# Patient Record
Sex: FEMALE | Race: WHITE | NOT HISPANIC OR LATINO | Employment: PART TIME | URBAN - METROPOLITAN AREA
[De-identification: names, ages, dates, MRNs, and addresses within clinical notes are randomized per-mention and may not be internally consistent; named-entity substitution may affect disease eponyms.]

---

## 2022-12-04 ENCOUNTER — APPOINTMENT (EMERGENCY)
Dept: CT IMAGING | Facility: HOSPITAL | Age: 62
End: 2022-12-04

## 2022-12-04 ENCOUNTER — HOSPITAL ENCOUNTER (INPATIENT)
Facility: HOSPITAL | Age: 62
LOS: 31 days | End: 2023-01-04
Attending: EMERGENCY MEDICINE | Admitting: SURGERY

## 2022-12-04 DIAGNOSIS — A41.9 SEPTIC SHOCK (HCC): ICD-10-CM

## 2022-12-04 DIAGNOSIS — R65.21 SEPTIC SHOCK (HCC): ICD-10-CM

## 2022-12-04 DIAGNOSIS — K57.32 SIGMOID DIVERTICULITIS: ICD-10-CM

## 2022-12-04 DIAGNOSIS — T81.43XA POSTOPERATIVE INTRA-ABDOMINAL ABSCESS: ICD-10-CM

## 2022-12-04 DIAGNOSIS — K57.92 DIVERTICULITIS: Primary | ICD-10-CM

## 2022-12-04 DIAGNOSIS — J90 PLEURAL EFFUSION: ICD-10-CM

## 2022-12-04 DIAGNOSIS — J96.01 ACUTE RESPIRATORY FAILURE WITH HYPOXIA (HCC): ICD-10-CM

## 2022-12-04 LAB
ALBUMIN SERPL BCP-MCNC: 4.4 G/DL (ref 3.5–5)
ALP SERPL-CCNC: 68 U/L (ref 34–104)
ALT SERPL W P-5'-P-CCNC: 17 U/L (ref 7–52)
ANION GAP SERPL CALCULATED.3IONS-SCNC: 9 MMOL/L (ref 4–13)
APTT PPP: 26 SECONDS (ref 23–37)
AST SERPL W P-5'-P-CCNC: 17 U/L (ref 13–39)
BASOPHILS # BLD AUTO: 0.04 THOUSANDS/ÂΜL (ref 0–0.1)
BASOPHILS NFR BLD AUTO: 1 % (ref 0–1)
BILIRUB SERPL-MCNC: 0.47 MG/DL (ref 0.2–1)
BILIRUB UR QL STRIP: NEGATIVE
BUN SERPL-MCNC: 18 MG/DL (ref 5–25)
CALCIUM SERPL-MCNC: 10 MG/DL (ref 8.4–10.2)
CHLORIDE SERPL-SCNC: 104 MMOL/L (ref 96–108)
CLARITY UR: CLEAR
CO2 SERPL-SCNC: 25 MMOL/L (ref 21–32)
COLOR UR: ABNORMAL
CREAT SERPL-MCNC: 0.9 MG/DL (ref 0.6–1.3)
EOSINOPHIL # BLD AUTO: 0.12 THOUSAND/ÂΜL (ref 0–0.61)
EOSINOPHIL NFR BLD AUTO: 3 % (ref 0–6)
ERYTHROCYTE [DISTWIDTH] IN BLOOD BY AUTOMATED COUNT: 12.5 % (ref 11.6–15.1)
GFR SERPL CREATININE-BSD FRML MDRD: 68 ML/MIN/1.73SQ M
GLUCOSE SERPL-MCNC: 104 MG/DL (ref 65–140)
GLUCOSE UR STRIP-MCNC: NEGATIVE MG/DL
HCT VFR BLD AUTO: 42.1 % (ref 34.8–46.1)
HGB BLD-MCNC: 14.1 G/DL (ref 11.5–15.4)
HGB UR QL STRIP.AUTO: NEGATIVE
IMM GRANULOCYTES # BLD AUTO: 0.01 THOUSAND/UL (ref 0–0.2)
IMM GRANULOCYTES NFR BLD AUTO: 0 % (ref 0–2)
INR PPP: 0.93 (ref 0.84–1.19)
KETONES UR STRIP-MCNC: NEGATIVE MG/DL
LEUKOCYTE ESTERASE UR QL STRIP: NEGATIVE
LIPASE SERPL-CCNC: 15 U/L (ref 11–82)
LYMPHOCYTES # BLD AUTO: 2.04 THOUSANDS/ÂΜL (ref 0.6–4.47)
LYMPHOCYTES NFR BLD AUTO: 43 % (ref 14–44)
MCH RBC QN AUTO: 31.7 PG (ref 26.8–34.3)
MCHC RBC AUTO-ENTMCNC: 33.5 G/DL (ref 31.4–37.4)
MCV RBC AUTO: 95 FL (ref 82–98)
MONOCYTES # BLD AUTO: 0.46 THOUSAND/ÂΜL (ref 0.17–1.22)
MONOCYTES NFR BLD AUTO: 10 % (ref 4–12)
NEUTROPHILS # BLD AUTO: 2.07 THOUSANDS/ÂΜL (ref 1.85–7.62)
NEUTS SEG NFR BLD AUTO: 43 % (ref 43–75)
NITRITE UR QL STRIP: NEGATIVE
NRBC BLD AUTO-RTO: 0 /100 WBCS
PH UR STRIP.AUTO: 8 [PH]
PLATELET # BLD AUTO: 248 THOUSANDS/UL (ref 149–390)
PMV BLD AUTO: 9.9 FL (ref 8.9–12.7)
POTASSIUM SERPL-SCNC: 3.8 MMOL/L (ref 3.5–5.3)
PROT SERPL-MCNC: 6.7 G/DL (ref 6.4–8.4)
PROT UR STRIP-MCNC: NEGATIVE MG/DL
PROTHROMBIN TIME: 12.5 SECONDS (ref 11.6–14.5)
RBC # BLD AUTO: 4.45 MILLION/UL (ref 3.81–5.12)
SODIUM SERPL-SCNC: 138 MMOL/L (ref 135–147)
SP GR UR STRIP.AUTO: 1.02
UROBILINOGEN UR QL STRIP.AUTO: 0.2 E.U./DL
WBC # BLD AUTO: 4.74 THOUSAND/UL (ref 4.31–10.16)

## 2022-12-04 RX ORDER — FENTANYL CITRATE 50 UG/ML
25 INJECTION, SOLUTION INTRAMUSCULAR; INTRAVENOUS EVERY 2 HOUR PRN
Status: DISCONTINUED | OUTPATIENT
Start: 2022-12-04 | End: 2022-12-05

## 2022-12-04 RX ORDER — SODIUM CHLORIDE, SODIUM LACTATE, POTASSIUM CHLORIDE, CALCIUM CHLORIDE 600; 310; 30; 20 MG/100ML; MG/100ML; MG/100ML; MG/100ML
75 INJECTION, SOLUTION INTRAVENOUS CONTINUOUS
Status: DISCONTINUED | OUTPATIENT
Start: 2022-12-04 | End: 2022-12-07

## 2022-12-04 RX ORDER — KETOROLAC TROMETHAMINE 30 MG/ML
15 INJECTION, SOLUTION INTRAMUSCULAR; INTRAVENOUS ONCE
Status: COMPLETED | OUTPATIENT
Start: 2022-12-04 | End: 2022-12-04

## 2022-12-04 RX ORDER — ACETAMINOPHEN 325 MG/1
975 TABLET ORAL EVERY 8 HOURS SCHEDULED
Status: DISCONTINUED | OUTPATIENT
Start: 2022-12-04 | End: 2022-12-05

## 2022-12-04 RX ORDER — ACETAMINOPHEN 325 MG/1
975 TABLET ORAL ONCE
Status: COMPLETED | OUTPATIENT
Start: 2022-12-04 | End: 2022-12-04

## 2022-12-04 RX ORDER — OMEPRAZOLE 40 MG/1
40 CAPSULE, DELAYED RELEASE ORAL 2 TIMES DAILY
COMMUNITY

## 2022-12-04 RX ORDER — ONDANSETRON 2 MG/ML
4 INJECTION INTRAMUSCULAR; INTRAVENOUS ONCE
Status: DISCONTINUED | OUTPATIENT
Start: 2022-12-04 | End: 2022-12-04

## 2022-12-04 RX ORDER — MAGNESIUM HYDROXIDE/ALUMINUM HYDROXICE/SIMETHICONE 120; 1200; 1200 MG/30ML; MG/30ML; MG/30ML
30 SUSPENSION ORAL ONCE
Status: COMPLETED | OUTPATIENT
Start: 2022-12-04 | End: 2022-12-04

## 2022-12-04 RX ORDER — FENTANYL CITRATE 50 UG/ML
25 INJECTION, SOLUTION INTRAMUSCULAR; INTRAVENOUS ONCE
Status: COMPLETED | OUTPATIENT
Start: 2022-12-04 | End: 2022-12-04

## 2022-12-04 RX ORDER — PANTOPRAZOLE SODIUM 40 MG/10ML
40 INJECTION, POWDER, LYOPHILIZED, FOR SOLUTION INTRAVENOUS EVERY 12 HOURS SCHEDULED
Status: DISCONTINUED | OUTPATIENT
Start: 2022-12-04 | End: 2022-12-23

## 2022-12-04 RX ORDER — PROMETHAZINE HYDROCHLORIDE 25 MG/ML
12.5 INJECTION, SOLUTION INTRAMUSCULAR; INTRAVENOUS EVERY 4 HOURS PRN
Status: DISCONTINUED | OUTPATIENT
Start: 2022-12-04 | End: 2022-12-05

## 2022-12-04 RX ORDER — DICYCLOMINE HCL 20 MG
20 TABLET ORAL ONCE
Status: COMPLETED | OUTPATIENT
Start: 2022-12-04 | End: 2022-12-04

## 2022-12-04 RX ORDER — LISINOPRIL 40 MG/1
40 TABLET ORAL DAILY
COMMUNITY

## 2022-12-04 RX ORDER — KETOROLAC TROMETHAMINE 30 MG/ML
15 INJECTION, SOLUTION INTRAMUSCULAR; INTRAVENOUS EVERY 6 HOURS SCHEDULED
Status: DISCONTINUED | OUTPATIENT
Start: 2022-12-04 | End: 2022-12-04

## 2022-12-04 RX ORDER — LIDOCAINE HYDROCHLORIDE 20 MG/ML
15 SOLUTION OROPHARYNGEAL ONCE
Status: COMPLETED | OUTPATIENT
Start: 2022-12-04 | End: 2022-12-04

## 2022-12-04 RX ORDER — AMLODIPINE BESYLATE 10 MG/1
10 TABLET ORAL DAILY
COMMUNITY

## 2022-12-04 RX ORDER — HEPARIN SODIUM 5000 [USP'U]/ML
5000 INJECTION, SOLUTION INTRAVENOUS; SUBCUTANEOUS EVERY 8 HOURS SCHEDULED
Status: DISCONTINUED | OUTPATIENT
Start: 2022-12-04 | End: 2022-12-05

## 2022-12-04 RX ORDER — ACETAMINOPHEN 650 MG/1
650 SUPPOSITORY RECTAL EVERY 4 HOURS PRN
Status: DISCONTINUED | OUTPATIENT
Start: 2022-12-04 | End: 2022-12-08

## 2022-12-04 RX ORDER — NEBIVOLOL 5 MG/1
5 TABLET ORAL DAILY
COMMUNITY

## 2022-12-04 RX ORDER — ONDANSETRON 2 MG/ML
4 INJECTION INTRAMUSCULAR; INTRAVENOUS EVERY 6 HOURS PRN
Status: DISCONTINUED | OUTPATIENT
Start: 2022-12-04 | End: 2022-12-22

## 2022-12-04 RX ORDER — FENTANYL CITRATE 50 UG/ML
50 INJECTION, SOLUTION INTRAMUSCULAR; INTRAVENOUS EVERY 2 HOUR PRN
Status: DISCONTINUED | OUTPATIENT
Start: 2022-12-04 | End: 2022-12-05

## 2022-12-04 RX ORDER — FENTANYL CITRATE 50 UG/ML
50 INJECTION, SOLUTION INTRAMUSCULAR; INTRAVENOUS ONCE
Status: DISCONTINUED | OUTPATIENT
Start: 2022-12-04 | End: 2022-12-05

## 2022-12-04 RX ORDER — KETOROLAC TROMETHAMINE 30 MG/ML
30 INJECTION, SOLUTION INTRAMUSCULAR; INTRAVENOUS EVERY 6 HOURS SCHEDULED
Status: DISCONTINUED | OUTPATIENT
Start: 2022-12-04 | End: 2022-12-05

## 2022-12-04 RX ORDER — ONDANSETRON 2 MG/ML
4 INJECTION INTRAMUSCULAR; INTRAVENOUS ONCE
Status: COMPLETED | OUTPATIENT
Start: 2022-12-04 | End: 2022-12-04

## 2022-12-04 RX ADMIN — ALUMINUM HYDROXIDE, MAGNESIUM HYDROXIDE, AND SIMETHICONE 30 ML: 200; 200; 20 SUSPENSION ORAL at 07:35

## 2022-12-04 RX ADMIN — ACETAMINOPHEN 975 MG: 325 TABLET ORAL at 07:19

## 2022-12-04 RX ADMIN — PROMETHAZINE HYDROCHLORIDE 12.5 MG: 25 INJECTION INTRAMUSCULAR; INTRAVENOUS at 15:52

## 2022-12-04 RX ADMIN — SODIUM CHLORIDE, SODIUM LACTATE, POTASSIUM CHLORIDE, AND CALCIUM CHLORIDE 1000 ML: .6; .31; .03; .02 INJECTION, SOLUTION INTRAVENOUS at 15:31

## 2022-12-04 RX ADMIN — SODIUM CHLORIDE 1000 ML: 0.9 INJECTION, SOLUTION INTRAVENOUS at 09:46

## 2022-12-04 RX ADMIN — ACETAMINOPHEN 975 MG: 325 TABLET ORAL at 21:13

## 2022-12-04 RX ADMIN — LIDOCAINE HYDROCHLORIDE 15 ML: 20 SOLUTION OROPHARYNGEAL at 07:35

## 2022-12-04 RX ADMIN — PIPERACILLIN AND TAZOBACTAM 3.38 G: 3; .375 INJECTION, POWDER, FOR SOLUTION INTRAVENOUS at 09:28

## 2022-12-04 RX ADMIN — KETOROLAC TROMETHAMINE 30 MG: 30 INJECTION, SOLUTION INTRAMUSCULAR at 23:54

## 2022-12-04 RX ADMIN — ONDANSETRON 4 MG: 2 INJECTION INTRAMUSCULAR; INTRAVENOUS at 09:28

## 2022-12-04 RX ADMIN — FENTANYL CITRATE 25 MCG: 50 INJECTION INTRAMUSCULAR; INTRAVENOUS at 09:43

## 2022-12-04 RX ADMIN — SODIUM CHLORIDE, POTASSIUM CHLORIDE, SODIUM LACTATE AND CALCIUM CHLORIDE 125 ML/HR: 600; 310; 30; 20 INJECTION, SOLUTION INTRAVENOUS at 10:48

## 2022-12-04 RX ADMIN — SODIUM CHLORIDE 1000 ML: 0.9 INJECTION, SOLUTION INTRAVENOUS at 07:18

## 2022-12-04 RX ADMIN — HEPARIN SODIUM 5000 UNITS: 5000 INJECTION INTRAVENOUS; SUBCUTANEOUS at 21:14

## 2022-12-04 RX ADMIN — FENTANYL CITRATE 50 MCG: 50 INJECTION INTRAMUSCULAR; INTRAVENOUS at 20:04

## 2022-12-04 RX ADMIN — SODIUM CHLORIDE, POTASSIUM CHLORIDE, SODIUM LACTATE AND CALCIUM CHLORIDE 125 ML/HR: 600; 310; 30; 20 INJECTION, SOLUTION INTRAVENOUS at 22:37

## 2022-12-04 RX ADMIN — FENTANYL CITRATE 50 MCG: 50 INJECTION INTRAMUSCULAR; INTRAVENOUS at 11:50

## 2022-12-04 RX ADMIN — ONDANSETRON 4 MG: 2 INJECTION INTRAMUSCULAR; INTRAVENOUS at 14:17

## 2022-12-04 RX ADMIN — KETOROLAC TROMETHAMINE 15 MG: 30 INJECTION, SOLUTION INTRAMUSCULAR at 07:19

## 2022-12-04 RX ADMIN — PROMETHAZINE HYDROCHLORIDE 12.5 MG: 25 INJECTION INTRAMUSCULAR; INTRAVENOUS at 20:04

## 2022-12-04 RX ADMIN — PANTOPRAZOLE SODIUM 40 MG: 40 INJECTION, POWDER, LYOPHILIZED, FOR SOLUTION INTRAVENOUS at 12:56

## 2022-12-04 RX ADMIN — FENTANYL CITRATE 50 MCG: 50 INJECTION INTRAMUSCULAR; INTRAVENOUS at 15:52

## 2022-12-04 RX ADMIN — IOHEXOL 100 ML: 350 INJECTION, SOLUTION INTRAVENOUS at 08:27

## 2022-12-04 RX ADMIN — PROMETHAZINE HYDROCHLORIDE 12.5 MG: 25 INJECTION INTRAMUSCULAR; INTRAVENOUS at 11:50

## 2022-12-04 RX ADMIN — PIPERACILLIN AND TAZOBACTAM 4.5 G: 4; .5 INJECTION, POWDER, FOR SOLUTION INTRAVENOUS at 21:14

## 2022-12-04 RX ADMIN — HEPARIN SODIUM 5000 UNITS: 5000 INJECTION INTRAVENOUS; SUBCUTANEOUS at 15:31

## 2022-12-04 RX ADMIN — PIPERACILLIN AND TAZOBACTAM 4.5 G: 4; .5 INJECTION, POWDER, FOR SOLUTION INTRAVENOUS at 17:16

## 2022-12-04 RX ADMIN — DICYCLOMINE HYDROCHLORIDE 20 MG: 20 TABLET ORAL at 07:35

## 2022-12-04 RX ADMIN — KETOROLAC TROMETHAMINE 30 MG: 30 INJECTION, SOLUTION INTRAMUSCULAR at 11:49

## 2022-12-04 RX ADMIN — PANTOPRAZOLE SODIUM 40 MG: 40 INJECTION, POWDER, LYOPHILIZED, FOR SOLUTION INTRAVENOUS at 21:12

## 2022-12-04 RX ADMIN — KETOROLAC TROMETHAMINE 30 MG: 30 INJECTION, SOLUTION INTRAMUSCULAR at 18:05

## 2022-12-04 NOTE — ED NOTES
Patient and  made aware that patient will be hold in ED until bed becomes available       Garrick Dennis RN  12/04/22 102

## 2022-12-04 NOTE — ED PROVIDER NOTES
History  Chief Complaint   Patient presents with   • Abdominal Pain     Patient reports lower abdominal pain that started at 0300  Patient reports cramping and nausea  Patient is a 59-year-old female the history of GERD, hiatal hernia, who presents for evaluation of lower abdominal pain  Patient says the symptoms started around 3:00 a m  this morning  She says it has been progressing  She describes it as sharp in nature  Does not radiate into her back or anywhere else  She denies any nausea, vomiting  Patient says that she had a small bowel movement yesterday  She denies any chest pain, shortness of breath  Patient has a history of multiple abdominal surgeries  She took some Pepto-Bismol and an aphthous at home which did not help  Prior to Admission Medications   Prescriptions Last Dose Informant Patient Reported? Taking? amLODIPine (NORVASC) 10 mg tablet 12/3/2022  Yes Yes   Sig: Take 10 mg by mouth daily   lisinopril (ZESTRIL) 40 mg tablet 12/3/2022  Yes Yes   Sig: Take 40 mg by mouth daily   nebivolol (BYSTOLIC) 5 mg tablet 88/7/5005  Yes Yes   Sig: Take 5 mg by mouth daily   omeprazole (PriLOSEC) 40 MG capsule 12/3/2022  Yes Yes   Sig: Take 40 mg by mouth 2 (two) times a day      Facility-Administered Medications: None       Past Medical History:   Diagnosis Date   • Asthma    • Bronchiectasis ([facility] [address] )    • Bronchomalacia    • GERD (gastroesophageal reflux disease)    • Hiatal hernia        Past Surgical History:   Procedure Laterality Date   • APPENDECTOMY     • CHOLECYSTECTOMY     • HYSTERECTOMY     • KNEE SURGERY Left        History reviewed  No pertinent family history  I have reviewed and agree with the history as documented      E-Cigarette/Vaping     E-Cigarette/Vaping Substances     Social History     Tobacco Use   • Smoking status: Never   • Smokeless tobacco: Never   Substance Use Topics   • Alcohol use: Not Currently   • Drug use: Not Currently       Review of Systems Constitutional: Negative for fever and unexpected weight change  HENT: Negative for congestion, ear pain, sore throat and trouble swallowing  Eyes: Negative for pain and redness  Respiratory: Negative for cough, chest tightness and shortness of breath  Cardiovascular: Negative for chest pain and leg swelling  Gastrointestinal: Positive for abdominal pain  Negative for abdominal distention, diarrhea and vomiting  Endocrine: Negative for polyuria  Genitourinary: Negative for dysuria, hematuria, pelvic pain and vaginal bleeding  Musculoskeletal: Negative for back pain and myalgias  Skin: Negative for rash  Neurological: Negative for dizziness, syncope, weakness, light-headedness and headaches  Physical Exam  Physical Exam  Vitals and nursing note reviewed  Constitutional:       General: She is in acute distress  Appearance: She is well-developed  HENT:      Head: Normocephalic and atraumatic  Right Ear: External ear normal       Left Ear: External ear normal       Nose: Nose normal       Mouth/Throat:      Mouth: Mucous membranes are moist       Pharynx: No oropharyngeal exudate  Eyes:      Conjunctiva/sclera: Conjunctivae normal       Pupils: Pupils are equal, round, and reactive to light  Cardiovascular:      Rate and Rhythm: Normal rate and regular rhythm  Heart sounds: Normal heart sounds  No murmur heard  No friction rub  No gallop  Pulmonary:      Effort: Pulmonary effort is normal  No respiratory distress  Breath sounds: Normal breath sounds  No wheezing or rales  Abdominal:      General: There is no distension  Palpations: Abdomen is soft  Tenderness: There is abdominal tenderness (diffuse)  There is no guarding  Musculoskeletal:         General: No swelling, tenderness or deformity  Normal range of motion  Cervical back: Normal range of motion and neck supple  Lymphadenopathy:      Cervical: No cervical adenopathy     Skin: General: Skin is warm and dry  Neurological:      General: No focal deficit present  Mental Status: She is alert and oriented to person, place, and time  Mental status is at baseline  Cranial Nerves: No cranial nerve deficit  Sensory: No sensory deficit  Motor: No weakness or abnormal muscle tone        Coordination: Coordination normal          Vital Signs  ED Triage Vitals [12/04/22 0710]   Temperature Pulse Respirations Blood Pressure SpO2   98 8 °F (37 1 °C) 81 20 150/73 96 %      Temp Source Heart Rate Source Patient Position - Orthostatic VS BP Location FiO2 (%)   Temporal Monitor Lying Left arm --      Pain Score       10 - Worst Possible Pain           Vitals:    12/04/22 1231 12/04/22 1300 12/04/22 1400 12/04/22 1500   BP: 108/55 109/58 99/55 111/59   Pulse: 73 71 79 78   Patient Position - Orthostatic VS: Lying            Visual Acuity      ED Medications  Medications   fentanyl citrate (PF) 100 MCG/2ML 50 mcg (0 mcg Intravenous Hold 12/4/22 0943)   lactated ringers infusion (125 mL/hr Intravenous New Bag 12/4/22 1048)   heparin (porcine) subcutaneous injection 5,000 Units (5,000 Units Subcutaneous Given 12/4/22 1531)   ondansetron (ZOFRAN) injection 4 mg (4 mg Intravenous Given 12/4/22 1417)   piperacillin-tazobactam (ZOSYN) IVPB 4 5 g (has no administration in time range)   acetaminophen (TYLENOL) tablet 975 mg (975 mg Oral Not Given 12/4/22 1149)   promethazine (PHENERGAN) injection 12 5 mg (12 5 mg Intravenous Given 12/4/22 1552)   pantoprazole (PROTONIX) injection 40 mg (40 mg Intravenous Given 12/4/22 1256)   fentanyl citrate (PF) 100 MCG/2ML 25 mcg (has no administration in time range)   fentanyl citrate (PF) 100 MCG/2ML 50 mcg (50 mcg Intravenous Given 12/4/22 1552)   ketorolac (TORADOL) injection 30 mg (30 mg Intravenous Given 12/4/22 1149)   lactated ringers bolus 1,000 mL (1,000 mL Intravenous New Bag 12/4/22 1531)   sodium chloride 0 9 % bolus 1,000 mL (0 mL Intravenous Stopped 12/4/22 0909)   ketorolac (TORADOL) injection 15 mg (15 mg Intravenous Given 12/4/22 0719)   acetaminophen (TYLENOL) tablet 975 mg (975 mg Oral Given 12/4/22 0719)   dicyclomine (BENTYL) tablet 20 mg (20 mg Oral Given 12/4/22 0735)   Lidocaine Viscous HCl (XYLOCAINE) 2 % mucosal solution 15 mL (15 mL Swish & Swallow Given 12/4/22 0735)   aluminum-magnesium hydroxide-simethicone (MYLANTA) oral suspension 30 mL (30 mL Oral Given 12/4/22 0735)   iohexol (OMNIPAQUE) 350 MG/ML injection (SINGLE-DOSE) 100 mL (100 mL Intravenous Given 12/4/22 0827)   piperacillin-tazobactam (ZOSYN) IVPB 3 375 g (0 g Intravenous Stopped 12/4/22 1016)   ondansetron (ZOFRAN) injection 4 mg (4 mg Intravenous Given 12/4/22 0928)   fentanyl citrate (PF) 100 MCG/2ML 25 mcg (25 mcg Intravenous Given 12/4/22 0943)   sodium chloride 0 9 % bolus 1,000 mL (0 mL Intravenous Stopped 12/4/22 1048)       Diagnostic Studies  Results Reviewed     Procedure Component Value Units Date/Time    UA (URINE) with reflex to Scope [[id number]]  (Abnormal) Collected: 12/04/22 0833    Lab Status: Final result Specimen: Urine, Other Updated: 12/04/22 0908     Color, UA Straw     Clarity, UA Clear     Specific [place], UA 1 020     pH, UA 8 0     Leukocytes, UA Negative     Nitrite, UA Negative     Protein, UA Negative mg/dl      Glucose, UA Negative mg/dl      Ketones, UA Negative mg/dl      Urobilinogen, UA 0 2 E U /dl      Bilirubin, UA Negative     Occult Blood, UA Negative    Comprehensive metabolic panel [[id number]] Collected: 12/04/22 0718    Lab Status: Final result Specimen: Blood from Arm, Right Updated: 12/04/22 0751     Sodium 138 mmol/L      Potassium 3 8 mmol/L      Chloride 104 mmol/L      CO2 25 mmol/L      ANION GAP 9 mmol/L      BUN 18 mg/dL      Creatinine 0 90 mg/dL      Glucose 104 mg/dL      Calcium 10 0 mg/dL      AST 17 U/L      ALT 17 U/L      Alkaline Phosphatase 68 U/L      Total Protein 6 7 g/dL      Albumin 4 4 g/dL      Total Bilirubin 0 47 mg/dL      eGFR 68 ml/min/1 73sq m     Narrative:      [place] guidelines for Chronic Kidney Disease (CKD):   •  Stage 1 with normal or high GFR (GFR > 90 mL/min/1 73 square meters)  •  Stage 2 Mild CKD (GFR = 60-89 mL/min/1 73 square meters)  •  Stage 3A Moderate CKD (GFR = 45-59 mL/min/1 73 square meters)  •  Stage 3B Moderate CKD (GFR = 30-44 mL/min/1 73 square meters)  •  Stage 4 Severe CKD (GFR = 15-29 mL/min/1 73 square meters)  •  Stage 5 End Stage CKD (GFR <15 mL/min/1 73 square meters)  Note: GFR calculation is accurate only with a steady state creatinine    Lipase [[id number]]  (Normal) Collected: 12/04/22 0718    Lab Status: Final result Specimen: Blood from Arm, Right Updated: 12/04/22 0751     Lipase 15 u/L     Protime-INR [[id number]]  (Normal) Collected: 12/04/22 0718    Lab Status: Final result Specimen: Blood from Arm, Right Updated: 12/04/22 0743     Protime 12 5 seconds      INR 0 93    APTT [[id number]]  (Normal) Collected: 12/04/22 0718    Lab Status: Final result Specimen: Blood from Arm, Right Updated: 12/04/22 0743     PTT 26 seconds     CBC and differential [[id number]] Collected: 12/04/22 0718    Lab Status: Final result Specimen: Blood from Arm, Right Updated: 12/04/22 0730     WBC 4 74 Thousand/uL      RBC 4 45 Million/uL      Hemoglobin 14 1 g/dL      Hematocrit 42 1 %      MCV 95 fL      MCH 31 7 pg      MCHC 33 5 g/dL      RDW 12 5 %      MPV 9 9 fL      Platelets 305 Thousands/uL      nRBC 0 /100 WBCs      Neutrophils Relative 43 %      Immat GRANS % 0 %      Lymphocytes Relative 43 %      Monocytes Relative 10 %      Eosinophils Relative 3 %      Basophils Relative 1 %      Neutrophils Absolute 2 07 Thousands/µL      Immature Grans Absolute 0 01 Thousand/uL      Lymphocytes Absolute 2 04 Thousands/µL      Monocytes Absolute 0 46 Thousand/µL      Eosinophils Absolute 0 12 Thousand/µL      Basophils Absolute 0 04 Thousands/µL                  CT abdomen pelvis with contrast   Final Result by Yumiko Browne DO (12/04 9272)      Acute sigmoid diverticulitis involving a large sigmoid diverticulum  There is a small amount of pneumoperitoneum suggesting small perforation  No abscess identified  The study was marked in [facility] for immediate notification  Workstation performed: [id number]                    Procedures  Procedures         ED Course  ED Course as of 12/04/22 1603   Sun Dec 04, 2022   7714 Patients pain improved s/p medications    0922 Spoke with Dr Davis Damian of general surgery  She says her team will see the patient  Said to start on Zosyn  4296 Updated patient and  on lab work, imaging and plan of care going forward   0935 Dr Davis Damian evaluated the patient in the emergency department  She will accept the patient to her service                               SBIRT 22yo+    Flowsheet Row Most Recent Value   SBIRT (25 yo +)    In order to provide better care to our patients, we are screening all of our patients for alcohol and drug use  Would it be okay to ask you these screening questions? Yes Filed at: 12/04/2022 1708   Initial Alcohol Screen: US AUDIT-C     1  How often do you have a drink containing alcohol? 0 Filed at: 12/04/2022 0714   2  How many drinks containing alcohol do you have on a typical day you are drinking? 0 Filed at: 12/04/2022 0714   3a  Male UNDER 65: How often do you have five or more drinks on one occasion? 0 Filed at: 12/04/2022 0714   3b  FEMALE Any Age, or MALE 65+: How often do you have 4 or more drinks on one occassion? 0 Filed at: 12/04/2022 0714   Audit-C Score 0 Filed at: 12/04/2022 5404   CLAUDIA: How many times in the past year have you    Used an illegal drug or used a prescription medication for non-medical reasons?  Never Filed at: 12/04/2022 7332                    MDM  Number of Diagnoses or Management Options  Diagnosis management comments: 63-year-old female presenting for lower abdominal pain   Started since 3:00 a m  progressing  Nonradiating  Diffuse abdominal tenderness on exam   No nausea, vomiting  No chest pain or shortness of breath  No urinary symptoms  Will obtain belly labs, CT abdomen pelvis  Will give Toradol, IV fluids, Zofran  Disposition  Final diagnoses:   Diverticulitis     Time reflects when diagnosis was documented in both MDM as applicable and the Disposition within this note     Time User Action Codes Description Comment    12/4/2022  9:20 AM Tama Gilford Asuncion [K57 [id number]] Diverticulitis       ED Disposition     ED Disposition   Admit    Condition   Stable    Date/Time   Sun Dec 4, 2022  9:35 AM    Comment   Case was discussed with General Surgeyr and the patient's admission status was agreed to be Admission Status: inpatient status to the service of Dr Moreau Last   Follow-up Information    None         Current Discharge Medication List      CONTINUE these medications which have NOT CHANGED    Details   amLODIPine (NORVASC) 10 mg tablet Take 10 mg by mouth daily      lisinopril (ZESTRIL) 40 mg tablet Take 40 mg by mouth daily      nebivolol (BYSTOLIC) 5 mg tablet Take 5 mg by mouth daily      omeprazole (PriLOSEC) 40 MG capsule Take 40 mg by mouth 2 (two) times a day             No discharge procedures on file      PDMP Review     None          ED Provider  Electronically Signed by           Lea Bolaños DO  12/04/22 8466

## 2022-12-04 NOTE — H&P
H&P Exam - General Surgery   Flora Garcia 58 y o  female MRN: [id number]  Unit/Bed#: ED 25 Encounter: [id number]    Assessment/Plan     62F with bronchiectasis and bronchomalacia, now with first episode complicated sigmoid diverticulitis with a small amount of pneumoperitoneum  Vitals and labs are normal  Chart reviewed, images reviewed directly by me  Patient with quite a bit of discomfort and tenderness but is overall soft  Will admit to the surgical service for close monitoring in the stepdown unit for bowel rest, IVF rehydration, IV antibiotics, scheduled antiinflammatories, pain and nausea control  Will hold home BP medications for now and give PPI via IV route  Will reassess frequently, patient understands that she may require repeat CT imaging, IR percutaneous drainage, operative management with colon resection, possible stoma  She is willing to proceed as we feel is appropriate  History of Present Illness     HPI:  Flora Garcia is a 58 y o  female who presents with first episode sigmoid diverticulitis  Symptoms started at 3 am with sharp abdominal pain on the left side radiating throughout  Had associated nausea, started to have episodes of emesis in the ED  Pain worse with movement  Labs and vitals normal  CT scan shows a large diverticulum with inflammatory changes in the sigmoid colon with some pericolonic air and a few specks of free air on the left side of the abdomen, no abscess  Patient has allergy to dilaudid, gets very nauseated with any narcotics  Fentanyl was tolerated in the past but still with some nausea  Hesitant to take pain medications but is very uncomfortable  Tylenol and toradol given and ordered scheduled  Zosyn given  No prior episodes of diverticulitis  Prior colonoscopy over 10 years ago  Maternal uncle had colon cancer at a young age, no other cancer in the family  Patient's  with a lot of experience with recurrent diverticulitis       Patient has bronchiectasis and bronchomalacia diagnosed in 2016, follows with pulmonary specialist at [facility]  Has had 2 laser treatments on her airways  Has not been down to see them since 2018/2019  Says things have been under control from that perspective  Had prior open cholecystectomy, appendectomy, hysterectomy  Not on blood thinners  No significant prior anesthetic complications  Review of Systems   Constitutional: Positive for activity change, appetite change and chills  Negative for diaphoresis, fatigue, fever and unexpected weight change  Gastrointestinal: Positive for abdominal pain, nausea and vomiting  Negative for constipation and diarrhea  All other systems reviewed and are negative        Historical Information   Past Medical History:   Diagnosis Date   • Asthma    • Bronchiectasis ([address] )    • Bronchomalacia    • GERD (gastroesophageal reflux disease)    • Hiatal hernia      Past Surgical History:   Procedure Laterality Date   • APPENDECTOMY     • CHOLECYSTECTOMY     • HYSTERECTOMY     • KNEE SURGERY Left      Social History   Social History     Substance and Sexual Activity   Alcohol Use Not Currently     Social History     Substance and Sexual Activity   Drug Use Not Currently     Social History     Tobacco Use   Smoking Status Never   Smokeless Tobacco Never     Family History: non-contributory    Meds/Allergies   all medications and allergies reviewed  Allergies   Allergen Reactions   • Dilaudid [Hydromorphone] Vomiting   • Doxycycline Vomiting   • Hydrochlorothiazide Vomiting   • Sulfa Antibiotics Vomiting       Objective   First Vitals:   Blood Pressure: 150/73 (12/04/22 0710)  Pulse: 81 (12/04/22 0710)  Temperature: 98 8 °F (37 1 °C) (12/04/22 0710)  Temp Source: Temporal (12/04/22 0710)  Respirations: 20 (12/04/22 0710)  Height: 5' 5" (165 1 cm) (12/04/22 0710)  Weight - Scale: 77 1 kg (170 lb) (12/04/22 0710)  SpO2: 96 % (12/04/22 0710)    Current Vitals:   Blood Pressure: 117/59 (12/04/22 0900)  Pulse: 61 (12/04/22 0900)  Temperature: 98 8 °F (37 1 °C) (12/04/22 0710)  Temp Source: Temporal (12/04/22 0710)  Respirations: 18 (12/04/22 0900)  Height: 5' 5" (165 1 cm) (12/04/22 0710)  Weight - Scale: 77 1 kg (170 lb) (12/04/22 0710)  SpO2: 96 % (12/04/22 0900)      Intake/Output Summary (Last 24 hours) at 12/4/2022 1152  Last data filed at 12/4/2022 1048  Gross per 24 hour   Intake 2100 ml   Output --   Net 2100 ml       Invasive Devices     Peripheral Intravenous Line  Duration           Peripheral IV 12/04/22 Left;Ventral (anterior) Forearm <1 day                Physical Exam  Vitals reviewed  Constitutional:       Appearance: She is obese  She is ill-appearing  HENT:      Mouth/Throat:      Mouth: Mucous membranes are dry  Eyes:      Pupils: Pupils are equal, round, and reactive to light  Cardiovascular:      Rate and Rhythm: Normal rate  Pulmonary:      Effort: Pulmonary effort is normal  No respiratory distress  Abdominal:      Comments: Soft, minimal distention, tender most notably in the left mid and lower abdomen with some rebound tenderness, some tenderness on the right mid abdomen as well but distractible   Skin:     General: Skin is warm and dry  Capillary Refill: Capillary refill takes less than 2 seconds  Neurological:      General: No focal deficit present  Mental Status: She is alert  Mental status is at baseline  Psychiatric:         Mood and Affect: Mood normal          Lab Results:   I have personally reviewed pertinent lab results    , CBC:   Lab Results   Component Value Date    WBC 4 74 12/04/2022    HGB 14 1 12/04/2022    HCT 42 1 12/04/2022    MCV 95 12/04/2022     12/04/2022    MCH 31 7 12/04/2022    MCHC 33 5 12/04/2022    RDW 12 5 12/04/2022    MPV 9 9 12/04/2022    NRBC 0 12/04/2022   , CMP:   Lab Results   Component Value Date    SODIUM 138 12/04/2022    K 3 8 12/04/2022     12/04/2022    CO2 25 12/04/2022    BUN 18 12/04/2022    CREATININE 0 90 12/04/2022 CALCIUM 10 0 12/04/2022    AST 17 12/04/2022    ALT 17 12/04/2022    ALKPHOS 68 12/04/2022    EGFR 68 12/04/2022     Imaging: I have personally reviewed pertinent reports  and I have personally reviewed pertinent films in PACS          Signature:   Jordi Resendiz MD  Date: 12/4/2022 Time: 11:52 AM

## 2022-12-04 NOTE — ED NOTES
Patient transported to [facility], [address] [place] [address]  12/04/22 8130

## 2022-12-04 NOTE — ED NOTES
Patient stating 10/10  Continues to refuse narcotics  Provider notified       Nathan Clovin, [address] [facility]  12/04/22 0906

## 2022-12-04 NOTE — PLAN OF CARE
Problem: Potential for Falls  Goal: Patient will remain free of falls  Description: INTERVENTIONS:  - Educate patient/family on patient safety including physical limitations  - Instruct patient to call for assistance with activity   - Consult OT/PT to assist with strengthening/mobility   - Keep Call bell within reach  - Keep bed low and locked with side rails adjusted as appropriate  - Keep care items and personal belongings within reach  - Initiate and maintain comfort rounds  - Make Fall Risk Sign visible to staff  - Offer Toileting every 2 Hours, in advance of need  - Initiate/Maintain bed alarm  - Obtain necessary fall risk management equipment:   Problem: PAIN - ADULT  Goal: Verbalizes/displays adequate comfort level or baseline comfort level  Description: Interventions:  - Encourage patient to monitor pain and request assistance  - Assess pain using appropriate pain scale  - Administer analgesics based on type and severity of pain and evaluate response  - Implement non-pharmacological measures as appropriate and evaluate response  - Consider cultural and social influences on pain and pain management  - Notify physician/advanced practitioner if interventions unsuccessful or patient reports new pain  Outcome: Progressing     Problem: INFECTION - ADULT  Goal: Absence or prevention of progression during hospitalization  Description: INTERVENTIONS:  - Assess and monitor for signs and symptoms of infection  - Monitor lab/diagnostic results  - Monitor all insertion sites, i e  indwelling lines, tubes, and drains  - Monitor endotracheal if appropriate and nasal secretions for changes in amount and color  - [place] appropriate cooling/warming therapies per order  - Administer medications as ordered  - Instruct and encourage patient and family to use good hand hygiene technique  - Identify and instruct in appropriate isolation precautions for identified infection/condition  Outcome: Progressing  Goal: Absence of fever/infection during neutropenic period  Description: INTERVENTIONS:  - Monitor WBC    Outcome: Progressing     Problem: Knowledge Deficit  Goal: Patient/family/caregiver demonstrates understanding of disease process, treatment plan, medications, and discharge instructions  Description: Complete learning assessment and assess knowledge base    Interventions:  - Provide teaching at level of understanding  - Provide teaching via preferred learning methods  Outcome: Progressing     - Apply yellow socks and bracelet for high fall risk patients  - Consider moving patient to room near nurses station  Outcome: Progressing

## 2022-12-04 NOTE — ED NOTES
[facility]     [facility]  12/04/22 9953

## 2022-12-04 NOTE — ED NOTES
Patient agreeable to "low dose" fentanyl at this time       [facility] [employer], [address] [facility]  12/04/22 3895

## 2022-12-05 ENCOUNTER — APPOINTMENT (INPATIENT)
Dept: RADIOLOGY | Facility: HOSPITAL | Age: 62
End: 2022-12-05

## 2022-12-05 ENCOUNTER — ANESTHESIA EVENT (INPATIENT)
Dept: PERIOP | Facility: HOSPITAL | Age: 62
End: 2022-12-05

## 2022-12-05 ENCOUNTER — ANESTHESIA (INPATIENT)
Dept: PERIOP | Facility: HOSPITAL | Age: 62
End: 2022-12-05

## 2022-12-05 PROBLEM — J98.09 BRONCHOMALACIA: Status: ACTIVE | Noted: 2022-12-05

## 2022-12-05 PROBLEM — I10 HYPERTENSION: Status: ACTIVE | Noted: 2022-12-05

## 2022-12-05 PROBLEM — A41.9 SEVERE SEPSIS (HCC): Status: ACTIVE | Noted: 2022-12-05

## 2022-12-05 PROBLEM — J96.01 ACUTE RESPIRATORY FAILURE WITH HYPOXIA (HCC): Status: ACTIVE | Noted: 2022-12-05

## 2022-12-05 PROBLEM — R65.20 SEVERE SEPSIS (HCC): Status: ACTIVE | Noted: 2022-12-05

## 2022-12-05 PROBLEM — R65.21 SEPTIC SHOCK (HCC): Status: ACTIVE | Noted: 2022-12-05

## 2022-12-05 PROBLEM — J45.909 ASTHMA: Status: ACTIVE | Noted: 2022-12-05

## 2022-12-05 PROBLEM — K57.32 SIGMOID DIVERTICULITIS: Status: ACTIVE | Noted: 2022-12-04

## 2022-12-05 PROBLEM — K21.9 GERD (GASTROESOPHAGEAL REFLUX DISEASE): Status: ACTIVE | Noted: 2022-12-05

## 2022-12-05 LAB
ABO GROUP BLD: NORMAL
ABO GROUP BLD: NORMAL
ALBUMIN SERPL BCP-MCNC: 2.9 G/DL (ref 3.5–5)
ALP SERPL-CCNC: 34 U/L (ref 34–104)
ALT SERPL W P-5'-P-CCNC: 13 U/L (ref 7–52)
ANION GAP SERPL CALCULATED.3IONS-SCNC: 8 MMOL/L (ref 4–13)
ANION GAP SERPL CALCULATED.3IONS-SCNC: 8 MMOL/L (ref 4–13)
APTT PPP: 35 SECONDS (ref 23–37)
AST SERPL W P-5'-P-CCNC: 18 U/L (ref 13–39)
BASE EXCESS BLDA CALC-SCNC: -4.3 MMOL/L
BASE EXCESS BLDA CALC-SCNC: -6 MMOL/L (ref -2–3)
BASOPHILS # BLD AUTO: 0.01 THOUSANDS/ÂΜL (ref 0–0.1)
BASOPHILS # BLD MANUAL: 0 THOUSAND/UL (ref 0–0.1)
BASOPHILS NFR BLD AUTO: 1 % (ref 0–1)
BASOPHILS NFR MAR MANUAL: 0 % (ref 0–1)
BILIRUB SERPL-MCNC: 0.61 MG/DL (ref 0.2–1)
BLD GP AB SCN SERPL QL: NEGATIVE
BUN SERPL-MCNC: 16 MG/DL (ref 5–25)
BUN SERPL-MCNC: 16 MG/DL (ref 5–25)
CA-I BLD-SCNC: 1.12 MMOL/L (ref 1.12–1.32)
CA-I BLD-SCNC: 1.22 MMOL/L (ref 1.12–1.32)
CALCIUM ALBUM COR SERPL-MCNC: 9 MG/DL (ref 8.3–10.1)
CALCIUM SERPL-MCNC: 8.1 MG/DL (ref 8.4–10.2)
CALCIUM SERPL-MCNC: 8.4 MG/DL (ref 8.4–10.2)
CHLORIDE SERPL-SCNC: 106 MMOL/L (ref 96–108)
CHLORIDE SERPL-SCNC: 110 MMOL/L (ref 96–108)
CO2 SERPL-SCNC: 21 MMOL/L (ref 21–32)
CO2 SERPL-SCNC: 26 MMOL/L (ref 21–32)
CREAT SERPL-MCNC: 0.78 MG/DL (ref 0.6–1.3)
CREAT SERPL-MCNC: 0.93 MG/DL (ref 0.6–1.3)
EOSINOPHIL # BLD AUTO: 0 THOUSAND/ÂΜL (ref 0–0.61)
EOSINOPHIL # BLD MANUAL: 0 THOUSAND/UL (ref 0–0.4)
EOSINOPHIL NFR BLD AUTO: 0 % (ref 0–6)
EOSINOPHIL NFR BLD MANUAL: 0 % (ref 0–6)
ERYTHROCYTE [DISTWIDTH] IN BLOOD BY AUTOMATED COUNT: 12.8 % (ref 11.6–15.1)
ERYTHROCYTE [DISTWIDTH] IN BLOOD BY AUTOMATED COUNT: 13 % (ref 11.6–15.1)
FLUAV RNA RESP QL NAA+PROBE: NEGATIVE
FLUBV RNA RESP QL NAA+PROBE: NEGATIVE
GFR SERPL CREATININE-BSD FRML MDRD: 66 ML/MIN/1.73SQ M
GFR SERPL CREATININE-BSD FRML MDRD: 81 ML/MIN/1.73SQ M
GLUCOSE SERPL-MCNC: 84 MG/DL (ref 65–140)
GLUCOSE SERPL-MCNC: 95 MG/DL (ref 65–140)
GLUCOSE SERPL-MCNC: 98 MG/DL (ref 65–140)
HCO3 BLDA-SCNC: 21 MMOL/L (ref 22–28)
HCO3 BLDA-SCNC: 21.4 MMOL/L (ref 22–28)
HCT VFR BLD AUTO: 34.3 % (ref 34.8–46.1)
HCT VFR BLD AUTO: 39.1 % (ref 34.8–46.1)
HCT VFR BLD CALC: 30 % (ref 34.8–46.1)
HGB BLD-MCNC: 11.3 G/DL (ref 11.5–15.4)
HGB BLD-MCNC: 13 G/DL (ref 11.5–15.4)
HGB BLDA-MCNC: 10.2 G/DL (ref 11.5–15.4)
HIV 1+2 AB+HIV1 P24 AG SERPL QL IA: NORMAL
HIV1 P24 AG SER QL: NORMAL
HOROWITZ INDEX BLDA+IHG-RTO: 70 MM[HG]
IMM GRANULOCYTES # BLD AUTO: 0.01 THOUSAND/UL (ref 0–0.2)
IMM GRANULOCYTES NFR BLD AUTO: 1 % (ref 0–2)
INR PPP: 2.19 (ref 0.84–1.19)
LACTATE SERPL-SCNC: 1.9 MMOL/L (ref 0.5–2)
LYMPHOCYTES # BLD AUTO: 0.13 THOUSANDS/ÂΜL (ref 0.6–4.47)
LYMPHOCYTES # BLD AUTO: 0.76 THOUSAND/UL (ref 0.6–4.47)
LYMPHOCYTES # BLD AUTO: 32 % (ref 14–44)
LYMPHOCYTES NFR BLD AUTO: 7 % (ref 14–44)
MAGNESIUM SERPL-MCNC: 1.6 MG/DL (ref 1.9–2.7)
MAGNESIUM SERPL-MCNC: 2 MG/DL (ref 1.9–2.7)
MCH RBC QN AUTO: 31.7 PG (ref 26.8–34.3)
MCH RBC QN AUTO: 31.9 PG (ref 26.8–34.3)
MCHC RBC AUTO-ENTMCNC: 32.9 G/DL (ref 31.4–37.4)
MCHC RBC AUTO-ENTMCNC: 33.2 G/DL (ref 31.4–37.4)
MCV RBC AUTO: 96 FL (ref 82–98)
MCV RBC AUTO: 96 FL (ref 82–98)
METAMYELOCYTES NFR BLD MANUAL: 8 % (ref 0–1)
MONOCYTES # BLD AUTO: 0.07 THOUSAND/UL (ref 0–1.22)
MONOCYTES # BLD AUTO: 0.09 THOUSAND/ÂΜL (ref 0.17–1.22)
MONOCYTES NFR BLD AUTO: 5 % (ref 4–12)
MONOCYTES NFR BLD: 3 % (ref 4–12)
MYELOCYTES NFR BLD MANUAL: 2 % (ref 0–1)
NEUTROPHILS # BLD AUTO: 1.59 THOUSANDS/ÂΜL (ref 1.85–7.62)
NEUTROPHILS # BLD MANUAL: 1.31 THOUSAND/UL (ref 1.85–7.62)
NEUTS BAND NFR BLD MANUAL: 21 % (ref 0–8)
NEUTS SEG NFR BLD AUTO: 34 % (ref 43–75)
NEUTS SEG NFR BLD AUTO: 86 % (ref 43–75)
NRBC BLD AUTO-RTO: 0 /100 WBCS
O2 CT BLDA-SCNC: 16.1 ML/DL (ref 16–23)
OXYHGB MFR BLDA: 93.6 % (ref 94–97)
PCO2 BLD: 22 MMOL/L (ref 21–32)
PCO2 BLD: 46.6 MM HG (ref 36–44)
PCO2 BLDA: 41.3 MM HG (ref 36–44)
PEEP RESPIRATORY: 6 CM[H2O]
PH BLD: 7.26 [PH] (ref 7.35–7.45)
PH BLDA: 7.33 [PH] (ref 7.35–7.45)
PHOSPHATE SERPL-MCNC: 3.3 MG/DL (ref 2.3–4.1)
PHOSPHATE SERPL-MCNC: 4.3 MG/DL (ref 2.3–4.1)
PLATELET # BLD AUTO: 166 THOUSANDS/UL (ref 149–390)
PLATELET # BLD AUTO: 181 THOUSANDS/UL (ref 149–390)
PLATELET BLD QL SMEAR: ADEQUATE
PMV BLD AUTO: 10.2 FL (ref 8.9–12.7)
PMV BLD AUTO: 9.9 FL (ref 8.9–12.7)
PO2 BLD: 75 MM HG (ref 75–129)
PO2 BLDA: 72.1 MM HG (ref 75–129)
POTASSIUM BLD-SCNC: 3.8 MMOL/L (ref 3.5–5.3)
POTASSIUM SERPL-SCNC: 4 MMOL/L (ref 3.5–5.3)
POTASSIUM SERPL-SCNC: 4.1 MMOL/L (ref 3.5–5.3)
PROCALCITONIN SERPL-MCNC: 17.61 NG/ML
PROT SERPL-MCNC: 4.6 G/DL (ref 6.4–8.4)
PROTHROMBIN TIME: 24.3 SECONDS (ref 11.6–14.5)
RBC # BLD AUTO: 3.57 MILLION/UL (ref 3.81–5.12)
RBC # BLD AUTO: 4.08 MILLION/UL (ref 3.81–5.12)
RBC MORPH BLD: NORMAL
RH BLD: POSITIVE
RH BLD: POSITIVE
RSV RNA RESP QL NAA+PROBE: NEGATIVE
SAO2 % BLD FROM PO2: 92 % (ref 60–85)
SARS-COV-2 RNA RESP QL NAA+PROBE: NEGATIVE
SODIUM BLD-SCNC: 140 MMOL/L (ref 136–145)
SODIUM SERPL-SCNC: 139 MMOL/L (ref 135–147)
SODIUM SERPL-SCNC: 140 MMOL/L (ref 135–147)
SPECIMEN EXPIRATION DATE: NORMAL
SPECIMEN SOURCE: ABNORMAL
SPECIMEN SOURCE: ABNORMAL
VENT AC: 14
VT SETTING VENT: 480 ML
WBC # BLD AUTO: 1.83 THOUSAND/UL (ref 4.31–10.16)
WBC # BLD AUTO: 2.38 THOUSAND/UL (ref 4.31–10.16)

## 2022-12-05 PROCEDURE — 0DTN0ZZ RESECTION OF SIGMOID COLON, OPEN APPROACH: ICD-10-PCS | Performed by: SURGERY

## 2022-12-05 PROCEDURE — 0DCW0ZZ EXTIRPATION OF MATTER FROM PERITONEUM, OPEN APPROACH: ICD-10-PCS | Performed by: SURGERY

## 2022-12-05 PROCEDURE — 0D1L0Z4 BYPASS TRANSVERSE COLON TO CUTANEOUS, OPEN APPROACH: ICD-10-PCS | Performed by: SURGERY

## 2022-12-05 PROCEDURE — 0DJD8ZZ INSPECTION OF LOWER INTESTINAL TRACT, VIA NATURAL OR ARTIFICIAL OPENING ENDOSCOPIC: ICD-10-PCS | Performed by: SURGERY

## 2022-12-05 RX ORDER — DEXAMETHASONE SODIUM PHOSPHATE 10 MG/ML
INJECTION, SOLUTION INTRAMUSCULAR; INTRAVENOUS AS NEEDED
Status: DISCONTINUED | OUTPATIENT
Start: 2022-12-05 | End: 2022-12-05

## 2022-12-05 RX ORDER — BUDESONIDE 0.5 MG/2ML
0.5 INHALANT ORAL
Status: DISCONTINUED | OUTPATIENT
Start: 2022-12-05 | End: 2022-12-16

## 2022-12-05 RX ORDER — CEFAZOLIN SODIUM 1 G/3ML
INJECTION, POWDER, FOR SOLUTION INTRAMUSCULAR; INTRAVENOUS AS NEEDED
Status: DISCONTINUED | OUTPATIENT
Start: 2022-12-05 | End: 2022-12-05

## 2022-12-05 RX ORDER — DEXMEDETOMIDINE HYDROCHLORIDE 100 UG/ML
INJECTION, SOLUTION INTRAVENOUS AS NEEDED
Status: DISCONTINUED | OUTPATIENT
Start: 2022-12-05 | End: 2022-12-05

## 2022-12-05 RX ORDER — HEPARIN SODIUM 5000 [USP'U]/ML
5000 INJECTION, SOLUTION INTRAVENOUS; SUBCUTANEOUS EVERY 8 HOURS SCHEDULED
Status: DISCONTINUED | OUTPATIENT
Start: 2022-12-05 | End: 2022-12-05

## 2022-12-05 RX ORDER — PROPOFOL 10 MG/ML
5-50 INJECTION, EMULSION INTRAVENOUS
Status: DISCONTINUED | OUTPATIENT
Start: 2022-12-05 | End: 2022-12-07

## 2022-12-05 RX ORDER — ROCURONIUM BROMIDE 10 MG/ML
INJECTION, SOLUTION INTRAVENOUS AS NEEDED
Status: DISCONTINUED | OUTPATIENT
Start: 2022-12-05 | End: 2022-12-05

## 2022-12-05 RX ORDER — MAGNESIUM HYDROXIDE 1200 MG/15ML
LIQUID ORAL AS NEEDED
Status: DISCONTINUED | OUTPATIENT
Start: 2022-12-05 | End: 2022-12-05 | Stop reason: HOSPADM

## 2022-12-05 RX ORDER — ALBUTEROL SULFATE 90 UG/1
AEROSOL, METERED RESPIRATORY (INHALATION) AS NEEDED
Status: DISCONTINUED | OUTPATIENT
Start: 2022-12-05 | End: 2022-12-05

## 2022-12-05 RX ORDER — CHLORHEXIDINE GLUCONATE 0.12 MG/ML
15 RINSE ORAL EVERY 12 HOURS SCHEDULED
Status: DISCONTINUED | OUTPATIENT
Start: 2022-12-05 | End: 2022-12-07

## 2022-12-05 RX ORDER — LEVALBUTEROL 1.25 MG/.5ML
1.25 SOLUTION, CONCENTRATE RESPIRATORY (INHALATION)
Status: DISCONTINUED | OUTPATIENT
Start: 2022-12-06 | End: 2022-12-17

## 2022-12-05 RX ORDER — SODIUM CHLORIDE FOR INHALATION 0.9 %
3 VIAL, NEBULIZER (ML) INHALATION
Status: DISCONTINUED | OUTPATIENT
Start: 2022-12-06 | End: 2022-12-17

## 2022-12-05 RX ORDER — SUCCINYLCHOLINE/SOD CL,ISO/PF 100 MG/5ML
SYRINGE (ML) INTRAVENOUS AS NEEDED
Status: DISCONTINUED | OUTPATIENT
Start: 2022-12-05 | End: 2022-12-05

## 2022-12-05 RX ORDER — FENTANYL CITRATE 50 UG/ML
INJECTION, SOLUTION INTRAMUSCULAR; INTRAVENOUS AS NEEDED
Status: DISCONTINUED | OUTPATIENT
Start: 2022-12-05 | End: 2022-12-05

## 2022-12-05 RX ORDER — LIDOCAINE HYDROCHLORIDE 10 MG/ML
INJECTION, SOLUTION EPIDURAL; INFILTRATION; INTRACAUDAL; PERINEURAL
Status: COMPLETED | OUTPATIENT
Start: 2022-12-05 | End: 2022-12-05

## 2022-12-05 RX ORDER — SODIUM CHLORIDE FOR INHALATION 0.9 %
3 VIAL, NEBULIZER (ML) INHALATION
Status: DISCONTINUED | OUTPATIENT
Start: 2022-12-05 | End: 2022-12-05

## 2022-12-05 RX ORDER — METRONIDAZOLE 500 MG/100ML
INJECTION, SOLUTION INTRAVENOUS CONTINUOUS PRN
Status: DISCONTINUED | OUTPATIENT
Start: 2022-12-05 | End: 2022-12-05

## 2022-12-05 RX ORDER — LEVALBUTEROL 1.25 MG/.5ML
1.25 SOLUTION, CONCENTRATE RESPIRATORY (INHALATION)
Status: DISCONTINUED | OUTPATIENT
Start: 2022-12-05 | End: 2022-12-05

## 2022-12-05 RX ORDER — MAGNESIUM SULFATE HEPTAHYDRATE 40 MG/ML
4 INJECTION, SOLUTION INTRAVENOUS ONCE
Status: COMPLETED | OUTPATIENT
Start: 2022-12-05 | End: 2022-12-05

## 2022-12-05 RX ORDER — SODIUM CHLORIDE, SODIUM LACTATE, POTASSIUM CHLORIDE, CALCIUM CHLORIDE 600; 310; 30; 20 MG/100ML; MG/100ML; MG/100ML; MG/100ML
INJECTION, SOLUTION INTRAVENOUS CONTINUOUS PRN
Status: DISCONTINUED | OUTPATIENT
Start: 2022-12-05 | End: 2022-12-05

## 2022-12-05 RX ORDER — ALBUMIN, HUMAN INJ 5% 5 %
SOLUTION INTRAVENOUS CONTINUOUS PRN
Status: DISCONTINUED | OUTPATIENT
Start: 2022-12-05 | End: 2022-12-05

## 2022-12-05 RX ORDER — PROPOFOL 10 MG/ML
INJECTION, EMULSION INTRAVENOUS AS NEEDED
Status: DISCONTINUED | OUTPATIENT
Start: 2022-12-05 | End: 2022-12-05

## 2022-12-05 RX ORDER — FENTANYL CITRATE 50 UG/ML
50 INJECTION, SOLUTION INTRAMUSCULAR; INTRAVENOUS
Status: DISCONTINUED | OUTPATIENT
Start: 2022-12-05 | End: 2022-12-07

## 2022-12-05 RX ORDER — FENTANYL CITRATE-0.9 % NACL/PF 10 MCG/ML
75 PLASTIC BAG, INJECTION (ML) INTRAVENOUS CONTINUOUS
Status: DISCONTINUED | OUTPATIENT
Start: 2022-12-05 | End: 2022-12-07

## 2022-12-05 RX ORDER — ONDANSETRON 2 MG/ML
INJECTION INTRAMUSCULAR; INTRAVENOUS AS NEEDED
Status: DISCONTINUED | OUTPATIENT
Start: 2022-12-05 | End: 2022-12-05

## 2022-12-05 RX ADMIN — PROPOFOL 150 MG: 10 INJECTION, EMULSION INTRAVENOUS at 14:35

## 2022-12-05 RX ADMIN — ALBUTEROL SULFATE 2 PUFF: 90 INHALANT RESPIRATORY (INHALATION) at 14:25

## 2022-12-05 RX ADMIN — FENTANYL CITRATE 50 MCG: 50 INJECTION, SOLUTION INTRAMUSCULAR; INTRAVENOUS at 15:44

## 2022-12-05 RX ADMIN — ONDANSETRON 4 MG: 2 INJECTION INTRAMUSCULAR; INTRAVENOUS at 18:29

## 2022-12-05 RX ADMIN — MAGNESIUM SULFATE HEPTAHYDRATE 4 G: 40 INJECTION, SOLUTION INTRAVENOUS at 06:12

## 2022-12-05 RX ADMIN — PROPOFOL 50 MCG/KG/MIN: 10 INJECTION, EMULSION INTRAVENOUS at 20:51

## 2022-12-05 RX ADMIN — SODIUM CHLORIDE, POTASSIUM CHLORIDE, SODIUM LACTATE AND CALCIUM CHLORIDE 125 ML/HR: 600; 310; 30; 20 INJECTION, SOLUTION INTRAVENOUS at 06:12

## 2022-12-05 RX ADMIN — FENTANYL CITRATE 100 MCG: 50 INJECTION, SOLUTION INTRAMUSCULAR; INTRAVENOUS at 17:45

## 2022-12-05 RX ADMIN — CHLORHEXIDINE GLUCONATE 0.12% ORAL RINSE 15 ML: 1.2 LIQUID ORAL at 20:51

## 2022-12-05 RX ADMIN — PIPERACILLIN AND TAZOBACTAM 4.5 G: 4; .5 INJECTION, POWDER, FOR SOLUTION INTRAVENOUS at 04:15

## 2022-12-05 RX ADMIN — PANTOPRAZOLE SODIUM 40 MG: 40 INJECTION, POWDER, LYOPHILIZED, FOR SOLUTION INTRAVENOUS at 08:58

## 2022-12-05 RX ADMIN — DEXMEDETOMIDINE HCL 8 MCG: 100 INJECTION INTRAVENOUS at 17:45

## 2022-12-05 RX ADMIN — FENTANYL CITRATE 50 MCG: 50 INJECTION INTRAMUSCULAR; INTRAVENOUS at 05:16

## 2022-12-05 RX ADMIN — KETOROLAC TROMETHAMINE 30 MG: 30 INJECTION, SOLUTION INTRAMUSCULAR at 11:55

## 2022-12-05 RX ADMIN — FENTANYL CITRATE 50 MCG: 50 INJECTION, SOLUTION INTRAMUSCULAR; INTRAVENOUS at 15:30

## 2022-12-05 RX ADMIN — PIPERACILLIN AND TAZOBACTAM 4.5 G: 4; .5 INJECTION, POWDER, FOR SOLUTION INTRAVENOUS at 22:30

## 2022-12-05 RX ADMIN — SODIUM CHLORIDE, SODIUM LACTATE, POTASSIUM CHLORIDE, AND CALCIUM CHLORIDE: .6; .31; .03; .02 INJECTION, SOLUTION INTRAVENOUS at 15:59

## 2022-12-05 RX ADMIN — KETOROLAC TROMETHAMINE 30 MG: 30 INJECTION, SOLUTION INTRAMUSCULAR at 05:36

## 2022-12-05 RX ADMIN — SODIUM CHLORIDE, SODIUM LACTATE, POTASSIUM CHLORIDE, AND CALCIUM CHLORIDE 1000 ML: .6; .31; .03; .02 INJECTION, SOLUTION INTRAVENOUS at 23:45

## 2022-12-05 RX ADMIN — ROCURONIUM BROMIDE 20 MG: 10 INJECTION INTRAVENOUS at 18:06

## 2022-12-05 RX ADMIN — CEFAZOLIN 2000 MG: 1 INJECTION, POWDER, FOR SOLUTION INTRAMUSCULAR; INTRAVENOUS at 19:18

## 2022-12-05 RX ADMIN — ONDANSETRON 4 MG: 2 INJECTION INTRAMUSCULAR; INTRAVENOUS at 12:52

## 2022-12-05 RX ADMIN — PIPERACILLIN AND TAZOBACTAM 4.5 G: 4; .5 INJECTION, POWDER, FOR SOLUTION INTRAVENOUS at 08:54

## 2022-12-05 RX ADMIN — FENTANYL CITRATE 50 MCG: 50 INJECTION INTRAMUSCULAR; INTRAVENOUS at 10:01

## 2022-12-05 RX ADMIN — LIDOCAINE HYDROCHLORIDE 80 MG: 20 INJECTION INTRAVENOUS at 14:35

## 2022-12-05 RX ADMIN — FENTANYL CITRATE 50 MCG: 50 INJECTION INTRAMUSCULAR; INTRAVENOUS at 20:32

## 2022-12-05 RX ADMIN — ROCURONIUM BROMIDE 50 MG: 10 INJECTION INTRAVENOUS at 14:45

## 2022-12-05 RX ADMIN — ALBUMIN (HUMAN): 12.5 INJECTION, SOLUTION INTRAVENOUS at 14:48

## 2022-12-05 RX ADMIN — PROMETHAZINE HYDROCHLORIDE 12.5 MG: 25 INJECTION INTRAMUSCULAR; INTRAVENOUS at 09:58

## 2022-12-05 RX ADMIN — SODIUM CHLORIDE, SODIUM LACTATE, POTASSIUM CHLORIDE, AND CALCIUM CHLORIDE: .6; .31; .03; .02 INJECTION, SOLUTION INTRAVENOUS at 17:44

## 2022-12-05 RX ADMIN — PHENYLEPHRINE HYDROCHLORIDE 20 MCG/MIN: 10 INJECTION INTRAVENOUS at 15:06

## 2022-12-05 RX ADMIN — DEXMEDETOMIDINE HCL 8 MCG: 100 INJECTION INTRAVENOUS at 16:39

## 2022-12-05 RX ADMIN — CEFAZOLIN 2000 MG: 1 INJECTION, POWDER, FOR SOLUTION INTRAMUSCULAR; INTRAVENOUS at 15:18

## 2022-12-05 RX ADMIN — FENTANYL CITRATE 50 MCG: 50 INJECTION INTRAMUSCULAR; INTRAVENOUS at 01:59

## 2022-12-05 RX ADMIN — Medication 100 MG: at 14:35

## 2022-12-05 RX ADMIN — ACETAMINOPHEN 975 MG: 325 TABLET ORAL at 05:37

## 2022-12-05 RX ADMIN — METRONIDAZOLE: 500 INJECTION, SOLUTION INTRAVENOUS at 15:15

## 2022-12-05 RX ADMIN — DEXMEDETOMIDINE HCL 12 MCG: 100 INJECTION INTRAVENOUS at 14:35

## 2022-12-05 RX ADMIN — PANTOPRAZOLE SODIUM 40 MG: 40 INJECTION, POWDER, LYOPHILIZED, FOR SOLUTION INTRAVENOUS at 20:51

## 2022-12-05 RX ADMIN — HEPARIN SODIUM 5000 UNITS: 5000 INJECTION INTRAVENOUS; SUBCUTANEOUS at 05:37

## 2022-12-05 RX ADMIN — PROPOFOL 50 MCG/KG/MIN: 10 INJECTION, EMULSION INTRAVENOUS at 22:44

## 2022-12-05 RX ADMIN — ROCURONIUM BROMIDE 10 MG: 10 INJECTION INTRAVENOUS at 16:18

## 2022-12-05 RX ADMIN — ROCURONIUM BROMIDE 20 MG: 10 INJECTION INTRAVENOUS at 15:02

## 2022-12-05 RX ADMIN — ALBUMIN (HUMAN): 12.5 INJECTION, SOLUTION INTRAVENOUS at 14:30

## 2022-12-05 RX ADMIN — SODIUM CHLORIDE, SODIUM LACTATE, POTASSIUM CHLORIDE, AND CALCIUM CHLORIDE: .6; .31; .03; .02 INJECTION, SOLUTION INTRAVENOUS at 14:23

## 2022-12-05 RX ADMIN — ONDANSETRON 4 MG: 2 INJECTION INTRAMUSCULAR; INTRAVENOUS at 05:16

## 2022-12-05 RX ADMIN — ROCURONIUM BROMIDE 10 MG: 10 INJECTION INTRAVENOUS at 17:47

## 2022-12-05 RX ADMIN — SODIUM CHLORIDE, SODIUM LACTATE, POTASSIUM CHLORIDE, AND CALCIUM CHLORIDE 500 ML: .6; .31; .03; .02 INJECTION, SOLUTION INTRAVENOUS at 08:46

## 2022-12-05 RX ADMIN — DEXAMETHASONE SODIUM PHOSPHATE 8 MG: 10 INJECTION, SOLUTION INTRAMUSCULAR; INTRAVENOUS at 14:41

## 2022-12-05 RX ADMIN — Medication 50 MCG/HR: at 20:52

## 2022-12-05 RX ADMIN — PROMETHAZINE HYDROCHLORIDE 12.5 MG: 25 INJECTION INTRAMUSCULAR; INTRAVENOUS at 02:00

## 2022-12-05 RX ADMIN — SODIUM CHLORIDE, SODIUM LACTATE, POTASSIUM CHLORIDE, AND CALCIUM CHLORIDE 1000 ML: 600; 310; 30; 20 INJECTION, SOLUTION INTRAVENOUS at 00:00

## 2022-12-05 RX ADMIN — LIDOCAINE HYDROCHLORIDE 2 ML: 10 INJECTION, SOLUTION EPIDURAL; INFILTRATION; INTRACAUDAL; PERINEURAL at 14:25

## 2022-12-05 RX ADMIN — FENTANYL CITRATE 50 MCG: 50 INJECTION INTRAMUSCULAR; INTRAVENOUS at 12:51

## 2022-12-05 NOTE — ANESTHESIA PROCEDURE NOTES
Central Line Insertion  Performed by: Davis Lindsay MD  Authorized by: Davis Lindsay MD     Date/Time: 12/5/2022 3:03 PM  Catheter Type:  triple lumen  Consent: Written consent obtained  Risks and benefits: risks, benefits and alternatives were discussed  Consent given by: patient  Indications: vascular access  Catheter size: 7 Fr  Patient position: Trendelenburg    Sedation:  Patient sedated: Under GA      Complications: local hematoma  Assessment: blood return through all ports  Preparation: skin prepped with 2% chlorhexidine  Skin prep agent dried: skin prep agent completely dried prior to procedure  Sterile barriers: all five maximum sterile barriers used - cap, mask, sterile gown, sterile gloves, and large sterile sheet  Hand hygiene: hand hygiene performed prior to central venous catheter insertion  sterile gel and probe cover used in ultrasound-guided central venous catheter insertionPre-procedure: landmarks identified  Number of attempts: 1  Successful placement: yes  Post-procedure: line sutured and dressing applied  Patient tolerance: Patient tolerated the procedure well with no immediate complications

## 2022-12-05 NOTE — UTILIZATION REVIEW
Inpatient Admission Authorization Request   NOTIFICATION OF INPATIENT ADMISSION/INPATIENT AUTHORIZATION REQUEST   SERVICING FACILITY:   [facility] [address]  [address] [facility] Kishore Javier, [address]  Tax ID: [id number]  NPI: [id number]  Place of Service: Inpatient [facility] [facility]  [address]  Place of Service Code: 24     ATTENDING PROVIDER:  Attending Name and NPI#: Brianda [place], [place] [[id number]]  Address: [address] [facility] [place] [place]avier, [address]  Phone: [phone]     UTILIZATION REVIEW CONTACT:  Abelardo Carrizales Utilization   Network Utilization Review Department  Phone: [phone]  Fax [phone]  Email: [email or web address]     PHYSICIAN ADVISORY SERVICES:  FOR PILA-YL-LKSE REVIEW - MEDICAL NECESSITY DENIAL  Phone: [phone]  Fax: [phone]  Email: [email or web address]     TYPE OF REQUEST:  Inpatient Status     ADMISSION INFORMATION:  ADMISSION DATE/TIME: 12/4/22  9:35 AM  PATIENT DIAGNOSIS CODE/DESCRIPTION:  Diverticulitis [K57 92]  Abdominal pain [R10 9]  DISCHARGE DATE/TIME: No discharge date for patient encounter  IMPORTANT INFORMATION:  Please contact the Abelardo Carrizales directly with any questions or concerns regarding this request  Department voicemails are confidential     Send requests for admission clinical reviews, concurrent reviews, approvals, and administrative denials due to lack of clinical to fax [phone]

## 2022-12-05 NOTE — PROGRESS NOTES
Progress Note - General Surgery   Stalin Hernandez 58 y o  female MRN: [id number]  Unit/Bed#: ICU 11-01 Encounter: [id number]    Assessment:  58year old female with past medical history significant for bronchiectasis, bronchomalacia, and hiatal hernia presenting with acute complicated sigmoid diverticulitis    -Febrile overnight with Tmax 101 8 now with temperature of 99 7, hypotension with SBP 80s to 90s, on 4L supplemental O2  -UO 1 3L over last 24 hours, 200cc recorded since 0400  -one episode of emesis recorded at 1400 12/4, no additional episodes recorded   -WBC 2 38   -Hgb 13 0  -Cr 0 93   -hypomagnesia, 1 6   -hyperphosphatemia, 4 3   -patient with unchanged generalized abdominal pain, notes minimal relief from pain medications    Plan:  500 cc LR bolus, s/p 1L bolus last evening   NPO  IVF   IV Zosyn   Replete elytes   Scheduled tylenol and toradol   Antiemetics and analgesics prn   Serial abd exams   Monitor vitals and UO  To be evaluated by attending to determine if operative management necessary at this time     Subjective/Objective   Subjective: Patient febrile overnight with tmax 101 8 and hypotensive with SBP 80s to 90s  Reports unchanged abdominal pain and notes minimal relief with pain medications  Denies passing flatus or bowel movement since admission  Multiple episodes of vomiting reported yesterday but no episodes overnight  Objective:   Blood pressure 90/59, pulse 75, temperature 99 7 °F (37 6 °C), temperature source Bladder, resp  rate 16, height 5' 5" (1 651 m), weight 80 7 kg (177 lb 14 6 oz), SpO2 90 %  ,Body mass index is 29 61 kg/m²        Intake/Output Summary (Last 24 hours) at 12/5/2022 0814  Last data filed at 12/5/2022 0800  Gross per 24 hour   Intake 5850 ml   Output 1365 ml   Net 4485 ml       Invasive Devices     Peripheral Intravenous Line  Duration           Peripheral IV 12/04/22 Left;Ventral (anterior) Forearm <1 day          Drain  Duration           Urethral Catheter Temperature probe 16 Fr  <1 day              Physical Exam  Vitals and nursing note reviewed  Constitutional:       General: She is not in acute distress  Appearance: She is obese  She is ill-appearing  She is not toxic-appearing  Interventions: Nasal cannula in place  HENT:      Head: Normocephalic and atraumatic  Cardiovascular:      Rate and Rhythm: Normal rate and regular rhythm  Pulses: Normal pulses  Heart sounds: Normal heart sounds  No murmur heard  No friction rub  No gallop  Pulmonary:      Effort: Pulmonary effort is normal  No respiratory distress  Breath sounds: Normal breath sounds  No wheezing, rhonchi or rales  Abdominal:      General: Bowel sounds are decreased  There is distension  Palpations: Abdomen is soft  Tenderness: There is generalized abdominal tenderness (light percussion and palpation)  There is guarding  Musculoskeletal:         General: Normal range of motion  Right lower leg: No edema  Left lower leg: No edema  Skin:     General: Skin is warm and dry  Neurological:      General: No focal deficit present  Mental Status: She is alert and oriented to person, place, and time  Psychiatric:         Mood and Affect: Mood normal          Behavior: Behavior normal          Thought Content: Thought content normal        Lab, Imaging and other studies:  I have personally reviewed pertinent lab results      CBC:   Lab Results   Component Value Date    WBC 2 38 (L) 12/05/2022    HGB 13 0 12/05/2022    HCT 39 1 12/05/2022    MCV 96 12/05/2022     12/05/2022    MCH 31 9 12/05/2022    MCHC 33 2 12/05/2022    RDW 12 8 12/05/2022    MPV 9 9 12/05/2022     CMP:   Lab Results   Component Value Date    SODIUM 140 12/05/2022    K 4 0 12/05/2022     12/05/2022    CO2 26 12/05/2022    BUN 16 12/05/2022    CREATININE 0 93 12/05/2022    CALCIUM 8 4 12/05/2022    EGFR 66 12/05/2022     VTE Pharmacologic Prophylaxis: Heparin  VTE Mechanical Prophylaxis: sequential compression device    Los Garcia PA-C

## 2022-12-05 NOTE — UTILIZATION REVIEW
Initial Clinical Review    Admission: Date/Time/Statement:   Admission Orders (From admission, onward)     Ordered        12/04/22 1200  INPATIENT ADMISSION  Once                      Orders Placed This Encounter   Procedures   • INPATIENT ADMISSION     Standing Status:   Standing     Number of Occurrences:   1     Order Specific Question:   Level of Care     Answer:   Level 2 Stepdown / HOT [14]     Order Specific Question:   Estimated length of stay     Answer:   More than 2 Midnights     Order Specific Question:   Certification     Answer:   I certify that inpatient services are medically necessary for this patient for a duration of greater than two midnights  See H&P and MD Progress Notes for additional information about the patient's course of treatment  ED Arrival Information     Expected   12/4/2022     Arrival   12/4/2022 07:07    Acuity   Urgent            Means of arrival   Ambulance    Escorted by   [facility]    Service   Surgery-General    Admission type   Emergency            Arrival complaint   Abdominal Pain           Chief Complaint   Patient presents with   • Abdominal Pain     Patient reports lower abdominal pain that started at 0300  Patient reports cramping and nausea  Initial Presentation: 58 y o  female to the ED from home via EMS with complaints of abdominal pain, nausea, cramping which started about 4 hours prior to arrival  Admitted to CRITICAL CARE step down for diverticulitis  H/O   GERD, hiatal hernia  Arrives tachypneic  Given IV toradol, iv fentanyl and IV fluids, zofran in the ED  CT a/p shows: Diverticulitis with pneumoperitoneum  Keep NPO  Notably tender in left mid and lower abdomen with rebound tenderness  Date: 12/5   Day 2:   Febrile overnight  Given IV fluid bolus  SBP in 80s/90s  Continue with IV abx  Had an episode of vomiting overnight  COntinue with serial abdominal exams  KEep NPO   Generalized abdominal tenderness, abdominal distension, with guarding       ED Triage Vitals [12/04/22 0710]   Temperature Pulse Respirations Blood Pressure SpO2   98 8 °F (37 1 °C) 81 20 150/73 96 %      Temp Source Heart Rate Source Patient Position - Orthostatic VS BP Location FiO2 (%)   Temporal Monitor Lying Left arm --      Pain Score       10 - Worst Possible Pain          Wt Readings from Last 1 Encounters:   12/05/22 80 7 kg (177 lb 14 6 oz)     Additional Vital Signs:   Date/Time Temp Pulse Resp BP MAP (mmHg) SpO2 Calculated FIO2 (%) - Nasal Cannula Nasal Cannula O2 Flow Rate (L/min) O2 Device Patient Position - Orthostatic VS   12/05/22 1200 99 1 °F (37 3 °C) 79 19 98/56 71 88 % Abnormal  40 5 L/min  -- Lying   Nasal Cannula O2 Flow Rate (L/min): increase 6 at 12/05/22 1200   12/05/22 1100 98 8 °F (37 1 °C) 76 15 95/55 70 87 % Abnormal  36 4 L/min  -- Lying   Nasal Cannula O2 Flow Rate (L/min): increase to 5 at 12/05/22 1100   12/05/22 1000 99 3 °F (37 4 °C) 78 25 Abnormal  97/56 73 88 % Abnormal  -- -- -- Lying   12/05/22 0600 100 4 °F (38 °C) 75 16 81/53 Abnormal  63 Abnormal  91 % -- -- -- --   12/05/22 0546 100 4 °F (38 °C) 76 18 -- -- 91 % 36 4 L/min Nasal cannula --   12/05/22 0500 100 °F (37 8 °C) 79 17 127/75 95 90 % -- -- -- --   12/05/22 0400 99 5 °F (37 5 °C) 77 22 118/71 89 91 % -- -- -- --   12/05/22 0300 98 8 °F (37 1 °C) 71 15 113/61 81 90 % -- -- -- --   12/04/22 2300 101 5 °F (38 6 °C) Abnormal  75 21 98/56 73 94 % -- -- -- --   12/04/22 2200 101 8 °F (38 8 °C) Abnormal  76 21 91/54 68 93 % -- -- -- --   12/04/22 2113 101 2 °F (38 4 °C) Abnormal  79 23 Abnormal  96/57 70 93 % -- -- -- --   12/04/22 2100 -- 77 20 -- -- 93 % -- -- -- --   12/04/22 2004 -- 79 20 106/63 80 93 % -- -- -- --   12/04/22 2000 -- 79 21 106/63 80 93 % -- -- -- --   12/04/22 1900 100 6 °F (38 1 °C) Abnormal  84 17 103/60 76 92 % 32 3 L/min Nasal cannula Lying   12/04/22 1847 -- 88 23 Abnormal  -- -- 92 % -- -- -- --   12/04/22 1808 -- -- -- 108/66 82 -- -- -- -- --   12/04/22 1600 99 6 °F (37 6 °C) 90 21 93/51 67 80 % Abnormal  -- -- -- --   12/04/22 1500 -- 78 28 Abnormal  111/59 79 90 % -- -- -- --   12/04/22 1400 -- 79 26 Abnormal  99/55 72 90 % -- -- -- --   12/04/22 1300 -- 71 20 109/58 76 92 % -- -- -- --   12/04/22 1231 99 4 °F (37 4 °C) 73 23 Abnormal  108/55 78 94 % -- -- None (Room air) Lying   12/04/22 1200 -- 71 18 118/60 -- 93 % -- -- None (Room air) Sitting   12/04/22 0900 -- 61 18 117/59 82 96 % -- -- None (Room air) Sitting   12/04/22 0845 -- 60 18 145/78 -- 96 % -- -- None (Room air) Lying   12/04/22 0715 -- 77 -- 150/73 101 96 % -- -- -- --   12/04/22 0710 98 8 °F (37 1 °C) 81 20 150/73 -- 96 % -- -- None (Room air) Lying       Pertinent Labs/Diagnostic Test Results:   CT abdomen pelvis with contrast   Final Result by Kwame Marie DO (12/04 6473)      Acute sigmoid diverticulitis involving a large sigmoid diverticulum  There is a small amount of pneumoperitoneum suggesting small perforation  No abscess identified  The study was marked in [facility] for immediate notification        Workstation performed: [id number]               Results from last 7 days   Lab Units 12/05/22  0507 12/04/22  0718   WBC Thousand/uL 2 38* 4 74   HEMOGLOBIN g/dL 13 0 14 1   HEMATOCRIT % 39 1 42 1   PLATELETS Thousands/uL 181 248   NEUTROS ABS Thousands/µL  --  2 07   BANDS PCT % 21*  --          Results from last 7 days   Lab Units 12/05/22  0507 12/04/22  0718   SODIUM mmol/L 140 138   POTASSIUM mmol/L 4 0 3 8   CHLORIDE mmol/L 106 104   CO2 mmol/L 26 25   ANION GAP mmol/L 8 9   BUN mg/dL 16 18   CREATININE mg/dL 0 93 0 90   EGFR ml/min/1 73sq m 66 68   CALCIUM mg/dL 8 4 10 0   MAGNESIUM mg/dL 1 6*  --    PHOSPHORUS mg/dL 4 3*  --      Results from last 7 days   Lab Units 12/04/22  0718   AST U/L 17   ALT U/L 17   ALK PHOS U/L 68   TOTAL PROTEIN g/dL 6 7   ALBUMIN g/dL 4 4   TOTAL BILIRUBIN mg/dL 0 47         Results from last 7 days   Lab Units 12/05/22  0507 12/04/22  0718   GLUCOSE RANDOM mg/dL 95 104           Results from last 7 days   Lab Units 12/04/22  0718   PROTIME seconds 12 5   INR  0 93   PTT seconds 26       Results from last 7 days   Lab Units 12/04/22  0718   LIPASE u/L 15       Results from last 7 days   Lab Units 12/04/22  0833   CLARITY UA  Clear   COLOR UA  Straw   SPEC GRAV UA  1 020   PH UA  8 0*   GLUCOSE UA mg/dl Negative   KETONES UA mg/dl Negative   BLOOD UA  Negative   PROTEIN UA mg/dl Negative   NITRITE UA  Negative   BILIRUBIN UA  Negative   UROBILINOGEN UA E U /dl 0 2   LEUKOCYTES UA  Negative       ED Treatment:   Medication Administration from 12/04/2022 0707 to 12/04/2022 1219       Date/Time Order Dose Route Action Action by Comments     12/04/2022 0718 EST sodium chloride 0 9 % bolus 1,000 mL 1,000 mL Intravenous New Bag Lynsey Chili, RN --     12/04/2022 0719 EST ketorolac (TORADOL) injection 15 mg 15 mg Intravenous Given Lynsey Maico, RN --     12/04/2022 0719 EST acetaminophen (TYLENOL) tablet 975 mg 975 mg Oral Given Lynsey Chili, RN --     12/04/2022 0735 EST dicyclomine (BENTYL) tablet 20 mg 20 mg Oral Given Lynsey Chili, RN --     12/04/2022 0735 EST Lidocaine Viscous HCl (XYLOCAINE) 2 % mucosal solution 15 mL 15 mL Swish & Swallow Given Lynsey Maico, RN --     12/04/2022 0735 EST aluminum-magnesium hydroxide-simethicone (MYLANTA) oral suspension 30 mL 30 mL Oral Given Lynsey Maico, RN --     12/04/2022 0928 EST piperacillin-tazobactam (ZOSYN) IVPB 3 375 g 3 375 g Intravenous New Bag Lynsey Maico, RN --     12/04/2022 0928 EST ondansetron (ZOFRAN) injection 4 mg 4 mg Intravenous Given Lynsey Maico, RN --     12/04/2022 5242 EST fentanyl citrate (PF) 100 MCG/2ML 25 mcg 25 mcg Intravenous Given Lynsey Chili, RN --     12/04/2022 0946 EST sodium chloride 0 9 % bolus 1,000 mL 1,000 mL Intravenous New Bag Lynsey Chili, RN --     12/04/2022 1048 EST lactated ringers infusion 125 mL/hr Intravenous New Bag Amanda Acuña RN --     12/04/2022 1150 EST promethazine (PHENERGAN) injection 12 5 mg 12 5 mg Intravenous Given Amanda Acuña RN --     12/04/2022 1150 EST fentanyl citrate (PF) 100 MCG/2ML 50 mcg 50 mcg Intravenous Given Amanda Acuña RN --     12/04/2022 1149 EST ketorolac (TORADOL) injection 30 mg 30 mg Intravenous Given Amanda Acuña RN --        Past Medical History:   Diagnosis Date   • Asthma    • Bronchiectasis ([address] )    • Bronchomalacia    • GERD (gastroesophageal reflux disease)    • Hiatal hernia        Admitting Diagnosis: Diverticulitis [K57 92]  Abdominal pain [R10 9]  Age/Sex: 58 y o  female  Admission Orders:  NPO  Scheduled Medications:  acetaminophen, 975 mg, Oral, Q8H EAN  fentanyl citrate (PF), 50 mcg, Intravenous, Once  heparin (porcine), 5,000 Units, Subcutaneous, Q8H EAN  ketorolac, 30 mg, Intravenous, Q6H EAN  pantoprazole, 40 mg, Intravenous, Q12H EAN  piperacillin-tazobactam, 4 5 g, Intravenous, Q6H      Continuous IV Infusions:  lactated ringers, 125 mL/hr, Intravenous, Continuous      PRN Meds:  acetaminophen, 650 mg, Rectal, Q4H PRN  fentanyl citrate (PF), 25 mcg, Intravenous, Q2H PRN  fentanyl citrate (PF), 50 mcg, Intravenous, Q2H PRN x 3 on 12/4, 12/5   ondansetron, 4 mg, Intravenous, Q6H PRN  promethazine, 12 5 mg, Intravenous, Q4H PRN        IP CONSULT TO ACUTE CARE SURGERY  IP CONSULT TO MEDICAL CRITICAL CARE    Network Utilization Review Department  ATTENTION: Please call with any questions or concerns to [phone] and carefully listen to the prompts so that you are directed to the right person  All voicemails are confidential   Pamela Marshall all requests for admission clinical reviews, approved or denied determinations and any other requests to dedicated fax number below belonging to the campus where the patient is receiving treatment   List of dedicated fax numbers for the Facilities:  FACILITY NAME UR FAX NUMBER   ADMISSION DENIALS (Administrative/Medical Necessity) [phone]   [address] (Maternity/NICU/Pediatrics) Nancy Savage [address] [place]la Crockett  [phone]   [facility] [address] [place] [address] [facility] [address] [facility] [place] [address] [facility] [phone]

## 2022-12-05 NOTE — PLAN OF CARE
Problem: PAIN - ADULT  Goal: Verbalizes/displays adequate comfort level or baseline comfort level  Description: Interventions:  - Encourage patient to monitor pain and request assistance  - Assess pain using appropriate pain scale  - Administer analgesics based on type and severity of pain and evaluate response  - Implement non-pharmacological measures as appropriate and evaluate response  - Consider cultural and social influences on pain and pain management  - Notify physician/advanced practitioner if interventions unsuccessful or patient reports new pain  Outcome: Not Progressing     Problem: INFECTION - ADULT  Goal: Absence of fever/infection during neutropenic period  Description: INTERVENTIONS:  - Monitor WBC    Outcome: Not Progressing     Problem: SAFETY ADULT  Goal: Maintain or return to baseline ADL function  Description: INTERVENTIONS:  -  Assess patient's ability to carry out ADLs; assess patient's baseline for ADL function and identify physical deficits which impact ability to perform ADLs (bathing, care of mouth/teeth, toileting, grooming, dressing, etc )  - Assess/evaluate cause of self-care deficits   - Assess range of motion  - Assess patient's mobility; develop plan if impaired  - Assess patient's need for assistive devices and provide as appropriate  - Encourage maximum independence but intervene and supervise when necessary  - Involve family in performance of ADLs  - Assess for home care needs following discharge   - Consider OT consult to assist with ADL evaluation and planning for discharge  - Provide patient education as appropriate  Outcome: Not Progressing

## 2022-12-05 NOTE — PROGRESS NOTES
[id number]: Received report from day shift RN  [id number]: Pt lying in bed with  at bedside  Pt c/o 10/10 pain with goal of 5  Pt assisted to use bedpan  Pt has increased pain with movement  Pt also c/o pain with urination  Pt could not distinguish if pain was with trying to push the urine out or if the pain was with urination itself  Discussed placing a arcos in for comfort & more accurate output  Pt initially declined, but then changed her mind after having to move around to use bedpan  Temp 100 6 oral  Notified Dr Shannan Friedman of above information  Pt medicated with PRN Fentanyl & Phenergan for 10/10 pain  Pt receives antiemetic with opioid pain medication due to side effects of pain med  2130: 16 FR temp sensing arcos inserted under sterile technique  Pt tolerated procedure well  230 ml clear yellow urine drained within 10 minutes of insertion  Pt given PO tylenol  0000: Updated Dr Shannan Friedman of pt condition  HR 70-80's, bp 90's while asleep, temp was as high as 101 8, now down to 101 1  UO dropped to 45 ml since 2200; yellow/hazy  Order received for 1L bolus over 2 hours; started promptly  Pt denies nausea, but c/o 10/10 abdominal pain  Pt sleepy, arouses easily & falls back to sleep quickly  Pt given scheduled Toradol  Sleeping comfortably at this time, call bell within reach  0200: PRN Phenergan & fentanyl given  0515: PRN zofran & fentanyl given prior to being given a chlorhexidine bath  Fever had broken during the night  Pt was all sweaty & possibility for OR today  Pt was then medicated with scheduled tylenol for low grade fever and toradol for continued 10/10 pain  Pt rates as 10/10 every time   States that "it takes the edge off, but doesn't really take the pain away "

## 2022-12-05 NOTE — ANESTHESIA PROCEDURE NOTES
Arterial Line Insertion  Performed by: Susy Chen MD  Authorized by: Susy Chen MD   Consent: Written consent obtained  Risks and benefits: risks, benefits and alternatives were discussed  Preparation: Patient was prepped and draped in the usual sterile fashion  Indications: multiple ABGs, respiratory failure and hemodynamic monitoring  Orientation:  Left  Location: radial arterylidocaine (PF) (XYLOCAINE-MPF) 1 % - Infiltration   2 mL - 12/5/2022 2:25:00 PM  Sedation:  Patient sedated: yes  Sedation type: anxiolysis    Procedure Details:  Needle gauge: 20  Seldinger technique: Seldinger technique used  Number of attempts: 1    Post-procedure:  Post-procedure: dressing applied  Waveform: good waveform  Post-procedure CNS: normal  Patient tolerance: Patient tolerated the procedure well with no immediate complications  Comments: Ultrasound guided

## 2022-12-05 NOTE — PLAN OF CARE
Problem: Potential for Falls  Goal: Patient will remain free of falls  Description: INTERVENTIONS:  - Educate patient/family on patient safety including physical limitations  - Instruct patient to call for assistance with activity   - Consult OT/PT to assist with strengthening/mobility   - Keep Call bell within reach  - Keep bed low and locked with side rails adjusted as appropriate  - Keep care items and personal belongings within reach  - Initiate and maintain comfort rounds  - Make Fall Risk Sign visible to staff  - Offer Toileting every 2 Hours, in advance of need  - Initiate/Maintain bed alarm as needed  Problem: PAIN - ADULT  Goal: Verbalizes/displays adequate comfort level or baseline comfort level  Description: Interventions:  - Encourage patient to monitor pain and request assistance  - Assess pain using appropriate pain scale  - Administer analgesics based on type and severity of pain and evaluate response  - Implement non-pharmacological measures as appropriate and evaluate response  - Consider cultural and social influences on pain and pain management  - Notify physician/advanced practitioner if interventions unsuccessful or patient reports new pain  Outcome: Progressing     Problem: INFECTION - ADULT  Goal: Absence or prevention of progression during hospitalization  Description: INTERVENTIONS:  - Assess and monitor for signs and symptoms of infection  - Monitor lab/diagnostic results  - Monitor all insertion sites, i e  indwelling lines, tubes, and drains  - Monitor endotracheal if appropriate and nasal secretions for changes in amount and color  - [place] appropriate cooling/warming therapies per order  - Administer medications as ordered  - Instruct and encourage patient and family to use good hand hygiene technique  - Identify and instruct in appropriate isolation precautions for identified infection/condition  Outcome: Progressing     Problem: INFECTION - ADULT  Goal: Absence of fever/infection during neutropenic period  Description: INTERVENTIONS:  - Monitor WBC    Outcome: Progressing     Problem: SAFETY ADULT  Goal: Patient will remain free of falls  Description: INTERVENTIONS:  - Educate patient/family on patient safety including physical limitations  - Instruct patient to call for assistance with activity   - Consult OT/PT to assist with strengthening/mobility   - Keep Call bell within reach  - Keep bed low and locked with side rails adjusted as appropriate  - Keep care items and personal belongings within reach  - Initiate and maintain comfort rounds  - Make Fall Risk Sign visible to staff  - Offer Toileting every 2 Hours, in advance of need  Problem: DISCHARGE PLANNING  Goal: Discharge to home or other facility with appropriate resources  Description: INTERVENTIONS:  - Identify barriers to discharge w/patient and caregiver  - Arrange for needed discharge resources and transportation as appropriate  - Identify discharge learning needs (meds, wound care, etc )  - Arrange for interpretive services to assist at discharge as needed  - Refer to Case Management Department for coordinating discharge planning if the patient needs post-hospital services based on physician/advanced practitioner order or complex needs related to functional status, cognitive ability, or social support system  Outcome: Progressing     Problem: Knowledge Deficit  Goal: Patient/family/caregiver demonstrates understanding of disease process, treatment plan, medications, and discharge instructions  Description: Complete learning assessment and assess knowledge base    Interventions:  - Provide teaching at level of understanding  - Provide teaching via preferred learning methods  Outcome: Progressing     Problem: MOBILITY - ADULT  Goal: Maintains/Returns to pre admission functional level  Description: INTERVENTIONS:  - Perform BMAT or MOVE assessment daily    - Set and communicate daily mobility goal to care team and patient/family/caregiver     - Collaborate with rehabilitation services on mobility goals if consulted  - Reposition patient every 2 hr  Problem: Prexisting or High Potential for Compromised Skin Integrity  Goal: Skin integrity is maintained or improved  Description: INTERVENTIONS:  - Identify patients at risk for skin breakdown  - Assess and monitor skin integrity  - Assess and monitor nutrition and hydration status  - Monitor labs   - Assess for incontinence   - Turn and reposition patient  - Assist with mobility/ambulation  - Relieve pressure over bony prominences  - Avoid friction and shearing  - Provide appropriate hygiene as needed including keeping skin clean and dry  - Evaluate need for skin moisturizer/barrier cream  - Collaborate with interdisciplinary team   - Patient/family teaching  - Consider wound care consult   Outcome: Progressing     - Out of bed for toileting  - Record patient progress and toleration of activity level   Outcome: Progressing     - Apply yellow socks and bracelet for high fall risk patients  - Consider moving patient to room near nurses station  Outcome: Progressing     - Apply yellow socks and bracelet for high fall risk patients  - Consider moving patient to room near nurses station  Outcome: Progressing

## 2022-12-05 NOTE — ANESTHESIA PREPROCEDURE EVALUATION
Procedure:  RESECTION COLON SIGMOID LAPAROSCOPIC HAND-ASSISTED, POSSIBLE OPEN, POSSIBLE STOMA (Abdomen)    Relevant Problems   Other   (+) Diverticulitis      PMHx of GERD, hiatal hernia  Acute respiratory failure 2/2 sepsis     Meds: amlodipine, lisinopril, nebivolol, omeprazole    Hx of bronchiectasis and bronchomalacia with complicated sigmoid diverticulitis and small amount of pneumoperitoneum  Patient spiking fevers overnight with associated hypotension and desaturations, requiring fluid boluses and supplemental O2  Plan on surgical intervention  From Surgery H&P: "Patient has bronchiectasis and bronchomalacia diagnosed in 2016, follows with pulmonary specialist at [facility]  Has had 2 laser treatments on her airways  Has not been down to see them since 2018/2019  Says things have been under control from that perspective "     Upon further evaluation, patient states she has had "complete blockage" of one side of her airways which was treated via laser (no stent placed)  The other was at 70% blockage and was treated down to 50% and she has been stable from a respiratory standpoint for the past 4 years  Was able to reach [place] [address] who states her bronchomalacia is mild and has had no issues with intubation/extubation for previous procedures  She should be treated as any other patient receiving GA per his recommendations  Currently SpO2 in low 90s on 10 L simple mask  Does not appear to have increased work of breathing  CXR ordered  PSHx: + open cholecystectomy, appendectomy, hysterectomy  No issues with general anesthesia    Plan to obtain preop EKG and CXR; pre-induction A-line with possible CVC for pressors  Explained to patient possible prolonged intubation in ICU with sedation  Patient consented for TAP blocks         Physical Exam    Airway    Mallampati score: II  TM Distance: >3 FB  Neck ROM: full     Dental   No notable dental hx     Cardiovascular      Pulmonary      Other Findings        Anesthesia Plan  ASA Score- 4     Anesthesia Type- general with ASA Monitors  Additional Monitors: arterial line and central venous line  Airway Plan: ETT  Plan Factors-Exercise tolerance (METS): <4 METS  Chart reviewed  Patient summary reviewed  Patient is not a current smoker  Obstructive sleep apnea risk education given perioperatively  Induction- intravenous and rapid sequence induction  Postoperative Plan- Plan for postoperative opioid use  Planned trial extubation    Informed Consent- Anesthetic plan and risks discussed with patient  I personally reviewed this patient with the CRNA  Discussed and agreed on the Anesthesia Plan with the CRNA  Hernan Bobby

## 2022-12-05 NOTE — QUICK NOTE
Placed call to Dr Hao Cosby at [facility] who has been caring for patient and her pulmonary diagnoses  Spoke with Dr Hao Cosby at 2:25PM, he provided the following history:    -The patient has mild asthma and mild to moderate tracheobronchomalacia  -No documented history of bronchiectasis  -She had two bronchoscopic laser therapies to stiffen the membranous airways focusing on the trachea and the proximal portions of the right and left mainstem bronchi  -She tolerated LMA and ETT and extubation without difficulty, no history of hypercarbia or complex reintubation needs  -Has not been seen there since 2018/2019  -No special management recommendations in the perioperative period unique to her pulmonary history    Discussed with anesthesia

## 2022-12-06 ENCOUNTER — APPOINTMENT (INPATIENT)
Dept: RADIOLOGY | Facility: HOSPITAL | Age: 62
End: 2022-12-06

## 2022-12-06 LAB
ALBUMIN SERPL BCP-MCNC: 2.6 G/DL (ref 3.5–5)
ALP SERPL-CCNC: 28 U/L (ref 34–104)
ALT SERPL W P-5'-P-CCNC: 10 U/L (ref 7–52)
ANION GAP SERPL CALCULATED.3IONS-SCNC: 6 MMOL/L (ref 4–13)
ANION GAP SERPL CALCULATED.3IONS-SCNC: 8 MMOL/L (ref 4–13)
ARTERIAL PATENCY WRIST A: YES
AST SERPL W P-5'-P-CCNC: 20 U/L (ref 13–39)
ATRIAL RATE: 75 BPM
ATRIAL RATE: 83 BPM
BASE EXCESS BLDA CALC-SCNC: -3 MMOL/L
BASE EXCESS BLDA CALC-SCNC: -5 MMOL/L (ref -2–3)
BASE EXCESS BLDA CALC-SCNC: 0.5 MMOL/L
BASOPHILS # BLD MANUAL: 0 THOUSAND/UL (ref 0–0.1)
BASOPHILS NFR MAR MANUAL: 0 % (ref 0–1)
BILIRUB SERPL-MCNC: 0.49 MG/DL (ref 0.2–1)
BUN SERPL-MCNC: 15 MG/DL (ref 5–25)
BUN SERPL-MCNC: 16 MG/DL (ref 5–25)
CA-I BLD-SCNC: 1.11 MMOL/L (ref 1.12–1.32)
CA-I BLD-SCNC: 1.2 MMOL/L (ref 1.12–1.32)
CALCIUM ALBUM COR SERPL-MCNC: 8.9 MG/DL (ref 8.3–10.1)
CALCIUM SERPL-MCNC: 7.8 MG/DL (ref 8.4–10.2)
CALCIUM SERPL-MCNC: 8.4 MG/DL (ref 8.4–10.2)
CHLORIDE SERPL-SCNC: 108 MMOL/L (ref 96–108)
CHLORIDE SERPL-SCNC: 109 MMOL/L (ref 96–108)
CO2 SERPL-SCNC: 22 MMOL/L (ref 21–32)
CO2 SERPL-SCNC: 24 MMOL/L (ref 21–32)
CREAT SERPL-MCNC: 0.76 MG/DL (ref 0.6–1.3)
CREAT SERPL-MCNC: 0.8 MG/DL (ref 0.6–1.3)
EOSINOPHIL # BLD MANUAL: 0 THOUSAND/UL (ref 0–0.4)
EOSINOPHIL NFR BLD MANUAL: 0 % (ref 0–6)
ERYTHROCYTE [DISTWIDTH] IN BLOOD BY AUTOMATED COUNT: 13.2 % (ref 11.6–15.1)
ERYTHROCYTE [DISTWIDTH] IN BLOOD BY AUTOMATED COUNT: 13.4 % (ref 11.6–15.1)
GFR SERPL CREATININE-BSD FRML MDRD: 79 ML/MIN/1.73SQ M
GFR SERPL CREATININE-BSD FRML MDRD: 84 ML/MIN/1.73SQ M
GLUCOSE SERPL-MCNC: 101 MG/DL (ref 65–140)
GLUCOSE SERPL-MCNC: 115 MG/DL (ref 65–140)
GLUCOSE SERPL-MCNC: 115 MG/DL (ref 65–140)
GLUCOSE SERPL-MCNC: 117 MG/DL (ref 65–140)
GLUCOSE SERPL-MCNC: 122 MG/DL (ref 65–140)
GLUCOSE SERPL-MCNC: 127 MG/DL (ref 65–140)
GLUCOSE SERPL-MCNC: 135 MG/DL (ref 65–140)
GLUCOSE SERPL-MCNC: 89 MG/DL (ref 65–140)
HAV IGM SER QL: NORMAL
HBV CORE IGM SER QL: NORMAL
HBV SURFACE AG SER QL: NORMAL
HCO3 BLDA-SCNC: 21 MMOL/L (ref 22–28)
HCO3 BLDA-SCNC: 21.9 MMOL/L (ref 22–28)
HCO3 BLDA-SCNC: 25.4 MMOL/L (ref 22–28)
HCT VFR BLD AUTO: 29.8 % (ref 34.8–46.1)
HCT VFR BLD AUTO: 31.1 % (ref 34.8–46.1)
HCT VFR BLD CALC: 32 % (ref 34.8–46.1)
HCV AB SER QL: NORMAL
HGB BLD-MCNC: 10.4 G/DL (ref 11.5–15.4)
HGB BLD-MCNC: 9.8 G/DL (ref 11.5–15.4)
HGB BLDA-MCNC: 10.9 G/DL (ref 11.5–15.4)
HOROWITZ INDEX BLDA+IHG-RTO: 70 MM[HG]
HOROWITZ INDEX BLDA+IHG-RTO: 70 MM[HG]
LYMPHOCYTES # BLD AUTO: 0.35 THOUSAND/UL (ref 0.6–4.47)
LYMPHOCYTES # BLD AUTO: 6 % (ref 14–44)
MAGNESIUM SERPL-MCNC: 2.2 MG/DL (ref 1.9–2.7)
MAGNESIUM SERPL-MCNC: 2.2 MG/DL (ref 1.9–2.7)
MCH RBC QN AUTO: 31.7 PG (ref 26.8–34.3)
MCH RBC QN AUTO: 31.8 PG (ref 26.8–34.3)
MCHC RBC AUTO-ENTMCNC: 32.9 G/DL (ref 31.4–37.4)
MCHC RBC AUTO-ENTMCNC: 33.4 G/DL (ref 31.4–37.4)
MCV RBC AUTO: 95 FL (ref 82–98)
MCV RBC AUTO: 97 FL (ref 82–98)
MONOCYTES # BLD AUTO: 0.35 THOUSAND/UL (ref 0–1.22)
MONOCYTES NFR BLD: 6 % (ref 4–12)
NEUTROPHILS # BLD MANUAL: 5.18 THOUSAND/UL (ref 1.85–7.62)
NEUTS BAND NFR BLD MANUAL: 1 % (ref 0–8)
NEUTS SEG NFR BLD AUTO: 87 % (ref 43–75)
O2 CT BLDA-SCNC: 14.6 ML/DL (ref 16–23)
O2 CT BLDA-SCNC: 14.9 ML/DL (ref 16–23)
OXYHGB MFR BLDA: 93.6 % (ref 94–97)
OXYHGB MFR BLDA: 95.3 % (ref 94–97)
P AXIS: 31 DEGREES
P AXIS: 43 DEGREES
PCO2 BLD: 22 MMOL/L (ref 21–32)
PCO2 BLD: 41.6 MM HG (ref 36–44)
PCO2 BLDA: 38.8 MM HG (ref 36–44)
PCO2 BLDA: 42 MM HG (ref 36–44)
PEEP RESPIRATORY: 6 CM[H2O]
PEEP RESPIRATORY: 6 CM[H2O]
PH BLD: 7.31 [PH] (ref 7.35–7.45)
PH BLDA: 7.37 [PH] (ref 7.35–7.45)
PH BLDA: 7.4 [PH] (ref 7.35–7.45)
PHOSPHATE SERPL-MCNC: 2.1 MG/DL (ref 2.3–4.1)
PHOSPHATE SERPL-MCNC: 2.9 MG/DL (ref 2.3–4.1)
PLATELET # BLD AUTO: 155 THOUSANDS/UL (ref 149–390)
PLATELET # BLD AUTO: 166 THOUSANDS/UL (ref 149–390)
PLATELET BLD QL SMEAR: ADEQUATE
PMV BLD AUTO: 10.1 FL (ref 8.9–12.7)
PMV BLD AUTO: 10.1 FL (ref 8.9–12.7)
PO2 BLD: 74 MM HG (ref 75–129)
PO2 BLDA: 70.3 MM HG (ref 75–129)
PO2 BLDA: 84.4 MM HG (ref 75–129)
POTASSIUM BLD-SCNC: 4 MMOL/L (ref 3.5–5.3)
POTASSIUM SERPL-SCNC: 3.6 MMOL/L (ref 3.5–5.3)
POTASSIUM SERPL-SCNC: 3.9 MMOL/L (ref 3.5–5.3)
PR INTERVAL: 126 MS
PR INTERVAL: 166 MS
PROCALCITONIN SERPL-MCNC: 16.07 NG/ML
PROT SERPL-MCNC: 4.3 G/DL (ref 6.4–8.4)
QRS AXIS: 41 DEGREES
QRS AXIS: 72 DEGREES
QRSD INTERVAL: 78 MS
QRSD INTERVAL: 82 MS
QT INTERVAL: 364 MS
QT INTERVAL: 390 MS
QTC INTERVAL: 427 MS
QTC INTERVAL: 435 MS
RBC # BLD AUTO: 3.08 MILLION/UL (ref 3.81–5.12)
RBC # BLD AUTO: 3.28 MILLION/UL (ref 3.81–5.12)
RBC MORPH BLD: NORMAL
SAO2 % BLD FROM PO2: 93 % (ref 60–85)
SODIUM BLD-SCNC: 139 MMOL/L (ref 136–145)
SODIUM SERPL-SCNC: 137 MMOL/L (ref 135–147)
SODIUM SERPL-SCNC: 140 MMOL/L (ref 135–147)
SPECIMEN SOURCE: ABNORMAL
T WAVE AXIS: 38 DEGREES
T WAVE AXIS: 56 DEGREES
VENT AC: 14
VENT AC: 14
VENTRICULAR RATE: 75 BPM
VENTRICULAR RATE: 83 BPM
VT SETTING VENT: 480 ML
VT SETTING VENT: 480 ML
WBC # BLD AUTO: 2.7 THOUSAND/UL (ref 4.31–10.16)
WBC # BLD AUTO: 5.89 THOUSAND/UL (ref 4.31–10.16)

## 2022-12-06 RX ORDER — ALBUMIN, HUMAN INJ 5% 5 %
25 SOLUTION INTRAVENOUS ONCE
Status: COMPLETED | OUTPATIENT
Start: 2022-12-06 | End: 2022-12-06

## 2022-12-06 RX ORDER — FUROSEMIDE 10 MG/ML
20 INJECTION INTRAMUSCULAR; INTRAVENOUS ONCE
Status: COMPLETED | OUTPATIENT
Start: 2022-12-06 | End: 2022-12-06

## 2022-12-06 RX ORDER — FENTANYL CITRATE 50 UG/ML
100 INJECTION, SOLUTION INTRAMUSCULAR; INTRAVENOUS ONCE
Status: COMPLETED | OUTPATIENT
Start: 2022-12-06 | End: 2022-12-06

## 2022-12-06 RX ORDER — DEXMEDETOMIDINE HYDROCHLORIDE 4 UG/ML
.1-1 INJECTION, SOLUTION INTRAVENOUS
Status: DISCONTINUED | OUTPATIENT
Start: 2022-12-06 | End: 2022-12-08

## 2022-12-06 RX ORDER — ALBUMIN, HUMAN INJ 5% 5 %
25 SOLUTION INTRAVENOUS ONCE
Status: COMPLETED | OUTPATIENT
Start: 2022-12-06 | End: 2022-12-07

## 2022-12-06 RX ORDER — MIDAZOLAM HYDROCHLORIDE 2 MG/2ML
2 INJECTION, SOLUTION INTRAMUSCULAR; INTRAVENOUS ONCE
Status: COMPLETED | OUTPATIENT
Start: 2022-12-06 | End: 2022-12-06

## 2022-12-06 RX ADMIN — SODIUM CHLORIDE, POTASSIUM CHLORIDE, SODIUM LACTATE AND CALCIUM CHLORIDE 125 ML/HR: 600; 310; 30; 20 INJECTION, SOLUTION INTRAVENOUS at 15:36

## 2022-12-06 RX ADMIN — ISODIUM CHLORIDE 3 ML: 0.03 SOLUTION RESPIRATORY (INHALATION) at 08:24

## 2022-12-06 RX ADMIN — PIPERACILLIN AND TAZOBACTAM 4.5 G: 4; .5 INJECTION, POWDER, FOR SOLUTION INTRAVENOUS at 20:45

## 2022-12-06 RX ADMIN — PROPOFOL 50 MCG/KG/MIN: 10 INJECTION, EMULSION INTRAVENOUS at 19:50

## 2022-12-06 RX ADMIN — LEVALBUTEROL 1.25 MG: 1.25 SOLUTION, CONCENTRATE RESPIRATORY (INHALATION) at 08:24

## 2022-12-06 RX ADMIN — FENTANYL CITRATE 50 MCG: 50 INJECTION INTRAMUSCULAR; INTRAVENOUS at 10:16

## 2022-12-06 RX ADMIN — SODIUM CHLORIDE, POTASSIUM CHLORIDE, SODIUM LACTATE AND CALCIUM CHLORIDE 125 ML/HR: 600; 310; 30; 20 INJECTION, SOLUTION INTRAVENOUS at 07:39

## 2022-12-06 RX ADMIN — PROPOFOL 50 MCG/KG/MIN: 10 INJECTION, EMULSION INTRAVENOUS at 16:52

## 2022-12-06 RX ADMIN — PIPERACILLIN AND TAZOBACTAM 4.5 G: 4; .5 INJECTION, POWDER, FOR SOLUTION INTRAVENOUS at 09:08

## 2022-12-06 RX ADMIN — ALBUMIN (HUMAN) 25 G: 12.5 SOLUTION INTRAVENOUS at 23:01

## 2022-12-06 RX ADMIN — FENTANYL CITRATE 50 MCG: 50 INJECTION INTRAMUSCULAR; INTRAVENOUS at 06:41

## 2022-12-06 RX ADMIN — MIDAZOLAM 2 MG: 1 INJECTION INTRAMUSCULAR; INTRAVENOUS at 22:37

## 2022-12-06 RX ADMIN — FENTANYL CITRATE 50 MCG: 50 INJECTION INTRAMUSCULAR; INTRAVENOUS at 12:21

## 2022-12-06 RX ADMIN — DEXMEDETOMIDINE HYDROCHLORIDE 0.4 MCG/KG/HR: 400 INJECTION INTRAVENOUS at 22:37

## 2022-12-06 RX ADMIN — POTASSIUM PHOSPHATE, MONOBASIC AND POTASSIUM PHOSPHATE, DIBASIC 30 MMOL: 224; 236 INJECTION, SOLUTION, CONCENTRATE INTRAVENOUS at 23:04

## 2022-12-06 RX ADMIN — ISODIUM CHLORIDE 3 ML: 0.03 SOLUTION RESPIRATORY (INHALATION) at 13:11

## 2022-12-06 RX ADMIN — NOREPINEPHRINE BITARTRATE 1 MCG/MIN: 1 INJECTION, SOLUTION INTRAVENOUS at 17:43

## 2022-12-06 RX ADMIN — Medication 75 MCG/HR: at 14:06

## 2022-12-06 RX ADMIN — LEVALBUTEROL 1.25 MG: 1.25 SOLUTION, CONCENTRATE RESPIRATORY (INHALATION) at 13:11

## 2022-12-06 RX ADMIN — FENTANYL CITRATE 50 MCG: 50 INJECTION INTRAMUSCULAR; INTRAVENOUS at 09:01

## 2022-12-06 RX ADMIN — SODIUM CHLORIDE, POTASSIUM CHLORIDE, SODIUM LACTATE AND CALCIUM CHLORIDE 125 ML/HR: 600; 310; 30; 20 INJECTION, SOLUTION INTRAVENOUS at 00:15

## 2022-12-06 RX ADMIN — ALBUMIN (HUMAN) 25 G: 12.5 SOLUTION INTRAVENOUS at 03:54

## 2022-12-06 RX ADMIN — PROPOFOL 40 MCG/KG/MIN: 10 INJECTION, EMULSION INTRAVENOUS at 12:26

## 2022-12-06 RX ADMIN — PIPERACILLIN AND TAZOBACTAM 4.5 G: 4; .5 INJECTION, POWDER, FOR SOLUTION INTRAVENOUS at 15:36

## 2022-12-06 RX ADMIN — BUDESONIDE 0.5 MG: 0.5 INHALANT ORAL at 08:24

## 2022-12-06 RX ADMIN — Medication 75 MCG/HR: at 17:35

## 2022-12-06 RX ADMIN — PROPOFOL 50 MCG/KG/MIN: 10 INJECTION, EMULSION INTRAVENOUS at 23:49

## 2022-12-06 RX ADMIN — PROPOFOL 50 MCG/KG/MIN: 10 INJECTION, EMULSION INTRAVENOUS at 03:32

## 2022-12-06 RX ADMIN — ISODIUM CHLORIDE 3 ML: 0.03 SOLUTION RESPIRATORY (INHALATION) at 21:00

## 2022-12-06 RX ADMIN — FENTANYL CITRATE 50 MCG: 50 INJECTION INTRAMUSCULAR; INTRAVENOUS at 21:35

## 2022-12-06 RX ADMIN — PROPOFOL 40 MCG/KG/MIN: 10 INJECTION, EMULSION INTRAVENOUS at 07:37

## 2022-12-06 RX ADMIN — LEVALBUTEROL 1.25 MG: 1.25 SOLUTION, CONCENTRATE RESPIRATORY (INHALATION) at 21:00

## 2022-12-06 RX ADMIN — FENTANYL CITRATE 100 MCG: 50 INJECTION INTRAMUSCULAR; INTRAVENOUS at 22:37

## 2022-12-06 RX ADMIN — SODIUM CHLORIDE, POTASSIUM CHLORIDE, SODIUM LACTATE AND CALCIUM CHLORIDE 125 ML/HR: 600; 310; 30; 20 INJECTION, SOLUTION INTRAVENOUS at 23:04

## 2022-12-06 RX ADMIN — PIPERACILLIN AND TAZOBACTAM 4.5 G: 4; .5 INJECTION, POWDER, FOR SOLUTION INTRAVENOUS at 03:32

## 2022-12-06 RX ADMIN — CHLORHEXIDINE GLUCONATE 0.12% ORAL RINSE 15 ML: 1.2 LIQUID ORAL at 20:41

## 2022-12-06 RX ADMIN — BUDESONIDE 0.5 MG: 0.5 INHALANT ORAL at 21:00

## 2022-12-06 RX ADMIN — FUROSEMIDE 20 MG: 10 INJECTION, SOLUTION INTRAMUSCULAR; INTRAVENOUS at 11:36

## 2022-12-06 RX ADMIN — PANTOPRAZOLE SODIUM 40 MG: 40 INJECTION, POWDER, LYOPHILIZED, FOR SOLUTION INTRAVENOUS at 20:41

## 2022-12-06 RX ADMIN — CHLORHEXIDINE GLUCONATE 0.12% ORAL RINSE 15 ML: 1.2 LIQUID ORAL at 08:47

## 2022-12-06 RX ADMIN — PANTOPRAZOLE SODIUM 40 MG: 40 INJECTION, POWDER, LYOPHILIZED, FOR SOLUTION INTRAVENOUS at 08:47

## 2022-12-06 NOTE — INTERIM OP NOTE
RESECTION COLON SIGMOID LAPAROSCOPIC HAND-ASSISTED, diverting transverse loop colostomy  Postoperative Note  PATIENT NAME: Pattie Sahu  : 1960  MRN: [id number]  [facility] OR ROOM 01    Surgery Date: 2022    Preop Diagnosis:  Diverticulitis [K57 92]    Post-Op Diagnosis Codes:     * Diverticulitis [K57 92]    Procedure(s) (LRB):  RESECTION COLON SIGMOID LAPAROSCOPIC HAND-ASSISTED, diverting transverse loop colostomy (N/A)    Surgeon(s) and Role:     * Fátima Nagel MD - Primary     * Regi Peguero MD - Assisting     * Soumya Van PA-C - Assisting    Specimens:  ID Type Source Tests Collected by Time Destination   1 :  Tissue Large Intestine, Sigmoid Colon TISSUE EXAM Fátima Nagel MD 2022 1730    A :  Body Fluid Peritoneal Fluid BODY FLUID CULTURE AND GRAM STAIN Fátima Nagel MD 2022 1537      500cc albumin  2800cc crystalloid  180cc UOP  50cc NGT    Estimated Blood Loss:   30 mL    Anesthesia Type:   General     Findings:   -Perforated feculent diverticulitis with large perforation in the mid sigmoid colon  -Sigmoid resection performed  -29mm EEA end to end stapled anastomosis created  -Proximal donut with slight thinning and near defect  -No leak on rigid sigmoidoscopy  -Protective diverting transverse loop colostomy performed      Complications:   None      SIGNATURE: Fátima Nagel MD   DATE: 2022   TIME: 7:45 PM

## 2022-12-06 NOTE — ASSESSMENT & PLAN NOTE
· Worsening in PM of 12/04 - AEB hyperthermia, tachypnea, hypotension, leukopenia w/bandemia, elevated INR  · In setting of sigmoid diverticulitis with large perforation   · Imaging as noted above  · COVID/FLU/RSV negative  · Send blood cultures  · UA w/out evidence of infection  · Peritoneal fluid pending  · Send lactic acid now   · Send procalcitonin   · Has received aggressive IVF resuscitation  · Was requiring Phenylephrine in OR - now off  · Continue IV Zosyn D#3  · Monitor fever and WBC curve  · Trend procalcitonin and follow up cultures

## 2022-12-06 NOTE — ASSESSMENT & PLAN NOTE
· Diagnosed in 2016  · Follows with Pulmonary at [facility], however has not seen them since 2018/2019 as disease has been stable  · Is s/p 2 laser treatments to airways

## 2022-12-06 NOTE — RESPIRATORY THERAPY NOTE
12/05/22 2030   Respiratory Assessment   Assessment Type Assess only   General Appearance Sedated   Respiratory Pattern Assisted;Symmetrical   Chest Assessment Chest expansion symmetrical   Bilateral Breath Sounds Diminished;Clear   Resp Comments pt arrived from OR  for large perferation repaired, intubated placed on vent ACVC+ 14 480 70% +6 td well, pt did awake, bitting on Ett and then sedated, Ett 7 0 22@ teeth, Ett solitario placed and secured, skin and strap intact with 2 finger pass to back of head, pt had mod amt of green/brown bile from mouth, pt td vent, will cont to monitor   O2 Device vent   Vent Information   Vent ID [id number]   Vent type     Vent Mode AC/VC+   $ Vent Charge-INITIAL Yes   Ventilator Start Yes   $ Pulse Oximetry Spot Check Charge Completed   SpO2 92 %   AC/VC+ Settings   Resp Rate (BPM) 14 BPM   VT (mL) 480 mL   Insp Time (S) 1 06 S   FIO2 (%) 70 %   PEEP (cmH2O) 6 cmH2O   Rise Time (%) 50 %   Trigger Sensitivity Flow (LPM) 3 LPM   Humidification Heater   Heater Temp 98 6 °F (37 °C)   AC/VC+ Actuals   Resp Rate (BPM) 17 BPM   VT (mL) 564 mL   MV (Obs) 7 04   MAP (cmH2O) 10 cmH2O   Peak Pressure (cmH2O) 14 cmH2O   I:E Ratio (Obs) 1:2 1   Static Compliance (mL/cmH20) 51 mL/cmH2O   Plateau Pressure (cm H2O) 17 cm H2O   Heater Temperature (Obs) 98 4 °F (36 9 °C)   AC/VC+ ALARMS   High Peak Pressure (cmH2O) 40 cmH2O   Low Peak Pressure (cmH2O) 11 5 cm H2O   High Resp Rate (BPM) 45 BPM   High MV (L/min) 20 L/min   Low MV (L/min) 3 75 L/min   High Daniel VTE (mL) 880 mL   Low Daniel VTE (mL) 380 mL   High Spont VTE (mL) 880 mL   Low Spont VTE (mL) 380 mL   High Insp Daniel VT (mL) 1130 mL   AC/VC+ Apnea Settings   Resp Rate (BPM) 14 BPM   VT (mL) 480 mL   FIO2 (%) 100 %   Apnea Time (s) 20 S   Apnea Flow (L/min) 52 L/min   Maintenance   Alarm (pink) cable attached No   Resuscitation bag with peep valve at bedside Yes   Water bag changed No   Circuit changed No   Daily Screen   Patient safety screen outcome: Failed   Not Ready for Weaning due to: Underline problem not resolved   IHI Ventilator Associated Pneumonia Bundle   Daily Assessment of Readiness to Extubate Yes   Head of Bed Elevated HOB 30   ETT  Oral;Hi-Lo; Cuffed 7 mm   Placement Date/Time: 12/05/22 1436   Mask Ventilation: Ventilated by mask (1)  Preoxygenated: Yes  Technique: Stylet; Video laryngoscopy  Type: Oral;Hi-Lo; Cuffed  Tube Size: 7 mm  Laryngoscope: Lew  Blade Size: 3  Location: Oral  Grade View: Full v  Secured at (cm) 22   Measured from [address] [place] by [employer] tube solitario  (SKIN AND STRAP INTACT WITH 2 FINGER PASS TO BACK OF HEAD)   Site Condition Dry; Cool   Cuff Pressure (cm H2O) 25 cm H2O   HI-LO Suction  Continuous low suction   HI-LO Secretions Moderate;Thin;Blood tinged; Other (Comment)  (GREEN BILE BROWN)   HI-LO Intervention Patent   RT Ventilator Management Note  Authantonella Hernandez 58 y o  female MRN: [id number]  Unit/Bed#: ICU 11-01 Encounter: [id number]      Daily Screen         12/5/2022  2030             Patient safety screen outcome[de-identified] Failed    Not Ready for Weaning due to[de-identified] Underline problem not resolved              Physical Exam:   Assessment Type: Assess only  General Appearance: Sedated  Respiratory Pattern: Assisted, Symmetrical  Chest Assessment: Chest expansion symmetrical  Bilateral Breath Sounds: Diminished, Clear  O2 Device: vent      Resp Comments: pt arrived from OR  for large perferation repaired, intubated placed on vent ACVC+ 14 480 70% +6 td well, pt did awake, bitting on Ett and then sedated, Ett 7 0 22@ teeth, Ett solitario placed and secured, skin and strap intact with 2 finger pass to back of head, pt had mod amt of green/brown bile from mouth, pt td vent, will cont to monitor

## 2022-12-06 NOTE — CASE MANAGEMENT
Case Management Assessment & Discharge Planning Note    Patient name Denise Khan  Location ICU 11/ICU  MRN [id number]  : 1960 Date 2022       Current Admission Date: 2022  Current Admission Diagnosis:Sigmoid diverticulitis   Patient Active Problem List    Diagnosis Date Noted   • Acute respiratory failure with hypoxia ([facility] [address] ) 2022   • GERD (gastroesophageal reflux disease) 2022   • Hypertension 2022   • Bronchomalacia 2022   • Asthma 2022   • Septic shock ([facility] [address] ) 2022   • Sigmoid diverticulitis 2022      LOS (days): 2  Geometric Mean LOS (GMLOS) (days): 2 60  Days to GMLOS:0 7     OBJECTIVE:    Risk of Unplanned Readmission Score: 14 26         Current admission status: Inpatient  Referral Reason: Other (d/c planning)    Preferred Pharmacy: No Pharmacies Listed  Primary Care Provider: No primary care provider on file  Primary Insurance: [employer]  Secondary Insurance: MEDICARE    ASSESSMENT:  [id number] [facility], [address] [place] [address] Representative - Spouse   Primary Phone: [phone] (Mobile)               Advance Directives  Does patient have a [address]?: No  Was patient offered paperwork?: Yes (pt declined paperwork)  Does patient have Advance Directives?: No  Was patient offered paperwork?: Yes (pt declined paperwork)         Readmission Root Cause  30 Day Readmission: No    Patient Information  Admitted from[de-identified] Home  Mental Status: Alert  During Assessment patient was accompanied by: Spouse  Assessment information provided by[de-identified] Patient, Spouse  Primary Caregiver: Self  Support Systems: Spouse/significant other  [place] of Residence: Other (specify in comment box) [facility])  What city do you live in?: [place] entry access options   Select all that apply : Stairs  Number of steps to enter home :  (13-20 steps)  Do the steps have railings?: Yes  Type of Current Residence: 2 story home  Upon entering residence, is there a bedroom on the main floor (no further steps)?: No  A bedroom is located on the following floor levels of residence (select all that apply):: 2nd Floor  Upon entering residence, is there a bathroom on the main floor (no further steps)?: Yes  Number of steps to 2nd floor from main floor: One Flight  In the last 12 months, was there a time when you were not able to pay the mortgage or rent on time?: No  In the last 12 months, how many places have you lived?: 1  In the last 12 months, was there a time when you did not have a steady place to sleep or slept in a shelter (including now)?: No  Homeless/housing insecurity resource given?: N/A  Living Arrangements: Lives w/ Spouse/significant other  Is patient a ?: No    Activities of Daily Living Prior to Admission  Functional Status: Independent  Completes ADLs independently?: Yes  Ambulates independently?: Yes  Does patient use assisted devices?: No  Does patient currently own DME?: Yes  What DME does the patient currently own?: Crutches, Straight Cane, Nebulizer, Other (Comment) (bp cuff)  Does patient have a history of Outpatient Therapy (PT/OT)?: Yes  Does the patient have a history of Short-Term Rehab?: Yes ([facility])  Does patient have a history of HHC?: No  Does patient currently have [place] [address]?: No         Patient Information Continued  Income Source: Employed  Does patient have prescription coverage?: Yes ([employer])  Within the past 12 months, you worried that your food would run out before you got the money to buy more : Never true  Within the past 12 months, the food you bought just didn't last and you didn't have money to get more : Never true  Food insecurity resource given?: N/A  Does patient receive dialysis treatments?: No  Does patient have a history of substance abuse?: No  Does patient have a history of Mental Health Diagnosis?: No         Means of Transportation  Means of Transport to [facility][de-identified] Drives Self  In the past 12 months, has lack of transportation kept you from medical appointments or from getting medications?: No  In the past 12 months, has lack of transportation kept you from meetings, work, or from getting things needed for daily living?: No  Was application for public transport provided?: N/A        DISCHARGE DETAILS:    Discharge planning discussed with[de-identified] patient and spouse  met with them on 12/5/2022  Freedom of Choice: Yes  Comments - Freedom of Choice: cm will follow and assess for d/c needs  CM contacted family/caregiver?: Yes             Contacts  Patient Contacts: Al Jimenez  Relationship to Patient[de-identified] Family (spouse)  Contact Method:  In Person  Reason/Outcome: Discharge Planning              Other Referral/Resources/Interventions Provided:  Referral Comments: pt went for surgery on 12/5/2022  will continue to assess for d/c needs    Would you like to participate in our [facility] service program?  : No - Declined    Treatment Team Recommendation:  (d/c plan TBD)

## 2022-12-06 NOTE — ASSESSMENT & PLAN NOTE
· Unclear if she follows with Pulmonology as outpatient  · Prescribed Dulera and PRN Albuterol as outpatient - will order scheduled Xopenex/Pulmicort nebs  · Aggressive pulmonary hygiene

## 2022-12-06 NOTE — ASSESSMENT & PLAN NOTE
· Diagnosed in 2016  · Follows with Pulmonary at [facility], however has not seen them since 2018/2019 as disease has been stable  · Is s/p 2 laser treatments to airways

## 2022-12-06 NOTE — PROCEDURES
Intubation    Date/Time: 12/6/2022 4:57 PM  Performed by: Justin Benjamin by: BARBIE Cruz     Patient location:  Bedside  Other Assisting Provider: No    Consent:     Consent obtained:  Emergent situation  Pre-procedure details:     Patient status:  Unresponsive    Mallampati score:  2    Pretreatment medications:  Propofol  Indications:     Indications for intubation: airway protection and hypoxemia    Procedure details:     Preoxygenation:  Bag valve mask    CPR in progress: no      Intubation method:  Oral    Oral intubation technique:  Direct    Laryngoscope blade:  Mac 2    Tube size (mm):  7 0    Tube type:  Cuffed    Number of attempts:  1    Ventilation between attempts: no      Cricoid pressure: no      Tube visualized through cords: yes    Placement assessment:     ETT to lip:  23 cm    ETT to teeth:  22 cm    Tube secured with:  ETT solitario    Breath sounds:  Equal    Placement verification: chest rise, CXR verification and ETCO2 detector      Ventilator settings:  14/480/60/6  Post-procedure details:     Patient tolerance of procedure: Tolerated well, no immediate complications  Comments:      Existing ETT 7 0 exchanged over bougie with 7 0 ETT secondary to balloon tube being severed by patient's biting  Minor trauma

## 2022-12-06 NOTE — NUTRITION
12/06/22 1522   Current PO Intake   Current Diet Order NPO   Estimated calorie intake compared to estimated need Pt is NPO  Nutrition needs not met at this time  Recommendations/Interventions   Summary Presents 12/4 with lower abdominal pain, cramping, and nausea  Episodes of emesis in ED  Pt with first episode sigmoid diverticulitis with perforation  NPO for symptom management and OR  Underwent diverting transverse loop colostomy on 12/5  Pt remains intubated post-op for oxygen requirements  NGT in place for intermittent suctioning  RD to monitor for diet initiation      Interventions/Recommendations Initiate diet   Patient Nutrition Goals   Goal Nutrition via appropriate route

## 2022-12-06 NOTE — RESPIRATORY THERAPY NOTE
Resp care   12/06/22 1706   Respiratory Assessment   Resp Comments Pt  chewed through the  balloon on ET tube requiring tube exchange with the same size ET tube and resecured at the 22cm @ the teeth

## 2022-12-06 NOTE — NURSING NOTE
Patients vent alarming, RN attempted to troubleshoot, Patient sedated, SPO2 92%, vent alarming circuit disconnect  Patients endotracheal tube balloon bitten off and laying on patients chest  RN contacted Respiratory and NP  At bedside  Bougie put down over current Endotracheal tube and tube exchanged with a 7mm tube  Patient stable and currently ventilating

## 2022-12-06 NOTE — ANESTHESIA POSTPROCEDURE EVALUATION
Post-Op Assessment Note    CV Status:  Stable  Pain Score: 0    Pain management: adequate     Post-procedure mental status: sedated    Hydration Status:  Euvolemic   PONV Controlled:  Controlled  Airway: intubated      Post Op Vitals Reviewed: Yes      Staff: Anesthesiologist, CRNA         No notable events documented      BP   132/74   Temp 98 3   Pulse 73   Resp 20   SpO2 94

## 2022-12-06 NOTE — RESPIRATORY THERAPY NOTE
RT Ventilator Management Note  Candice Gist 58 y o  female MRN: [id number]  Unit/Bed#: ICU 11-01 Encounter: [id number]      Daily Screen         12/5/2022 2030 12/6/2022  0824          Patient safety screen outcome[de-identified] Failed Failed      Not Ready for Weaning due to[de-identified] Underline problem not resolved Underline problem not resolved                Physical Exam:   Assessment Type: Assess only  General Appearance: Sedated  Respiratory Pattern: Assisted, Symmetrical  Chest Assessment: Chest expansion symmetrical  Bilateral Breath Sounds: Diminished, Clear  Suction: (P) ET Tube  O2 Device: vent      Resp Comments: (P) FIO2 titrated at this time  No changes at this time

## 2022-12-06 NOTE — PROGRESS NOTES
[facility]  Progress Note - Manisha Faust 1960, 58 y o  female MRN: [id number]  Unit/Bed#: ICU 11-01 Encounter: [id number]  Primary Care Provider: No primary care provider on file  Date and time admitted to hospital: 12/4/2022  7:07 AM    * Sigmoid diverticulitis  Assessment & Plan  · Presented with abdominal pain, n/v on 12/04  · Imaging revealed acute sigmoid diverticulitis involving a large sigmoid diverticulum w/small amount of pneumoperitoneum suggesting small perforation  · OR today for laparoscopic hand-assisted sigmoid colon resection, diverting transverse loop colostomy   · Perforated feculent diverticulitis w/large perforation in mid sigmoid colon noted  · Maintain NGT to LIS   · Monitor colostomy stoma  · Strict I/O  · Trend endpoints - send labs now    Septic shock [facility])  Assessment & Plan  · Worsening in PM of 12/04 - AEB hyperthermia, tachypnea, hypotension, leukopenia w/bandemia, elevated INR  · In setting of sigmoid diverticulitis with large perforation   · Imaging as noted above  · COVID/FLU/RSV negative  · Blood cultures pending  · UA w/out evidence of infection  · Peritoneal fluid pending  · Lactic acid 1 9  · Procalcitonin 17 61  · Has received aggressive IVF resuscitation  · Was requiring Phenylephrine in OR - now off  · Continue IV Zosyn D#3  · Monitor fever and WBC curve  · Trend procalcitonin and follow up cultures    Acute respiratory failure with hypoxia ([address] )  Assessment & Plan  · Remained intubated post-op given O2 requirement and tenuous status  · CXR w/concern for ?  RLL infiltrate - formal read pending  · Current vent settings: ACVC 14/480/70/6  · Follow ABG and adjust vent settings as necessary  · Currently sedated with Propofol/Fentanyl gtt's for goal RASS -1 to -2  · Continue Xopenex/Pulmicort nebs given asthma history  · Aggressive pulmonary hygiene  · Daily SBT/SAT  · VAP precautions  · Titrate FiO2 for MAP > 90%    Asthma  Assessment & Plan  · Unclear if she follows with Pulmonology as outpatient  · Prescribed Dulera and PRN Albuterol as outpatient - continue scheduled Xopenex/Pulmicort nebs  · Aggressive pulmonary hygiene    Bronchomalacia  Assessment & Plan  · Diagnosed in 2016  · Follows with Pulmonary at [facility], however has not seen them since  as disease has been stable  · Is s/p 2 laser treatments to airways     Hypertension  Assessment & Plan  · Holding home antihypertensives 2/2 septic shock/hypotension    GERD (gastroesophageal reflux disease)  Assessment & Plan  · Continue home PPI via IV route while NPO post abdominal surgical procedure as noted above      ----------------------------------------------------------------------------------------  HPI/24hr events:     · Received additional IVF/albumin overnight for low UO/BP  · Unable to wean FiO2 given low SpO2/PaO2  · Periods of agitation requiring PRNs and increase in sedation    Patient appropriate for transfer out of the ICU today?: No  Disposition: Continue Critical Care   Code Status: Level 1 - Full Code  ---------------------------------------------------------------------------------------  SUBJECTIVE  ANGIE    Review of Systems  Review of systems was unable to be performed secondary to intubated/sedated  ---------------------------------------------------------------------------------------  OBJECTIVE    Vitals   Vitals:    22 0100 22 0200 22 0300 22 0400   BP:       BP Location:       Pulse: 67 68 73 67   Resp: 14 14 (!) 31 14   Temp: 97 9 °F (36 6 °C) 97 7 °F (36 5 °C) 97 7 °F (36 5 °C) 97 9 °F (36 6 °C)   TempSrc:  Bladder  Bladder   SpO2: 95% 93% (!) 89% 92%   Weight:       Height:         Temp (24hrs), Av 9 °F (37 2 °C), Min:97 7 °F (36 5 °C), Max:100 4 °F (38 °C)  Current: Temperature: 97 9 °F (36 6 °C)  Arterial Line BP: 85/49  Arterial Line MAP (mmHg): (!) 63 mmHg    Respiratory:  SpO2: SpO2: 92 %, SpO2 Activity: SpO2 Activity: At Rest, SpO2 Device: O2 Device: Ventilator  Nasal Cannula O2 Flow Rate (L/min): 10 L/min    Invasive/non-invasive ventilation settings   Respiratory    Lab Data (Last 4 hours)      12/06 0358            pH, Arterial       7 370             pCO2, Arterial       38 8             pO2, Arterial       70 3             HCO3, Arterial       21 9             Base Excess, Arterial       -3 0                  O2/Vent Data     None                Physical Exam  Vitals and nursing note reviewed  Constitutional:       Appearance: She is obese  She is ill-appearing  Interventions: She is sedated, intubated and restrained  HENT:      Head: Normocephalic and atraumatic  Mouth/Throat:      Mouth: Mucous membranes are dry  Eyes:      Pupils: Pupils are equal, round, and reactive to light  Cardiovascular:      Rate and Rhythm: Normal rate and regular rhythm  Pulses: Normal pulses  Heart sounds: Normal heart sounds  No murmur heard  Pulmonary:      Effort: She is intubated  Breath sounds: Decreased breath sounds and rhonchi present  No wheezing or rales  Abdominal:      General: Bowel sounds are decreased  There is distension  Comments: Midline incision; colostomy stoma pink   Genitourinary:     Comments: Avery cath draining urine  Musculoskeletal:      Cervical back: Neck supple  Right lower leg: No edema  Left lower leg: No edema  Skin:     General: Skin is warm and dry  Capillary Refill: Capillary refill takes less than 2 seconds  Coloration: Skin is pale  Neurological:      GCS: GCS eye subscore is 3  GCS verbal subscore is 1  GCS motor subscore is 5           Laboratory and Diagnostics:  Results from last 7 days   Lab Units 12/06/22  0358 12/05/22  2119 12/05/22  1600 12/05/22  0507 12/04/22  0718   WBC Thousand/uL 2 70* 1 83*  --  2 38* 4 74   HEMOGLOBIN g/dL 10 4* 11 3*  --  13 0 14 1   I STAT HEMOGLOBIN g/dl  --   --  10 2*  --   --    HEMATOCRIT % 31 1* 34 3*  --  39 1 42 1   HEMATOCRIT, ISTAT %  --   --  30*  --   --    PLATELETS Thousands/uL 155 166  --  181 248   NEUTROS PCT %  --  86*  --   --  43   BANDS PCT %  --   --   --  21*  --    MONOS PCT %  --  5  --   --  10   MONO PCT %  --   --   --  3*  --      Results from last 7 days   Lab Units 12/05/22 2119 12/05/22  1600 12/05/22  0507 12/04/22  0718   SODIUM mmol/L 139  --  140 138   POTASSIUM mmol/L 4 1  --  4 0 3 8   CHLORIDE mmol/L 110*  --  106 104   CO2 mmol/L 21  --  26 25   CO2, I-STAT mmol/L  --  22  --   --    ANION GAP mmol/L 8  --  8 9   BUN mg/dL 16  --  16 18   CREATININE mg/dL 0 78  --  0 93 0 90   CALCIUM mg/dL 8 1*  --  8 4 10 0   GLUCOSE RANDOM mg/dL 98  --  95 104   ALT U/L 13  --   --  17   AST U/L 18  --   --  17   ALK PHOS U/L 34  --   --  68   ALBUMIN g/dL 2 9*  --   --  4 4   TOTAL BILIRUBIN mg/dL 0 61  --   --  0 47     Results from last 7 days   Lab Units 12/05/22 2119 12/05/22  0507   MAGNESIUM mg/dL 2 0 1 6*   PHOSPHORUS mg/dL 3 3 4 3*      Results from last 7 days   Lab Units 12/05/22 2119 12/04/22  0718   INR  2 19* 0 93   PTT seconds 35 26          Results from last 7 days   Lab Units 12/05/22 2119   LACTIC ACID mmol/L 1 9     ABG:  Results from last 7 days   Lab Units 12/06/22  0358   PH ART  7 370   PCO2 ART mm Hg 38 8   PO2 ART mm Hg 70 3*   HCO3 ART mmol/L 21 9*   BASE EXC ART mmol/L -3 0   ABG SOURCE  Line, Arterial     VBG:  Results from last 7 days   Lab Units 12/06/22  0358   ABG SOURCE  Line, Arterial     Results from last 7 days   Lab Units 12/05/22 2119   PROCALCITONIN ng/ml 17 61*       Micro        EKG: sinus rhythm  Imaging: I have personally reviewed pertinent reports  and I have personally reviewed pertinent films in PACS    Intake and Output  I/O       12/04 0701 12/05 0700 12/05 0701 12/06 0700    P  O   0    I V  (mL/kg) 2150 (26 6) 4858 7 (60 2)    IV Piggyback 3200 2800    Total Intake(mL/kg) 5350 (66 3) 7658 7 (94 9)    Urine (mL/kg/hr) 1325 960 (0 5)    Emesis/NG output 0 50    Blood  30    Total Output 1325 1040    Net +4025 +6618 7          Unmeasured Urine Occurrence 1 x     Unmeasured Emesis Occurrence 1 x           Height and Weights   Height: 5' 5" (165 1 cm)     Body mass index is 29 61 kg/m²  Weight (last 2 days)     Date/Time Weight    12/05/22 0600 80 7 (177 91)    12/04/22 1231 81 (178 57)    12/04/22 0710 77 1 (170)            Nutrition       Diet Orders   (From admission, onward)             Start     Ordered    12/05/22 2009  Diet NPO  Diet effective now        References:    Nutrtion Support Algorithm Enteral vs  Parenteral   Question Answer Comment   Diet Type NPO    RD to adjust diet per protocol?  No        12/05/22 2008                  Active Medications  Scheduled Meds:  Current Facility-Administered Medications   Medication Dose Route Frequency Provider Last Rate   • acetaminophen  650 mg Rectal Q4H PRN Isela Wagner PA-C     • budesonide  0 5 mg Nebulization Q12H Yanira Emrey MD     • chlorhexidine  15 mL Mouth/Throat Q12H [address] Isela Wagner PA-C     • fentaNYL  50 mcg/hr Intravenous Continuous Isela Bernard, PA-C 50 mcg/hr (12/06/22 0000)   • fentanyl citrate (PF)  50 mcg Intravenous Q1H PRN Isela Wagner PA-C     • lactated ringers  125 mL/hr Intravenous Continuous Isela Bernard, PA-C 125 mL/hr (12/06/22 0015)   • levalbuterol  1 25 mg Nebulization TID Yanira Emery MD      And   • sodium chloride  3 mL Nebulization TID Yanira Emery MD     • ondansetron  4 mg Intravenous Q6H PRN Isela Wagner PA-C     • pantoprazole  40 mg Intravenous Q12H [address] Jolly Clinton PA-C     • piperacillin-tazobactam  4 5 g Intravenous Q6H Jolly Clinton PA-C 0 g (12/06/22 0000)   • propofol  5-50 mcg/kg/min Intravenous Titrated Isela Bernard, PA-C 35 mcg/kg/min (12/06/22 0405)     Continuous Infusions:  fentaNYL, 50 mcg/hr, Last Rate: 50 mcg/hr (12/06/22 0000)  lactated ringers, 125 mL/hr, Last Rate: 125 mL/hr (12/06/22 0015)  propofol, 5-50 mcg/kg/min, Last Rate: 35 mcg/kg/min (12/06/22 8875)      PRN Meds:   acetaminophen, 650 mg, Q4H PRN  fentanyl citrate (PF), 50 mcg, Q1H PRN  ondansetron, 4 mg, Q6H PRN        Invasive Devices Review  Invasive Devices     Central Venous Catheter Line  Duration           CVC Central Lines 12/05/22 Triple <1 day          Peripheral Intravenous Line  Duration           Peripheral IV 12/04/22 Left;Ventral (anterior) Forearm 1 day    Peripheral IV 12/05/22 Right Hand <1 day          Arterial Line  Duration           Arterial Line 12/05/22 Left Radial <1 day          Drain  Duration           Urethral Catheter Temperature probe 16 Fr  1 day    NG/OG/Enteral Tube Nasogastric 14 Fr Right nare <1 day          Airway  Duration           ETT  Oral;Hi-Lo; Cuffed 7 mm <1 day                Rationale for remaining devices: R IJ TLC - IV access, L Radial A-Line - BP monitoring, Avery cath - accurate I/O  ---------------------------------------------------------------------------------------  Advance Directive and Living Will:      Power of :    POLST:    ---------------------------------------------------------------------------------------  Care Time Delivered:   Upon my evaluation, this patient had a high probability of imminent or life-threatening deterioration due to septic shock, sigmoid diverticulitis, which required my direct attention, intervention, and personal management  I have personally provided 20 minutes ([phone] to [phone]) of critical care time, exclusive of procedures, teaching, family meetings, and any prior time recorded by providers other than myself  BARBIE Contreras      Portions of the record may have been created with voice recognition software  Occasional wrong word or "sound a like" substitutions may have occurred due to the inherent limitations of voice recognition software    Read the chart carefully and recognize, using context, where substitutions have occurred

## 2022-12-06 NOTE — ASSESSMENT & PLAN NOTE
· Worsening in PM of 12/04 - AEB hyperthermia, tachypnea, hypotension, leukopenia w/bandemia, elevated INR  · In setting of sigmoid diverticulitis with large perforation   · Imaging as noted above  · COVID/FLU/RSV negative  · Blood cultures pending  · UA w/out evidence of infection  · Peritoneal fluid pending  · Lactic acid 1 9  · Procalcitonin 17 61  · Has received aggressive IVF resuscitation  · Was requiring Phenylephrine in OR - now off  · Continue IV Zosyn D#3  · Monitor fever and WBC curve  · Trend procalcitonin and follow up cultures

## 2022-12-06 NOTE — ASSESSMENT & PLAN NOTE
· Remained intubated post-op given O2 requirement and tenuous status  · CXR w/concern for ?  RLL infiltrate - formal read pending  · Current vent settings: ACVC 14/480/70/6  · Follow ABG and adjust vent settings as necessary  · Currently sedated with Propofol/Fentanyl gtt's for goal RASS -1 to -2  · Continue Xopenex/Pulmicort nebs given asthma history  · Aggressive pulmonary hygiene  · Daily SBT/SAT  · VAP precautions  · Titrate FiO2 for MAP > 90%

## 2022-12-06 NOTE — ASSESSMENT & PLAN NOTE
· Unclear if she follows with Pulmonology as outpatient  · Prescribed Dulera and PRN Albuterol as outpatient - continue scheduled Xopenex/Pulmicort nebs  · Aggressive pulmonary hygiene

## 2022-12-06 NOTE — OCCUPATIONAL THERAPY NOTE
Occupational Therapy Cancel Note     12/06/22 0731   OT Last Visit   OT Visit Date 12/06/22   Note Type   Note type Cancelled Session   Cancel Reasons Intubated/sedated     Rosemary Lara MS, OTR/L

## 2022-12-06 NOTE — ASSESSMENT & PLAN NOTE
· Presented with abdominal pain, n/v on 12/04  · Imaging revealed acute sigmoid diverticulitis involving a large sigmoid diverticulum w/small amount of pneumoperitoneum suggesting small perforation  · OR today for laparoscopic hand-assisted sigmoid colon resection, diverting transverse loop colostomy   · Perforated feculent diverticulitis w/large perforation in mid sigmoid colon noted  · Maintain NGT to LIS   · Monitor colostomy stoma  · Strict I/O  · Trend endpoints - send labs now

## 2022-12-06 NOTE — PLAN OF CARE
Problem: Potential for Falls  Goal: Patient will remain free of falls  Description: INTERVENTIONS:  - Educate patient/family on patient safety including physical limitations  - Instruct patient to call for assistance with activity   - Consult OT/PT to assist with strengthening/mobility   - Keep Call bell within reach  - Keep bed low and locked with side rails adjusted as appropriate  - Keep care items and personal belongings within reach  - Initiate and maintain comfort rounds  - Make Fall Risk Sign visible to staff  - Offer Toileting every 2  Problem: PAIN - ADULT  Goal: Verbalizes/displays adequate comfort level or baseline comfort level  Description: Interventions:  - Encourage patient to monitor pain and request assistance  - Assess pain using appropriate pain scale  - Administer analgesics based on type and severity of pain and evaluate response  - Implement non-pharmacological measures as appropriate and evaluate response  - Consider cultural and social influences on pain and pain management  - Notify physician/advanced practitioner if interventions unsuccessful or patient reports new pain  Outcome: Progressing     Problem: INFECTION - ADULT  Goal: Absence or prevention of progression during hospitalization  Description: INTERVENTIONS:  - Assess and monitor for signs and symptoms of infection  - Monitor lab/diagnostic results  - Monitor all insertion sites, i e  indwelling lines, tubes, and drains  - Monitor endotracheal if appropriate and nasal secretions for changes in amount and color  - [place] appropriate cooling/warming therapies per order  - Administer medications as ordered  - Instruct and encourage patient and family to use good hand hygiene technique  - Identify and instruct in appropriate isolation precautions for identified infection/condition  Outcome: Progressing  Goal: Absence of fever/infection during neutropenic period  Description: INTERVENTIONS:  - Monitor WBC    Outcome: Progressing Problem: SAFETY ADULT  Goal: Patient will remain free of falls  Description: INTERVENTIONS:  - Educate patient/family on patient safety including physical limitations  - Instruct patient to call for assistance with activity   - Consult OT/PT to assist with strengthening/mobility   - Keep Call bell within reach  - Keep bed low and locked with side rails adjusted as appropriate  - Keep care items and personal belongings within reach  - Initiate and maintain comfort rounds  - Make Fall Risk Sign visible to staff  - Offer Toileting every 2  Problem: DISCHARGE PLANNING  Goal: Discharge to home or other facility with appropriate resources  Description: INTERVENTIONS:  - Identify barriers to discharge w/patient and caregiver  - Arrange for needed discharge resources and transportation as appropriate  - Identify discharge learning needs (meds, wound care, etc )  - Arrange for interpretive services to assist at discharge as needed  - Refer to Case Management Department for coordinating discharge planning if the patient needs post-hospital services based on physician/advanced practitioner order or complex needs related to functional status, cognitive ability, or social support system  Outcome: Progressing     Problem: Knowledge Deficit  Goal: Patient/family/caregiver demonstrates understanding of disease process, treatment plan, medications, and discharge instructions  Description: Complete learning assessment and assess knowledge base    Interventions:  - Provide teaching at level of understanding  - Provide teaching via preferred learning methods  Outcome: Progressing     Problem: MOBILITY - ADULT  Goal: Maintain or return to baseline ADL function  Description: INTERVENTIONS:  -  Assess patient's ability to carry out ADLs; assess patient's baseline for ADL function and identify physical deficits which impact ability to perform ADLs (bathing, care of mouth/teeth, toileting, grooming, dressing, etc )  - Assess/evaluate cause of self-care deficits   - Assess range of motion  - Assess patient's mobility; develop plan if impaired  - Assess patient's need for assistive devices and provide as appropriate  - Encourage maximum independence but intervene and supervise when necessary  - Involve family in performance of ADLs  - Assess for home care needs following discharge   - Consider OT consult to assist with ADL evaluation and planning for discharge  - Provide patient education as appropriate  Outcome: Progressing  Goal: Maintains/Returns to pre admission functional level  Description: INTERVENTIONS:  - Perform BMAT or MOVE assessment daily    - Set and communicate daily mobility goal to care team and patient/family/caregiver  - Collaborate with rehabilitation services on mobility goals if consulted  - Perform Range of Motion   Problem: Prexisting or High Potential for Compromised Skin Integrity  Goal: Skin integrity is maintained or improved  Description: INTERVENTIONS:  - Identify patients at risk for skin breakdown  - Assess and monitor skin integrity  - Assess and monitor nutrition and hydration status  - Monitor labs   - Assess for incontinence   - Turn and reposition patient  - Assist with mobility/ambulation  - Relieve pressure over bony prominences  - Avoid friction and shearing  - Provide appropriate hygiene as needed including keeping skin clean and dry  - Evaluate need for skin moisturizer/barrier cream  - Collaborate with interdisciplinary team   - Patient/family teaching  - Consider wound care consult   Outcome: Progressing    times a day  - Reposition patient every 2 hours    - Dangle patient  times a day  - Stand patient  times a day  - Ambulate patient  times a day  - Out of bed to chair  times a day   - Out of bed for meals  times a day  - Out of bed for toileting  - Record patient progress and toleration of activity level   Outcome: Progressing    Hours, in advance of need  - Initiate/Maintain alarm  - Obtain necessary fall risk management equipment:   - Apply yellow socks and bracelet for high fall risk patients  - Consider moving patient to room near nurses station  Outcome: Progressing  Goal: Maintain or return to baseline ADL function  Description: INTERVENTIONS:  -  Assess patient's ability to carry out ADLs; assess patient's baseline for ADL function and identify physical deficits which impact ability to perform ADLs (bathing, care of mouth/teeth, toileting, grooming, dressing, etc )  - Assess/evaluate cause of self-care deficits   - Assess range of motion  - Assess patient's mobility; develop plan if impaired  - Assess patient's need for assistive devices and provide as appropriate  - Encourage maximum independence but intervene and supervise when necessary  - Involve family in performance of ADLs  - Assess for home care needs following discharge   - Consider OT consult to assist with ADL evaluation and planning for discharge  - Provide patient education as appropriate  Outcome: Progressing  Goal: Maintains/Returns to pre admission functional level  Description: INTERVENTIONS:  - Perform BMAT or MOVE assessment daily    - Set and communicate daily mobility goal to care team and patient/family/caregiver  - Collaborate with rehabilitation services on mobility goals if consulted  - Perform Range of Motion  times a day  - Reposition patient every 2 hours    - Dangle patient  times a day  - Stand patient  times a day  - Ambulate patient  times a day  - Out of bed to chair  times a day   - Out of bed for meals  times a day  - Out of bed for toileting  - Record patient progress and toleration of activity level   Outcome: Progressing    Hours, in advance of need  - Initiate/Maintain bed alarm  - Obtain necessary fall risk management equipment:   - Apply yellow socks and bracelet for high fall risk patients  - Consider moving patient to room near nurses station  Outcome: Progressing

## 2022-12-06 NOTE — PHYSICAL THERAPY NOTE
Physical Therapy Cancellation Note       12/06/22 0729   PT Last Visit   PT Visit Date 12/06/22   Note Type   Note type Cancelled Session   Cancel Reasons Intubated/sedated       PT order received  Chart review performed  At this time, PT evaluation cancelled as patient intubated/sedated  PT will follow and evaluate as appropriate      Siri Nieto, PT, DPT

## 2022-12-06 NOTE — RESPIRATORY THERAPY NOTE
12/06/22 0415   Respiratory Assessment   Assessment Type Assess only   General Appearance Sedated   Respiratory Pattern Assisted;Symmetrical   Chest Assessment Chest expansion symmetrical   Bilateral Breath Sounds Diminished;Clear   Resp Comments unchanged pt td well uneventful shift   O2 Device vent   Vent Information   Vent ID [id number]   Vent type     Vent Mode AC/VC+   $ Vent Daily Charge-Subsequent Yes   $ Pulse Oximetry Spot Check Charge Completed   SpO2 94 %   AC/VC+ Settings   Resp Rate (BPM) 14 BPM   VT (mL) 480 mL   Insp Time (S) 1 06 S   FIO2 (%) 70 %   PEEP (cmH2O) 6 cmH2O   Rise Time (%) 50 %   Trigger Sensitivity Flow (LPM) 3 LPM   Humidification Heater   Heater Temp 98 6 °F (37 °C)   AC/VC+ Actuals   Resp Rate (BPM) 14 BPM   VT (mL) 520 mL   MV (Obs) 7 27   MAP (cmH2O) 9 4 cmH2O   Peak Pressure (cmH2O) 20 cmH2O   I:E Ratio (Obs) 1:3   Static Compliance (mL/cmH20) 46 mL/cmH2O   Plateau Pressure (cm H2O) 18 cm H2O   Heater Temperature (Obs) 98 6 °F (37 °C)   AC/VC+ ALARMS   High Peak Pressure (cmH2O) 40 cmH2O   Low Peak Pressure (cmH2O) 11 5 cm H2O   High Resp Rate (BPM) 45 BPM   High MV (L/min) 20 L/min   Low MV (L/min) 3 75 L/min   High Daniel VTE (mL) 880 mL   Low Daniel VTE (mL) 380 mL   High Spont VTE (mL) 880 mL   Low Spont VTE (mL) 380 mL   High Insp Daniel VT (mL) 1130 mL   AC/VC+ Apnea Settings   Resp Rate (BPM) 14 BPM   VT (mL) 480 mL   FIO2 (%) 100 %   Apnea Time (s) 20 S   Apnea Flow (L/min) 52 L/min   Maintenance   Alarm (pink) cable attached No   Resuscitation bag with peep valve at bedside Yes   Water bag changed No   IHI Ventilator Associated Pneumonia Bundle   Daily Assessment of Readiness to Extubate Yes   Head of Bed Elevated HOB 30   ETT  Oral;Hi-Lo; Cuffed 7 mm   Placement Date/Time: 12/05/22 1436   Mask Ventilation: Ventilated by mask (1)  Preoxygenated: Yes  Technique: Stylet; Video laryngoscopy  Type: Oral;Hi-Lo; Cuffed  Tube Size: 7 mm  Laryngoscope: [employer]  Blade Size: 3 Location: Oral  Grade View: Full v  Secured at (cm) 22   Measured from [address] [place] by [employer] tube [employer]ario  (unchanged)   Site Condition Dry; Cool   Cuff Pressure (cm H2O) 25 cm H2O   HI-LO Suction  Continuous low suction   HI-LO Secretions Scant;Thin;Clear   HI-LO Intervention Patent

## 2022-12-06 NOTE — PROGRESS NOTES
Progress Note - General Surgery   Sammy Olguin 58 y o  female MRN: [id number]  Unit/Bed#: ICU 11-01 Encounter: [id number]    Assessment:  51-year-old female with past medical history significant for asthma and tracheobronchomalacia presenting with complicated sigmoid diverticulitis  Patient meeting SIRS/sepsis criteria 12 hours after admission as evidenced by fever, tachypnea, hypotension, leukopenia  Now POD#1 s/p laparoscopic hand-assisted sigmoid resection with transverse loop colostomy  Intraoperative findings of perforated feculent diverticulitis with large perforation at mid sigmoid colon   -Remained intubated and sedated postoperatively  -Afebrile, borderline hypotension, initially was requiring pressors intraoperatively now weaned off  -Per nursing, levo ordered however was not initiated with improvement in BP this morning  - cc overnight, additional 375 cc this morning  -NGT with minimal output, 50 cc dark bilious   -WBC 2 70  -Hgb 10 4  -CR 0 76  -Pro-Spenser 16 07    Plan:  NPO/NGT to low intermittent wall suction, flush every 4 hours as needed to ensure patency  IV Zosyn, day 3  IVF, received aggressive IV fluid resuscitation  Ostomy care  Dressings to remain in place until at least postop day 2  Maintain abdominal binder  Medical management per critical care, appreciate recommendations  Serial abdominal exams  Monitor ostomy for output   Strict I/O  To be evaluated bedside by attending    Subjective/Objective   Subjective: Intubated and sedated    Objective:   Blood pressure 123/67, pulse 74, temperature 97 7 °F (36 5 °C), resp  rate 15, height 5' 5" (1 651 m), weight 87 6 kg (193 lb 2 oz), SpO2 93 %  ,Body mass index is 32 14 kg/m²        Intake/Output Summary (Last 24 hours) at 12/6/2022 0757  Last data filed at 12/6/2022 0601  Gross per 24 hour   Intake 7955 15 ml   Output 1040 ml   Net 6915 15 ml       Invasive Devices     Central Venous Catheter Line  Duration           CVC Central Lines 12/05/22 Triple <1 day          Peripheral Intravenous Line  Duration           Peripheral IV 12/04/22 Left;Ventral (anterior) Forearm 1 day    Peripheral IV 12/05/22 Right Hand <1 day          Arterial Line  Duration           Arterial Line 12/05/22 Left Radial <1 day          Drain  Duration           Urethral Catheter Temperature probe 16 Fr  1 day    NG/OG/Enteral Tube Nasogastric 14 Fr Right nare <1 day          Airway  Duration           ETT  Oral;Hi-Lo; Cuffed 7 mm <1 day              Physical Exam  Vitals and nursing note reviewed  Constitutional:       General: She is not in acute distress  Appearance: She is obese  She is ill-appearing  She is not toxic-appearing  Interventions: She is sedated and intubated  HENT:      Head: Normocephalic and atraumatic  Cardiovascular:      Rate and Rhythm: Normal rate and regular rhythm  Pulses: Normal pulses  Heart sounds: Normal heart sounds  No murmur heard  No friction rub  No gallop  Pulmonary:      Effort: She is intubated  Breath sounds: Decreased breath sounds present  Abdominal:      General: Bowel sounds are decreased  There is distension  Palpations: Abdomen is soft  Tenderness: There is abdominal tenderness (She became agitated upon abdominal exam)  There is no guarding or rebound  Comments: Laparoscopic sites are clean, dry, intact with 2 x 2 and Tegaderm in place; left paramedian incision clean, dry, intact with 4 x 4 and Tegaderm in place; ostomy with bar in place, minimal amount of bloody output in bag   Musculoskeletal:         General: Normal range of motion  Right lower leg: No edema  Left lower leg: No edema  Skin:     General: Skin is warm and dry  Capillary Refill: Capillary refill takes less than 2 seconds  Lab, Imaging and other studies:  I have personally reviewed pertinent lab results      CBC:   Lab Results   Component Value Date    WBC 2 70 (L) 12/06/2022    HGB 10 4 (L) 12/06/2022    HCT 31 1 (L) 12/06/2022    MCV 95 12/06/2022     12/06/2022    MCH 31 7 12/06/2022    MCHC 33 4 12/06/2022    RDW 13 2 12/06/2022    MPV 10 1 12/06/2022    NRBC 0 12/05/2022     CMP:   Lab Results   Component Value Date    SODIUM 137 12/06/2022    K 3 9 12/06/2022     (H) 12/06/2022    CO2 22 12/06/2022    CO2 22 12/05/2022    BUN 16 12/06/2022    CREATININE 0 76 12/06/2022    GLUCOSE 89 12/05/2022    CALCIUM 7 8 (L) 12/06/2022    AST 20 12/06/2022    ALT 10 12/06/2022    ALKPHOS 28 (L) 12/06/2022    EGFR 84 12/06/2022     VTE Pharmacologic Prophylaxis: Reason for no pharmacologic prophylaxis Per primary  VTE Mechanical Prophylaxis: sequential compression device    Isela Wagner PA-C

## 2022-12-06 NOTE — ASSESSMENT & PLAN NOTE
· Remained intubated post-op given O2 requirement and tenuous status  · CXR w/concern for ?  RLL infiltrate - formal read pending  · Current vent settings: ACVC 14/480/70/6  · Follow ABG and adjust vent settings as necessary  · Currently sedated with Propofol/Fentanyl gtt's for goal RASS -1 to -2  · Will start Xopenex/Pulmicort nebs given asthma history  · Aggressive pulmonary hygiene  · Daily SBT/SAT  · VAP precautions  · Titrate FiO2 for MAP > 90%

## 2022-12-06 NOTE — PROGRESS NOTES
Nasrin U  66   ICU Acceptance Note - Ramila Orellana 1960, 58 y o  female MRN: [id number]  Unit/Bed#: ICU 11-01 Encounter: [id number]  Primary Care Provider: No primary care provider on file  Date and time admitted to hospital: 12/4/2022  7:07 AM    * Sigmoid diverticulitis  Assessment & Plan  · Presented with abdominal pain, n/v on 12/04  · Imaging revealed acute sigmoid diverticulitis involving a large sigmoid diverticulum w/small amount of pneumoperitoneum suggesting small perforation  · OR today for laparoscopic hand-assisted sigmoid colon resection, diverting transverse loop colostomy   · Perforated feculent diverticulitis w/large perforation in mid sigmoid colon noted  · Maintain NGT to LIS   · Monitor colostomy stoma  · Strict I/O  · Trend endpoints - send labs now    Septic shock [facility])  Assessment & Plan  · Worsening in PM of 12/04 - AEB hyperthermia, tachypnea, hypotension, leukopenia w/bandemia, elevated INR  · In setting of sigmoid diverticulitis with large perforation   · Imaging as noted above  · COVID/FLU/RSV negative  · Send blood cultures  · UA w/out evidence of infection  · Peritoneal fluid pending  · Send lactic acid now   · Send procalcitonin   · Has received aggressive IVF resuscitation  · Was requiring Phenylephrine in OR - now off  · Continue IV Zosyn D#3  · Monitor fever and WBC curve  · Trend procalcitonin and follow up cultures    Acute respiratory failure with hypoxia ([address] )  Assessment & Plan  · Remained intubated post-op given O2 requirement and tenuous status  · CXR w/concern for ?  RLL infiltrate - formal read pending  · Current vent settings: ACVC 14/480/70/6  · Follow ABG and adjust vent settings as necessary  · Currently sedated with Propofol/Fentanyl gtt's for goal RASS -1 to -2  · Will start Xopenex/Pulmicort nebs given asthma history  · Aggressive pulmonary hygiene  · Daily SBT/SAT  · VAP precautions  · Titrate FiO2 for MAP > 90%    Asthma  Assessment & Plan  · Unclear if she follows with Pulmonology as outpatient  · Prescribed Dulera and PRN Albuterol as outpatient - will order scheduled Xopenex/Pulmicort nebs  · Aggressive pulmonary hygiene    Bronchomalacia  Assessment & Plan  · Diagnosed in 2016  · Follows with Pulmonary at [facility], however has not seen them since 2018/2019 as disease has been stable  · Is s/p 2 laser treatments to airways     Hypertension  Assessment & Plan  · Holding home antihypertensives 2/2 septic shock/hypotension    GERD (gastroesophageal reflux disease)  Assessment & Plan  · Continue home PPI via IV route while NPO post abdominal surgical procedure as noted above    ----------------------------------------------------------------------------------------  HPI/24hr events: Brian Proctor is a 59 yo female w/PMHx of HTN, asthma, bronchomalacia s/p laser treatment x2, GERD who initially presented to ED on 12/04 for abdominal pain, n/v  Imaging during this time was significant for acute sigmoid diverticulitis w/small amount of pneumoperitoneum  Patient was medically managed, however clinical status continued to worsen and so patient was taken to OR  In OR, patient underwent sigmoid colon resection w/creation of diverting transverse loop colostomy  Patient remained intubated post-op for increased O2 requirements and will be transferred to ICU for airway management and further work-up       Patient appropriate for transfer out of the ICU today?: No  Disposition: Transfer to Critical Care   Code Status: Level 1 - Full Code  ---------------------------------------------------------------------------------------  SUBJECTIVE  ANGIE    Review of Systems  Review of systems was unable to be performed secondary to intubated/sedated  ---------------------------------------------------------------------------------------  OBJECTIVE    Vitals   Vitals:    12/06/22 0030 12/06/22 0045 12/06/22 0100 12/06/22 0200   BP:       BP Location:       Pulse: 69 69 67 68 Resp: 14 14 14 14   Temp: 97 7 °F (36 5 °C) 97 7 °F (36 5 °C) 97 9 °F (36 6 °C) 97 7 °F (36 5 °C)   TempSrc:    Bladder   SpO2: 94% 94% 95% 93%   Weight:       Height:         Temp (24hrs), Av °F (37 2 °C), Min:97 7 °F (36 5 °C), Max:100 4 °F (38 °C)  Current: Temperature: 97 7 °F (36 5 °C)  Arterial Line BP: 95/56  Arterial Line MAP (mmHg): 70 mmHg    Respiratory:  SpO2: SpO2: 93 %, SpO2 Activity: SpO2 Activity: At Rest, SpO2 Device: O2 Device: Ventilator  Nasal Cannula O2 Flow Rate (L/min): 10 L/min    Invasive/non-invasive ventilation settings   Respiratory    Lab Data (Last 4 hours)    None         O2/Vent Data (Last 4 hours)    None                Physical Exam  Vitals and nursing note reviewed  Constitutional:       Appearance: She is obese  She is ill-appearing  Interventions: She is sedated, intubated and restrained  HENT:      Head: Normocephalic and atraumatic  Mouth/Throat:      Mouth: Mucous membranes are dry  Eyes:      Pupils: Pupils are equal, round, and reactive to light  Cardiovascular:      Rate and Rhythm: Normal rate and regular rhythm  Pulses: Normal pulses  Heart sounds: Normal heart sounds  No murmur heard  Pulmonary:      Effort: She is intubated  Breath sounds: Decreased breath sounds and rhonchi present  No wheezing or rales  Abdominal:      General: Bowel sounds are absent  There is distension  Palpations: Abdomen is soft  Comments: Midline abdominal incision; colostomy stoma pink   Genitourinary:     Comments: Avery cath draining yellow urine  Musculoskeletal:      Cervical back: Neck supple  Right lower leg: No edema  Left lower leg: No edema  Skin:     General: Skin is warm and dry  Capillary Refill: Capillary refill takes less than 2 seconds  Coloration: Skin is pale  Neurological:      GCS: GCS eye subscore is 3  GCS verbal subscore is 1  GCS motor subscore is 5     Psychiatric:      Comments: Deferred Laboratory and Diagnostics:  Results from last 7 days   Lab Units 12/05/22 2119 12/05/22  1600 12/05/22 0507 12/04/22  0718   WBC Thousand/uL 1 83*  --  2 38* 4 74   HEMOGLOBIN g/dL 11 3*  --  13 0 14 1   I STAT HEMOGLOBIN g/dl  --  10 2*  --   --    HEMATOCRIT % 34 3*  --  39 1 42 1   HEMATOCRIT, ISTAT %  --  30*  --   --    PLATELETS Thousands/uL 166  --  181 248   NEUTROS PCT % 86*  --   --  43   BANDS PCT %  --   --  21*  --    MONOS PCT % 5  --   --  10   MONO PCT %  --   --  3*  --      Results from last 7 days   Lab Units 12/05/22 2119 12/05/22 1600 12/05/22 0507 12/04/22  0718   SODIUM mmol/L 139  --  140 138   POTASSIUM mmol/L 4 1  --  4 0 3 8   CHLORIDE mmol/L 110*  --  106 104   CO2 mmol/L 21  --  26 25   CO2, I-STAT mmol/L  --  22  --   --    ANION GAP mmol/L 8  --  8 9   BUN mg/dL 16  --  16 18   CREATININE mg/dL 0 78  --  0 93 0 90   CALCIUM mg/dL 8 1*  --  8 4 10 0   GLUCOSE RANDOM mg/dL 98  --  95 104   ALT U/L 13  --   --  17   AST U/L 18  --   --  17   ALK PHOS U/L 34  --   --  68   ALBUMIN g/dL 2 9*  --   --  4 4   TOTAL BILIRUBIN mg/dL 0 61  --   --  0 47     Results from last 7 days   Lab Units 12/05/22 2119 12/05/22  0507   MAGNESIUM mg/dL 2 0 1 6*   PHOSPHORUS mg/dL 3 3 4 3*      Results from last 7 days   Lab Units 12/05/22 2119 12/04/22 0718   INR  2 19* 0 93   PTT seconds 35 26          Results from last 7 days   Lab Units 12/05/22 2119   LACTIC ACID mmol/L 1 9     ABG:  Results from last 7 days   Lab Units 12/05/22 2121   PH ART  7 332*   PCO2 ART mm Hg 41 3   PO2 ART mm Hg 72 1*   HCO3 ART mmol/L 21 4*   BASE EXC ART mmol/L -4 3   ABG SOURCE  Line, Arterial     VBG:  Results from last 7 days   Lab Units 12/05/22 2121   ABG SOURCE  Line, Arterial     Results from last 7 days   Lab Units 12/05/22 2119   PROCALCITONIN ng/ml 17 61*       Micro        EKG: sinus rhythm  Imaging: I have personally reviewed pertinent reports     and I have personally reviewed pertinent films in PACS    Intake and Output  I/O       12/04 0701 12/05 0700 12/05 0701 12/06 0700    P  O   0    I V  (mL/kg) 2150 (26 6) 4550 3 (56 4)    IV Piggyback 3200 2300    Total Intake(mL/kg) 5350 (66 3) 6850 3 (84 9)    Urine (mL/kg/hr) 1325 885 (0 5)    Emesis/NG output 0 50    Blood  30    Total Output 1325 965    Net +4025 +5885 3          Unmeasured Urine Occurrence 1 x     Unmeasured Emesis Occurrence 1 x           Height and Weights   Height: 5' 5" (165 1 cm)     Body mass index is 29 61 kg/m²  Weight (last 2 days)     Date/Time Weight    12/05/22 0600 80 7 (177 91)    12/04/22 1231 81 (178 57)    12/04/22 0710 77 1 (170)            Nutrition       Diet Orders   (From admission, onward)             Start     Ordered    12/05/22 2009  Diet NPO  Diet effective now        References:    Nutrtion Support Algorithm Enteral vs  Parenteral   Question Answer Comment   Diet Type NPO    RD to adjust diet per protocol?  No        12/05/22 2008                  Active Medications  Scheduled Meds:  Current Facility-Administered Medications   Medication Dose Route Frequency Provider Last Rate   • acetaminophen  650 mg Rectal Q4H PRN Laura Singh PA-C     • budesonide  0 5 mg Nebulization Q12H Angela Rodrigez MD     • chlorhexidine  15 mL Mouth/Throat Q12H [facility] Laura Singh PA-C     • fentaNYL  50 mcg/hr Intravenous Continuous Laura Singh PA-C 50 mcg/hr (12/06/22 0000)   • fentanyl citrate (PF)  50 mcg Intravenous Q1H PRN Laura Singh PA-C     • lactated ringers  125 mL/hr Intravenous Continuous Laura Singh PA-C 125 mL/hr (12/06/22 0015)   • levalbuterol  1 25 mg Nebulization TID Angela Rodrigez MD      And   • sodium chloride  3 mL Nebulization TID Angela Rodrigez MD     • ondansetron  4 mg Intravenous Q6H PRN Laura Singh PA-C     • pantoprazole  40 mg Intravenous Q12H [facility] Jolly Clinton PA-C     • piperacillin-tazobactam  4 5 g Intravenous Q6H Laura Singh PA-C Stopped (12/06/22 0000)   • propofol  5-50 mcg/kg/min Intravenous Titrated Phoebe Dillard PA-C 50 mcg/kg/min (12/06/22 0000)     Continuous Infusions:  fentaNYL, 50 mcg/hr, Last Rate: 50 mcg/hr (12/06/22 0000)  lactated ringers, 125 mL/hr, Last Rate: 125 mL/hr (12/06/22 0015)  propofol, 5-50 mcg/kg/min, Last Rate: 50 mcg/kg/min (12/06/22 0000)      PRN Meds:   acetaminophen, 650 mg, Q4H PRN  fentanyl citrate (PF), 50 mcg, Q1H PRN  ondansetron, 4 mg, Q6H PRN        Invasive Devices Review  Invasive Devices     Central Venous Catheter Line  Duration           CVC Central Lines 12/05/22 Triple <1 day          Peripheral Intravenous Line  Duration           Peripheral IV 12/04/22 Left;Ventral (anterior) Forearm 1 day    Peripheral IV 12/05/22 Right Hand <1 day          Arterial Line  Duration           Arterial Line 12/05/22 Left Radial <1 day          Drain  Duration           Urethral Catheter Temperature probe 16 Fr  1 day    NG/OG/Enteral Tube Nasogastric 14 Fr Right nare <1 day          Airway  Duration           ETT  Oral;Hi-Lo; Cuffed 7 mm <1 day                Rationale for remaining devices: R IJ TLC - IV fluid/medications, L radial A-Line - BP monitoring, Avery cath - accurate I/O  ---------------------------------------------------------------------------------------  Advance Directive and Living Will:      Power of :    POLST:    ---------------------------------------------------------------------------------------  Care Time Delivered:   Upon my evaluation, this patient had a high probability of imminent or life-threatening deterioration due to septic shock, sigmoid diverticulitis, acute respiratory failure, which required my direct attention, intervention, and personal management  I have personally provided 30 minutes (2045 to 2115) of critical care time, exclusive of procedures, teaching, family meetings, and any prior time recorded by providers other than myself         BARBIE Dale      Portions of the record may have been created with voice recognition software  Occasional wrong word or "sound a like" substitutions may have occurred due to the inherent limitations of voice recognition software    Read the chart carefully and recognize, using context, where substitutions have occurred

## 2022-12-07 PROBLEM — D64.9 ANEMIA: Status: ACTIVE | Noted: 2022-12-07

## 2022-12-07 PROBLEM — G93.40 ENCEPHALOPATHY: Status: ACTIVE | Noted: 2022-12-07

## 2022-12-07 LAB
ALBUMIN SERPL BCP-MCNC: 3 G/DL (ref 3.5–5)
ALP SERPL-CCNC: 37 U/L (ref 34–104)
ALT SERPL W P-5'-P-CCNC: 12 U/L (ref 7–52)
ANION GAP SERPL CALCULATED.3IONS-SCNC: 7 MMOL/L (ref 4–13)
AST SERPL W P-5'-P-CCNC: 28 U/L (ref 13–39)
BILIRUB SERPL-MCNC: 0.46 MG/DL (ref 0.2–1)
BUN SERPL-MCNC: 12 MG/DL (ref 5–25)
CA-I BLD-SCNC: 1.13 MMOL/L (ref 1.12–1.32)
CALCIUM ALBUM COR SERPL-MCNC: 9.2 MG/DL (ref 8.3–10.1)
CALCIUM SERPL-MCNC: 8.4 MG/DL (ref 8.4–10.2)
CHLORIDE SERPL-SCNC: 109 MMOL/L (ref 96–108)
CO2 SERPL-SCNC: 25 MMOL/L (ref 21–32)
CREAT SERPL-MCNC: 0.67 MG/DL (ref 0.6–1.3)
ERYTHROCYTE [DISTWIDTH] IN BLOOD BY AUTOMATED COUNT: 13.5 % (ref 11.6–15.1)
GFR SERPL CREATININE-BSD FRML MDRD: 94 ML/MIN/1.73SQ M
GLUCOSE SERPL-MCNC: 100 MG/DL (ref 65–140)
GLUCOSE SERPL-MCNC: 106 MG/DL (ref 65–140)
GLUCOSE SERPL-MCNC: 82 MG/DL (ref 65–140)
GLUCOSE SERPL-MCNC: 99 MG/DL (ref 65–140)
HCT VFR BLD AUTO: 27.4 % (ref 34.8–46.1)
HGB BLD-MCNC: 9 G/DL (ref 11.5–15.4)
MAGNESIUM SERPL-MCNC: 2.3 MG/DL (ref 1.9–2.7)
MCH RBC QN AUTO: 31.9 PG (ref 26.8–34.3)
MCHC RBC AUTO-ENTMCNC: 32.8 G/DL (ref 31.4–37.4)
MCV RBC AUTO: 97 FL (ref 82–98)
PHOSPHATE SERPL-MCNC: 2.9 MG/DL (ref 2.3–4.1)
PLATELET # BLD AUTO: 136 THOUSANDS/UL (ref 149–390)
PMV BLD AUTO: 10.4 FL (ref 8.9–12.7)
POTASSIUM SERPL-SCNC: 3.9 MMOL/L (ref 3.5–5.3)
PROCALCITONIN SERPL-MCNC: 10.75 NG/ML
PROT SERPL-MCNC: 5.1 G/DL (ref 6.4–8.4)
RBC # BLD AUTO: 2.82 MILLION/UL (ref 3.81–5.12)
SODIUM SERPL-SCNC: 141 MMOL/L (ref 135–147)
WBC # BLD AUTO: 3.97 THOUSAND/UL (ref 4.31–10.16)

## 2022-12-07 RX ORDER — FENTANYL CITRATE 50 UG/ML
50 INJECTION, SOLUTION INTRAMUSCULAR; INTRAVENOUS EVERY 2 HOUR PRN
Status: DISCONTINUED | OUTPATIENT
Start: 2022-12-07 | End: 2022-12-08

## 2022-12-07 RX ORDER — FUROSEMIDE 10 MG/ML
20 INJECTION INTRAMUSCULAR; INTRAVENOUS ONCE
Status: COMPLETED | OUTPATIENT
Start: 2022-12-07 | End: 2022-12-07

## 2022-12-07 RX ORDER — FENTANYL CITRATE 50 UG/ML
25 INJECTION, SOLUTION INTRAMUSCULAR; INTRAVENOUS EVERY 2 HOUR PRN
Status: DISCONTINUED | OUTPATIENT
Start: 2022-12-07 | End: 2022-12-08

## 2022-12-07 RX ADMIN — LEVALBUTEROL 1.25 MG: 1.25 SOLUTION, CONCENTRATE RESPIRATORY (INHALATION) at 14:08

## 2022-12-07 RX ADMIN — ISODIUM CHLORIDE 3 ML: 0.03 SOLUTION RESPIRATORY (INHALATION) at 14:08

## 2022-12-07 RX ADMIN — PANTOPRAZOLE SODIUM 40 MG: 40 INJECTION, POWDER, LYOPHILIZED, FOR SOLUTION INTRAVENOUS at 08:53

## 2022-12-07 RX ADMIN — PIPERACILLIN AND TAZOBACTAM 3.38 G: 3; .375 INJECTION, POWDER, FOR SOLUTION INTRAVENOUS at 14:55

## 2022-12-07 RX ADMIN — ISODIUM CHLORIDE 3 ML: 0.03 SOLUTION RESPIRATORY (INHALATION) at 09:04

## 2022-12-07 RX ADMIN — PIPERACILLIN AND TAZOBACTAM 4.5 G: 4; .5 INJECTION, POWDER, FOR SOLUTION INTRAVENOUS at 04:19

## 2022-12-07 RX ADMIN — ONDANSETRON 4 MG: 2 INJECTION INTRAMUSCULAR; INTRAVENOUS at 19:46

## 2022-12-07 RX ADMIN — ISODIUM CHLORIDE 3 ML: 0.03 SOLUTION RESPIRATORY (INHALATION) at 20:08

## 2022-12-07 RX ADMIN — PIPERACILLIN AND TAZOBACTAM 4.5 G: 4; .5 INJECTION, POWDER, FOR SOLUTION INTRAVENOUS at 08:57

## 2022-12-07 RX ADMIN — FENTANYL CITRATE 50 MCG: 50 INJECTION INTRAMUSCULAR; INTRAVENOUS at 19:46

## 2022-12-07 RX ADMIN — BUDESONIDE 0.5 MG: 0.5 INHALANT ORAL at 09:04

## 2022-12-07 RX ADMIN — BUDESONIDE 0.5 MG: 0.5 INHALANT ORAL at 20:08

## 2022-12-07 RX ADMIN — DEXMEDETOMIDINE HYDROCHLORIDE 0.4 MCG/KG/HR: 400 INJECTION INTRAVENOUS at 06:00

## 2022-12-07 RX ADMIN — PANTOPRAZOLE SODIUM 40 MG: 40 INJECTION, POWDER, LYOPHILIZED, FOR SOLUTION INTRAVENOUS at 20:03

## 2022-12-07 RX ADMIN — DEXMEDETOMIDINE HYDROCHLORIDE 0.4 MCG/KG/HR: 400 INJECTION INTRAVENOUS at 18:38

## 2022-12-07 RX ADMIN — FUROSEMIDE 20 MG: 10 INJECTION, SOLUTION INTRAMUSCULAR; INTRAVENOUS at 09:43

## 2022-12-07 RX ADMIN — LEVALBUTEROL 1.25 MG: 1.25 SOLUTION, CONCENTRATE RESPIRATORY (INHALATION) at 09:04

## 2022-12-07 RX ADMIN — SODIUM CHLORIDE, POTASSIUM CHLORIDE, SODIUM LACTATE AND CALCIUM CHLORIDE 125 ML/HR: 600; 310; 30; 20 INJECTION, SOLUTION INTRAVENOUS at 06:00

## 2022-12-07 RX ADMIN — PROPOFOL 50 MCG/KG/MIN: 10 INJECTION, EMULSION INTRAVENOUS at 03:47

## 2022-12-07 RX ADMIN — Medication 100 MCG/HR: at 01:46

## 2022-12-07 RX ADMIN — PIPERACILLIN AND TAZOBACTAM 3.38 G: 3; .375 INJECTION, POWDER, FOR SOLUTION INTRAVENOUS at 22:52

## 2022-12-07 RX ADMIN — LEVALBUTEROL 1.25 MG: 1.25 SOLUTION, CONCENTRATE RESPIRATORY (INHALATION) at 20:08

## 2022-12-07 RX ADMIN — CHLORHEXIDINE GLUCONATE 0.12% ORAL RINSE 15 ML: 1.2 LIQUID ORAL at 08:53

## 2022-12-07 NOTE — PLAN OF CARE
Problem: Potential for Falls  Goal: Patient will remain free of falls  Description: INTERVENTIONS:  - Educate patient/family on patient safety including physical limitations  - Instruct patient to call for assistance with activity   - Consult OT/PT to assist with strengthening/mobility   - Keep Call bell within reach  - Keep bed low and locked with side rails adjusted as appropriate  - Keep care items and personal belongings within reach  - Initiate and maintain comfort rounds  - Make Fall Risk Sign visible to staff  - Offer Toileting every 2  Problem: PAIN - ADULT  Goal: Verbalizes/displays adequate comfort level or baseline comfort level  Description: Interventions:  - Encourage patient to monitor pain and request assistance  - Assess pain using appropriate pain scale  - Administer analgesics based on type and severity of pain and evaluate response  - Implement non-pharmacological measures as appropriate and evaluate response  - Consider cultural and social influences on pain and pain management  - Notify physician/advanced practitioner if interventions unsuccessful or patient reports new pain  Outcome: Progressing     Problem: INFECTION - ADULT  Goal: Absence or prevention of progression during hospitalization  Description: INTERVENTIONS:  - Assess and monitor for signs and symptoms of infection  - Monitor lab/diagnostic results  - Monitor all insertion sites, i e  indwelling lines, tubes, and drains  - Monitor endotracheal if appropriate and nasal secretions for changes in amount and color  - [place] appropriate cooling/warming therapies per order  - Administer medications as ordered  - Instruct and encourage patient and family to use good hand hygiene technique  - Identify and instruct in appropriate isolation precautions for identified infection/condition  Outcome: Progressing  Goal: Absence of fever/infection during neutropenic period  Description: INTERVENTIONS:  - Monitor WBC    Outcome: Progressing Problem: SAFETY ADULT  Goal: Patient will remain free of falls  Description: INTERVENTIONS:  - Educate patient/family on patient safety including physical limitations  - Instruct patient to call for assistance with activity   - Consult OT/PT to assist with strengthening/mobility   - Keep Call bell within reach  - Keep bed low and locked with side rails adjusted as appropriate  - Keep care items and personal belongings within reach  - Initiate and maintain comfort rounds  - Make Fall Risk Sign visible to staff  - Offer Toileting every 2 Hours, in advance of need  - Initiate/Maintain bed alarm  - Obtain necessary fall risk management equipment:   - Apply yellow socks and bracelet for high fall risk patients  - Consider moving patient to room near nurses station  Outcome: Progressing  Goal: Maintain or return to baseline ADL function  Description: INTERVENTIONS:  -  Assess patient's ability to carry out ADLs; assess patient's baseline for ADL function and identify physical deficits which impact ability to perform ADLs (bathing, care of mouth/teeth, toileting, grooming, dressing, etc )  - Assess/evaluate cause of self-care deficits   - Assess range of motion  - Assess patient's mobility; develop plan if impaired  - Assess patient's need for assistive devices and provide as appropriate  - Encourage maximum independence but intervene and supervise when necessary  - Involve family in performance of ADLs  - Assess for home care needs following discharge   - Consider OT consult to assist with ADL evaluation and planning for discharge  - Provide patient education as appropriate  Outcome: Progressing  Goal: Maintains/Returns to pre admission functional level  Description: INTERVENTIONS:  - Perform BMAT or MOVE assessment daily    - Set and communicate daily mobility goal to care team and patient/family/caregiver     - Collaborate with rehabilitation services on mobility goals if consulted  - Perform Range of Motion 3  Problem: DISCHARGE PLANNING  Goal: Discharge to home or other facility with appropriate resources  Description: INTERVENTIONS:  - Identify barriers to discharge w/patient and caregiver  - Arrange for needed discharge resources and transportation as appropriate  - Identify discharge learning needs (meds, wound care, etc )  - Arrange for interpretive services to assist at discharge as needed  - Refer to Case Management Department for coordinating discharge planning if the patient needs post-hospital services based on physician/advanced practitioner order or complex needs related to functional status, cognitive ability, or social support system  Outcome: Progressing     Problem: Knowledge Deficit  Goal: Patient/family/caregiver demonstrates understanding of disease process, treatment plan, medications, and discharge instructions  Description: Complete learning assessment and assess knowledge base    Interventions:  - Provide teaching at level of understanding  - Provide teaching via preferred learning methods  Outcome: Progressing     Problem: MOBILITY - ADULT  Goal: Maintain or return to baseline ADL function  Description: INTERVENTIONS:  -  Assess patient's ability to carry out ADLs; assess patient's baseline for ADL function and identify physical deficits which impact ability to perform ADLs (bathing, care of mouth/teeth, toileting, grooming, dressing, etc )  - Assess/evaluate cause of self-care deficits   - Assess range of motion  - Assess patient's mobility; develop plan if impaired  - Assess patient's need for assistive devices and provide as appropriate  - Encourage maximum independence but intervene and supervise when necessary  - Involve family in performance of ADLs  - Assess for home care needs following discharge   - Consider OT consult to assist with ADL evaluation and planning for discharge  - Provide patient education as appropriate  Outcome: Progressing  Goal: Maintains/Returns to pre admission functional level  Description: INTERVENTIONS:  - Perform BMAT or MOVE assessment daily    - Set and communicate daily mobility goal to care team and patient/family/caregiver  - Collaborate with rehabilitation services on mobility goals if consulted  - Perform Range of Motion 3  Problem: Prexisting or High Potential for Compromised Skin Integrity  Goal: Skin integrity is maintained or improved  Description: INTERVENTIONS:  - Identify patients at risk for skin breakdown  - Assess and monitor skin integrity  - Assess and monitor nutrition and hydration status  - Monitor labs   - Assess for incontinence   - Turn and reposition patient  - Assist with mobility/ambulation  - Relieve pressure over bony prominences  - Avoid friction and shearing  - Provide appropriate hygiene as needed including keeping skin clean and dry  - Evaluate need for skin moisturizer/barrier cream  - Collaborate with interdisciplinary team   - Patient/family teaching  - Consider wound care consult   Outcome: Progressing     Problem: Nutrition/Hydration-ADULT  Goal: Nutrient/Hydration intake appropriate for improving, restoring or maintaining nutritional needs  Description: Monitor and assess patient's nutrition/hydration status for malnutrition  Collaborate with interdisciplinary team and initiate plan and interventions as ordered  Monitor patient's weight and dietary intake as ordered or per policy  Utilize nutrition screening tool and intervene as necessary  Determine patient's food preferences and provide high-protein, high-caloric foods as appropriate       INTERVENTIONS:  - Monitor oral intake, urinary output, labs, and treatment plans  - Assess nutrition and hydration status and recommend course of action  - Evaluate amount of meals eaten  - Assist patient with eating if necessary   - Allow adequate time for meals  - Recommend/ encourage appropriate diets, oral nutritional supplements, and vitamin/mineral supplements  - Order, calculate, and assess calorie counts as needed  - Recommend, monitor, and adjust tube feedings and TPN/PPN based on assessed needs  - Assess need for intravenous fluids  - Provide specific nutrition/hydration education as appropriate  - Include patient/family/caregiver in decisions related to nutrition  Outcome: Progressing    times a day  - Reposition patient every 2 hours  - Dangle patient  times a day  - Stand patient 3 times a day  - Ambulate patient 3 times a day  - Out of bed to chair 3 times a day   - Out of bed for meals 3 times a day  - Out of bed for toileting  - Record patient progress and toleration of activity level   Outcome: Progressing    times a day  - Reposition patient every 2 hours    - Dangle patient 3 times a day  - Stand patient 3 times a day  - Ambulate patient 3 times a day  - Out of bed to chair 3 times a day   - Out of bed for meals 3 times a day  - Out of bed for toileting  - Record patient progress and toleration of activity level   Outcome: Progressing    Hours, in advance of need  - Initiate/Maintain bed alarm  - Obtain necessary fall risk management equipment:   - Apply yellow socks and bracelet for high fall risk patients  - Consider moving patient to room near nurses station  Outcome: Progressing

## 2022-12-07 NOTE — RESPIRATORY THERAPY NOTE
12/07/22 0407   Respiratory Assessment   General Appearance Sedated   Respiratory Pattern Assisted   Chest Assessment Chest expansion symmetrical   Suction ET Tube   Resp Comments   (pts fio2 increased NP due to desat otherwise pt remained on current settings  ETT secured  pt suctioned as needed  will continue to monitor pt)   O2 Device vent   Vent Information   Vent ID [id number]   Vent type     Vent Mode AC/VC+   $ Pulse Oximetry Spot Check Charge Completed   AC/VC+ Settings   Resp Rate (BPM) 14 BPM   VT (mL) 480 mL   Insp Time (S) 1 S   FIO2 (%) 70 %   PEEP (cmH2O) 6 cmH2O   Rise Time (%) 50 %   Trigger Sensitivity Flow (LPM) 3 LPM   Humidification Heater   Heater Temp 96 8 °F (36 °C)   AC/VC+ Actuals   Resp Rate (BPM) 14 BPM   VT (mL) 512 mL   MV (Obs) 7 11   MAP (cmH2O) 9 5 cmH2O   Peak Pressure (cmH2O) 21 cmH2O   I:E Ratio (Obs) 1:3 3   Static Compliance (mL/cmH20) 44 mL/cmH2O   Plateau Pressure (cm H2O) 18 cm H2O   Heater Temperature (Obs) 96 8 °F (36 °C)   AC/VC+ ALARMS   High Peak Pressure (cmH2O) 40 cmH2O   Low Peak Pressure (cmH2O) 11 5 cm H2O   High Resp Rate (BPM) 45 BPM   High MV (L/min) 20 L/min   Low MV (L/min) 4 L/min   High VT (mL) 900 mL   Low VT (mL) 380 mL   High Daniel VTE (mL) 900 mL   Low Daniel VTE (mL) 380 mL   High Spont VTE (mL) 900 mL   Low Spont VTE (mL) 380 mL   High Insp Daniel VT (mL) 1130 mL   AC/VC+ Apnea Settings   Resp Rate (BPM) 14 BPM   VT (mL) 480 mL   FIO2 (%) 100 %   Apnea Time (s) 20 S   Apnea Flow (L/min) 50 L/min   Maintenance   Alarm (pink) cable attached No   Resuscitation bag with peep valve at bedside Yes   Water bag changed Yes   Circuit changed No   Daily Screen   Patient safety screen outcome: Failed   Not Ready for Weaning due to: Underline problem not resolved;FiO2 >60%   [REMOVED] ETT  Oral;Hi-Lo; Cuffed 7 mm   Removal Date/Time: 12/06/22 (c) 1702  Placement Date/Time: 12/05/22 1436   Mask Ventilation: Ventilated by mask (1)  Preoxygenated: Yes  Technique: Stylet; Video laryngoscopy  Type: Oral;Hi-Lo; Cuffed  Tube Size: 7 mm  Laryngoscope: [place] [employer]  Blade Size:    Secured at (cm) 21   Measured from Teeth   Secured Location Right   Repositioned Center to Right   Secured by Commercial tube solitario   Site Condition Dry   Cuff Pressure (cm H2O) 26 cm H2O   HI-LO Suction  Continuous low suction   HI-LO Secretions Scant   HI-LO Intervention Patent   RT Ventilator Management Note  Stalin Hernandez 58 y o  female MRN: [id number]  Unit/Bed#: ICU 11-01 Encounter: [id number]      Daily Screen         12/6/2022  0824 12/7/2022  0407          Patient safety screen outcome[de-identified] Failed Failed      Not Ready for Weaning due to[de-identified] Underline problem not resolved Underline problem not resolved;FiO2 >60%                Physical Exam:   Assessment Type: During-treatment  General Appearance: Sedated  Respiratory Pattern: Assisted  Chest Assessment: Chest expansion symmetrical  Cough: None  Suction: ET Tube  O2 Device: vent      Resp Comments: (P)  (pts fio2 increased NP due to desat otherwise pt remained on current settings  ETT secured  pt suctioned as needed   will continue to monitor pt)

## 2022-12-07 NOTE — PROGRESS NOTES
The patient’s standard-infusion Piperacillin-Tazobactam / Zosyn (infused over 30-60 minutes) has been converted to extended-infusion (infused over 4 hours) per [facility], [facility] Extended-Infusion Piperacillin-Tazobactam Protocol for Adults as approved by the Pharmacy and Therapeutics Committee (accessible here on MyNET)       The patient met ALL eligible criteria:    Age >= 25years old   Critical Care patient    And did NOT have ANY exclusions:     Emergency Department or Operating Room patient  Drug incompatibilities that could NOT be avoided with timing or separate line administration    The following are reminders for Nursing regarding administration:  Infuse the first dose of Zosyn over 30min as a load (if new start), and then all subsequent doses will be given as an extended-infusion over 4 hours (see dosing below)  Use primary tubing as an intermittent infusion; change out primary tubing every 24 hours   Ensure full dose of the medication is given at the appropriate rate  Most incompatible drugs can be scheduled during times when the Zosyn is not being infused; however, if one requires administration during the same time, a separate site or lumen MUST be used  If access is limited and an incompatible medication urgently needs to be given, the Zosyn extended-infusion can be held for up to 30min (remember to flush line before/after)  Extended-infusion Zosyn does NOT require special timing around hemodialysis (it can even be given simultaneously)  If a patient needs an urgent MRI while Zosyn is infusing and there is not a MRI-compatible pump available for use, finish the infusion over the traditional length (30min) and ask Pharmacy to reschedule the next doses so that they start a few hours earlier  Pharmacy will assist nursing in troubleshooting other administration issues as they arise  Dosing for Piperacillin-Tazobactam  CrCl (mL/min) Traditional Dosing Extended-Infusion Dosing #   CrCl > 40 High-Dose  CrCl > 40 Low-Dose 4 5g Q6H (over 30min)  3 375g IV Q6H (over 30min) 3 375g IV Q8H (over 4hr)*    *1st dose loaded over 30min, then start extended-infusion dosing 4hr later   CrCl 20-40 High-Dose  CrCl 20-40 Low-Dose 3 375g IV Q6H (over 30min)  2 25g IV Q6H (over 30min)    CrCl < 20 High-Dose  CrCl < 20 Low-Dose 2 25g IV Q6H (over 30min)  2 25g IV Q8H (over 30min) 3 375g IV Q12H (over 4hr)*    *1st dose loaded over 30min, then start extended-infusion dosing 6hr later   Hemo/Peritoneal Dialysis High-Dose  Hemo/Peritoneal Dialysis Low-Dose 2 25g IV Q6H (over 30min)  2 25g IV Q8H (over 30min)    CVVH/D High-Dose  CVVH/D Low-Dose 3 375g IV Q6H (over 30min)  2 25 IV Q6H (over 30min) 3 375g IV Q8H (over 4hr)*    *1st dose loaded over 30min, then start extended-infusion dosing 4hr later   # = Use 4 5g dosing (same interval) if morbidly obese (BMI ?40)    Please call the Pharmacy with any questions or concerns    Senthil Aldridge, PharmD, [facility]  Infectious Diseases Clinical Pharmacy Specialist  [phone]

## 2022-12-07 NOTE — PROGRESS NOTES
Darlene 45  Progress Note - Angelica José Miguel 1960, 58 y o  female MRN: [id number]  Unit/Bed#: ICU 11-01 Encounter: [id number]  Primary Care Provider: No primary care provider on file  Date and time admitted to hospital: 12/4/2022  7:07 AM    * Sigmoid diverticulitis  Assessment & Plan  57 yo F hx bronchomalacia/bronchiectasis HD3 with septic shock 2/2 feculent peritonitis from peforated sigmoid diverticulitis (Hinchey IV) now POD2 s/p emergent hand assisted laparoscopic sigmoid resection, primary anastomosis and diverting transverse loop colostomy  Remains on mechanical ventilation, sedation being weaned, responds to questions with rude hand gestures  Lungs clear, HRR, abdomen soft without distention, bowel sounds present, dressings C/D/I, stoma pink without output  T 98 1, HR 66, /70, on mechanical ventilation  WBC 3 97, Hgb 9 0 (9 8), Plt 136, BUN 12, Cr 0 67  A/P by Organ System:  Neuro- sedation weaned for possible extubation, re-assess for delirium/pain upon extubation  Cardio- hemodynamically stable, off pressors this morning  Pulm- hx bronchiectasis and bronchomalacia, on mechanical ventilation, pulmicort/xopenex, respiratory protocol  GI- s/p resection/diversion for perforated sigmoid diverticulitis, await stoma output  - good UOP, consider d/c Avery  Renal- BUN/Cr stable, no TANI, multiple risk factors for TANI, monitor  FEN- IVF on hold, received lasix, monitor for additional needs while NPO, lytes stable, consider TF today pending NG output (200 overnight), try to keep NG in place upon extubation, but do not re-insert if it comes out accidentally until we can re-assess    Heme- post-op anemia, trend, down to 9 0 today, plt 136, trend  ID- septic shock 2/2 Hinchey IV diverticulitis, on Zosyn, consider 4 day course from source control (Friday evening last dose), trend temp/WBC, follow-up on fluid cultures which appear polymicrobial, no blood cultures drawn  Endo- no active issues  Msk/Skin- OOB after extubation, PT/OT consult, change dressings today/tomorrow, consult stoma nurse Friday, will clarify timing of bar removal   VTE- consider heparin for VTE prophylaxis/SCDs    Discussed with Critical Care, updated Dr Edwina Gonsales  Will re-assess later today  Subjective/Objective   Chief Complaint: none    Subjective: sedation weaned, remains on ventilator, shakes head in response to questions, gestures with both middle fingers to further questions/conversation, intermittently somnolent    Objective: ETT exchanged overnight, off pressors and sedation this morning per RN, plan for extubation today per CC    Blood pressure 143/71, pulse 82, temperature 98 4 °F (36 9 °C), resp  rate 12, height 5' 5" (1 651 m), weight 91 4 kg (201 lb 8 oz), SpO2 96 %  ,Body mass index is 33 53 kg/m²  Intake/Output Summary (Last 24 hours) at 12/7/2022 1148  Last data filed at 12/7/2022 1122  Gross per 24 hour   Intake 4619 06 ml   Output 4595 ml   Net 24 06 ml       Invasive Devices     Central Venous Catheter Line  Duration           CVC Central Lines 12/05/22 Triple 1 day          Peripheral Intravenous Line  Duration           Peripheral IV 12/04/22 Left;Ventral (anterior) Forearm 3 days    Peripheral IV 12/05/22 Right Hand 1 day          Drain  Duration           Colostomy RLQ 2 days    Urethral Catheter Temperature probe 16 Fr  2 days    NG/OG/Enteral Tube Nasogastric 14 Fr Right nare 1 day                Physical Exam  Vitals and nursing note reviewed  Constitutional:       General: She is not in acute distress  Appearance: She is well-developed  She is not diaphoretic  Comments: Awake, not opening eyes, remains somnolent and appears agitated with hand restraints, remains on ventilator  HENT:      Head: Normocephalic and atraumatic  Eyes:      Conjunctiva/sclera: Conjunctivae normal       Pupils: Pupils are equal, round, and reactive to light     Cardiovascular:      Rate and Rhythm: Normal rate and regular rhythm  Heart sounds: No murmur heard  Pulmonary:      Effort: Pulmonary effort is normal  No respiratory distress  Breath sounds: Normal breath sounds  Comments: On ventilator  Abdominal:      Comments: Soft, no distention, bowel sounds present, dressings C/D/I, stoma pink without output, appliance/bar intact  Mild generalized TTP, no guarding or rigidity  Musculoskeletal:         General: Normal range of motion  Cervical back: Normal range of motion  Comments: Mild peripheral edema  Skin:     General: Skin is warm and dry  Capillary Refill: Capillary refill takes less than 2 seconds  Neurological:      Mental Status: She is oriented to person, place, and time  Psychiatric:      Comments: Agitated  Lab, Imaging and other studies:  I have personally reviewed pertinent lab results    , CBC:   Lab Results   Component Value Date    WBC 3 97 (L) 12/07/2022    HGB 9 0 (L) 12/07/2022    HCT 27 4 (L) 12/07/2022    MCV 97 12/07/2022     (L) 12/07/2022    MCH 31 9 12/07/2022    MCHC 32 8 12/07/2022    RDW 13 5 12/07/2022    MPV 10 4 12/07/2022   , CMP:   Lab Results   Component Value Date    SODIUM 141 12/07/2022    K 3 9 12/07/2022     (H) 12/07/2022    CO2 25 12/07/2022    BUN 12 12/07/2022    CREATININE 0 67 12/07/2022    CALCIUM 8 4 12/07/2022    AST 28 12/07/2022    ALT 12 12/07/2022    ALKPHOS 37 12/07/2022    EGFR 94 12/07/2022     VTE Pharmacologic Prophylaxis: Reason for no pharmacologic prophylaxis none currently  VTE Mechanical Prophylaxis: sequential compression device

## 2022-12-07 NOTE — ASSESSMENT & PLAN NOTE
· Diagnosed in 2016  · Follows with Pulmonary at [facility], however has not seen them since 2018/2019 as disease has been stable  · Is s/p 2 laser treatments to airways

## 2022-12-07 NOTE — RESPIRATORY THERAPY NOTE
RT Ventilator Management Note  Stevie Mcqueen 58 y o  female MRN: [id number]  Unit/Bed#: ICU 11-01 Encounter: [id number]      Daily Screen         12/7/2022  0407 12/7/2022  0904          Patient safety screen outcome[de-identified] Failed Passed      Not Ready for Weaning due to[de-identified] Underline problem not resolved;FiO2 >60% --      Spont breathing trial % for 30 min: -- Yes                Physical Exam:   General Appearance: Sedated  Respiratory Pattern: Assisted  Chest Assessment: Chest expansion symmetrical  Suction: (P) ET Tube  O2 Device: vent      Resp Comments: (P) Pt  placed on wean by AP   Pt  tolerating well at this time

## 2022-12-07 NOTE — ASSESSMENT & PLAN NOTE
57 yo F hx bronchomalacia/bronchiectasis HD3 with septic shock 2/2 feculent peritonitis from peforated sigmoid diverticulitis (Hinchey IV) now POD2 s/p emergent hand assisted laparoscopic sigmoid resection, primary anastomosis and diverting transverse loop colostomy  Remains on mechanical ventilation, sedation being weaned, responds to questions with rude hand gestures  Lungs clear, HRR, abdomen soft without distention, bowel sounds present, dressings C/D/I, stoma pink without output  T 98 1, HR 66, /70, on mechanical ventilation  WBC 3 97, Hgb 9 0 (9 8), Plt 136, BUN 12, Cr 0 67  A/P by Organ System:  Neuro- sedation weaned for possible extubation, re-assess for delirium/pain upon extubation  Cardio- hemodynamically stable, off pressors this morning  Pulm- hx bronchiectasis and bronchomalacia, on mechanical ventilation, pulmicort/xopenex, respiratory protocol  GI- s/p resection/diversion for perforated sigmoid diverticulitis, await stoma output  - good UOP, consider d/c Avery  Renal- BUN/Cr stable, no TANI, multiple risk factors for TANI, monitor  FEN- on LR, lytes stable, consider TF today pending NG output (200 overnight), try to keep NG in place upon extubation, but do not re-insert if it comes out accidentally until we can re-assess  Heme- post-op anemia, trend, down to 9 0 today, plt 136, trend  ID- septic shock 2/2 Hinchey IV diverticulitis, on Zosyn, consider 4 day course from source control (Friday evening last dose), trend temp/WBC, follow-up on fluid cultures which appear polymicrobial, no blood cultures drawn  Endo- no active issues  Msk/Skin- OOB after extubation, PT/OT consult, change dressings today/tomorrow, consult stoma nurse Friday, will clarify timing of bar removal   VTE- consider heparin for VTE prophylaxis/SCDs    Discussed with Critical Care, updated Dr Ghotra Shown  Will re-assess later today

## 2022-12-07 NOTE — ASSESSMENT & PLAN NOTE
· Hgb trended down to 9 8  · Baseline Hgb unknown as records unavailable  · Suspect this is 2/2 ABLA s/p surgical intervention as noted above vs septic shock  · Monitor for active bleeding  · Trend Hgb and transfuse for Hgb < 7 or with evidence of active bleeding

## 2022-12-07 NOTE — ASSESSMENT & PLAN NOTE
· Presented with abdominal pain, n/v on 12/04  · Imaging revealed acute sigmoid diverticulitis involving a large sigmoid diverticulum w/small amount of pneumoperitoneum suggesting small perforation  · OR today for laparoscopic hand-assisted sigmoid colon resection, diverting transverse loop colostomy   · Perforated feculent diverticulitis w/large perforation in mid sigmoid colon noted  · Maintain NGT to LIS   · Monitor colostomy stoma  · Strict I/O  · Trend endpoints

## 2022-12-07 NOTE — ANESTHESIA POSTPROCEDURE EVALUATION
Post-Op Assessment Note             Reason for prolonged intubation > 24 hours:  Remained intubated for post-operative hypoxia      No notable events documented      BP      Temp      Pulse     Resp      SpO2

## 2022-12-07 NOTE — RESPIRATORY THERAPY NOTE
RT Ventilator Management Note  Eli Cash 58 y o  female MRN: [id number]  Unit/Bed#: ICU 11-01 Encounter: [id number]      Daily Screen         12/7/2022  0904 12/7/2022  0935          Patient safety screen outcome[de-identified] Passed --      Spont breathing trial % for 30 min: Yes --      Spont breathing trial outcome[de-identified] -- Passed (P)       Name of Medical Team Notified[de-identified] -- CC (P)       Preparing to extubate/ Notify Nurse: -- Yes (P)       Extubation order obtained: -- Yes (P)       Consider Cuff Test: -- Yes (P)       Patient extubated: -- Yes (P)                 Physical Exam:   General Appearance: Sedated  Respiratory Pattern: Assisted  Chest Assessment: Chest expansion symmetrical  Suction: ET Tube  O2 Device: vent      Resp Comments: (P) Pt extubated to 6L nc  No stridor  No resp  distress noted   Pt resting comfortably

## 2022-12-07 NOTE — PHYSICAL THERAPY NOTE
Physical Therapy Cancellation Note       12/07/22 0735   PT Last Visit   PT Visit Date 12/07/22   Note Type   Note type Cancelled Session   Cancel Reasons Intubated/sedated       PT order received  Chart review performed  At this time, PT evaluation cancelled as patient remains intubated/sedated, and therefore is not medically appropriate for skilled PT interventions  PT will follow and evaluate as appropriate      Jose Taylor, PT, DPT

## 2022-12-07 NOTE — ASSESSMENT & PLAN NOTE
· Remained intubated post-op given O2 requirement and tenuous status  · CXR w/concern for ?  RLL infiltrate - formal read pending  · Current vent settings: ACVC 14/480/70/6  · Follow ABG and adjust vent settings as necessary  · Currently sedated with Propofol/Fentanyl/Precedex gtt's for goal RASS -1 to -2  · Continue Xopenex/Pulmicort nebs given asthma history  · Aggressive pulmonary hygiene  · Daily SBT/SAT  · VAP precautions  · Titrate FiO2 for MAP > 90%

## 2022-12-07 NOTE — ASSESSMENT & PLAN NOTE
57 yo F hx bronchomalacia/bronchiectasis admitted with septic shock 2/2 feculent peritonitis from peforated sigmoid diverticulitis (Hinchey IV) now s/p emergent hand assisted laparoscopic sigmoid resection, primary anastomosis and diverting transverse loop colostomy  Overall doing well, pain controlled, tolerating diet, working on mobility  Stoma with pasty brown stool, LLQ incision open and clean with packing changed  Afebrile, heart rate 87, blood pressure 113/70, SPO2 92%, respirations 20  Hemoglobin 9 1, will WBC 8 77, platelets 885, creatinine 0 72, BUN 27    Assessment:  Hinchey IV diverticulitis  LLE wound infection  Plan:   Overall clinically and hemodynamically stable with ongoing efforts to mobilize with plan for eventual discharge to home with home health care when arranged  Continue home meds and supportive measures  Daily packing changes to the left lower quadrant wound  Appliance changed for the colostomy tomorrow  Follow-up on wound cultures, monitor off antibiotics

## 2022-12-07 NOTE — PROGRESS NOTES
[facility] 45  Progress Note - Jeffery Maria 1960, 58 y o  female MRN: [id number]  Unit/Bed#: ICU 11-01 Encounter: [id number]  Primary Care Provider: No primary care provider on file  Date and time admitted to hospital: 12/4/2022  7:07 AM    * Sigmoid diverticulitis  Assessment & Plan  · Presented with abdominal pain, n/v on 12/04  · Imaging revealed acute sigmoid diverticulitis involving a large sigmoid diverticulum w/small amount of pneumoperitoneum suggesting small perforation  · OR today for laparoscopic hand-assisted sigmoid colon resection, diverting transverse loop colostomy   · Perforated feculent diverticulitis w/large perforation in mid sigmoid colon noted  · Maintain NGT to LIS   · Monitor colostomy stoma  · Strict I/O  · Trend endpoints    Septic shock ([facility] [address] )  Assessment & Plan  · Worsening in PM of 12/04 - AEB hyperthermia, tachypnea, hypotension, leukopenia w/bandemia, elevated INR  · In setting of sigmoid diverticulitis with large perforation   · Imaging as noted above  · COVID/FLU/RSV negative  · Blood cultures pending  · UA w/out evidence of infection  · Peritoneal fluid w/GNR, GPC in pairs, GPR - speciation pending  · Lactic acid 1 9  · Procalcitonin 17 61 - 16 07  · Has received aggressive IVF resuscitation  · Continue low dose Levophed gtt to maintain MAP > 65  · Continue IV Zosyn D#4  · Monitor fever and WBC curve  · Trend procalcitonin and follow up cultures    Acute respiratory failure with hypoxia (HCC)  Assessment & Plan  · Remained intubated post-op given O2 requirement and tenuous status  · CXR w/concern for ?  RLL infiltrate - formal read pending  · Current vent settings: ACVC 14/480/70/6  · Follow ABG and adjust vent settings as necessary  · Currently sedated with Propofol/Fentanyl/Precedex gtt's for goal RASS -1 to -2  · Continue Xopenex/Pulmicort nebs given asthma history  · Aggressive pulmonary hygiene  · Daily SBT/SAT  · VAP precautions  · Titrate FiO2 for MAP > 90%    Anemia  Assessment & Plan  · Hgb trended down to 9 8  · Baseline Hgb unknown as records unavailable  · Suspect this is 2/2 ABLA s/p surgical intervention as noted above vs septic shock  · Monitor for active bleeding  · Trend Hgb and transfuse for Hgb < 7 or with evidence of active bleeding    Asthma  Assessment & Plan  · Unclear if she follows with Pulmonology as outpatient  · Prescribed Dulera and PRN Albuterol as outpatient - continue scheduled Xopenex/Pulmicort nebs  · Aggressive pulmonary hygiene    Bronchomalacia  Assessment & Plan  · Diagnosed in 2016  · Follows with Pulmonary at [facility], however has not seen them since 2018/2019 as disease has been stable  · Is s/p 2 laser treatments to airways     Hypertension  Assessment & Plan  · Holding home antihypertensives 2/2 septic shock/hypotension    GERD (gastroesophageal reflux disease)  Assessment & Plan  · Continue home PPI via IV route while NPO post abdominal surgical procedure as noted above      ----------------------------------------------------------------------------------------  HPI/24hr events:     · ETT exchanged over bougie yesterday evening 2/2 balloon puncture   · Received 5% Albumin 500 ml x1 overnight for hypotension  · Required Levophed gtt for short period of time overnight  · Sedation uptitrated and Precedex gtt added for agitation/vent dyssynchrony     Patient appropriate for transfer out of the ICU today?: No  Disposition: Continue Critical Care   Code Status: Level 1 - Full Code  ---------------------------------------------------------------------------------------  SUBJECTIVE  ANGIE    Review of Systems  Review of systems was unable to be performed secondary to intubated/sedated  ---------------------------------------------------------------------------------------  OBJECTIVE    Vitals   Vitals:    12/06/22 2300 12/07/22 0000 12/07/22 0100 12/07/22 0200   BP:       BP Location:       Pulse: 81 77 74 76   Resp: 14 14 14 14 Temp: 99 3 °F (37 4 °C) 99 °F (37 2 °C) 98 6 °F (37 °C) 98 6 °F (37 °C)   TempSrc:  Bladder     SpO2: 93% 93% 94% 91%   Weight:       Height:         Temp (24hrs), Av 6 °F (37 °C), Min:97 2 °F (36 2 °C), Max:99 5 °F (37 5 °C)  Current: Temperature: 98 6 °F (37 °C)  Arterial Line BP: 113/63  Arterial Line MAP (mmHg): 82 mmHg    Respiratory:  SpO2: SpO2: 91 %, SpO2 Activity: SpO2 Activity: At Rest, SpO2 Device: O2 Device: Ventilator  Nasal Cannula O2 Flow Rate (L/min): 10 L/min    Invasive/non-invasive ventilation settings   Respiratory    Lab Data (Last 4 hours)    None         O2/Vent Data (Last 4 hours)       0041           Vent Mode AC/VC+       Resp Rate (BPM) (BPM) 14       VT (mL) (mL) 480       Insp Time (S) (S) 1       FIO2 (%) (%) 70       PEEP (cmH2O) (cmH2O) 6       Rise Time (%) (%) 50       MV (Obs) 7 25                   Physical Exam  Vitals and nursing note reviewed  Constitutional:       Appearance: She is obese  She is ill-appearing  Interventions: She is sedated, intubated and restrained  HENT:      Head: Normocephalic and atraumatic  Eyes:      Pupils: Pupils are equal, round, and reactive to light  Cardiovascular:      Rate and Rhythm: Normal rate and regular rhythm  Pulses: Normal pulses  Heart sounds: Normal heart sounds  No murmur heard  Pulmonary:      Effort: She is intubated  Breath sounds: Rhonchi present  No wheezing or rales  Abdominal:      General: Bowel sounds are decreased  There is distension  Palpations: Abdomen is soft  Comments: Midline abdominal incision; abdominal binder intact; colostomy stoma pink   Genitourinary:     Comments: Avery cath draining urine  Musculoskeletal:      Cervical back: Neck supple  Right lower leg: Edema present  Left lower leg: Edema present  Comments: Trace bilateral LE edema   Skin:     General: Skin is warm and dry        Capillary Refill: Capillary refill takes less than 2 seconds  Neurological:      GCS: GCS eye subscore is 3  GCS verbal subscore is 1  GCS motor subscore is 6     Psychiatric:      Comments: Deferred         Laboratory and Diagnostics:  Results from last 7 days   Lab Units 12/06/22 2039 12/06/22 0358 12/05/22 2119 12/05/22 1741 12/05/22  1600 12/05/22  0507 12/04/22  0718   WBC Thousand/uL 5 89 2 70* 1 83*  --   --  2 38* 4 74   HEMOGLOBIN g/dL 9 8* 10 4* 11 3*  --   --  13 0 14 1   I STAT HEMOGLOBIN g/dl  --   --   --  10 9* 10 2*  --   --    HEMATOCRIT % 29 8* 31 1* 34 3*  --   --  39 1 42 1   HEMATOCRIT, ISTAT %  --   --   --  32* 30*  --   --    PLATELETS Thousands/uL 166 155 166  --   --  181 248   NEUTROS PCT %  --   --  86*  --   --   --  43   BANDS PCT % 1  --   --   --   --  21*  --    MONOS PCT %  --   --  5  --   --   --  10   MONO PCT % 6  --   --   --   --  3*  --      Results from last 7 days   Lab Units 12/06/22 2039 12/06/22 0358 12/05/22 2119 12/05/22 1741 12/05/22  1600 12/05/22  0507 12/04/22  0718   SODIUM mmol/L 140 137 139  --   --  140 138   POTASSIUM mmol/L 3 6 3 9 4 1  --   --  4 0 3 8   CHLORIDE mmol/L 108 109* 110*  --   --  106 104   CO2 mmol/L 24 22 21  --   --  26 25   CO2, I-STAT mmol/L  --   --   --  22 22  --   --    ANION GAP mmol/L 8 6 8  --   --  8 9   BUN mg/dL 15 16 16  --   --  16 18   CREATININE mg/dL 0 80 0 76 0 78  --   --  0 93 0 90   CALCIUM mg/dL 8 4 7 8* 8 1*  --   --  8 4 10 0   GLUCOSE RANDOM mg/dL 117 115 98  --   --  95 104   ALT U/L  --  10 13  --   --   --  17   AST U/L  --  20 18  --   --   --  17   ALK PHOS U/L  --  28* 34  --   --   --  68   ALBUMIN g/dL  --  2 6* 2 9*  --   --   --  4 4   TOTAL BILIRUBIN mg/dL  --  0 49 0 61  --   --   --  0 47     Results from last 7 days   Lab Units 12/06/22 2039 12/06/22 0358 12/05/22 2119 12/05/22  0507   MAGNESIUM mg/dL 2 2 2 2 2 0 1 6*   PHOSPHORUS mg/dL 2 1* 2 9 3 3 4 3*      Results from last 7 days   Lab Units 12/05/22 2119 12/04/22  0718   INR  2 19* 0 93 PTT seconds 35 26          Results from last 7 days   Lab Units 12/05/22 2119   LACTIC ACID mmol/L 1 9     ABG:  Results from last 7 days   Lab Units 12/06/22 2034   PH ART  7 399   PCO2 ART mm Hg 42 0   PO2 ART mm Hg 84 4   HCO3 ART mmol/L 25 4   BASE EXC ART mmol/L 0 5   ABG SOURCE  Line, Arterial     VBG:  Results from last 7 days   Lab Units 12/06/22 2034   ABG SOURCE  Line, Arterial     Results from last 7 days   Lab Units 12/06/22  0358 12/05/22 2119   PROCALCITONIN ng/ml 16 07* 17 61*       Micro  Results from last 7 days   Lab Units 12/05/22  2101 12/05/22  1537   BLOOD CULTURE  Received in Microbiology Lab  Culture in Progress  Received in Microbiology Lab  Culture in Progress  --    GRAM STAIN RESULT   --  2+ Polys*  2+ Gram negative rods*  1+ Gram positive cocci in pairs*  1+ Gram positive rods*       EKG: sinus rhythm  Imaging: I have personally reviewed pertinent reports  and I have personally reviewed pertinent films in PACS    Intake and Output  I/O       12/05 0701 12/06 0700 12/06 0701 12/07 0700    P  O  0     I V  (mL/kg) 5155 2 (58 8) 3179 5 (36 3)    IV Piggyback 2800 822 3    Total Intake(mL/kg) 7955 2 (90 8) 4001 8 (45 7)    Urine (mL/kg/hr) 960 (0 5) 2655 (1 3)    Emesis/NG output 50 400    Blood 30     Total Output 1040 3055    Net +6915 2 +946 8                Height and Weights   Height: 5' 5" (165 1 cm)     Body mass index is 32 14 kg/m²  Weight (last 2 days)     Date/Time Weight    12/06/22 0600 87 6 (193 12)     Weight: reweighed x2, everything off bed at 12/06/22 0600    12/05/22 0600 80 7 (177 91)            Nutrition       Diet Orders   (From admission, onward)             Start     Ordered    12/05/22 2009  Diet NPO  Diet effective now        References:    Nutrtion Support Algorithm Enteral vs  Parenteral   Question Answer Comment   Diet Type NPO    RD to adjust diet per protocol?  No        12/05/22 2008                  Active Medications  Scheduled Meds:  Current Facility-Administered Medications   Medication Dose Route Frequency Provider Last Rate   • acetaminophen  650 mg Rectal Q4H PRN ADWOA ShawC     • budesonide  0 5 mg Nebulization Q12H Yanira Emery MD     • chlorhexidine  15 mL Mouth/Throat Q12H [address] ADWOA ShawC     • dexmedetomidine  0 1-0 7 mcg/kg/hr Intravenous Titrated JAY VazquezNP 0 4 mcg/kg/hr (12/06/22 2237)   • fentaNYL  100 mcg/hr Intravenous Continuous JAY VazquezNP 100 mcg/hr (12/07/22 0146)   • fentanyl citrate (PF)  50 mcg Intravenous Q1H PRN ADWOA ShawC     • lactated ringers  125 mL/hr Intravenous Continuous SELMA Shaw-C 125 mL/hr (12/06/22 2304)   • levalbuterol  1 25 mg Nebulization TID Yanira Emery MD      And   • sodium chloride  3 mL Nebulization TID Yanira Emery MD     • norepinephrine  1-30 mcg/min Intravenous Titrated BARBIE Vasquez Stopped (12/07/22 0130)   • ondansetron  4 mg Intravenous Q6H PRN ADWOA ShawC     • pantoprazole  40 mg Intravenous Q12H [address] Jolly Clinton PA-C     • piperacillin-tazobactam  4 5 g Intravenous Q6H SELMA Shaw-C 4 5 g (12/06/22 2045)   • potassium phosphate  30 mmol Intravenous Once BARBIE Vazquez 30 mmol (12/06/22 2304)   • propofol  5-50 mcg/kg/min Intravenous Titrated Isela Wagner PA-C 50 mcg/kg/min (12/06/22 2349)     Continuous Infusions:  dexmedetomidine, 0 1-0 7 mcg/kg/hr, Last Rate: 0 4 mcg/kg/hr (12/06/22 2237)  fentaNYL, 100 mcg/hr, Last Rate: 100 mcg/hr (12/07/22 0146)  lactated ringers, 125 mL/hr, Last Rate: 125 mL/hr (12/06/22 2304)  norepinephrine, 1-30 mcg/min, Last Rate: Stopped (12/07/22 0130)  propofol, 5-50 mcg/kg/min, Last Rate: 50 mcg/kg/min (12/06/22 7685)      PRN Meds:   acetaminophen, 650 mg, Q4H PRN  fentanyl citrate (PF), 50 mcg, Q1H PRN  ondansetron, 4 mg, Q6H PRN        Invasive Devices Review  Invasive Devices     Central Venous Catheter Line  Duration           CVC Central Lines 12/05/22 Triple 1 day Peripheral Intravenous Line  Duration           Peripheral IV 12/04/22 Left;Ventral (anterior) Forearm 2 days    Peripheral IV 12/05/22 Right Hand 1 day          Arterial Line  Duration           Arterial Line 12/05/22 Left Radial 1 day          Drain  Duration           Urethral Catheter Temperature probe 16 Fr  2 days    NG/OG/Enteral Tube Nasogastric 14 Fr Right nare 1 day          Airway  Duration           ETT  Cuffed 7 mm <1 day                Rationale for remaining devices: R IJ TLC - IV medications including vasopressors, L Radial A-Line - BP monitoring/frequent blood draws, Avery cath - accurate I/O  ---------------------------------------------------------------------------------------  Advance Directive and Living Will:      Power of :    POLST:    ---------------------------------------------------------------------------------------  Care Time Delivered:   Upon my evaluation, this patient had a high probability of imminent or life-threatening deterioration due to sigmoid diverticulitis, septic shock, acute respiratory failure, anemia, which required my direct attention, intervention, and personal management  I have personally provided 25 minutes (0501 to 0526) of critical care time, exclusive of procedures, teaching, family meetings, and any prior time recorded by providers other than myself  BARBIE Rashid      Portions of the record may have been created with voice recognition software  Occasional wrong word or "sound a like" substitutions may have occurred due to the inherent limitations of voice recognition software    Read the chart carefully and recognize, using context, where substitutions have occurred

## 2022-12-07 NOTE — ASSESSMENT & PLAN NOTE
· Worsening in PM of 12/04 - AEB hyperthermia, tachypnea, hypotension, leukopenia w/bandemia, elevated INR  · In setting of sigmoid diverticulitis with large perforation   · Imaging as noted above  · COVID/FLU/RSV negative  · Blood cultures pending  · UA w/out evidence of infection  · Peritoneal fluid w/GNR, GPC in pairs, GPR - speciation pending  · Lactic acid 1 9  · Procalcitonin 17 61 - 16 07  · Has received aggressive IVF resuscitation  · Continue low dose Levophed gtt to maintain MAP > 65  · Continue IV Zosyn D#4  · Monitor fever and WBC curve  · Trend procalcitonin and follow up cultures

## 2022-12-07 NOTE — OCCUPATIONAL THERAPY NOTE
Occupational Therapy Cancellation Note     12/07/22 0734   OT Last Visit   OT Visit Date 12/07/22   Note Type   Note type Evaluation   Cancel Reasons Intubated/sedated   Additional Comments OT orders received, chart reviewed performed  Pt still intubated/sedated  Will continue to follow-up as able and appropriate       Trenton Murtaza HOLDER, OTR/L

## 2022-12-07 NOTE — NURSING NOTE
Patient currently on a wean, Sedation decreased  See [facility]    Patient extubated with Provider, NP, and Respiratory at bedside  Patient currently on 6 L NC, vital signs stable  Sedation turned off  Patient resting comfortably

## 2022-12-08 LAB
ALBUMIN SERPL BCP-MCNC: 3.1 G/DL (ref 3.5–5)
ALP SERPL-CCNC: 54 U/L (ref 34–104)
ALT SERPL W P-5'-P-CCNC: 14 U/L (ref 7–52)
ANION GAP SERPL CALCULATED.3IONS-SCNC: 7 MMOL/L (ref 4–13)
AST SERPL W P-5'-P-CCNC: 32 U/L (ref 13–39)
ATRIAL RATE: 61 BPM
BILIRUB SERPL-MCNC: 0.75 MG/DL (ref 0.2–1)
BUN SERPL-MCNC: 15 MG/DL (ref 5–25)
CA-I BLD-SCNC: 1.13 MMOL/L (ref 1.12–1.32)
CALCIUM ALBUM COR SERPL-MCNC: 9.6 MG/DL (ref 8.3–10.1)
CALCIUM SERPL-MCNC: 8.9 MG/DL (ref 8.4–10.2)
CHLORIDE SERPL-SCNC: 106 MMOL/L (ref 96–108)
CO2 SERPL-SCNC: 28 MMOL/L (ref 21–32)
CREAT SERPL-MCNC: 0.64 MG/DL (ref 0.6–1.3)
ERYTHROCYTE [DISTWIDTH] IN BLOOD BY AUTOMATED COUNT: 13.3 % (ref 11.6–15.1)
GFR SERPL CREATININE-BSD FRML MDRD: 95 ML/MIN/1.73SQ M
GLUCOSE SERPL-MCNC: 109 MG/DL (ref 65–140)
GLUCOSE SERPL-MCNC: 119 MG/DL (ref 65–140)
GLUCOSE SERPL-MCNC: 132 MG/DL (ref 65–140)
GLUCOSE SERPL-MCNC: 60 MG/DL (ref 65–140)
GLUCOSE SERPL-MCNC: 81 MG/DL (ref 65–140)
GLUCOSE SERPL-MCNC: 85 MG/DL (ref 65–140)
GLUCOSE SERPL-MCNC: 91 MG/DL (ref 65–140)
HCT VFR BLD AUTO: 31.1 % (ref 34.8–46.1)
HGB BLD-MCNC: 10.1 G/DL (ref 11.5–15.4)
MAGNESIUM SERPL-MCNC: 2 MG/DL (ref 1.9–2.7)
MCH RBC QN AUTO: 31 PG (ref 26.8–34.3)
MCHC RBC AUTO-ENTMCNC: 32.5 G/DL (ref 31.4–37.4)
MCV RBC AUTO: 95 FL (ref 82–98)
P AXIS: 9 DEGREES
PHOSPHATE SERPL-MCNC: 2.2 MG/DL (ref 2.3–4.1)
PLATELET # BLD AUTO: 191 THOUSANDS/UL (ref 149–390)
PMV BLD AUTO: 10.3 FL (ref 8.9–12.7)
POTASSIUM SERPL-SCNC: 3.6 MMOL/L (ref 3.5–5.3)
PR INTERVAL: 200 MS
PROCALCITONIN SERPL-MCNC: 7.12 NG/ML
PROT SERPL-MCNC: 5.7 G/DL (ref 6.4–8.4)
QRS AXIS: 33 DEGREES
QRSD INTERVAL: 86 MS
QT INTERVAL: 418 MS
QTC INTERVAL: 420 MS
RBC # BLD AUTO: 3.26 MILLION/UL (ref 3.81–5.12)
SODIUM SERPL-SCNC: 141 MMOL/L (ref 135–147)
T WAVE AXIS: 11 DEGREES
VENTRICULAR RATE: 61 BPM
WBC # BLD AUTO: 5.46 THOUSAND/UL (ref 4.31–10.16)

## 2022-12-08 RX ORDER — OLANZAPINE 10 MG/1
10 TABLET, ORALLY DISINTEGRATING ORAL
Status: DISCONTINUED | OUTPATIENT
Start: 2022-12-08 | End: 2022-12-08

## 2022-12-08 RX ORDER — FENTANYL CITRATE 50 UG/ML
50 INJECTION, SOLUTION INTRAMUSCULAR; INTRAVENOUS EVERY 2 HOUR PRN
Status: DISCONTINUED | OUTPATIENT
Start: 2022-12-08 | End: 2022-12-10

## 2022-12-08 RX ORDER — ACETAMINOPHEN 160 MG/5ML
975 SUSPENSION, ORAL (FINAL DOSE FORM) ORAL EVERY 8 HOURS
Status: DISCONTINUED | OUTPATIENT
Start: 2022-12-08 | End: 2022-12-09

## 2022-12-08 RX ORDER — DEXTROSE MONOHYDRATE 25 G/50ML
INJECTION, SOLUTION INTRAVENOUS
Status: COMPLETED
Start: 2022-12-08 | End: 2022-12-08

## 2022-12-08 RX ORDER — LIDOCAINE 50 MG/G
2 PATCH TOPICAL DAILY
Status: DISCONTINUED | OUTPATIENT
Start: 2022-12-08 | End: 2023-01-04 | Stop reason: HOSPADM

## 2022-12-08 RX ORDER — DEXTROSE MONOHYDRATE 25 G/50ML
25 INJECTION, SOLUTION INTRAVENOUS ONCE
Status: COMPLETED | OUTPATIENT
Start: 2022-12-08 | End: 2022-12-08

## 2022-12-08 RX ORDER — LANOLIN ALCOHOL/MO/W.PET/CERES
6 CREAM (GRAM) TOPICAL
Status: DISCONTINUED | OUTPATIENT
Start: 2022-12-08 | End: 2022-12-08

## 2022-12-08 RX ORDER — MAGNESIUM SULFATE HEPTAHYDRATE 40 MG/ML
2 INJECTION, SOLUTION INTRAVENOUS ONCE
Status: COMPLETED | OUTPATIENT
Start: 2022-12-08 | End: 2022-12-08

## 2022-12-08 RX ORDER — ENOXAPARIN SODIUM 100 MG/ML
40 INJECTION SUBCUTANEOUS
Status: DISCONTINUED | OUTPATIENT
Start: 2022-12-08 | End: 2023-01-04 | Stop reason: HOSPADM

## 2022-12-08 RX ORDER — TRAMADOL HYDROCHLORIDE 50 MG/1
50 TABLET ORAL EVERY 6 HOURS PRN
Status: DISCONTINUED | OUTPATIENT
Start: 2022-12-08 | End: 2023-01-04 | Stop reason: HOSPADM

## 2022-12-08 RX ORDER — LANOLIN ALCOHOL/MO/W.PET/CERES
6 CREAM (GRAM) TOPICAL
Status: DISCONTINUED | OUTPATIENT
Start: 2022-12-08 | End: 2023-01-04 | Stop reason: HOSPADM

## 2022-12-08 RX ORDER — FENTANYL CITRATE 50 UG/ML
25 INJECTION, SOLUTION INTRAMUSCULAR; INTRAVENOUS EVERY 2 HOUR PRN
Status: DISCONTINUED | OUTPATIENT
Start: 2022-12-08 | End: 2022-12-10

## 2022-12-08 RX ORDER — DEXTROSE 25 % IN WATER 25 %
25 SYRINGE (ML) INTRAVENOUS ONCE
Status: DISCONTINUED | OUTPATIENT
Start: 2022-12-08 | End: 2022-12-08

## 2022-12-08 RX ORDER — OLANZAPINE 5 MG/1
5 TABLET, ORALLY DISINTEGRATING ORAL ONCE
Status: COMPLETED | OUTPATIENT
Start: 2022-12-08 | End: 2022-12-08

## 2022-12-08 RX ORDER — FUROSEMIDE 10 MG/ML
20 INJECTION INTRAMUSCULAR; INTRAVENOUS ONCE
Status: COMPLETED | OUTPATIENT
Start: 2022-12-08 | End: 2022-12-08

## 2022-12-08 RX ORDER — DEXTROSE AND SODIUM CHLORIDE 5; .45 G/100ML; G/100ML
50 INJECTION, SOLUTION INTRAVENOUS CONTINUOUS
Status: DISCONTINUED | OUTPATIENT
Start: 2022-12-08 | End: 2022-12-09

## 2022-12-08 RX ORDER — KETOROLAC TROMETHAMINE 30 MG/ML
15 INJECTION, SOLUTION INTRAMUSCULAR; INTRAVENOUS EVERY 6 HOURS
Status: DISCONTINUED | OUTPATIENT
Start: 2022-12-08 | End: 2022-12-10

## 2022-12-08 RX ADMIN — MAGNESIUM SULFATE HEPTAHYDRATE 2 G: 40 INJECTION, SOLUTION INTRAVENOUS at 08:04

## 2022-12-08 RX ADMIN — DEXTROSE MONOHYDRATE 50 ML: 25 INJECTION, SOLUTION INTRAVENOUS at 12:12

## 2022-12-08 RX ADMIN — OLANZAPINE 5 MG: 5 TABLET, ORALLY DISINTEGRATING ORAL at 02:19

## 2022-12-08 RX ADMIN — PIPERACILLIN AND TAZOBACTAM 3.38 G: 3; .375 INJECTION, POWDER, FOR SOLUTION INTRAVENOUS at 15:12

## 2022-12-08 RX ADMIN — KETOROLAC TROMETHAMINE 15 MG: 30 INJECTION, SOLUTION INTRAMUSCULAR at 11:58

## 2022-12-08 RX ADMIN — FENTANYL CITRATE 50 MCG: 50 INJECTION INTRAMUSCULAR; INTRAVENOUS at 10:12

## 2022-12-08 RX ADMIN — ENOXAPARIN SODIUM 40 MG: 100 INJECTION SUBCUTANEOUS at 12:18

## 2022-12-08 RX ADMIN — DEXTROSE AND SODIUM CHLORIDE 50 ML/HR: 5; .45 INJECTION, SOLUTION INTRAVENOUS at 12:14

## 2022-12-08 RX ADMIN — PANTOPRAZOLE SODIUM 40 MG: 40 INJECTION, POWDER, LYOPHILIZED, FOR SOLUTION INTRAVENOUS at 20:01

## 2022-12-08 RX ADMIN — ACETAMINOPHEN 975 MG: 650 SUSPENSION ORAL at 12:00

## 2022-12-08 RX ADMIN — KETOROLAC TROMETHAMINE 15 MG: 30 INJECTION, SOLUTION INTRAMUSCULAR at 16:47

## 2022-12-08 RX ADMIN — KETOROLAC TROMETHAMINE 15 MG: 30 INJECTION, SOLUTION INTRAMUSCULAR at 22:37

## 2022-12-08 RX ADMIN — LEVALBUTEROL 1.25 MG: 1.25 SOLUTION, CONCENTRATE RESPIRATORY (INHALATION) at 22:10

## 2022-12-08 RX ADMIN — PIPERACILLIN AND TAZOBACTAM 3.38 G: 3; .375 INJECTION, POWDER, FOR SOLUTION INTRAVENOUS at 22:37

## 2022-12-08 RX ADMIN — ONDANSETRON 4 MG: 2 INJECTION INTRAMUSCULAR; INTRAVENOUS at 20:23

## 2022-12-08 RX ADMIN — BUDESONIDE 0.5 MG: 0.5 INHALANT ORAL at 08:24

## 2022-12-08 RX ADMIN — POTASSIUM PHOSPHATE, MONOBASIC POTASSIUM PHOSPHATE, DIBASIC 30 MMOL: 224; 236 INJECTION, SOLUTION, CONCENTRATE INTRAVENOUS at 09:04

## 2022-12-08 RX ADMIN — LIDOCAINE 5% 2 PATCH: 700 PATCH TOPICAL at 12:00

## 2022-12-08 RX ADMIN — PANTOPRAZOLE SODIUM 40 MG: 40 INJECTION, POWDER, LYOPHILIZED, FOR SOLUTION INTRAVENOUS at 08:04

## 2022-12-08 RX ADMIN — LEVALBUTEROL 1.25 MG: 1.25 SOLUTION, CONCENTRATE RESPIRATORY (INHALATION) at 08:24

## 2022-12-08 RX ADMIN — LEVALBUTEROL 1.25 MG: 1.25 SOLUTION, CONCENTRATE RESPIRATORY (INHALATION) at 13:45

## 2022-12-08 RX ADMIN — FUROSEMIDE 20 MG: 10 INJECTION, SOLUTION INTRAMUSCULAR; INTRAVENOUS at 11:58

## 2022-12-08 RX ADMIN — ACETAMINOPHEN 975 MG: 650 SUSPENSION ORAL at 19:33

## 2022-12-08 RX ADMIN — Medication 6 MG: at 22:37

## 2022-12-08 RX ADMIN — PIPERACILLIN AND TAZOBACTAM 3.38 G: 3; .375 INJECTION, POWDER, FOR SOLUTION INTRAVENOUS at 06:18

## 2022-12-08 RX ADMIN — TRAMADOL HYDROCHLORIDE 50 MG: 50 TABLET, COATED ORAL at 20:00

## 2022-12-08 RX ADMIN — ISODIUM CHLORIDE 3 ML: 0.03 SOLUTION RESPIRATORY (INHALATION) at 22:10

## 2022-12-08 RX ADMIN — BUDESONIDE 0.5 MG: 0.5 INHALANT ORAL at 22:10

## 2022-12-08 RX ADMIN — FENTANYL CITRATE 50 MCG: 50 INJECTION INTRAMUSCULAR; INTRAVENOUS at 01:57

## 2022-12-08 RX ADMIN — ISODIUM CHLORIDE 3 ML: 0.03 SOLUTION RESPIRATORY (INHALATION) at 13:45

## 2022-12-08 RX ADMIN — ISODIUM CHLORIDE 3 ML: 0.03 SOLUTION RESPIRATORY (INHALATION) at 08:24

## 2022-12-08 NOTE — ASSESSMENT & PLAN NOTE
· Presented with abdominal pain, n/v on 12/04  · Imaging revealed acute sigmoid diverticulitis involving a large sigmoid diverticulum w/small amount of pneumoperitoneum suggesting small perforation  · POD#3 - OR on 12/05 for laparoscopic hand-assisted sigmoid colon resection, diverting transverse loop colostomy   · Perforated feculent diverticulitis w/large perforation in mid sigmoid colon noted  · Maintain NGT to LIS   · Monitor colostomy stoma  · Strict I/O  · Trend endpoints

## 2022-12-08 NOTE — DISCHARGE INSTR - OTHER ORDERS
Skin and Ostomy Care Plan:   1  Empty colostomy pouch when 1/3-1/2 full of stool or inflated with gas  Cleanse drainage end prior to closing pouch  Change pouch every 3 days and PRN with signs of leakage  Encourage patient to observe emptying pouch  2  Ehob offloading cushion to chair when OOB  3  Elevate heels off of bed with pillow to offload  4  Apply hydraguard to b/l heels, buttocks and sacrum BID and PRN  5  Turn and reposition patient Q2 hours  6   Wheeldo life silicone bordered dressing to the sacrum-upper buttocks, peel back daily for skin assessment, change every 3 days

## 2022-12-08 NOTE — PROGRESS NOTES
[address]  Progress Note - Praveen Lora 1960, 58 y o  female MRN: [id number]  Unit/Bed#: ICU 11-01 Encounter: [id number]  Primary Care Provider: No primary care provider on file  Date and time admitted to hospital: 12/4/2022  7:07 AM    * Sigmoid diverticulitis  Assessment & Plan  · Presented with abdominal pain, n/v on 12/04  · Imaging revealed acute sigmoid diverticulitis involving a large sigmoid diverticulum w/small amount of pneumoperitoneum suggesting small perforation  · POD#3 - OR on 12/05 for laparoscopic hand-assisted sigmoid colon resection, diverting transverse loop colostomy   · Perforated feculent diverticulitis w/large perforation in mid sigmoid colon noted  · Maintain NGT to LIS   · Monitor colostomy stoma  · Strict I/O  · Trend endpoints    Septic shock ([facility] [address] )  Assessment & Plan  · Worsening in PM of 12/04 - AEB hyperthermia, tachypnea, hypotension, leukopenia w/bandemia, elevated INR  · In setting of sigmoid diverticulitis with large perforation   · Imaging as noted above  · COVID/FLU/RSV negative  · Blood cultures w/NGTD  · UA w/out evidence of infection  · Peritoneal fluid positive for E coli  · Lactic acid 1 9  · Procalcitonin 17 61 - 16 07 - 10 75  · Has received aggressive IVF resuscitation  · Vasopressors now off  · Continue IV Zosyn D#5  · Monitor fever and WBC curve  · Trend procalcitonin and follow up cultures    Encephalopathy  Assessment & Plan  · Suspect this is 2/2 sedation vs infection  · Wean Precedex gtt for goal RASS 0  · Received Zyprexa x1  · Frequent neuro checks  · Maintain normothermia and normoglycemia  · Delirium precautions  · CAM ICU    Acute respiratory failure with hypoxia ([address] )  Assessment & Plan  · Remained intubated post-op given O2 requirement and tenuous status  · CXR w/concern for ?  RLL infiltrate - formal read pending  · Extubated to HFNC on 12/07  · Continue Xopenex/Pulmicort nebs given asthma history  · Continue with IV diuresis PRN  · Aggressive pulmonary hygiene  · Titrate FiO2 for MAP > 90% - currently requiring HFNC 80%/30L    Anemia  Assessment & Plan  · Hgb trended down to 9 8  · Baseline Hgb unknown as records unavailable  · Suspect this is 2/2 ABLA s/p surgical intervention as noted above vs septic shock  · Monitor for active bleeding  · Trend Hgb and transfuse for Hgb < 7 or with evidence of active bleeding    Asthma  Assessment & Plan  · Unclear if she follows with Pulmonology as outpatient  · Prescribed Dulera and PRN Albuterol as outpatient - continue scheduled Xopenex/Pulmicort nebs  · Aggressive pulmonary hygiene    Bronchomalacia  Assessment & Plan  · Diagnosed in 2016  · Follows with Pulmonary at [facility], however has not seen them since 2018/2019 as disease has been stable  · Is s/p 2 laser treatments to airways     Hypertension  Assessment & Plan  · Holding home antihypertensives 2/2 septic shock/hypotension    GERD (gastroesophageal reflux disease)  Assessment & Plan  · Continue home PPI via IV route while NPO post abdominal surgical procedure as noted above      ----------------------------------------------------------------------------------------  HPI/24hr events:     · Agitated overnight requiring uptitration of Precedex gtt  · Received Zyprexa x1     Patient appropriate for transfer out of the ICU today?: No  Disposition: Transfer to Stepdown Level 1   Code Status: Level 1 - Full Code  ---------------------------------------------------------------------------------------  SUBJECTIVE  Confused and agitated       Review of Systems  Review of systems was reviewed and negative unless stated above in HPI/24-hour events   ---------------------------------------------------------------------------------------  OBJECTIVE    Vitals   Vitals:    12/07/22 2333 12/08/22 0000 12/08/22 0100 12/08/22 0200   BP:  157/87 156/85 158/86   BP Location:  Left arm     Pulse: 60 60 60 61   Resp: 15 13 12 (!) 9   Temp:  99 3 °F (37 4 °C) 99 1 °F (37 3 °C) 99 3 °F (37 4 °C)   TempSrc:  Bladder     SpO2: 94% 94% 95% 93%   Weight:       Height:         Temp (24hrs), Av 6 °F (37 °C), Min:97 7 °F (36 5 °C), Max:99 5 °F (37 5 °C)  Current: Temperature: 99 3 °F (37 4 °C)  Arterial Line BP: 157/80  Arterial Line MAP (mmHg): 110 mmHg    Respiratory:  SpO2: SpO2: 93 %, SpO2 Activity: SpO2 Activity: At Rest, SpO2 Device: O2 Device: High flow nasal cannula  Nasal Cannula O2 Flow Rate (L/min): 10 L/min    Invasive/non-invasive ventilation settings   Respiratory    Lab Data (Last 4 hours)    None         O2/Vent Data (Last 4 hours)      2333          Non-Invasive Ventilation Mode HFNC (High flow)  Comment: vapotherm                   Physical Exam  Vitals and nursing note reviewed  Constitutional:       General: She is awake  Interventions: Nasal cannula in place  Comments: HFNC   HENT:      Head: Normocephalic and atraumatic  Eyes:      Extraocular Movements: Extraocular movements intact  Pupils: Pupils are equal, round, and reactive to light  Cardiovascular:      Rate and Rhythm: Regular rhythm  Bradycardia present  Pulses: Normal pulses  Heart sounds: Normal heart sounds  No murmur heard  Pulmonary:      Effort: Tachypnea present  Breath sounds: Examination of the right-lower field reveals rales  Examination of the left-lower field reveals rales  Decreased breath sounds and rales present  Abdominal:      General: Bowel sounds are decreased  There is distension  Palpations: Abdomen is soft  Comments: Midline incision; colostomy stoma pink   Genitourinary:     Comments: Avery cath draining urine  Musculoskeletal:      Cervical back: Neck supple  Right lower le+ Edema present  Left lower le+ Edema present  Skin:     General: Skin is warm and dry  Capillary Refill: Capillary refill takes less than 2 seconds  Neurological:      General: No focal deficit present        Mental Status: She is disoriented and confused  Psychiatric:         Behavior: Behavior is agitated  Judgment: Judgment is impulsive           Laboratory and Diagnostics:  Results from last 7 days   Lab Units 12/07/22 0418 12/06/22 2039 12/06/22 0358 12/05/22 2119 12/05/22 1741 12/05/22  1600 12/05/22  0507 12/04/22  0718   WBC Thousand/uL 3 97* 5 89 2 70* 1 83*  --   --  2 38* 4 74   HEMOGLOBIN g/dL 9 0* 9 8* 10 4* 11 3*  --   --  13 0 14 1   I STAT HEMOGLOBIN g/dl  --   --   --   --  10 9* 10 2*  --   --    HEMATOCRIT % 27 4* 29 8* 31 1* 34 3*  --   --  39 1 42 1   HEMATOCRIT, ISTAT %  --   --   --   --  32* 30*  --   --    PLATELETS Thousands/uL 136* 166 155 166  --   --  181 248   NEUTROS PCT %  --   --   --  86*  --   --   --  43   BANDS PCT %  --  1  --   --   --   --  21*  --    MONOS PCT %  --   --   --  5  --   --   --  10   MONO PCT %  --  6  --   --   --   --  3*  --      Results from last 7 days   Lab Units 12/07/22 0418 12/06/22 2039 12/06/22 0358 12/05/22 2119 12/05/22 1741 12/05/22  1600 12/05/22  0507 12/04/22  0718   SODIUM mmol/L 141 140 137 139  --   --  140 138   POTASSIUM mmol/L 3 9 3 6 3 9 4 1  --   --  4 0 3 8   CHLORIDE mmol/L 109* 108 109* 110*  --   --  106 104   CO2 mmol/L 25 24 22 21  --   --  26 25   CO2, I-STAT mmol/L  --   --   --   --  22 22  --   --    ANION GAP mmol/L 7 8 6 8  --   --  8 9   BUN mg/dL 12 15 16 16  --   --  16 18   CREATININE mg/dL 0 67 0 80 0 76 0 78  --   --  0 93 0 90   CALCIUM mg/dL 8 4 8 4 7 8* 8 1*  --   --  8 4 10 0   GLUCOSE RANDOM mg/dL 99 117 115 98  --   --  95 104   ALT U/L 12  --  10 13  --   --   --  17   AST U/L 28  --  20 18  --   --   --  17   ALK PHOS U/L 37  --  28* 34  --   --   --  68   ALBUMIN g/dL 3 0*  --  2 6* 2 9*  --   --   --  4 4   TOTAL BILIRUBIN mg/dL 0 46  --  0 49 0 61  --   --   --  0 47     Results from last 7 days   Lab Units 12/07/22  0418 12/06/22  2039 12/06/22  0358 12/05/22  2119 12/05/22  0507   MAGNESIUM mg/dL 2 3 2 2 2 2 2 0 1 6*   PHOSPHORUS mg/dL 2 9 2 1* 2 9 3 3 4 3*      Results from last 7 days   Lab Units 12/05/22 2119 12/04/22  0718   INR  2 19* 0 93   PTT seconds 35 26          Results from last 7 days   Lab Units 12/05/22 2119   LACTIC ACID mmol/L 1 9     ABG:  Results from last 7 days   Lab Units 12/06/22 2034   PH ART  7 399   PCO2 ART mm Hg 42 0   PO2 ART mm Hg 84 4   HCO3 ART mmol/L 25 4   BASE EXC ART mmol/L 0 5   ABG SOURCE  Line, Arterial     VBG:  Results from last 7 days   Lab Units 12/06/22 2034   ABG SOURCE  Line, Arterial     Results from last 7 days   Lab Units 12/07/22  0418 12/06/22  0358 12/05/22 2119   PROCALCITONIN ng/ml 10 75* 16 07* 17 61*       Micro  Results from last 7 days   Lab Units 12/05/22  2101 12/05/22  1537   BLOOD CULTURE  No Growth at 24 hrs  No Growth at 24 hrs   --    GRAM STAIN RESULT   --  2+ Polys*  2+ Gram negative rods*  1+ Gram positive cocci in pairs*  1+ Gram positive rods*   BODY FLUID CULTURE, STERILE   --  3+ Growth of Escherichia coli*       EKG: sinus rhythm  Imaging: I have personally reviewed pertinent reports  and I have personally reviewed pertinent films in PACS    Intake and Output  I/O       12/06 0701 12/07 0700 12/07 0701 12/08 0700    I V  (mL/kg) 3852 8 (42 2) 357 9 (3 9)    IV Piggyback 1056 5 300    Total Intake(mL/kg) 4909 3 (53 7) 657 9 (7 2)    Urine (mL/kg/hr) 3000 (1 4) 3415 (1 6)    Emesis/NG output 450 250    Other 20     Stool  0    Total Output 3470 3665    Net +1439 3 -3007 1                Height and Weights   Height: 5' 5" (165 1 cm)     Body mass index is 33 53 kg/m²    Weight (last 2 days)     Date/Time Weight    12/07/22 0541 91 4 (201 5)     Weight: weighed x 2; everything off bed at 12/07/22 0541    12/06/22 0600 87 6 (193 12)     Weight: reweighed x2, everything off bed at 12/06/22 0600            Nutrition       Diet Orders   (From admission, onward)             Start     Ordered    12/05/22 2009  Diet NPO  Diet effective now References:    Nutrtion Support Algorithm Enteral vs  Parenteral   Question Answer Comment   Diet Type NPO    RD to adjust diet per protocol?  No        12/05/22 2008                  Active Medications  Scheduled Meds:  Current Facility-Administered Medications   Medication Dose Route Frequency Provider Last Rate   • acetaminophen  650 mg Rectal Q4H PRN Whit Weinberg PA-C     • budesonide  0 5 mg Nebulization Q12H Javier Gonzales MD     • dexmedetomidine  0 1-1 mcg/kg/hr Intravenous Titrated BARBIE Vazquez 0 8 mcg/kg/hr (12/08/22 0147)   • fentanyl citrate (PF)  50 mcg Intravenous Q2H PRN BARBIE Vasquez      Or   • fentanyl citrate (PF)  25 mcg Intravenous Q2H PRN BARBIE Vasquez     • levalbuterol  1 25 mg Nebulization TID Javier Gonzales MD      And   • sodium chloride  3 mL Nebulization TID Javier Gonzales MD     • ondansetron  4 mg Intravenous Q6H PRN Whit Weinberg PA-C     • pantoprazole  40 mg Intravenous Q12H [facility] Jolly Clinton PA-C     • phenol  1 spray Mouth/Throat Q2H PRN BARBIE Vasquez     • piperacillin-tazobactam  3 375 g Intravenous Q8H Anna Marie Maria MD 3 375 g (12/07/22 2252)     Continuous Infusions:  dexmedetomidine, 0 1-1 mcg/kg/hr, Last Rate: 0 8 mcg/kg/hr (12/08/22 0147)      PRN Meds:   acetaminophen, 650 mg, Q4H PRN  fentanyl citrate (PF), 50 mcg, Q2H PRN   Or  fentanyl citrate (PF), 25 mcg, Q2H PRN  ondansetron, 4 mg, Q6H PRN  phenol, 1 spray, Q2H PRN        Invasive Devices Review  Invasive Devices     Central Venous Catheter Line  Duration           CVC Central Lines 12/05/22 Triple 2 days          Peripheral Intravenous Line  Duration           Peripheral IV 12/05/22 Right Hand 2 days          Drain  Duration           Colostomy RLQ 3 days    Urethral Catheter Temperature probe 16 Fr  3 days    NG/OG/Enteral Tube Nasogastric 14 Fr Right nare 2 days                Rationale for remaining devices: R IJ TLC - IV medications, Avery cath - accurate I/O  ---------------------------------------------------------------------------------------  Advance Directive and Living Will:      Power of :    POLST:    ---------------------------------------------------------------------------------------  Care Time Delivered:   Upon my evaluation, this patient had a high probability of imminent or life-threatening deterioration due to septic shock, sigmoid diverticulitis, acute respiratory failure, which required my direct attention, intervention, and personal management  I have personally provided 20 minutes (1645 to 8165) of critical care time, exclusive of procedures, teaching, family meetings, and any prior time recorded by providers other than myself  BARBIE Soria      Portions of the record may have been created with voice recognition software  Occasional wrong word or "sound a like" substitutions may have occurred due to the inherent limitations of voice recognition software    Read the chart carefully and recognize, using context, where substitutions have occurred

## 2022-12-08 NOTE — ASSESSMENT & PLAN NOTE
· Remained intubated post-op given O2 requirement and tenuous status  · CXR w/concern for ?  RLL infiltrate - formal read pending  · Extubated to HFNC on 12/07  · Continue Xopenex/Pulmicort nebs given asthma history  · Continue with IV diuresis PRN  · Aggressive pulmonary hygiene  · Titrate FiO2 for MAP > 90% - currently requiring HFNC 80%/30L

## 2022-12-08 NOTE — ASSESSMENT & PLAN NOTE
· Worsening in PM of 12/04 - AEB hyperthermia, tachypnea, hypotension, leukopenia w/bandemia, elevated INR  · In setting of sigmoid diverticulitis with large perforation   · Imaging as noted above  · COVID/FLU/RSV negative  · Blood cultures w/NGTD  · UA w/out evidence of infection  · Peritoneal fluid positive for E coli  · Lactic acid 1 9  · Procalcitonin 17 61 - 16 07 - 10 75  · Has received aggressive IVF resuscitation  · Vasopressors now off  · Continue IV Zosyn D#5  · Monitor fever and WBC curve  · Trend procalcitonin and follow up cultures

## 2022-12-08 NOTE — OP NOTE
OPERATIVE REPORT  PATIENT NAME: Cecily Al    :  1960  MRN: [id number]  Pt Location: [facility] OR ROOM 01    SURGERY DATE: 2022    Surgeon(s) and Role:     * Anahy Rodriguez MD - Primary     * Mamie Gowers, MD - Assisting     * Guero Singh PA-C - Assisting    Preop Diagnosis:  Diverticulitis [K57 92]    Post-Op Diagnosis Codes:     * Diverticulitis [K57 92]    Procedure(s) (LRB):  RESECTION COLON SIGMOID LAPAROSCOPIC HAND-ASSISTED, diverting transverse loop colostomy (N/A)    Specimen(s):  ID Type Source Tests Collected by Time Destination   1 :  Tissue Large Intestine, Sigmoid Colon TISSUE EXAM Anahy Rodriguez MD 2022 1730    A :  Body Fluid Peritoneal Fluid BODY FLUID CULTURE AND GRAM STAIN Anahy Rodriguez MD 2022 1537      500cc albumin  2800cc crystalloid  180cc UOP  50cc NGT    Estimated Blood Loss:   30 mL    Drains:  NG/OG/Enteral Tube Nasogastric 14 Fr Right nare (Active)   Placement Reverification X-ray 22   Site Assessment Clean;Dry; Intact 22   Status Suction-low intermittent 22   Drainage Appearance Green;Bile 22   Output (mL) 0 mL 22 0400   Number of days: 3       Colostomy RLQ (Active)   Stomal Appliance 1 piece;Dry; Intact 22   Stoma Assessment [place] 22   Stoma Shape Round 22   Peristomal Assessment Clean; Intact 22   Treatment Placement checked 22   Output (mL) 0 mL 22 0400   Number of days: 3       Urethral Catheter Temperature probe 16 Fr   (Active)   Reasons to continue Urinary Catheter  Accurate I&O assessment in critically ill patients (48 hr  max) 22   Goal for Removal Remove after 48 hrs of I/O monitoring 22   Site Assessment Clean;Skin intact 22   Avery Care Done 22 0900   Collection Container Standard drainage bag 22   Securement Method Securing device (Describe) 22   Output (mL) 200 mL 22 0804 Number of days: 4     Anesthesia Type:   General    Operative Indications:  Diverticulitis [K57 92]  62F with first episode complicated sigmoid diverticulitis, clinically worsening over initial period of conservative management, consented for urgent surgical management to include laparoscopic hand assisted sigmoid colectomy, possible open, possible Frank's, possible diverting stoma  Operative Findings:  -Perforated feculent diverticulitis with large perforation in the mid sigmoid colon  -Sigmoid resection performed  -29mm EEA end to end stapled anastomosis created  -Proximal donut with slight thinning and near defect  -No leak on rigid sigmoidoscopy  -Left ureter well visualized and preserved during the dissection  -Protective diverting transverse loop colostomy performed    Complications:   None    Procedure and Technique:  The patient was taken to the operating room where she was properly identified monitored and anesthetized with general endotracheal anesthesia  She received antibiotics and DVT prophyaxis perioperatively  Pneumoboots on and activated  Avery catheter placed prior to the operating room  NGT placed in the operating room  Arterial line and central line placed in the operating room  The abdomen prepped and draped under sterile conditions using aseptic technique  Time-out performed  Skin incised in the right mid abdomen, 5mm, direct entry technique used, after two attempts unclear if intraperitoneal or in preperitoneal space  Trocar left in place  Additional 5mm incision made on the right mid abdomen and another direct entry approach taken with the 5mm 30 degree laparoscope  This was successful, confirmed intraperitoneal placement of the initial trocar as well which had to be backed up a little bit as it was abutting the omentum, no trocar injuries identified  Abdomen insufflated  The left sided 5mm port site was made along the planned incision line for the left paramedian hand port   This hand port incision was made extending from the trocar site with a scalpel and electrocautery used to carry things down to the fascia, the peritoneal cavity was entered  Peritoneal fluid cultures sent from the initial feculent fluid that was encountered  The hand port was placed  12 mm trocar placed in the right lower quadrant under direct visualization  My hand advanced into the peritoneal cavity and the 4 quadrant laparoscopic inspection performed  Pelvis inspected  Padilla feculent peritonitis identified  The sigmoid colon pathology identified  There was a large perforation site on the right lateral side of the mid sigmoid colon  There was stool within the pelvis and the lateral gutters  The liquid stool was suctioned out and the solid stool was pulled out via the hand port  The sigmoid colon mobilized from the left pelvic sidewall  Window made in the mesocolon at the level of the sacral promontory and distal to the diseased sigmoid colon  The mesocolon taken with serial fires of the Endo-CHIKA 60-white cartridges  The bowel divided at the level of the rectum with an Endo-CHIKA 60 blue load stapler  The bowel delivered through the hand port and the bowel divided proximally with Metzenbaum scissors  The bowel sized and the 29mm EEA selected  A pursestring suture of running 2 0 Prolene was placed and the anvil advanced and secured  The hand port replaced and a pneumoperitoneum reestablished  The patient placed in steep Trendelenburg the bowel sizers advanced transanally then the EEA advanced transanally the tissue piercing device deployed and connected to the anvil under direct laparoscopic visualization  The EEA fired  The EEA removed  Both the proximal and distal donuts were inspected on the back table and the proximal donut was found to be thinned in one location with a small defect    Rigid sigmoidoscopy performed with the anastomosis submerged in saline and the bowel lumen occluded proximal to the staple line   No leak identified  Decision was made to create a protecting diverting transverse loop colostomy in the right upper quadrant  Abdomen copiously irrigated and aspirated  The 12 mm trocar site was closed with a Tressia Motto device with an 0 Vicryl suture  Circular skin incision made with a 15 blade in the right mid abdomen overlying the rectus muscle  Subcutaneous fat removed and cored down to the fascia with electrocautery  Cruciate incision marked in the anterior sheath with cut electrocautery, muscle splitting incision made through the rectus, posterior sheath opened sharply after elevated  Transverse colon identified, window created underneath the colon through the mesocolon and the transverse colon elevated into the wound, a stoma bridge placed underneath to hold it in place  Handport wound closed with 2 running 0-vicryl sutures tied in the middle on the posterior sheath and 2 running 1  PDS sutures tied in the middle on the anterior sheath, skin closed with staples, wounds dressed  Stoma matured using 3-0 vicryl suture circumferentially to take the bowel to the fascia in an interrupted fashion, then the colon opened, sharon style sutures placed in the four corners with interrupted additional sutures in between to secure the stoma to the skin with full thickness bites, bridge left in place  Finger placed through each limb to ensure patency  Stoma appliance placed  All counts correct  The patient was left intubated and transferred to the ICU in stable but guarded condition  I was present for the entire procedure, a surgeon assistant was required because of skills and techniques relevant to speciality and a physician assistant was required during the procedure for retraction, tissue handling,dissection and suturing      Patient Disposition:  Critical Care Unit    This procedure was not performed to treat colon cancer through resection      SIGNATURE: Billy Vela MD  DATE: December 8, 2022  TIME: 12:28 PM

## 2022-12-08 NOTE — ASSESSMENT & PLAN NOTE
· Diagnosed in 2016  · Follows with Pulmonary at [facility], however has not seen them since 2018/2019 as disease has been stable  · Is s/p 2 laser treatments to airways

## 2022-12-08 NOTE — ASSESSMENT & PLAN NOTE
· Suspect this is 2/2 sedation vs infection  · Wean Precedex gtt for goal RASS 0  · Received Zyprexa x1  · Frequent neuro checks  · Maintain normothermia and normoglycemia  · Delirium precautions  · CAM ICU

## 2022-12-08 NOTE — PROGRESS NOTES
Progress Note - General Surgery   Perfecto Leal 58 y o  female MRN: [id number]  Unit/Bed#: ICU 11-01 Encounter: [id number]    Assessment:  80-year-old female with past medical history significant for asthma and tracheobronchomalacia presenting with complicated sigmoid diverticulitis  Patient meeting SIRS/sepsis criteria 12 hours after admission as evidenced by fever, tachypnea, hypotension, leukopenia  HD#4 septic shock secondary to feculent peritonitis  POD#3 s/p laparoscopic hand-assisted sigmoid resection with transverse loop colostomy    Intraoperative findings of perforated feculent diverticulitis with large perforation at mid sigmoid colon  - Extubated 12/7 to high flow nasal cannula  - TMax 99 3, intermittent episodes of bradycardia to 50s, BP stable  - UO 3 6 L via Avery, additional 200 cc recorded this morning so far  -  cc, green bilious output  - Transverse loop colostomy with no output, bar in place  - WBC 5 46  - Hemoglobin 10 1  - CR 0 64  - Pro-Spenser 7 12  - Electrolytes unremarkable other than hypophosphatemia    Plan by organ system:  Neuro - Precedex gtt titrated overnight, s/p Zyprexa x1, analgesics as needed  Cardio - hemodynamically stable, off vasopressors, A-line discontinued  Pulm - extubated 12/7, currently on HFNC with Xopenex/Pulmicort nebs, incentive spirometry  GI - NGT/NPO with ice chips, will hold off on initiating TF until return of bowel function, bar to be removed POD7   - maintain Avery for accurate UO, has had adequate urinary output  FEN - IVF on hold, s/p IV Lasix x1 12/7 and 12/6, electrolytes unremarkable but replete as needed, hold TF return of bowel function  Heme - postoperative anemia secondary to acute blood loss anemia, trend hemoglobin and transfuse if less than 7 0, monitor for signs of active bleeding  ID - IV Zosyn, day 5; trend WBC and fever curve, follow-up intraoperative culture  Endo - no active problems  MSK/skin - dressings to be taken down, likely need to be redressed as patient was abdominal binder,ostomy nurse teaching, PT/OT  VTE - consider adding heparin for VTE prophylaxis, otherwise continue SCDs    Subjective/Objective   Subjective: Appears to be more oriented this morning  Appropriate questions in regards to surgical findings  Otherwise with no acute complaints  Objective:   Blood pressure 154/80, pulse 57, temperature 99 1 °F (37 3 °C), resp  rate 13, height 5' 5" (1 651 m), weight 87 6 kg (193 lb 2 oz), SpO2 92 %  ,Body mass index is 32 14 kg/m²  Intake/Output Summary (Last 24 hours) at 12/8/2022 0849  Last data filed at 12/8/2022 0804  Gross per 24 hour   Intake 651 89 ml   Output 3965 ml   Net -3313 11 ml       Invasive Devices     Central Venous Catheter Line  Duration           CVC Central Lines 12/05/22 Triple 2 days          Peripheral Intravenous Line  Duration           Peripheral IV 12/08/22 Left;Ventral (anterior) Forearm <1 day    Peripheral IV 12/08/22 Right;Upper;Ventral (anterior) Arm <1 day          Drain  Duration           Colostomy RLQ 3 days    Urethral Catheter Temperature probe 16 Fr  3 days    NG/OG/Enteral Tube Nasogastric 14 Fr Right nare 2 days              Physical Exam  Vitals and nursing note reviewed  Constitutional:       General: She is not in acute distress  Appearance: She is obese  She is not ill-appearing or toxic-appearing  Interventions: Nasal cannula in place  HENT:      Head: Normocephalic and atraumatic  Cardiovascular:      Rate and Rhythm: Regular rhythm  Bradycardia present  Pulses: Normal pulses  Heart sounds: Normal heart sounds  Pulmonary:      Effort: Pulmonary effort is normal       Breath sounds: Decreased breath sounds present  Abdominal:      General: Bowel sounds are decreased  There is distension  Palpations: Abdomen is soft  Tenderness: There is abdominal tenderness (Appropriately tender)  There is no guarding or rebound        Comments: laparoscopic site incisions are clean, dry, intact with unsaturated dressings in place, paramedian incision is clean, dry, intact with unsaturated dressings in place; ostomy's is pink/viable, no evidence of retraction or necrosis, no output in bag   Musculoskeletal:      Right lower leg: Edema present  Left lower leg: Edema present  Comments: Bilateral upper extremity restraints in place   Skin:     General: Skin is warm and dry  Neurological:      General: No focal deficit present  Mental Status: She is alert  Comments: Appears to be agitated and more oriented this morning, appropriately asking questions/responding to and asking questions regarding surgery   Psychiatric:         Mood and Affect: Mood normal          Behavior: Behavior normal  Behavior is cooperative  Thought Content: Thought content normal        Lab, Imaging and other studies:  I have personally reviewed pertinent lab results      CBC:   Lab Results   Component Value Date    WBC 5 46 12/08/2022    HGB 10 1 (L) 12/08/2022    HCT 31 1 (L) 12/08/2022    MCV 95 12/08/2022     12/08/2022    MCH 31 0 12/08/2022    MCHC 32 5 12/08/2022    RDW 13 3 12/08/2022    MPV 10 3 12/08/2022     CMP:   Lab Results   Component Value Date    SODIUM 141 12/08/2022    K 3 6 12/08/2022     12/08/2022    CO2 28 12/08/2022    BUN 15 12/08/2022    CREATININE 0 64 12/08/2022    CALCIUM 8 9 12/08/2022    AST 32 12/08/2022    ALT 14 12/08/2022    ALKPHOS 54 12/08/2022    EGFR 95 12/08/2022     VTE Pharmacologic Prophylaxis: Reason for no pharmacologic prophylaxis per primary  VTE Mechanical Prophylaxis: sequential compression device    Whit Weinberg PA-C

## 2022-12-08 NOTE — WOUND OSTOMY CARE
Progress Note- Phuong Cash 58 y o  female  [id number]  ICU 11-ICU 11-01    Assessment:  58year old female patient admitted with sigmoid diverticulitis and perforation  Patient is POD# 2 emergent hand assisted laparoscopic sigmoid resection, and diverting loop colostomy  Patient in bed, she was extubated this morning and at this time is very drowsy, not teachable at this time  , mother and family member at the bedside  Patient has b/l soft wrist restraints in place at this time, NG tube to the right nare  Per RN scant amount of output in ostomy pouch, no output at time of assessment  Patient has an abdominal binder in place covering the ostomy site, pulled back to observe stoma and pouch  Per family, patient is not aware that she had surgery and not sure if she know that she has a colostomy, observed intact ostomy pouch, pink stoma with a bridge in place  Per RN,     Discussed with family outside of patient room the role of the ostomy nurse and the teaching that will take place during admission, such as emptying pouch, changing and skin care during pouch change, application of pouch and different types of pouches  Made family aware that ostomy supplies will be given to patient for discharge  Ostomy pouch to be changed on Friday with family present  Me+packet and [employer] education booklet given to the   Discussed with  submitting name to ostomy [employer] for samples to be sent, he was agreeable  Skin and Ostomy Care Plan:  Reviewed what will be taught to patient,  and mother requested to be present on Friday for ostomy teaching, will meet with patient and family at 11:00 allowing for travel time for family from [place]  Family was on vacation locally when patient became ill  1  Empty colostomy pouch when 1/3-1/2 full of stool or inflated with gas  Cleanse drainage end prior to closing pouch  Change pouch every 3 days and PRN with signs of leakage   Encourage patient to observe emptying pouch  2  Ehob offloading cushion to chair when OOB  3  Elevate heels off of bed with pillow to offload  4  Apply hydraguard to b/l heels, buttocks and sacrum BID and PRN  5  Turn and reposition patient Q2 hours    Wound 12/05/22 Abdomen N/A (Active)   Wound Description ANGIE 12/07/22 1600   Devi-wound Assessment Clean;Dry; Intact 12/07/22 1600   Drainage Amount None 12/07/22 1600   Dressing Dry dressing 12/07/22 1600   Dressing Status Clean;Dry; Intact 12/07/22 1600   Number of days: 2         Colostomy RLQ (Active)   Stomal Appliance 1 piece;Dry; Intact 12/07/22 2101   Stoma Assessment [place] 12/07/22 2101   Stoma Shape Round 12/07/22 1600   Peristomal Assessment Clean; Intact 12/07/22 1600   Treatment Placement checked 12/07/22 2101   Output (mL) 0 mL 12/07/22 1812   Number of days: 2       Dressing Type Chlorhexidine dressing 12/07/22 1600   Dressing Status Clean; Intact;Dry 12/07/22 1600   Dressing Intervention Dressing changed 12/05/22 2000   Dressing Change Due 12/12/22 12/07/22 1600   Number of days: 2         Reviewed plan of care with primary RN Erin Recommendations written as orders  Wound care team to follow weekly while admitted  Questions or concerns [facility] Nurse    Paulette LINTONN, RN, Izzy Warner

## 2022-12-09 LAB
ALBUMIN SERPL BCP-MCNC: 2.8 G/DL (ref 3.5–5)
ALP SERPL-CCNC: 69 U/L (ref 34–104)
ALT SERPL W P-5'-P-CCNC: 16 U/L (ref 7–52)
ANION GAP SERPL CALCULATED.3IONS-SCNC: 6 MMOL/L (ref 4–13)
ANION GAP SERPL CALCULATED.3IONS-SCNC: 9 MMOL/L (ref 4–13)
AST SERPL W P-5'-P-CCNC: 28 U/L (ref 13–39)
BACTERIA SPEC BFLD CULT: ABNORMAL
BILIRUB SERPL-MCNC: 0.65 MG/DL (ref 0.2–1)
BUN SERPL-MCNC: 16 MG/DL (ref 5–25)
BUN SERPL-MCNC: 17 MG/DL (ref 5–25)
CA-I BLD-SCNC: 1.11 MMOL/L (ref 1.12–1.32)
CALCIUM ALBUM COR SERPL-MCNC: 9.6 MG/DL (ref 8.3–10.1)
CALCIUM SERPL-MCNC: 8.6 MG/DL (ref 8.4–10.2)
CALCIUM SERPL-MCNC: 9.5 MG/DL (ref 8.4–10.2)
CHLORIDE SERPL-SCNC: 105 MMOL/L (ref 96–108)
CHLORIDE SERPL-SCNC: 107 MMOL/L (ref 96–108)
CO2 SERPL-SCNC: 26 MMOL/L (ref 21–32)
CO2 SERPL-SCNC: 29 MMOL/L (ref 21–32)
CREAT SERPL-MCNC: 0.62 MG/DL (ref 0.6–1.3)
CREAT SERPL-MCNC: 0.64 MG/DL (ref 0.6–1.3)
ERYTHROCYTE [DISTWIDTH] IN BLOOD BY AUTOMATED COUNT: 13.7 % (ref 11.6–15.1)
GFR SERPL CREATININE-BSD FRML MDRD: 95 ML/MIN/1.73SQ M
GFR SERPL CREATININE-BSD FRML MDRD: 96 ML/MIN/1.73SQ M
GLUCOSE SERPL-MCNC: 105 MG/DL (ref 65–140)
GLUCOSE SERPL-MCNC: 106 MG/DL (ref 65–140)
GLUCOSE SERPL-MCNC: 117 MG/DL (ref 65–140)
GLUCOSE SERPL-MCNC: 122 MG/DL (ref 65–140)
GLUCOSE SERPL-MCNC: 128 MG/DL (ref 65–140)
GLUCOSE SERPL-MCNC: 134 MG/DL (ref 65–140)
GRAM STN SPEC: ABNORMAL
HCT VFR BLD AUTO: 29.9 % (ref 34.8–46.1)
HGB BLD-MCNC: 9.9 G/DL (ref 11.5–15.4)
MAGNESIUM SERPL-MCNC: 2 MG/DL (ref 1.9–2.7)
MCH RBC QN AUTO: 31.4 PG (ref 26.8–34.3)
MCHC RBC AUTO-ENTMCNC: 33.1 G/DL (ref 31.4–37.4)
MCV RBC AUTO: 95 FL (ref 82–98)
PHOSPHATE SERPL-MCNC: 2.8 MG/DL (ref 2.3–4.1)
PLATELET # BLD AUTO: 184 THOUSANDS/UL (ref 149–390)
PMV BLD AUTO: 10.3 FL (ref 8.9–12.7)
POTASSIUM SERPL-SCNC: 3.3 MMOL/L (ref 3.5–5.3)
POTASSIUM SERPL-SCNC: 4.2 MMOL/L (ref 3.5–5.3)
PROCALCITONIN SERPL-MCNC: 4.41 NG/ML
PROT SERPL-MCNC: 5.2 G/DL (ref 6.4–8.4)
RBC # BLD AUTO: 3.15 MILLION/UL (ref 3.81–5.12)
SODIUM SERPL-SCNC: 140 MMOL/L (ref 135–147)
SODIUM SERPL-SCNC: 142 MMOL/L (ref 135–147)
WBC # BLD AUTO: 5.88 THOUSAND/UL (ref 4.31–10.16)

## 2022-12-09 RX ORDER — FUROSEMIDE 10 MG/ML
20 INJECTION INTRAMUSCULAR; INTRAVENOUS ONCE
Status: COMPLETED | OUTPATIENT
Start: 2022-12-09 | End: 2022-12-09

## 2022-12-09 RX ORDER — MAGNESIUM SULFATE HEPTAHYDRATE 40 MG/ML
2 INJECTION, SOLUTION INTRAVENOUS ONCE
Status: COMPLETED | OUTPATIENT
Start: 2022-12-09 | End: 2022-12-09

## 2022-12-09 RX ORDER — ACETAMINOPHEN 325 MG/1
975 TABLET ORAL EVERY 6 HOURS
Status: DISCONTINUED | OUTPATIENT
Start: 2022-12-09 | End: 2022-12-17

## 2022-12-09 RX ORDER — CALCIUM GLUCONATE 20 MG/ML
2 INJECTION, SOLUTION INTRAVENOUS ONCE
Status: COMPLETED | OUTPATIENT
Start: 2022-12-09 | End: 2022-12-09

## 2022-12-09 RX ORDER — POTASSIUM CHLORIDE 20MEQ/15ML
40 LIQUID (ML) ORAL ONCE
Status: COMPLETED | OUTPATIENT
Start: 2022-12-09 | End: 2022-12-09

## 2022-12-09 RX ADMIN — ACETAMINOPHEN 975 MG: 650 SUSPENSION ORAL at 10:35

## 2022-12-09 RX ADMIN — ACETAMINOPHEN 975 MG: 650 SUSPENSION ORAL at 02:59

## 2022-12-09 RX ADMIN — KETOROLAC TROMETHAMINE 15 MG: 30 INJECTION, SOLUTION INTRAMUSCULAR at 05:57

## 2022-12-09 RX ADMIN — KETOROLAC TROMETHAMINE 15 MG: 30 INJECTION, SOLUTION INTRAMUSCULAR at 16:28

## 2022-12-09 RX ADMIN — PIPERACILLIN AND TAZOBACTAM 3.38 G: 3; .375 INJECTION, POWDER, FOR SOLUTION INTRAVENOUS at 06:03

## 2022-12-09 RX ADMIN — Medication 3.38 G: at 22:09

## 2022-12-09 RX ADMIN — ISODIUM CHLORIDE 3 ML: 0.03 SOLUTION RESPIRATORY (INHALATION) at 14:54

## 2022-12-09 RX ADMIN — KETOROLAC TROMETHAMINE 15 MG: 30 INJECTION, SOLUTION INTRAMUSCULAR at 10:35

## 2022-12-09 RX ADMIN — KETOROLAC TROMETHAMINE 15 MG: 30 INJECTION, SOLUTION INTRAMUSCULAR at 23:14

## 2022-12-09 RX ADMIN — ONDANSETRON 4 MG: 2 INJECTION INTRAMUSCULAR; INTRAVENOUS at 09:25

## 2022-12-09 RX ADMIN — CALCIUM GLUCONATE 2 G: 20 INJECTION, SOLUTION INTRAVENOUS at 08:43

## 2022-12-09 RX ADMIN — PANTOPRAZOLE SODIUM 40 MG: 40 INJECTION, POWDER, LYOPHILIZED, FOR SOLUTION INTRAVENOUS at 08:00

## 2022-12-09 RX ADMIN — LEVALBUTEROL 1.25 MG: 1.25 SOLUTION, CONCENTRATE RESPIRATORY (INHALATION) at 14:54

## 2022-12-09 RX ADMIN — FUROSEMIDE 20 MG: 10 INJECTION, SOLUTION INTRAMUSCULAR; INTRAVENOUS at 10:35

## 2022-12-09 RX ADMIN — ACETAMINOPHEN 975 MG: 325 TABLET ORAL at 16:14

## 2022-12-09 RX ADMIN — ACETAMINOPHEN 975 MG: 325 TABLET ORAL at 22:08

## 2022-12-09 RX ADMIN — POTASSIUM CHLORIDE 40 MEQ: 20 SOLUTION ORAL at 08:43

## 2022-12-09 RX ADMIN — Medication 6 MG: at 22:27

## 2022-12-09 RX ADMIN — ENOXAPARIN SODIUM 40 MG: 100 INJECTION SUBCUTANEOUS at 08:00

## 2022-12-09 RX ADMIN — ISODIUM CHLORIDE 3 ML: 0.03 SOLUTION RESPIRATORY (INHALATION) at 20:18

## 2022-12-09 RX ADMIN — BUDESONIDE 0.5 MG: 0.5 INHALANT ORAL at 20:18

## 2022-12-09 RX ADMIN — PANTOPRAZOLE SODIUM 40 MG: 40 INJECTION, POWDER, LYOPHILIZED, FOR SOLUTION INTRAVENOUS at 21:57

## 2022-12-09 RX ADMIN — MAGNESIUM SULFATE HEPTAHYDRATE 2 G: 40 INJECTION, SOLUTION INTRAVENOUS at 08:42

## 2022-12-09 RX ADMIN — Medication 3.38 G: at 14:46

## 2022-12-09 RX ADMIN — POTASSIUM PHOSPHATE, MONOBASIC AND POTASSIUM PHOSPHATE, DIBASIC 30 MMOL: 224; 236 INJECTION, SOLUTION, CONCENTRATE INTRAVENOUS at 09:31

## 2022-12-09 RX ADMIN — LEVALBUTEROL 1.25 MG: 1.25 SOLUTION, CONCENTRATE RESPIRATORY (INHALATION) at 20:18

## 2022-12-09 RX ADMIN — LEVALBUTEROL 1.25 MG: 1.25 SOLUTION, CONCENTRATE RESPIRATORY (INHALATION) at 08:02

## 2022-12-09 RX ADMIN — ISODIUM CHLORIDE 3 ML: 0.03 SOLUTION RESPIRATORY (INHALATION) at 08:02

## 2022-12-09 RX ADMIN — BUDESONIDE 0.5 MG: 0.5 INHALANT ORAL at 08:02

## 2022-12-09 NOTE — PHYSICAL THERAPY NOTE
Physical Therapy Evaluation   Time in: 9:01  Time out: 9:26  Total evaluation time: 25 minutes    Patient's Name: Candice Gist    Admitting Diagnosis  Diverticulitis [K57 92]  Abdominal pain [R10 9]    Problem List  Patient Active Problem List   Diagnosis    Sigmoid diverticulitis    Acute respiratory failure with hypoxia (HCC)    GERD (gastroesophageal reflux disease)    Hypertension    Bronchomalacia    Asthma    Septic shock ([facility] [address] )    Anemia    Encephalopathy       Past Medical History  Past Medical History:   Diagnosis Date    Asthma     Bronchiectasis ([address] )     Bronchomalacia     GERD (gastroesophageal reflux disease)     Hiatal hernia        Past Surgical History  Past Surgical History:   Procedure Laterality Date    APPENDECTOMY      CHOLECYSTECTOMY      COLON SIGMOID RESECTION LAPAROSCOPIC N/A 12/5/2022    Procedure: RESECTION COLON SIGMOID LAPAROSCOPIC HAND-ASSISTED, diverting transverse loop colostomy;  Surgeon: Andres Mcneil MD;  Location: [facility] MA[facility];  Service: General    HYSTERECTOMY      KNEE SURGERY Left        PT performed at least 2 patient identifiers during session: Name and wristband  12/09/22 0905   PT Last Visit   PT Visit Date 12/09/22   Note Type   Note type Evaluation   Pain Assessment   Pain Assessment Tool 0-10   Pain Score 9   Pain Location/Orientation Location: Generalized   Pain Onset/Description Onset: Ongoing;Frequency: Constant/Continuous   Patient's Stated Pain Goal No pain   Hospital Pain Intervention(s) Repositioned; Ambulation/increased activity; Emotional support   Restrictions/Precautions   Weight Bearing Precautions Per Order No   Other Precautions Bed Alarm;Multiple lines;O2;Telemetry; Fall Risk;Pain  (30L high flow NC)   Home Living   Type of [address] [place] [address] Two level;Bed/bath upstairs;Stairs to enter with rails  (13 KARYN)   Home Equipment Walker;Cane  (denies AD usage at baseline)   Additional Comments limited information regarding home setup/PLOF d/t pt fatigue and high flow NC, please refer to CM notes for further information   Prior Function   Level of Day Independent with ADLs; Independent with functional mobility; Independent with IADLS   Lives With Spouse   Receives Help From Family   IADLs Independent with driving; Independent with meal prep; Independent with medication management   Comments limited information regarding home setup/PLOF d/t pt fatigue and high flow NC, please refer to CM notes flr further information   General   Family/Caregiver Present No   Cognition   Overall Cognitive Status WFL   Arousal/Participation Alert   Attention Within functional limits   Orientation Level Oriented X4   Memory Within functional limits   Following Commands Follows all commands and directions without difficulty   Comments Pt agreeable to PT session   Subjective   Subjective "I will sit in the chair"   RLE Assessment   RLE Assessment X   Strength RLE   RLE Overall Strength 3/5   LLE Assessment   LLE Assessment X   Strength LLE   LLE Overall Strength 3/5   Light Touch   RLE Light Touch Grossly intact  (Pt responds to tactile stimuli in LE)   LLE Light Touch Grossly intact  (Pt responds to tactile stimuli in LE)   Bed Mobility   Supine to Sit 2  Maximal assistance   Additional items Assist x 2;HOB elevated; Increased time required;Verbal cues;LE management   Sit to Supine   (Not assessed, pt seated OOB in recliner upon conclusion of session)   Additional Comments Pt reports feeling nausea following bed mobility, does not resolve throughout session   Transfers   Sit to Stand 2  Maximal assistance   Additional items Assist x 2; Increased time required;Verbal cues   Stand to Sit 2  Maximal assistance   Additional items Assist x 2; Increased time required;Verbal cues;Armrests   Stand pivot 2  Maximal assistance   Additional items Assist x 2; Increased time required;Verbal cues  (third assist for trunk control and line management)   Additional Comments Pt reports continued feeling of nausea following transfers   Ambulation/Elevation   Gait pattern Not tested; Not appropriate   Ambulation/Elevation Additional Comments Ambulation/elevations not assessed d/t pt complain of nausea and fatigue   Balance   Static Sitting Poor   Dynamic Sitting Poor   Static Standing Poor -   Dynamic Standing Poor -   Ambulatory   (Not assessed, pt not appropriate for ambulation at this time)   Endurance Deficit   Endurance Deficit Yes   Activity Tolerance   Activity Tolerance Patient limited by fatigue;Patient limited by pain;Treatment limited secondary to medical complications (Comment)  (nausea)   Medical Staff Made Aware Coordination of care provided w/ OT Marilu  D/t medical complexity of pt's case and need for extensive assist x 2, two skilled therapists were required to perform skilled intervention  Goals were assessed seperately as documented  Nurse Made Aware Nursing made aware of session outcomes/recs   Assessment   Prognosis Fair   Problem List Decreased strength;Decreased endurance; Impaired balance;Decreased mobility; Decreased safety awareness;Pain;Decreased skin integrity   Assessment Pt is 58 y o  female seen for high-complexity PT evaluation on 12/9/2022 s/p admit to [place] [id number] on 12/4/2022 w/ Sigmoid diverticulitis  PT was consulted to assess pt's functional mobility and d/c needs  Order placed for PT eval and tx, w/ up w/ A order  PTA, pt lives w/ spouse in two level house w/ second floor bed/bath and 13 KARYN w/ rails  Pt reports (I) w/ ADLs/IADLs and functional mobility s AD  At time of eval, pt performs supine to sit and sit <> stand w/ max A x 2, and stand pivot transfer w/ max A x 2 w/ third assist for trunk control and line management  Pt reports nausea beginning w/ completion of supine to sit that persists throughout session, RN made aware   Upon evaluation, pt presenting with impaired functional mobility d/t decreased strength, decreased endurance, impaired balance, decreased mobility, decreased safety awareness, decreased skin integrity and pain  Pertinent PMHx and current co-morbidities affecting pt's physical performance at time of assessment include: bronchiectasis, bronchomalacia, sigmoid diverticulitis, pneumoperitoneum, asthma, GERD, hiatal hernia  Personal factors affecting pt at time of eval include: inaccessible home environment, lives in 2 story house, stairs to enter home, inability to ambulate household distances and limited home support  The following objective measures performed on IE also reveal limitations: AM-PAC 6-Clicks: 22/91  Pt's clinical presentation is currently unstable/unpredictable seen in pt's presentation of elevated BP, abnormal lab value(s), need for max x 2 assist w/ all phases of mobility when usually mobilizing independently, ongoing medical assessment and on telemetry monitoring  Overall, pt's rehab potential and prognosis to return to PLOF is fair as impacted by objective findings, warranting pt to receive further skilled PT interventions to address identified impairments, activity limitation(s), and participation restriction(s)  Goal for patient is to fair  Pt to benefit from continued PT tx to address deficits as defined above and maximize level of functional independent mobility and consistency in order for pt to decrease fall risk, increase safety and independence w/ mobility, improve functional task performance, and improve BLE strength  From PT/mobility standpoint, recommendation at time of d/c would be post acute rehabilitation services pending progress in order to facilitate return to PLOF  Barriers to Discharge Inaccessible home environment;Decreased caregiver support   Goals   Patient Goals "to feel better"   STG Expiration Date 12/19/22   Short Term Goal #1 In 7-10 days: Pt will perform supine to sit transfer w/ mod A x 1 to improve safety and independence w/ bed mobility   Pt will perform sit <> stand transfers from different surfaces w/ mod A x 2 to improve safety and independence w/ transfers  Pt will improve static and dynamic sitting balance grades to F/F+ to decrease risk of falls  Pt will tolerate seated EOB 10 minutes to improve functional task performance  Pt will improve BLE strength 1/2 grade or better to improve ease and efficiency of transfers and functional mobility  Pt will ambulate 10 steps mod A x 2 to improve ambulatory capacity and independence  PT Treatment Day 0   Plan   Treatment/Interventions Functional transfer training;LE strengthening/ROM; Elevations; Therapeutic exercise; Endurance training;Patient/family training;Equipment eval/education; Bed mobility;Gait training; Compensatory technique education;OT;Spoke to nursing   PT Frequency 3-5x/wk   Recommendation   PT Discharge Recommendation Post acute rehabilitation services   Additional Comments Pt raw score on [facility] Basic Mobility short form is 10/24, standardized score is 27 46  Pts at this level are likely to benefit from DC to post acute rehabilitation services, however please refer to therapist recommendation for safe planning  AM-PAC Basic Mobility Inpatient   Turning in Bed Without Bedrails 2   Lying on Back to Sitting on Edge of Flat Bed 2   Moving Bed to Chair 2   Standing Up From Chair 2   Walk in Room 1   Climb 3-5 Stairs 1   Basic Mobility Inpatient Raw Score 10   Turning Head Towards Sound 4   Follow Simple Instructions 3   Low Function Basic Mobility Raw Score 17   Low Function Basic Mobility Standardized Score 27 46   Highest Level Of Mobility   JH-HLM Goal 4: Move to chair/commode   JH-HLM Achieved 4: Move to chair/commode   End of Consult   Patient Position at End of Consult Bedside chair;Bed/Chair alarm activated; All needs within reach       SHUKRI RUSSELL AT [facility]

## 2022-12-09 NOTE — ASSESSMENT & PLAN NOTE
· Worsening in PM of 12/04 - AEB hyperthermia, tachypnea, hypotension, leukopenia w/bandemia, elevated INR  · In setting of sigmoid diverticulitis with large perforation   · Imaging as noted above  · COVID/FLU/RSV negative  · Blood cultures w/NGTD  · UA w/out evidence of infection  · Peritoneal fluid positive for E coli  · Lactic acid 1 9  · Procalcitonin 17 61 - 16 07 - 10 75 - 7 12  · Has received aggressive IVF resuscitation  · Vasopressors now off  · Continue IV Zosyn D#6  · Monitor fever and WBC curve  · Trend procalcitonin and follow up cultures

## 2022-12-09 NOTE — QUICK NOTE
I spoke to the patient's  and mother with an update today on the status of the patient as well as the plan of care for the day    All questions answered and concerns addressed

## 2022-12-09 NOTE — PLAN OF CARE
Problem: OCCUPATIONAL THERAPY ADULT  Goal: Performs self-care activities at highest level of function for planned discharge setting  See evaluation for individualized goals  Description: Treatment Interventions: ADL retraining, Functional transfer training, UE strengthening/ROM, Endurance training, Patient/family training, Equipment evaluation/education, Energy conservation, Activityengagement, Compensatory technique education          See flowsheet documentation for full assessment, interventions and recommendations  Note: Limitation: Decreased ADL status, Decreased UE strength, Decreased Safe judgement during ADL, Decreased endurance, Decreased self-care trans, Decreased high-level ADLs  Prognosis: Good  Assessment: Pt is a 58 y o  female seen for OT evaluation s/p admit to [address] [place] [address] on 12/4/2022 w/ Sigmoid diverticulitis  Comorbidities affecting pt's functional performance at time of assessment include: GERD, HTN, asthma, septic shock, anemia, encephalopathy, bronchomalacia  Personal factors affecting pt at time of IE include:steps to enter environment, difficulty performing ADLS, difficulty performing IADLS , decreased initiation and engagement  and health management   Prior to admission, pt was (I) with ADLs and IADLs  Upon evaluation: the following deficits impact occupational performance: weakness, decreased ROM, decreased strength, decreased balance, decreased tolerance, decreased safety awareness, increased pain and decreased coping skills  Pt to benefit from continued skilled OT tx while in the hospital to address deficits as defined above and maximize level of functional independence w ADL's and functional mobility  Occupational Performance areas to address include: grooming, bathing/shower, toilet hygiene, dressing, functional mobility, community mobility, clothing management and household maintenance  From OT standpoint, recommendation at time of d/c would be STR vs home pending progress       OT Discharge Recommendation: Post acute rehabilitation services (vs home health pending progress)     Mahendra Thomas MS OTR/L

## 2022-12-09 NOTE — OCCUPATIONAL THERAPY NOTE
Occupational Therapy Evaluation     Patient Name: Hector Cordova  OAGIQ'Y Date: 12/9/2022  Problem List  Principal Problem:    Sigmoid diverticulitis  Active Problems:    Acute respiratory failure with hypoxia (HCC)    GERD (gastroesophageal reflux disease)    Hypertension    Bronchomalacia    Asthma    Septic shock ([facility] [address] )    Anemia    Encephalopathy    Past Medical History  Past Medical History:   Diagnosis Date    Asthma     [facility]     Bronchomalacia     GERD (gastroesophageal reflux disease)     Hiatal hernia      Past Surgical History  Past Surgical History:   Procedure Laterality Date    APPENDECTOMY      CHOLECYSTECTOMY      COLON SIGMOID RESECTION LAPAROSCOPIC N/A 12/5/2022    Procedure: RESECTION COLON SIGMOID LAPAROSCOPIC HAND-ASSISTED, diverting transverse loop colostomy;  Surgeon: Meseret Garcia MD;  Location: [facility];  Service: General    HYSTERECTOMY      KNEE SURGERY Left            12/09/22 0925   OT Last Visit   OT Visit Date 12/09/22   Note Type   Note type Evaluation   Additional Comments Pt seen as a co-eval with PT due to the patient's co-morbidities, clinically unstable presentation, and present impairments which are a regression from the patient's baseline  Pain Assessment   Pain Assessment Tool 0-10   Pain Score 9   Pain Location/Orientation Location: Generalized   Pain Onset/Description Onset: Ongoing;Frequency: Constant/Continuous   Patient's Stated Pain Goal No pain   Hospital Pain Intervention(s) Repositioned; Ambulation/increased activity; Emotional support   Restrictions/Precautions   Weight Bearing Precautions Per Order No   Other Precautions Chair Alarm; Bed Alarm;Telemetry;O2;Fall Risk;Pain   Home Living   Type of [address] [place] [address] Two level;Bed/bath upstairs;Stairs to enter with rails  ([address])   Home Equipment Walker;Cane  (denies AD usage at baseline)   Additional Comments limited home living information provided d/t pt's fatigue status and high flow   Prior Function   Level of [place] Independent with ADLs; Independent with functional mobility; Independent with IADLS   Lives With Spouse   Receives Help From Family   IADLs Independent with driving; Independent with meal prep; Independent with medication management   Subjective   Subjective "I feel sick to my stomach"   ADL   Where Assessed Chair   Grooming Assistance 5  Supervision/Setup   Grooming Deficit Wash/dry face  (With encouragement cues, pt able to clean nose and face with tissue while seated in recliner w/ proper set-up )   Additional Comments Per therapist clinical reasoning skills, pt would require extensive assistance to complete LB dressing d/t fatigue, pain and limited mobility at this time  Bed Mobility   Supine to Sit 2  Maximal assistance   Additional items Assist x 2;HOB elevated; Increased time required;Verbal cues;LE management   Sit to Supine   (DNT; pt seated in recliner at end of session )   Transfers   Sit to Stand 2  Maximal assistance   Additional items Assist x 2; Increased time required;Verbal cues; Bedrails   Stand to Sit 2  Maximal assistance   Additional items Assist x 2; Increased time required;Verbal cues   Stand pivot 2  Maximal assistance   Additional items Assist x 2; Increased time required;Verbal cues  (with third assist to manage trunk control and lines)   Balance   Static Sitting Poor (Pt required min-mod (A) to maintain upright posture while seated at EOB)  Dynamic Sitting Poor   Static Standing Poor -   Dynamic Standing Poor -   Ambulatory   (DNT; not appropriate)   Activity Tolerance   Activity Tolerance Patient limited by fatigue;Patient limited by pain   Nurse Made Aware Yes, RN aware of outcomes/recs   RUE Assessment   RUE Assessment WFL   LUE Assessment   LUE Assessment WFL   Psychosocial   Psychosocial (WDL) WDL   Cognition   Overall Cognitive Status WFL   Arousal/Participation Alert; Responsive   Attention Within functional limits   Orientation Level Oriented X4   Memory Within functional limits   Following Commands Follows all commands and directions without difficulty   Assessment   Limitation Decreased ADL status; Decreased UE strength;Decreased Safe judgement during ADL;Decreased endurance;Decreased self-care trans;Decreased high-level ADLs   Prognosis Good   Assessment Pt is a 58 y o  female seen for OT evaluation s/p admit to [place] [id number] on 12/4/2022 w/ Sigmoid diverticulitis  Comorbidities affecting pt's functional performance at time of assessment include: GERD, HTN, asthma, septic shock, anemia, encephalopathy, bronchomalacia  Personal factors affecting pt at time of IE include:steps to enter environment, difficulty performing ADLS, difficulty performing IADLS , decreased initiation and engagement  and health management   Prior to admission, pt was (I) with ADLs and IADLs  Upon evaluation: the following deficits impact occupational performance: weakness, decreased ROM, decreased strength, decreased balance, decreased tolerance, decreased safety awareness, increased pain and decreased coping skills  Pt to benefit from continued skilled OT tx while in the hospital to address deficits as defined above and maximize level of functional independence w ADL's and functional mobility  Occupational Performance areas to address include: grooming, bathing/shower, toilet hygiene, dressing, functional mobility, community mobility, clothing management and household maintenance  From OT standpoint, recommendation at time of d/c would be STR vs home pending progress  Goals   Patient Goals "To feel better"   Plan   Treatment Interventions ADL retraining;Functional transfer training;UE strengthening/ROM; Endurance training;Patient/family training;Equipment evaluation/education; Energy conservation; Activityengagement; Compensatory technique education   Goal Expiration Date 12/19/22   OT Treatment Day 0   OT Frequency 3-5x/wk   Recommendation   OT Discharge Recommendation Post acute rehabilitation services  (vs home health pending progress)   Additional Comments  The patient's raw score on the AM-PAC Daily Activity inpatient short form is 14, standardized score is 33 39, less than 39 4  Patients at this level are likely to benefit from discharge to post-acute rehabilitation services  Please refer to the recommendation of the Occupational Therapist for safe discharge planning     AM-PAC Daily Activity Inpatient   Lower Body Dressing 1   Bathing 2   Toileting 2   Upper Body Dressing 3   Grooming 3   Eating 3   Daily Activity Raw Score 14   Daily Activity Standardized Score (Calc for Raw Score >=11) 33 39   AM-[facility] Applied Cognition Inpatient   Following a Speech/Presentation 4   Understanding Ordinary Conversation 4   Taking Medications 3   Remembering Where Things Are Placed or Put Away 4   Remembering List of 4-5 Errands 4   Taking Care of Complicated Tasks 4   Applied Cognition Raw Score 23   Applied Cognition Standardized Score 53 08     GOALS:    Pt will achieve the following within specified time frame: STG  Pt will achieve the following goals within 5 days    *ADL transfers with Mod (A) for inc'd independence with ADLs/purposeful tasks    *UB ADL with (S) for inc'd independence with self cares    *LB ADL with Mod (A) using AE prn for inc'd independence with self cares    *Toileting with Mod (A) for clothing management and hygiene for return to PLOF with personal care    *Increase static sitting balance and dyn sitting balance to F for inc'd safety with standing purposeful tasks    *Increase static stand balance and dyn stand balance to P+ for inc'd safety with standing purposeful tasks    *Increase stand tolerance x3 m for inc'd tolerance with standing purposeful tasks    *Participate in 10m UE therex to increase overall stamina/activity tolerance for purposeful tasks    *Bed mobility- Mod (A) for inc'd independence to manage own comfort and initiate EOB & OOB purposeful tasks    Pt will achieve the following within specified time frame: LTG  Pt will achieve the following goals within 10 days    *ADL transfers with Min (A) for inc'd independence with ADLs/purposeful tasks    *UB ADL with (I) for inc'd independence with self cares    *LB ADL with (S) using AE prn for inc'd independence with self cares    *Toileting with Min (A) for clothing management and hygiene for return to PLOF with personal care    *Increase static sitting balance and dyn sitting balance to G for inc'd safety with standing purposeful tasks    *Increase static stand balance and dyn stand balance to F for inc'd safety with standing purposeful tasks    *Increase stand tolerance x7 m for inc'd tolerance with standing purposeful tasks    *Bed mobility- (S) for inc'd independence to manage own comfort and initiate EOB & OOB purposeful tasks      Helen Lopez MS, OTR/L

## 2022-12-09 NOTE — ASSESSMENT & PLAN NOTE
· Suspect this is 2/2 sedation vs infection  · Continue PRN Zyprexa  · Frequent neuro checks  · Maintain normothermia and normoglycemia  · Delirium precautions  · [facility] ICU

## 2022-12-09 NOTE — ASSESSMENT & PLAN NOTE
· Remained intubated post-op given O2 requirement and tenuous status  · CXR w/mild pulmonary vascular congestion  · Extubated to HFNC on 12/07  · Continue Xopenex/Pulmicort nebs given asthma history  · Continue with IV diuresis PRN  · Aggressive pulmonary hygiene  · Titrate FiO2 for MAP > 90% - currently requiring HFNC 70%/30L

## 2022-12-09 NOTE — ASSESSMENT & PLAN NOTE
· Presented with abdominal pain, n/v on 12/04  · Imaging revealed acute sigmoid diverticulitis involving a large sigmoid diverticulum w/small amount of pneumoperitoneum suggesting small perforation  · POD#4 - OR on 12/05 for laparoscopic hand-assisted sigmoid colon resection, diverting transverse loop colostomy   · Perforated feculent diverticulitis w/large perforation in mid sigmoid colon noted  · Maintain NGT - trickle TF started yesterday  · Monitor colostomy stoma  · Strict I/O  · Trend endpoints

## 2022-12-09 NOTE — PROGRESS NOTES
Progress Note - General Surgery   Perfecto Leal 58 y o  female MRN: [id number]  Unit/Bed#: ICU 11-01 Encounter: [id number]    Assessment:  58-year-old female with past medical history significant for asthma and tracheobronchomalacia presenting with complicated sigmoid diverticulitis  Patient meeting SIRS/sepsis criteria 12 hours after admission as evidenced by fever, tachypnea, hypotension, leukopenia  HD#5 septic shock secondary to feculent peritonitis  POD#4 s/p laparoscopic hand-assisted sigmoid resection with transverse loop colostomy   Intraoperative findings of perforated feculent diverticulitis with large perforation at mid sigmoid colon  -T-max 100F, HR 60s to 70s, SBP 120s to 150s, O2 sats low 90s on HFNC  - UO 2 5 L, Avery removed 12/8  - Stool 825 cc, liquid/paste brown stool  - No TF residuals recorded  - WBC 5 88  - Hgb 9 9  - CR 0 62  - Pro-Spenser 4 41  - Hypokalemia, 3 3  - Hypoalbuminemia, 2 8  - Body fluid culture and gram stain positive for E  coli and Enterobacter  - BC x2 with no growth at 48 hours    Plan:  Neuro - Precedex weaned to off, Zyprexa as needed, delirium precautions per critical care, adjustments to pain regimen by critical care today afternoon/overnight  Cardio - hemodynamically stable  Pulm - HFNC, Lovenox/Pulmicort nebs, incentive spirometry, pulm toileting  GI - currently on TF but may consider discontinuing NGT and TF to initiate full liquid diet if mental status appropriate, otherwise may consider advancing TF to goal and then reevaluate    - Avery discontinued 12/8, continue to monitor UO  FEN - S/P aggressive fluid resuscitation pre/postop, s/p dextrose 12/8 hypoglycemia, replete electrolytes, patient initiated on Jevity 1 2 TF 12/8 but may consider starting oral diet  Heme - continue to monitor hemoglobin and signs of active bleeding, transfuse if hemoglobin less than 7 0  ID - continue IV Zosyn, day 6; continue to trend fever curve and WBC; BC x2 with NG at 48 hours;    Endo - hypoglycemia requiring dextrose   MSK/skin - PT/OT, encourage out of bed to chair; surgical dressings taken down and replaced with overlying gauze to protect from abdominal binder, ostomy teaching  VTE - Lovenox started 12/8, continue SCDs    Discussed with critical care team and attending, to be re evaluated later today  Subjective/Objective   Subjective: TF initiated overnight  Patient ports doing well with TF however did note that she had coughed up bloody phlegm last evening  There was abdominal pain better controlled with change in pain regimen  Tinges to have output from ostomy  Made it to chair yesterday with assist of 3  Notes continued wheezing  Objective:   Blood pressure 153/86, pulse 70, temperature 100 °F (37 8 °C), resp  rate (!) 10, height 5' 5" (1 651 m), weight 86 1 kg (189 lb 13 1 oz), SpO2 92 %  ,Body mass index is 31 59 kg/m²  Intake/Output Summary (Last 24 hours) at 12/9/2022 0725  Last data filed at 12/9/2022 3744  Gross per 24 hour   Intake 1478 33 ml   Output 3375 ml   Net -1896 67 ml       Invasive Devices     Peripheral Intravenous Line  Duration           Peripheral IV 12/08/22 Left;Ventral (anterior) Forearm 1 day    Peripheral IV 12/08/22 Right;Upper;Ventral (anterior) Arm 1 day          Drain  Duration           Colostomy RLQ 4 days    NG/OG/Enteral Tube Nasogastric 14 Fr Right nare 3 days              Physical Exam  Vitals and nursing note reviewed  Constitutional:       General: She is not in acute distress  Appearance: Normal appearance  She is obese  She is not ill-appearing or toxic-appearing  Interventions: Nasal cannula in place  HENT:      Head: Normocephalic and atraumatic  Cardiovascular:      Rate and Rhythm: Normal rate and regular rhythm  Pulses: Normal pulses  Heart sounds: Normal heart sounds  No murmur heard  No friction rub  No gallop  Pulmonary:      Effort: Pulmonary effort is normal       Breath sounds: Rales present  Comments: HFNC  Abdominal:      General: Bowel sounds are decreased  There is distension  Palpations: Abdomen is soft  Tenderness: There is abdominal tenderness  There is no guarding or rebound  Comments: Laparoscopic sites and left paramedian incision dressings taken down at bedside, normal amount of ooze present on overlying dressings otherwise incisions are clean, dry, intact with staples in place; right upper quadrant transverse loop colostomy with bar in place, liquid/brown stool present in bag, stoma edematous but viable   Musculoskeletal:         General: Normal range of motion  Right lower leg: Edema present  Left lower leg: Edema present  Skin:     General: Skin is warm and dry  Coloration: Skin is pale  Neurological:      General: No focal deficit present  Mental Status: She is alert and oriented to person, place, and time  Psychiatric:         Mood and Affect: Mood normal          Behavior: Behavior normal          Thought Content: Thought content normal        Lab, Imaging and other studies:  I have personally reviewed pertinent lab results      CBC:   Lab Results   Component Value Date    WBC 5 88 12/09/2022    HGB 9 9 (L) 12/09/2022    HCT 29 9 (L) 12/09/2022    MCV 95 12/09/2022     12/09/2022    MCH 31 4 12/09/2022    MCHC 33 1 12/09/2022    RDW 13 7 12/09/2022    MPV 10 3 12/09/2022     CMP:   Lab Results   Component Value Date    SODIUM 142 12/09/2022    K 3 3 (L) 12/09/2022     12/09/2022    CO2 29 12/09/2022    BUN 17 12/09/2022    CREATININE 0 62 12/09/2022    CALCIUM 8 6 12/09/2022    AST 28 12/09/2022    ALT 16 12/09/2022    ALKPHOS 69 12/09/2022    EGFR 96 12/09/2022     VTE Pharmacologic Prophylaxis: Enoxaparin (Lovenox)  VTE Mechanical Prophylaxis: sequential compression device    Reed Ojeda PA-C

## 2022-12-09 NOTE — PLAN OF CARE
Problem: Potential for Falls  Goal: Patient will remain free of falls  Description: INTERVENTIONS:  - Educate patient/family on patient safety including physical limitations  - Instruct patient to call for assistance with activity   - Consult OT/PT to assist with strengthening/mobility   - Keep Call bell within reach  - Keep bed low and locked with side rails adjusted as appropriate  - Keep care items and personal belongings within reach  - Initiate and maintain comfort rounds  - Make Fall Risk Sign visible to staff  - Offer Toileting every 2 Hours, in advance of need  - Initiate/Maintain BED alarm  - Obtain necessary fall risk management equipment: yellow socks  - Apply yellow socks and bracelet for high fall risk patients  - Consider moving patient to room near nurses station  12/8/2022 2313 by Artemio Rock RN  Outcome: Progressing  12/8/2022 2311 by Artemio Rock RN  Outcome: Progressing     Problem: PAIN - ADULT  Goal: Verbalizes/displays adequate comfort level or baseline comfort level  Description: Interventions:  - Encourage patient to monitor pain and request assistance  - Assess pain using appropriate pain scale  - Administer analgesics based on type and severity of pain and evaluate response  - Implement non-pharmacological measures as appropriate and evaluate response  - Consider cultural and social influences on pain and pain management  - Notify physician/advanced practitioner if interventions unsuccessful or patient reports new pain  12/8/2022 2313 by Artemio Rock RN  Outcome: Progressing  12/8/2022 2311 by Artemio Rock RN  Outcome: Progressing     Problem: INFECTION - ADULT  Goal: Absence or prevention of progression during hospitalization  Description: INTERVENTIONS:  - Assess and monitor for signs and symptoms of infection  - Monitor lab/diagnostic results  - Monitor all insertion sites, i e  indwelling lines, tubes, and drains  - Monitor endotracheal if appropriate and nasal secretions for changes in amount and color  - [place] appropriate cooling/warming therapies per order  - Administer medications as ordered  - Instruct and encourage patient and family to use good hand hygiene technique  - Identify and instruct in appropriate isolation precautions for identified infection/condition  12/8/2022 2313 by Nuha Laguna RN  Outcome: Progressing  12/8/2022 2311 by Nuha Laguna RN  Outcome: Progressing  Goal: Absence of fever/infection during neutropenic period  Description: INTERVENTIONS:  - Monitor WBC    12/8/2022 2313 by Nuha Laguna RN  Outcome: Progressing  12/8/2022 2311 by Nuha Laguna RN  Outcome: Progressing     Problem: SAFETY ADULT  Goal: Patient will remain free of falls  Description: INTERVENTIONS:  - Educate patient/family on patient safety including physical limitations  - Instruct patient to call for assistance with activity   - Consult OT/PT to assist with strengthening/mobility   - Keep Call bell within reach  - Keep bed low and locked with side rails adjusted as appropriate  - Keep care items and personal belongings within reach  - Initiate and maintain comfort rounds  - Make Fall Risk Sign visible to staff  - Offer Toileting every 2 Hours, in advance of need  - Initiate/Maintain BED alarm  - Obtain necessary fall risk management equipment: yellow socks  - Apply yellow socks and bracelet for high fall risk patients  - Consider moving patient to room near nurses station  12/8/2022 2313 by Nuha Laguna RN  Outcome: Progressing  12/8/2022 2311 by Nuha Laguna RN  Outcome: Progressing  Goal: Maintain or return to baseline ADL function  Description: INTERVENTIONS:  - Educate patient/family on patient safety including physical limitations  - Instruct patient to call for assistance with activity   - Consult OT/PT to assist with strengthening/mobility   - Keep Call bell within reach  - Keep bed low and locked with side rails adjusted as appropriate  - Keep care items and personal belongings within reach  - Initiate and maintain comfort rounds  - Make Fall Risk Sign visible to staff  - Offer Toileting every 2 Hours, in advance of need  - Initiate/Maintain bed alarm  - Obtain necessary fall risk management equipment: yellow socks  - Apply yellow socks and bracelet for high fall risk patients  - Consider moving patient to room near nurses station  12/8/2022 2313 by Pacsual Tomas RN  Outcome: Progressing  12/8/2022 2311 by Pascual Tomas RN  Outcome: Progressing     Problem: DISCHARGE PLANNING  Goal: Discharge to home or other facility with appropriate resources  Description: INTERVENTIONS:  - Identify barriers to discharge w/patient and caregiver  - Arrange for needed discharge resources and transportation as appropriate  - Identify discharge learning needs (meds, wound care, etc )  - Arrange for interpretive services to assist at discharge as needed  - Refer to Case Management Department for coordinating discharge planning if the patient needs post-hospital services based on physician/advanced practitioner order or complex needs related to functional status, cognitive ability, or social support system  12/8/2022 2313 by Pascual Tomas RN  Outcome: Progressing  12/8/2022 2311 by Pascual Tomas RN  Outcome: Progressing     Problem: Knowledge Deficit  Goal: Patient/family/caregiver demonstrates understanding of disease process, treatment plan, medications, and discharge instructions  Description: Complete learning assessment and assess knowledge base    Interventions:  - Provide teaching at level of understanding  - Provide teaching via preferred learning methods  12/8/2022 2313 by Pascual Tomas RN  Outcome: Progressing  12/8/2022 2311 by Pascual Tomas RN  Outcome: Progressing     Problem: MOBILITY - ADULT  Goal: Maintain or return to baseline ADL function  Description: INTERVENTIONS:  - Educate patient/family on patient safety including physical limitations  - Instruct patient to call for assistance with activity   - Consult OT/PT to assist with strengthening/mobility   - Keep Call bell within reach  - Keep bed low and locked with side rails adjusted as appropriate  - Keep care items and personal belongings within reach  - Initiate and maintain comfort rounds  - Make Fall Risk Sign visible to staff  - Offer Toileting every 2 Hours, in advance of need  - Initiate/Maintain bed alarm  - Obtain necessary fall risk management equipment: yellow socks  - Apply yellow socks and bracelet for high fall risk patients  - Consider moving patient to room near nurses station  12/8/2022 2313 by Dana Lawrence RN  Outcome: Progressing  12/8/2022 2311 by Dana Lawrence RN  Outcome: Progressing    Problem: Prexisting or High Potential for Compromised Skin Integrity  Goal: Skin integrity is maintained or improved  Description: INTERVENTIONS:  - Identify patients at risk for skin breakdown  - Assess and monitor skin integrity  - Assess and monitor nutrition and hydration status  - Monitor labs   - Assess for incontinence   - Turn and reposition patient  - Assist with mobility/ambulation  - Relieve pressure over bony prominences  - Avoid friction and shearing  - Provide appropriate hygiene as needed including keeping skin clean and dry  - Evaluate need for skin moisturizer/barrier cream  - Collaborate with interdisciplinary team   - Patient/family teaching  - Consider wound care consult   12/8/2022 2313 by Dana Lawrence RN  Outcome: Progressing  12/8/2022 2311 by Dana Lawrence RN  Outcome: Progressing     Problem: Nutrition/Hydration-ADULT  Goal: Nutrient/Hydration intake appropriate for improving, restoring or maintaining nutritional needs  Description: Monitor and assess patient's nutrition/hydration status for malnutrition  Collaborate with interdisciplinary team and initiate plan and interventions as ordered  Monitor patient's weight and dietary intake as ordered or per policy   Utilize nutrition screening tool and intervene as necessary  Determine patient's food preferences and provide high-protein, high-caloric foods as appropriate       INTERVENTIONS:  - Monitor oral intake, urinary output, labs, and treatment plans  - Assess nutrition and hydration status and recommend course of action  - Evaluate amount of meals eaten  - Assist patient with eating if necessary   - Allow adequate time for meals  - Recommend/ encourage appropriate diets, oral nutritional supplements, and vitamin/mineral supplements  - Order, calculate, and assess calorie counts as needed  - Recommend, monitor, and adjust tube feedings and TPN/PPN based on assessed needs  - Assess need for intravenous fluids  - Provide specific nutrition/hydration education as appropriate  - Include patient/family/caregiver in decisions related to nutrition  12/8/2022 2313 by Asuncion Chavez RN  Outcome: Progressing  12/8/2022 2311 by Asuncion Chavez RN  Outcome: Progressing     Problem: SAFETY,RESTRAINT: NV/NON-SELF DESTRUCTIVE BEHAVIOR  Goal: Remains free of harm/injury (restraint for non violent/non self-detsructive behavior)  Description: INTERVENTIONS:  - Instruct patient/family regarding restraint use   - Assess and monitor physiologic and psychological status   - Provide interventions and comfort measures to meet assessed patient needs   - Identify and implement measures to help patient regain control  - Assess readiness for release of restraint   12/8/2022 2313 by Asuncion Chavez RN  Outcome: Progressing  12/8/2022 2311 by Asuncion Chavez RN  Outcome: Progressing  Goal: Returns to optimal restraint-free functioning  Description: INTERVENTIONS:  - Assess the patient's behavior and symptoms that indicate continued need for restraint  - Identify and implement measures to help patient regain control  - Assess readiness for release of restraint   12/8/2022 2313 by Asuncion Chavez RN  Outcome: Progressing  12/8/2022 2311 by Asuncion Chavez RN  Outcome: Progressing

## 2022-12-09 NOTE — CONSULTS
Intubated for airway protection and hypoxemia on 12/6  Extubated 12/7 to 6 L NC  Episodes of AMS  Per 12/9 CRNP note, trickle TF started 12/8  825 mL colostomy output on 12/8  Consult for TF recs  Recommend Jevity 1 2 at 55 mL/hr continuous to provide 1440 kcals, 73 g protein, 1065 mL free water in 1320 mL total TF volume  Start at trickle and advance by 10 mL q6hr to goal  Flushes per MD discretion

## 2022-12-09 NOTE — PROGRESS NOTES
Darlene 45  Progress Note - Warden Whiting 1960, 58 y o  female MRN: [id number]  Unit/Bed#: ICU 11-01 Encounter: [id number]  Primary Care Provider: No primary care provider on file     Date and time admitted to hospital: 12/4/2022  7:07 AM    * Sigmoid diverticulitis  Assessment & Plan  · Presented with abdominal pain, n/v on 12/04  · Imaging revealed acute sigmoid diverticulitis involving a large sigmoid diverticulum w/small amount of pneumoperitoneum suggesting small perforation  · POD#4 - OR on 12/05 for laparoscopic hand-assisted sigmoid colon resection, diverting transverse loop colostomy   · Perforated feculent diverticulitis w/large perforation in mid sigmoid colon noted  · Maintain NGT - trickle TF started yesterday  · Monitor colostomy stoma  · Strict I/O  · Trend endpoints    Septic shock ([facility] [address] )  Assessment & Plan  · Worsening in PM of 12/04 - AEB hyperthermia, tachypnea, hypotension, leukopenia w/bandemia, elevated INR  · In setting of sigmoid diverticulitis with large perforation   · Imaging as noted above  · COVID/FLU/RSV negative  · Blood cultures w/NGTD  · UA w/out evidence of infection  · Peritoneal fluid positive for E coli  · Lactic acid 1 9  · Procalcitonin 17 61 - 16 07 - 10 75 - 7 12  · Has received aggressive IVF resuscitation  · Vasopressors now off  · Continue IV Zosyn D#6  · Monitor fever and WBC curve  · Trend procalcitonin and follow up cultures    Encephalopathy  Assessment & Plan  · Suspect this is 2/2 sedation vs infection  · Continue PRN Zyprexa  · Frequent neuro checks  · Maintain normothermia and normoglycemia  · Delirium precautions  · CAM ICU    Acute respiratory failure with hypoxia (HCC)  Assessment & Plan  · Remained intubated post-op given O2 requirement and tenuous status  · CXR w/mild pulmonary vascular congestion  · Extubated to HFNC on 12/07  · Continue Xopenex/Pulmicort nebs given asthma history  · Continue with IV diuresis PRN  · Aggressive pulmonary hygiene  · Titrate FiO2 for MAP > 90% - currently requiring HFNC 70%/30L    Anemia  Assessment & Plan  · Hgb trended down to 9 8  · Baseline Hgb unknown as records unavailable  · Suspect this is 2/2 ABLA s/p surgical intervention as noted above vs septic shock  · Monitor for active bleeding  · Trend Hgb and transfuse for Hgb < 7 or with evidence of active bleeding    Asthma  Assessment & Plan  · Unclear if she follows with Pulmonology as outpatient  · Prescribed Dulera and PRN Albuterol as outpatient - continue scheduled Xopenex/Pulmicort nebs  · Aggressive pulmonary hygiene    Bronchomalacia  Assessment & Plan  · Diagnosed in 2016  · Follows with Pulmonary at [facility], however has not seen them since 2018/2019 as disease has been stable  · Is s/p 2 laser treatments to airways     Hypertension  Assessment & Plan  · Holding home antihypertensives 2/2 septic shock/hypotension    GERD (gastroesophageal reflux disease)  Assessment & Plan  · Continue home PPI via IV route while NPO post abdominal surgical procedure as noted above      ----------------------------------------------------------------------------------------  HPI/24hr events:     · Scheduled APAP and Lidocaine patches added yesterday for pain management  · IVF w/dextrose ordered yesterday for hyoglycemia - stopped overnight as trickle TF started  · Received IV Lasix 20 mg x1  · Precedex gtt weaned off    Patient appropriate for transfer out of the ICU today?: No  Disposition: Transfer to Stepdown Level 1   Code Status: Level 1 - Full Code  ---------------------------------------------------------------------------------------  SUBJECTIVE  "I'm ok " Denies SOB, pain       Review of Systems  Review of systems was reviewed and negative unless stated above in HPI/24-hour events   ---------------------------------------------------------------------------------------  OBJECTIVE    Vitals   Vitals:    12/09/22 0100 12/09/22 0200 12/09/22 0300 12/09/22 034 BP: 130/66 129/66 141/75    BP Location:   Left arm    Pulse: 69 69 67    Resp: 16 15 14    Temp: 99 5 °F (37 5 °C) 99 7 °F (37 6 °C) 99 7 °F (37 6 °C)    TempSrc:  Bladder Bladder    SpO2: 92% 92% 93% 92%   Weight:       Height:         Temp (24hrs), Av 8 °F (37 7 °C), Min:99 °F (37 2 °C), Max:100 8 °F (38 2 °C)  Current: Temperature: 99 7 °F (37 6 °C)  Arterial Line BP: 157/80  Arterial Line MAP (mmHg): 110 mmHg    Respiratory:  SpO2: SpO2: 92 %, SpO2 Activity: SpO2 Activity: At Rest, SpO2 Device: O2 Device: High flow nasal cannula  Nasal Cannula O2 Flow Rate (L/min): 30 L/min    Invasive/non-invasive ventilation settings   Respiratory    Lab Data (Last 4 hours)    None         O2/Vent Data (Last 4 hours)       0343        Non-Invasive Ventilation Mode HFNC (High flow) HFNC (High flow)                  Physical Exam  Vitals and nursing note reviewed  Constitutional:       General: She is awake  She is not in acute distress  Appearance: She is obese  She is ill-appearing  Interventions: Nasal cannula in place  Comments: HFNC   HENT:      Head: Normocephalic and atraumatic  Eyes:      Extraocular Movements: Extraocular movements intact  Pupils: Pupils are equal, round, and reactive to light  Cardiovascular:      Rate and Rhythm: Normal rate and regular rhythm  Pulses: Normal pulses  Heart sounds: Normal heart sounds  No murmur heard  Pulmonary:      Effort: Pulmonary effort is normal       Breath sounds: Examination of the right-lower field reveals rales  Examination of the left-lower field reveals rales  Decreased breath sounds and rales present  No wheezing  Abdominal:      General: Bowel sounds are normal  There is distension  Palpations: Abdomen is soft  Tenderness: There is abdominal tenderness        Comments: Midline incision; colostomy stoma pink and w/stool in bag   Musculoskeletal:      Cervical back: Normal range of motion and neck supple  Right lower leg: Edema present  Left lower leg: Edema present  Comments: Trace bilateral LE edema   Skin:     General: Skin is warm and dry  Capillary Refill: Capillary refill takes less than 2 seconds  Coloration: Skin is pale  Neurological:      General: No focal deficit present  Mental Status: She is alert and oriented to person, place, and time  Psychiatric:         Behavior: Behavior is cooperative           Laboratory and Diagnostics:  Results from last 7 days   Lab Units 12/08/22 0522 12/07/22 0418 12/06/22 2039 12/06/22 0358 12/05/22 2119 12/05/22 1741 12/05/22  1600 12/05/22  0507 12/04/22  0718   WBC Thousand/uL 5 46 3 97* 5 89 2 70* 1 83*  --   --  2 38* 4 74   HEMOGLOBIN g/dL 10 1* 9 0* 9 8* 10 4* 11 3*  --   --  13 0 14 1   I STAT HEMOGLOBIN g/dl  --   --   --   --   --  10 9* 10 2*  --   --    HEMATOCRIT % 31 1* 27 4* 29 8* 31 1* 34 3*  --   --  39 1 42 1   HEMATOCRIT, ISTAT %  --   --   --   --   --  32* 30*  --   --    PLATELETS Thousands/uL 191 136* 166 155 166  --   --  181 248   NEUTROS PCT %  --   --   --   --  86*  --   --   --  43   BANDS PCT %  --   --  1  --   --   --   --  21*  --    MONOS PCT %  --   --   --   --  5  --   --   --  10   MONO PCT %  --   --  6  --   --   --   --  3*  --      Results from last 7 days   Lab Units 12/08/22 0522 12/07/22 0418 12/06/22 2039 12/06/22 0358 12/05/22 2119 12/05/22 1741 12/05/22  1600 12/05/22  0507 12/04/22  0718   SODIUM mmol/L 141 141 140 137 139  --   --  140 138   POTASSIUM mmol/L 3 6 3 9 3 6 3 9 4 1  --   --  4 0 3 8   CHLORIDE mmol/L 106 109* 108 109* 110*  --   --  106 104   CO2 mmol/L 28 25 24 22 21  --   --  26 25   CO2, I-STAT mmol/L  --   --   --   --   --  22 22  --   --    ANION GAP mmol/L 7 7 8 6 8  --   --  8 9   BUN mg/dL 15 12 15 16 16  --   --  16 18   CREATININE mg/dL 0 64 0 67 0 80 0 76 0 78  --   --  0 93 0 90   CALCIUM mg/dL 8 9 8 4 8 4 7 8* 8 1*  --   --  8 4 10 0   GLUCOSE RANDOM mg/dL 81 99 117 115 98  --   --  95 104   ALT U/L 14 12  --  10 13  --   --   --  17   AST U/L 32 28  --  20 18  --   --   --  17   ALK PHOS U/L 54 37  --  28* 34  --   --   --  68   ALBUMIN g/dL 3 1* 3 0*  --  2 6* 2 9*  --   --   --  4 4   TOTAL BILIRUBIN mg/dL 0 75 0 46  --  0 49 0 61  --   --   --  0 47     Results from last 7 days   Lab Units 12/08/22 0522 12/07/22 0418 12/06/22 2039 12/06/22 0358 12/05/22 2119 12/05/22  0507   MAGNESIUM mg/dL 2 0 2 3 2 2 2 2 2 0 1 6*   PHOSPHORUS mg/dL 2 2* 2 9 2 1* 2 9 3 3 4 3*      Results from last 7 days   Lab Units 12/05/22 2119 12/04/22  0718   INR  2 19* 0 93   PTT seconds 35 26          Results from last 7 days   Lab Units 12/05/22 2119   LACTIC ACID mmol/L 1 9     ABG:  Results from last 7 days   Lab Units 12/06/22 2034   PH ART  7 399   PCO2 ART mm Hg 42 0   PO2 ART mm Hg 84 4   HCO3 ART mmol/L 25 4   BASE EXC ART mmol/L 0 5   ABG SOURCE  Line, Arterial     VBG:  Results from last 7 days   Lab Units 12/06/22 2034   ABG SOURCE  Line, Arterial     Results from last 7 days   Lab Units 12/08/22 0522 12/07/22 0418 12/06/22 0358 12/05/22 2119   PROCALCITONIN ng/ml 7 12* 10 75* 16 07* 17 61*       Micro  Results from last 7 days   Lab Units 12/05/22 2101 12/05/22  1537   BLOOD CULTURE  No Growth at 48 hrs  No Growth at 48 hrs   --    GRAM STAIN RESULT   --  2+ Polys*  2+ Gram negative rods*  1+ Gram positive cocci in pairs*  1+ Gram positive rods*   BODY FLUID CULTURE, STERILE   --  3+ Growth of Escherichia coli*  3+ Growth of Enterobacter cloacae*  2+ Growth of       EKG: sinus rhythm  Imaging: I have personally reviewed pertinent reports     and I have personally reviewed pertinent films in PACS    Intake and Output  I/O       12/07 0701 12/08 0700 12/08 0701 12/09 0700    I V  (mL/kg) 425 7 (4 9) 688 3 (7 9)    IV Piggyback 400 450    Feedings  160    Total Intake(mL/kg) 825 7 (9 4) 1298 3 (14 8)    Urine (mL/kg/hr) 3615 (1 7) 2475 (1 2) Emesis/NG output 250     Stool 0 650    Total Output 3865 3125    [facility] -[phone]                Height and Weights   Height: 5' 5" (165 1 cm)     Body mass index is 32 14 kg/m²  Weight (last 2 days)     Date/Time Weight    12/08/22 0523 87 6 (193 12)    12/07/22 0541 91 4 (201 5)     Weight: weighed x 2; everything off bed at 12/07/22 0541            Nutrition       Diet Orders   (From admission, onward)             Start     Ordered    12/08/22 1628  Diet Enteral/Parenteral; Tube Feeding No Oral Diet; Jevity 1 2 [place]; Continuous; 20; Prosource Protein Liquid - Two Packets Daily; Ice chips  Diet effective now        Comments: Meds Ok via NGT   References:    Nutrtion Support Algorithm Enteral vs  Parenteral   Question Answer Comment   Diet Type Enteral/Parenteral    Enteral/Parenteral Tube Feeding No Oral Diet    Tube Feeding Formula: Jevity 1 2 [place]er    Bolus/Cyclic/Continuous Continuous    Tube Feeding Goal Rate (mL/hr): 20    Prosource Protein Liquid - No Carb Prosource Protein Liquid - Two Packets Daily    NPO Except: Ice chips    RD to adjust diet per protocol?  No        12/08/22 1631                  Active Medications  Scheduled Meds:  Current Facility-Administered Medications   Medication Dose Route Frequency Provider Last Rate   • acetaminophen  975 mg Oral Q8H BARBIE Hernandez     • budesonide  0 5 mg Nebulization Q12H Norm Wyatt MD     • enoxaparin  40 mg Subcutaneous Q24H [facility] BARBIE Hernandez     • fentanyl citrate (PF)  50 mcg Intravenous Q2H PRN BARBIE Kraus      Or   • fentanyl citrate (PF)  25 mcg Intravenous Q2H PRN BARBIE Kraus     • ketorolac  15 mg Intravenous Q6H BARBIE Hernandez     • levalbuterol  1 25 mg Nebulization TID Norm Wyatt MD      And   • sodium chloride  3 mL Nebulization TID Norm Wyatt MD     • lidocaine  2 patch Topical Daily BARBIE Kraus     • melatonin  6 mg Per NG Tube HS BARBIE Vazquez     • ondansetron  4 mg Intravenous Q6H PRN Shobha Stevens PA-C     • pantoprazole  40 mg Intravenous Q12H [facility] Shobha Stevens PA-C     • phenol  1 spray Mouth/Throat Q2H PRN BARBIE Vasquez     • piperacillin-tazobactam  3 375 g Intravenous Q8H Meseret Garcia MD Stopped (12/09/22 0200)   • traMADol  50 mg Oral Q6H PRN BARBIE Hernandez       Continuous Infusions:     PRN Meds:   fentanyl citrate (PF), 50 mcg, Q2H PRN   Or  fentanyl citrate (PF), 25 mcg, Q2H PRN  ondansetron, 4 mg, Q6H PRN  phenol, 1 spray, Q2H PRN  traMADol, 50 mg, Q6H PRN        Invasive Devices Review  Invasive Devices     Peripheral Intravenous Line  Duration           Peripheral IV 12/08/22 Left;Ventral (anterior) Forearm <1 day    Peripheral IV 12/08/22 Right;Upper;Ventral (anterior) Arm <1 day          Drain  Duration           Colostomy RLQ 4 days    Urethral Catheter Temperature probe 16 Fr  4 days    NG/OG/Enteral Tube Nasogastric 14 Fr Right nare 3 days                Rationale for remaining devices: N/A  ---------------------------------------------------------------------------------------  Advance Directive and Living Will:      Power of :    POLST:    ---------------------------------------------------------------------------------------  Care Time Delivered:   Upon my evaluation, this patient had a high probability of imminent or life-threatening deterioration due to acute respiratory failure, septic shock, sigmoid diverticulitis , which required my direct attention, intervention, and personal management  I have personally provided 20 minutes (0502 to 0522) of critical care time, exclusive of procedures, teaching, family meetings, and any prior time recorded by providers other than myself  BARBIE Guallpa      Portions of the record may have been created with voice recognition software  Occasional wrong word or "sound a like" substitutions may have occurred due to the inherent limitations of voice recognition software  Read the chart carefully and recognize, using context, where substitutions have occurred

## 2022-12-09 NOTE — WOUND OSTOMY CARE
Progress Note- Ostomy  Sammy Olguin 58 y o  female  [id number]  ICU 11-ICU 11-01    Assessment:  58year old female patient POD# 4, s/p laparoscopic hand-assisted sigmoid resection with transverse loop colostomy  Wound care consulted for ostomy taching  Patient was seen on 12/07/2022 for ostomy teaching, patient was not alert and able to comprehend teaching at that time, she had been extubated that morning  Overview of ostomy nurse role discussed with  and patient's mother  Patient is OOB in the recliner, eating small bits of lunch  Patient was drowsy not able to concentrate on ostomy teaching today  Spoke with  and patient's mother, provided ostomy teaching to them today  Patient was a 2 person assist to get back to bed  She has a purewick in place for urinary incontinence  Patient's ostomy pouch is functional for loose brown stool  Bar in place, stoma is pink-red, budded, mucocutaneous incision intact  Incision dressings intact at time of assessment  Patient's mother had reviewed the teaching materials given  Teaching to  and patient's mother included;  Emptying pouch when 1/3-1/2 full of gas or stool  How to empty pouch, cleanse drainage end and closing pouch, if odor detected investigate for leak or possibly not cleansed prior to closing  Call physician for continuous bleeding from stoma, scant amount when cleansing is normal, continuous bleeding, apply pressure and call physician  Cleansing the peristomal skin with warm water and washcloth  How to measure stoma with measuring tool, cutting to fit one piece pouch, application of barrier film, and application of new pouch  Change with any signs of leakage  Discussed one and two piece pouches and application of both  Patient's mother questioned as to what clothing would be appropriate  Discussed driving with seat belt to protect stoma  Frequency of pouch changes    Plan:   Will plan to meet with family again on Monday for ostomy teaching hoping that patient will be more alert and receptive to teaching    1  Empty colostomy pouch when 1/3-1/2 full of stool or inflated with gas  Cleanse drainage end prior to closing pouch  Change pouch every 3 days and PRN with signs of leakage  Encourage patient to observe emptying pouch  2  Ehob offloading cushion to chair when OOB  3  Elevate heels off of bed with pillow to offload  4  Apply hydraguard to b/l heels, buttocks and sacrum BID and PRN  5  Turn and reposition patient Q2 hours    Wound:  Wound 12/05/22 Abdomen N/A (Active)   Wound Image   12/09/22 1413   Wound Description ANGIE 12/09/22 1200   Devi-wound Assessment Clean;Dry; Intact 12/09/22 1200   Drainage Amount None 12/09/22 1200   Dressing Dry dressing 12/09/22 1200   Dressing Status Clean;Dry; Intact 12/09/22 1413   Number of days: 4         Colostomy RLQ (Active)   Stomal Appliance 1 piece; Changed 12/09/22 1415   Stoma Assessment Pink;Red 12/09/22 1415   Stoma Shape Oval;Budded 12/09/22 1415   Peristomal Assessment Intact; Clean 12/09/22 1415   Treatment Bag change;Site care 12/09/22 1415   Output (mL) 150 mL 12/09/22 1201   Number of days: 4       Reviewed plan of care with primary RN [facility]  Recommendations written as orders  Wound care team to follow weekly while admitted  Questions or concerns [phone] [facility] Nurse    Marcelle LINTONN, RN, Karley Chakraborty

## 2022-12-09 NOTE — PLAN OF CARE
Problem: Potential for Falls  Goal: Patient will remain free of falls  Description: INTERVENTIONS:  - Educate patient/family on patient safety including physical limitations  - Instruct patient to call for assistance with activity   - Consult OT/PT to assist with strengthening/mobility   - Keep Call bell within reach  - Keep bed low and locked with side rails adjusted as appropriate  - Keep care items and personal belongings within reach  - Initiate and maintain comfort rounds  - Make Fall Risk Sign visible to staff  - Offer Toileting every 2 Hours, in advance of need  - Initiate/Maintain BED alarm  - Obtain necessary fall risk management equipment: yellow socks  - Apply yellow socks and bracelet for high fall risk patients  - Consider moving patient to room near nurses station  Outcome: Progressing     Problem: PAIN - ADULT  Goal: Verbalizes/displays adequate comfort level or baseline comfort level  Description: Interventions:  - Encourage patient to monitor pain and request assistance  - Assess pain using appropriate pain scale  - Administer analgesics based on type and severity of pain and evaluate response  - Implement non-pharmacological measures as appropriate and evaluate response  - Consider cultural and social influences on pain and pain management  - Notify physician/advanced practitioner if interventions unsuccessful or patient reports new pain  Outcome: Progressing     Problem: INFECTION - ADULT  Goal: Absence or prevention of progression during hospitalization  Description: INTERVENTIONS:  - Assess and monitor for signs and symptoms of infection  - Monitor lab/diagnostic results  - Monitor all insertion sites, i e  indwelling lines, tubes, and drains  - Monitor endotracheal if appropriate and nasal secretions for changes in amount and color  - [place] appropriate cooling/warming therapies per order  - Administer medications as ordered  - Instruct and encourage patient and family to use good hand hygiene technique  - Identify and instruct in appropriate isolation precautions for identified infection/condition  Outcome: Progressing  Goal: Absence of fever/infection during neutropenic period  Description: INTERVENTIONS:  - Monitor WBC    Outcome: Progressing     Problem: SAFETY ADULT  Goal: Patient will remain free of falls  Description: INTERVENTIONS:  - Educate patient/family on patient safety including physical limitations  - Instruct patient to call for assistance with activity   - Consult OT/PT to assist with strengthening/mobility   - Keep Call bell within reach  - Keep bed low and locked with side rails adjusted as appropriate  - Keep care items and personal belongings within reach  - Initiate and maintain comfort rounds  - Make Fall Risk Sign visible to staff  - Offer Toileting every 2 Hours, in advance of need  - Initiate/Maintain BED alarm  - Obtain necessary fall risk management equipment: yellow socks  - Apply yellow socks and bracelet for high fall risk patients  - Consider moving patient to room near nurses station  Outcome: Progressing

## 2022-12-09 NOTE — PLAN OF CARE
Problem: PHYSICAL THERAPY ADULT  Goal: Performs mobility at highest level of function for planned discharge setting  See evaluation for individualized goals  Description: Treatment/Interventions: Functional transfer training, LE strengthening/ROM, Elevations, Therapeutic exercise, Endurance training, Patient/family training, Equipment eval/education, Bed mobility, Gait training, Compensatory technique education, OT, Spoke to nursing          See flowsheet documentation for full assessment, interventions and recommendations  Note: Prognosis: Fair  Problem List: Decreased strength, Decreased endurance, Impaired balance, Decreased mobility, Decreased safety awareness, Pain, Decreased skin integrity  Assessment: Pt is 58 y o  female seen for high-complexity PT evaluation on 12/9/2022 s/p admit to [facility] on 12/4/2022 w/ Sigmoid diverticulitis  PT was consulted to assess pt's functional mobility and d/c needs  Order placed for PT eval and tx, w/ up w/ A order  PTA, pt lives w/ spouse in two level house w/ second floor bed/bath and 13 KARYN w/ rails  Pt reports (I) w/ ADLs/IADLs and functional mobility s AD  At time of eval, pt performs supine to sit and sit <> stand w/ max A x 2, and stand pivot transfer w/ max A x 2 w/ third assist for trunk control and line management  Pt reports nausea beginning w/ completion of supine to sit that persists throughout session, RN made aware  Upon evaluation, pt presenting with impaired functional mobility d/t decreased strength, decreased endurance, impaired balance, decreased mobility, decreased safety awareness, decreased skin integrity and pain  Pertinent PMHx and current co-morbidities affecting pt's physical performance at time of assessment include: bronchiectasis, bronchomalacia, sigmoid diverticulitis, pneumoperitoneum, asthma, GERD, hiatal hernia   Personal factors affecting pt at time of eval include: inaccessible home environment, lives in 2 story house, stairs to enter home, inability to ambulate household distances and limited home support  The following objective measures performed on IE also reveal limitations: AM-PAC 6-Clicks: 59/68  Pt's clinical presentation is currently unstable/unpredictable seen in pt's presentation of elevated BP, abnormal lab value(s), need for max x 2 assist w/ all phases of mobility when usually mobilizing independently, ongoing medical assessment and on telemetry monitoring  Overall, pt's rehab potential and prognosis to return to PLOF is fair as impacted by objective findings, warranting pt to receive further skilled PT interventions to address identified impairments, activity limitation(s), and participation restriction(s)  Goal for patient is to fair  Pt to benefit from continued PT tx to address deficits as defined above and maximize level of functional independent mobility and consistency in order for pt to decrease fall risk, increase safety and independence w/ mobility, improve functional task performance, and improve BLE strength  From PT/mobility standpoint, recommendation at time of d/c would be post acute rehabilitation services pending progress in order to facilitate return to PLOF  Barriers to Discharge: Inaccessible home environment, Decreased caregiver support     PT Discharge Recommendation: Post acute rehabilitation services    See flowsheet documentation for full assessment

## 2022-12-09 NOTE — ASSESSMENT & PLAN NOTE
· Diagnosed in 2016  · Follows with Pulmonary at [facility], however has not seen them since 2018/2019 as disease has been stable  · Is s/p 2 laser treatments to airways

## 2022-12-09 NOTE — CASE MANAGEMENT
Case Management Discharge Planning Note    Patient name Viki Loya  Location ICU 11/ICU  MRN [id number]  : 1960 Date 2022       Current Admission Date: 2022  Current Admission Diagnosis:Sigmoid diverticulitis   Patient Active Problem List    Diagnosis Date Noted   • Anemia 2022   • Encephalopathy 2022   • Acute respiratory failure with hypoxia ([facility] [address] ) 2022   • GERD (gastroesophageal reflux disease) 2022   • Hypertension 2022   • Bronchomalacia 2022   • Asthma 2022   • Septic shock ([facility] [address] ) 2022   • Sigmoid diverticulitis 2022      LOS (days): 5  Geometric Mean LOS (GMLOS) (days): 9 60  Days to GMLOS:4 3     OBJECTIVE:  Risk of Unplanned Readmission Score: 10 11         Current admission status: Inpatient   Preferred Pharmacy: No Pharmacies Listed  Primary Care Provider: No primary care provider on file  Primary Insurance: HBCS  Secondary Insurance: MEDICARE    DISCHARGE DETAILS:    Discharge planning discussed with[de-identified] patient and spouse and mother at the bedside  Freedom of Choice: Yes  Comments - Freedom of Choice: rehab has been recommended- permission was givent to send referrals for snf rehb, aru and hhc referrals if pt improves with her mobility  CM contacted family/caregiver?: Yes             Contacts  Patient Contacts: Shannan Mckeon  Relationship to Patient[de-identified] Family (spouse)  Contact Method:  In Person  Reason/Outcome: Discharge [address]         Is the patient interested [place] [facility] [place] at discharge?: Yes         Other Referral/Resources/Interventions Provided:  Interventions: HHC, Acute Rehab, Short Term Rehab  Referral Comments: pt remains in ICU  pt is not stable for d/c and remains in ICU- referrals sent to snf , aru and hhc if pt improves with her mobility- pt will need authorization    Would you like to participate in our [address] service program?  : No - Declined    Treatment Team Recommendation:  (d/c plan tbd-tbd)

## 2022-12-10 LAB
ANION GAP SERPL CALCULATED.3IONS-SCNC: 6 MMOL/L (ref 4–13)
ANION GAP SERPL CALCULATED.3IONS-SCNC: 7 MMOL/L (ref 4–13)
BASOPHILS # BLD MANUAL: 0 THOUSAND/UL (ref 0–0.1)
BASOPHILS NFR MAR MANUAL: 0 % (ref 0–1)
BUN SERPL-MCNC: 20 MG/DL (ref 5–25)
BUN SERPL-MCNC: 21 MG/DL (ref 5–25)
CA-I BLD-SCNC: 1.12 MMOL/L (ref 1.12–1.32)
CALCIUM SERPL-MCNC: 8.4 MG/DL (ref 8.4–10.2)
CALCIUM SERPL-MCNC: 8.9 MG/DL (ref 8.4–10.2)
CHLORIDE SERPL-SCNC: 102 MMOL/L (ref 96–108)
CHLORIDE SERPL-SCNC: 106 MMOL/L (ref 96–108)
CO2 SERPL-SCNC: 28 MMOL/L (ref 21–32)
CO2 SERPL-SCNC: 30 MMOL/L (ref 21–32)
CREAT SERPL-MCNC: 0.68 MG/DL (ref 0.6–1.3)
CREAT SERPL-MCNC: 0.68 MG/DL (ref 0.6–1.3)
EOSINOPHIL # BLD MANUAL: 0.08 THOUSAND/UL (ref 0–0.4)
EOSINOPHIL NFR BLD MANUAL: 1 % (ref 0–6)
ERYTHROCYTE [DISTWIDTH] IN BLOOD BY AUTOMATED COUNT: 14.1 % (ref 11.6–15.1)
GFR SERPL CREATININE-BSD FRML MDRD: 94 ML/MIN/1.73SQ M
GFR SERPL CREATININE-BSD FRML MDRD: 94 ML/MIN/1.73SQ M
GLUCOSE SERPL-MCNC: 109 MG/DL (ref 65–140)
GLUCOSE SERPL-MCNC: 109 MG/DL (ref 65–140)
GLUCOSE SERPL-MCNC: 83 MG/DL (ref 65–140)
GLUCOSE SERPL-MCNC: 91 MG/DL (ref 65–140)
GLUCOSE SERPL-MCNC: 98 MG/DL (ref 65–140)
HCT VFR BLD AUTO: 30.5 % (ref 34.8–46.1)
HGB BLD-MCNC: 9.7 G/DL (ref 11.5–15.4)
LYMPHOCYTES # BLD AUTO: 1.42 THOUSAND/UL (ref 0.6–4.47)
LYMPHOCYTES # BLD AUTO: 17 % (ref 14–44)
MAGNESIUM SERPL-MCNC: 2 MG/DL (ref 1.9–2.7)
MCH RBC QN AUTO: 31.2 PG (ref 26.8–34.3)
MCHC RBC AUTO-ENTMCNC: 31.8 G/DL (ref 31.4–37.4)
MCV RBC AUTO: 98 FL (ref 82–98)
METAMYELOCYTES NFR BLD MANUAL: 1 % (ref 0–1)
MONOCYTES # BLD AUTO: 0.17 THOUSAND/UL (ref 0–1.22)
MONOCYTES NFR BLD: 2 % (ref 4–12)
NEUTROPHILS # BLD MANUAL: 6.6 THOUSAND/UL (ref 1.85–7.62)
NEUTS BAND NFR BLD MANUAL: 5 % (ref 0–8)
NEUTS SEG NFR BLD AUTO: 74 % (ref 43–75)
PHOSPHATE SERPL-MCNC: 3.7 MG/DL (ref 2.3–4.1)
PLATELET # BLD AUTO: 187 THOUSANDS/UL (ref 149–390)
PLATELET BLD QL SMEAR: ADEQUATE
PMV BLD AUTO: 9.9 FL (ref 8.9–12.7)
POTASSIUM SERPL-SCNC: 3.8 MMOL/L (ref 3.5–5.3)
POTASSIUM SERPL-SCNC: 4.2 MMOL/L (ref 3.5–5.3)
PROCALCITONIN SERPL-MCNC: 3.09 NG/ML
RBC # BLD AUTO: 3.11 MILLION/UL (ref 3.81–5.12)
RBC MORPH BLD: NORMAL
SODIUM SERPL-SCNC: 137 MMOL/L (ref 135–147)
SODIUM SERPL-SCNC: 142 MMOL/L (ref 135–147)
WBC # BLD AUTO: 8.36 THOUSAND/UL (ref 4.31–10.16)

## 2022-12-10 RX ORDER — POTASSIUM CHLORIDE 20 MEQ/1
40 TABLET, EXTENDED RELEASE ORAL ONCE
Status: COMPLETED | OUTPATIENT
Start: 2022-12-10 | End: 2022-12-10

## 2022-12-10 RX ORDER — KETOROLAC TROMETHAMINE 30 MG/ML
15 INJECTION, SOLUTION INTRAMUSCULAR; INTRAVENOUS EVERY 6 HOURS PRN
Status: DISCONTINUED | OUTPATIENT
Start: 2022-12-10 | End: 2022-12-11

## 2022-12-10 RX ORDER — FUROSEMIDE 10 MG/ML
20 INJECTION INTRAMUSCULAR; INTRAVENOUS ONCE
Status: COMPLETED | OUTPATIENT
Start: 2022-12-10 | End: 2022-12-10

## 2022-12-10 RX ADMIN — BUDESONIDE 0.5 MG: 0.5 INHALANT ORAL at 19:56

## 2022-12-10 RX ADMIN — ISODIUM CHLORIDE 3 ML: 0.03 SOLUTION RESPIRATORY (INHALATION) at 13:28

## 2022-12-10 RX ADMIN — KETOROLAC TROMETHAMINE 15 MG: 30 INJECTION, SOLUTION INTRAMUSCULAR at 04:56

## 2022-12-10 RX ADMIN — FUROSEMIDE 20 MG: 10 INJECTION, SOLUTION INTRAMUSCULAR; INTRAVENOUS at 10:22

## 2022-12-10 RX ADMIN — PANTOPRAZOLE SODIUM 40 MG: 40 INJECTION, POWDER, LYOPHILIZED, FOR SOLUTION INTRAVENOUS at 21:19

## 2022-12-10 RX ADMIN — LEVALBUTEROL 1.25 MG: 1.25 SOLUTION, CONCENTRATE RESPIRATORY (INHALATION) at 19:56

## 2022-12-10 RX ADMIN — ISODIUM CHLORIDE 3 ML: 0.03 SOLUTION RESPIRATORY (INHALATION) at 19:56

## 2022-12-10 RX ADMIN — Medication 3.38 G: at 22:04

## 2022-12-10 RX ADMIN — Medication 3.38 G: at 14:48

## 2022-12-10 RX ADMIN — POTASSIUM CHLORIDE 40 MEQ: 1500 TABLET, EXTENDED RELEASE ORAL at 10:22

## 2022-12-10 RX ADMIN — KETOROLAC TROMETHAMINE 15 MG: 30 INJECTION, SOLUTION INTRAMUSCULAR at 10:22

## 2022-12-10 RX ADMIN — Medication 3.38 G: at 06:36

## 2022-12-10 RX ADMIN — ONDANSETRON 4 MG: 2 INJECTION INTRAMUSCULAR; INTRAVENOUS at 08:39

## 2022-12-10 RX ADMIN — ISODIUM CHLORIDE 3 ML: 0.03 SOLUTION RESPIRATORY (INHALATION) at 07:10

## 2022-12-10 RX ADMIN — ACETAMINOPHEN 975 MG: 325 TABLET ORAL at 17:05

## 2022-12-10 RX ADMIN — ENOXAPARIN SODIUM 40 MG: 100 INJECTION SUBCUTANEOUS at 08:39

## 2022-12-10 RX ADMIN — ACETAMINOPHEN 975 MG: 325 TABLET ORAL at 22:04

## 2022-12-10 RX ADMIN — ACETAMINOPHEN 975 MG: 325 TABLET ORAL at 04:57

## 2022-12-10 RX ADMIN — LEVALBUTEROL 1.25 MG: 1.25 SOLUTION, CONCENTRATE RESPIRATORY (INHALATION) at 13:28

## 2022-12-10 RX ADMIN — KETOROLAC TROMETHAMINE 15 MG: 30 INJECTION, SOLUTION INTRAMUSCULAR at 17:05

## 2022-12-10 RX ADMIN — BUDESONIDE 0.5 MG: 0.5 INHALANT ORAL at 07:10

## 2022-12-10 RX ADMIN — ONDANSETRON 4 MG: 2 INJECTION INTRAMUSCULAR; INTRAVENOUS at 16:57

## 2022-12-10 RX ADMIN — PANTOPRAZOLE SODIUM 40 MG: 40 INJECTION, POWDER, LYOPHILIZED, FOR SOLUTION INTRAVENOUS at 08:39

## 2022-12-10 RX ADMIN — LEVALBUTEROL 1.25 MG: 1.25 SOLUTION, CONCENTRATE RESPIRATORY (INHALATION) at 07:10

## 2022-12-10 RX ADMIN — Medication 6 MG: at 21:19

## 2022-12-10 NOTE — PROGRESS NOTES
Progress Note - General Surgery   Job Harm 58 y o  female MRN: [id number]  Unit/Bed#: ICU 11-01 Encounter: [id number]    Assessment:  79-year-old female with PMH significant for asthma, tracheobronchomalacia who presented with complicated sigmoid diverticulitis  HD #6 septic shock 2/2 feculent peritonitis   POD#5 s/p laparoscopic hand-assisted sigmoid resection with creation of transverse loop colostomy  -Afebrile, SPO2 87% on 30 L high flow NC, all other VSS  -No leukocytosis, WBC 8 3 from 5 8  -Hemoglobin stable, 9 7 from 9 9  -elytes unremarkable  -Pro-Spenser downtrending 3 0 from 4 4  -Ostomy output: 300  -UOP: 650+4 unmeasured  -Blood cx: NGTD  - dressings removed, small amount of drainage from L sided vertical incision, no skin breakdown noted, no drainage able to be expressed, other incisions clean and intact with serous drainage     Plan:  -CLD  - Monitor surgical incisions closely over the next few days for s/s of skin breakdown/infection   - ostomy bar out POD#7 (Monday)  - Continue IV abx, day #7  - Strict I/Os  - pulm toilet, IS 10x /hr while awake   - DVT ppx  - AM labs   - PRN analgesia/antiemetics   - work with PT/OT, recommending post-acute rehab once stable for dc   - Rest of management per CC team    Addendum to plan: notified by RN and Therapy that when moving from bed to chair this morning patient experienced episode of emesis  Evaluated while resting in bedside chair, reports feeling better after episode  Initial plan was to advance to Fulls/T/C today however after speaking with patient, will maintain on CLD today  Subjective/Objective   Subjective: No acute events overnight  Patient states she feels well today with the exception of mild abdominal discomfort with movement  NGT removed yesterday, tolerating clear liquid diet without nausea/vomiting  Ostomy functioning, appliance changed this a m      Objective:   Blood pressure 149/79, pulse 66, temperature 98 °F (36 7 °C), temperature source Temporal, resp  rate 13, height 5' 5" (1 651 m), weight 87 kg (191 lb 12 8 oz), SpO2 (!) 87 %  ,Body mass index is 31 92 kg/m²  Intake/Output Summary (Last 24 hours) at 12/10/2022 1015  Last data filed at 12/10/2022 0800  Gross per 24 hour   Intake 515 ml   Output 1450 ml   Net -935 ml       Invasive Devices     Peripheral Intravenous Line  Duration           Peripheral IV 12/08/22 Left;Ventral (anterior) Forearm 2 days    Peripheral IV 12/08/22 Right;Upper;Ventral (anterior) Arm 2 days          Drain  Duration           Colostomy RLQ 5 days    External Urinary Catheter 1 day                Physical Exam  Vitals reviewed  Constitutional:       General: She is not in acute distress  Appearance: She is ill-appearing  Cardiovascular:      Rate and Rhythm: Normal rate and regular rhythm  Pulmonary:      Breath sounds: No wheezing, rhonchi or rales  Comments: On 30L high flow NC  Abdominal:      General: Bowel sounds are normal       Palpations: Abdomen is soft  Tenderness: There is no abdominal tenderness  There is no guarding or rebound  Comments: Ostomy pink, viable, functioning with sm amount of brown stool in bag, ostomy bar in place  Incisions: L sided vertical incision with sm amount of dark yellow drainage on gauze this AM, no signs of skin breakdown or infection, no expressible material, incision intact with staples  R sided incisions C/D/I with serous drainage on gauze   Musculoskeletal:      Right lower leg: No edema  Left lower leg: No edema  Skin:     General: Skin is warm and dry  Findings: No erythema  Neurological:      General: No focal deficit present  Mental Status: She is alert and oriented to person, place, and time  Psychiatric:         Mood and Affect: Mood normal          Behavior: Behavior normal            Lab, Imaging and other studies:  I have personally reviewed pertinent lab results    , CBC:   Lab Results   Component Value Date    WBC 8 36 12/10/2022    HGB 9 7 (L) 12/10/2022    HCT 30 5 (L) 12/10/2022    MCV 98 12/10/2022     12/10/2022    MCH 31 2 12/10/2022    MCHC 31 8 12/10/2022    RDW 14 1 12/10/2022    MPV 9 9 12/10/2022   , CMP:   Lab Results   Component Value Date    SODIUM 142 12/10/2022    K 3 8 12/10/2022     12/10/2022    CO2 30 12/10/2022    BUN 20 12/10/2022    CREATININE 0 68 12/10/2022    CALCIUM 8 4 12/10/2022    EGFR 94 12/10/2022     VTE Pharmacologic Prophylaxis: Enoxaparin (Lovenox)  VTE Mechanical Prophylaxis: sequential compression device      Anurag Bernard  12/10/22    **Please Note: This note may have been constructed using a voice recognition system  **

## 2022-12-10 NOTE — OCCUPATIONAL THERAPY NOTE
Occupational Therapy Treatment Note      Cecily Al    12/10/2022    Principal Problem:    Sigmoid diverticulitis  Active Problems:    Acute respiratory failure with hypoxia (HCC)    GERD (gastroesophageal reflux disease)    Hypertension    Bronchomalacia    Asthma    Septic shock (HCC)    Anemia    Encephalopathy      Past Medical History:   Diagnosis Date    Asthma     Bronchiectasis ([address] )     Bronchomalacia     GERD (gastroesophageal reflux disease)     Hiatal hernia        Past Surgical History:   Procedure Laterality Date    APPENDECTOMY      CHOLECYSTECTOMY      COLON SIGMOID RESECTION LAPAROSCOPIC N/A 12/5/2022    Procedure: RESECTION COLON SIGMOID LAPAROSCOPIC HAND-ASSISTED, diverting transverse loop colostomy;  Surgeon: Anahy Rodriguez MD;  Location: [facility];  Service: General    HYSTERECTOMY      KNEE SURGERY Left         12/10/22 1120   OT Last Visit   OT Visit Date 12/10/22   Note Type   Note Type Treatment   Pain Assessment   Pain Assessment Tool 0-10   Pain Score 6  (Denies at rest, Increased with activity  Pain with specifically weight bearing tasks )   Pain Location/Orientation Orientation: Mid;Location: Abdomen   Pain Onset/Description Onset: Ongoing;Frequency: Constant/Continuous; Descriptor: Gerline Loop; Descriptor: Sore   Effect of Pain on Daily Activities yes   Patient's Stated Pain Goal No pain   Hospital Pain Intervention(s) Repositioned; Ambulation/increased activity; Emotional support; Environmental changes   Multiple Pain Sites No   Restrictions/Precautions   Weight Bearing Precautions Per Order No   Other Precautions Chair Alarm; Bed Alarm;Multiple lines;Telemetry;O2;Fall Risk  (30 L HFNC at 70% O2, surgical site)   Bed Mobility   Supine to Sit 3  Moderate assistance   Additional items Assist x 2;HOB elevated; Bedrails; Increased time required;Verbal cues;LE management   Sit to Supine   (DNT pt  was sitting out of bed on recliner upon conclusion )   Additional Comments Sitting EOB, Pt vomited in sitting with increased time for symptomatic resolution  Gaurav Rivera reported having some relief after vomiting episode  Transfers   Sit to Stand 2  Maximal assistance   Additional items Assist x 2;Bedrails; Increased time required;Verbal cues   Stand to Sit 2  Maximal assistance   Additional items Assist x 2;Bedrails; Increased time required;Verbal cues   Stand pivot 2  Maximal assistance   Additional items Assist x 2; Increased time required;Verbal cues  (RW)   Additional Comments First standing trial x 10 seconds, second trial x 15 seconds respectively  External tactile input x 2 to facilitate erect posture and stabilization BLEs  Cognition   Overall Cognitive Status WFL   Arousal/Participation Alert; Cooperative   Attention Attends with cues to redirect   Orientation Level Oriented X4   Memory Within functional limits   Following Commands Follows one step commands with increased time or repetition   Activity Tolerance   Activity Tolerance Patient limited by fatigue;Patient limited by pain   Medical Staff Made Aware JOANA Figueroa   Assessment   Assessment Patient participated in Skilled OT session this date with interventions consisting of  therapeutic activities to: increase activity tolerance   Patient agreeable to OT treatment session, upon arrival patient was found supine in bed  In comparison to previous session, patient with improvements in bed mobility  Patient requiring verbal cues for safety and frequent rest periods  Patient continues to be functioning below baseline level, occupational performance remains limited secondary to factors listed above and increased risk for falls and injury  From OT standpoint, recommendation at time of d/c would be Short Term Rehab  Patient to benefit from continued Occupational Therapy treatment while in the hospital to address deficits as defined above and maximize level of functional independence with ADLs and functional mobility     Plan   Treatment Interventions ADL retraining;Functional transfer training;UE strengthening/ROM; Endurance training;Patient/family training;Equipment evaluation/education; Energy conservation; Activityengagement; Compensatory technique education   Goal Expiration Date 12/19/22   OT Treatment Day 1   OT Frequency 3-5x/wk   Recommendation   OT Discharge Recommendation Post acute rehabilitation services   Additional Comments  Co treatment with PT completed secondary to complex medical condition of pt, Max A of 2 required to achieve and maintain transitional movements, requiring the need of skilled therapeutic intervention of 2 therapists to achieve delivery of services  Additional Comments 2 The patient's raw score on the AM-PAC Daily Activity inpatient short form is 13, standardized score is 32 03, less than 39 4  Patients at this level are likely to benefit from discharge to post-acute rehabilitation services  Please refer to the recommendation of the Occupational Therapist for safe discharge planning     AM-PAC Daily Activity Inpatient   Lower Body Dressing 1   Bathing 2   Toileting 1   Upper Body Dressing 3   Grooming 3   Eating 3   Daily Activity Raw Score 13   Daily Activity Standardized Score (Calc for Raw Score >=11) 32 03   AM-[facility] Applied Cognition Inpatient   Following a Speech/Presentation 4   Understanding Ordinary Conversation 4   Taking Medications 3   Remembering Where Things Are Placed or Put Away 4   Remembering List of 4-5 Errands 4   Taking Care of Complicated Tasks 4   Applied Cognition Raw Score 23   Applied Cognition Standardized Score 53 08     Rosemary Greenberg MS, OTR/L

## 2022-12-10 NOTE — PLAN OF CARE
Problem: Potential for Falls  Goal: Patient will remain free of falls  Description: INTERVENTIONS:  - Educate patient/family on patient safety including physical limitations  - Instruct patient to call for assistance with activity   - Consult OT/PT to assist with strengthening/mobility   - Keep Call bell within reach  - Keep bed low and locked with side rails adjusted as appropriate  - Keep care items and personal belongings within reach  - Initiate and maintain comfort rounds  - Make Fall Risk Sign visible to staff  - Offer Toileting every 2 Hours, in advance of need  - Initiate/Maintain BED alarm  - Obtain necessary fall risk management equipment: yellow socks  - Apply yellow socks and bracelet for high fall risk patients  - Consider moving patient to room near nurses station  Outcome: Progressing     Problem: PAIN - ADULT  Goal: Verbalizes/displays adequate comfort level or baseline comfort level  Description: Interventions:  - Encourage patient to monitor pain and request assistance  - Assess pain using appropriate pain scale  - Administer analgesics based on type and severity of pain and evaluate response  - Implement non-pharmacological measures as appropriate and evaluate response  - Consider cultural and social influences on pain and pain management  - Notify physician/advanced practitioner if interventions unsuccessful or patient reports new pain  Outcome: Progressing     Problem: INFECTION - ADULT  Goal: Absence or prevention of progression during hospitalization  Description: INTERVENTIONS:  - Assess and monitor for signs and symptoms of infection  - Monitor lab/diagnostic results  - Monitor all insertion sites, i e  indwelling lines, tubes, and drains  - Monitor endotracheal if appropriate and nasal secretions for changes in amount and color  - [place] appropriate cooling/warming therapies per order  - Administer medications as ordered  - Instruct and encourage patient and family to use good hand hygiene technique  - Identify and instruct in appropriate isolation precautions for identified infection/condition  Outcome: Progressing  Goal: Absence of fever/infection during neutropenic period  Description: INTERVENTIONS:  - Monitor WBC    Outcome: Progressing     Problem: SAFETY ADULT  Goal: Patient will remain free of falls  Description: INTERVENTIONS:  - Educate patient/family on patient safety including physical limitations  - Instruct patient to call for assistance with activity   - Consult OT/PT to assist with strengthening/mobility   - Keep Call bell within reach  - Keep bed low and locked with side rails adjusted as appropriate  - Keep care items and personal belongings within reach  - Initiate and maintain comfort rounds  - Make Fall Risk Sign visible to staff  - Offer Toileting every 2 Hours, in advance of need  - Initiate/Maintain BED alarm  - Obtain necessary fall risk management equipment: yellow socks  - Apply yellow socks and bracelet for high fall risk patients  - Consider moving patient to room near nurses station  Outcome: Progressing  Goal: Maintain or return to baseline ADL function  Description: INTERVENTIONS:  - Educate patient/family on patient safety including physical limitations  - Instruct patient to call for assistance with activity   - Consult OT/PT to assist with strengthening/mobility   - Keep Call bell within reach  - Keep bed low and locked with side rails adjusted as appropriate  - Keep care items and personal belongings within reach  - Initiate and maintain comfort rounds  - Make Fall Risk Sign visible to staff  - Offer Toileting every 2 Hours, in advance of need  - Initiate/Maintain bed alarm  - Obtain necessary fall risk management equipment: yellow socks  - Apply yellow socks and bracelet for high fall risk patients  - Consider moving patient to room near nurses station  Outcome: Progressing  Goal: Maintains/Returns to pre admission functional level  Description: INTERVENTIONS:  - Perform BMAT or MOVE assessment daily    - Set and communicate daily mobility goal to care team and patient/family/caregiver  - Collaborate with rehabilitation services on mobility goals if consulted  - Perform Range of Motion 3 times a day  - Reposition patient every 2 hours  - Dangle patient 3 times a day  - Stand patient 3 times a day  - Ambulate patient 3 times a day  - Out of bed to chair 3 times a day   - Out of bed for meals 3 times a day  - Out of bed for toileting  - Record patient progress and toleration of activity level   Outcome: Progressing     Problem: DISCHARGE PLANNING  Goal: Discharge to home or other facility with appropriate resources  Description: INTERVENTIONS:  - Identify barriers to discharge w/patient and caregiver  - Arrange for needed discharge resources and transportation as appropriate  - Identify discharge learning needs (meds, wound care, etc )  - Arrange for interpretive services to assist at discharge as needed  - Refer to Case Management Department for coordinating discharge planning if the patient needs post-hospital services based on physician/advanced practitioner order or complex needs related to functional status, cognitive ability, or social support system  Outcome: Progressing     Problem: Knowledge Deficit  Goal: Patient/family/caregiver demonstrates understanding of disease process, treatment plan, medications, and discharge instructions  Description: Complete learning assessment and assess knowledge base    Interventions:  - Provide teaching at level of understanding  - Provide teaching via preferred learning methods  Outcome: Progressing     Problem: MOBILITY - ADULT  Goal: Maintain or return to baseline ADL function  Description: INTERVENTIONS:  - Educate patient/family on patient safety including physical limitations  - Instruct patient to call for assistance with activity   - Consult OT/PT to assist with strengthening/mobility   - Keep Call bell within reach  - Keep bed low and locked with side rails adjusted as appropriate  - Keep care items and personal belongings within reach  - Initiate and maintain comfort rounds  - Make Fall Risk Sign visible to staff  - Offer Toileting every 2 Hours, in advance of need  - Initiate/Maintain bed alarm  - Obtain necessary fall risk management equipment: yellow socks  - Apply yellow socks and bracelet for high fall risk patients  - Consider moving patient to room near nurses station  Outcome: Progressing  Goal: Maintains/Returns to pre admission functional level  Description: INTERVENTIONS:  - Perform BMAT or MOVE assessment daily    - Set and communicate daily mobility goal to care team and patient/family/caregiver  - Collaborate with rehabilitation services on mobility goals if consulted  - Record patient progress and toleration of activity level   Outcome: Progressing     Problem: Prexisting or High Potential for Compromised Skin Integrity  Goal: Skin integrity is maintained or improved  Description: INTERVENTIONS:  - Identify patients at risk for skin breakdown  - Assess and monitor skin integrity  - Assess and monitor nutrition and hydration status  - Monitor labs   - Assess for incontinence   - Turn and reposition patient  - Assist with mobility/ambulation  - Relieve pressure over bony prominences  - Avoid friction and shearing  - Provide appropriate hygiene as needed including keeping skin clean and dry  - Evaluate need for skin moisturizer/barrier cream  - Collaborate with interdisciplinary team   - Patient/family teaching  - Consider wound care consult   Outcome: Progressing     Problem: Nutrition/Hydration-ADULT  Goal: Nutrient/Hydration intake appropriate for improving, restoring or maintaining nutritional needs  Description: Monitor and assess patient's nutrition/hydration status for malnutrition  Collaborate with interdisciplinary team and initiate plan and interventions as ordered    Monitor patient's weight and dietary intake as ordered or per policy  Utilize nutrition screening tool and intervene as necessary  Determine patient's food preferences and provide high-protein, high-caloric foods as appropriate       INTERVENTIONS:  - Monitor oral intake, urinary output, labs, and treatment plans  - Assess nutrition and hydration status and recommend course of action  - Evaluate amount of meals eaten  - Assist patient with eating if necessary   - Allow adequate time for meals  - Recommend/ encourage appropriate diets, oral nutritional supplements, and vitamin/mineral supplements  - Order, calculate, and assess calorie counts as needed  - Recommend, monitor, and adjust tube feedings and TPN/PPN based on assessed needs  - Assess need for intravenous fluids  - Provide specific nutrition/hydration education as appropriate  - Include patient/family/caregiver in decisions related to nutrition  Outcome: Progressing     Problem: SAFETY,RESTRAINT: NV/NON-SELF DESTRUCTIVE BEHAVIOR  Goal: Remains free of harm/injury (restraint for non violent/non self-detsructive behavior)  Description: INTERVENTIONS:  - Instruct patient/family regarding restraint use   - Assess and monitor physiologic and psychological status   - Provide interventions and comfort measures to meet assessed patient needs   - Identify and implement measures to help patient regain control  - Assess readiness for release of restraint   Outcome: Progressing  Goal: Returns to optimal restraint-free functioning  Description: INTERVENTIONS:  - Assess the patient's behavior and symptoms that indicate continued need for restraint  - Identify and implement measures to help patient regain control  - Assess readiness for release of restraint   Outcome: Progressing

## 2022-12-10 NOTE — ASSESSMENT & PLAN NOTE
· Remained intubated post-op given O2 requirement and tenuous status  · Likely in the setting of vascular congestion and post operative atelectasis   · CXR w/mild pulmonary vascular congestion  · Extubated to HFNC on 12/07  · Continue Xopenex/Pulmicort nebs given asthma history  · Continue with IV diuresis PRN, goal -500ml/24 hours   · Aggressive pulmonary hygiene  · Titrate FiO2 for MAP > 90% - currently requiring HFNC 60%/30L

## 2022-12-10 NOTE — NURSING NOTE
With transfer from bed to chair, and chair back to bed patient became nauseous and had emesis x2 despite PRN zofran  Dr Maximus Vasquez made aware  [place] Loll in place draining yellow urine  Ostomy output liquid brown stool  Patient offers no complaints of pain at this time  Call bell in reach, will continue to monitor for changes

## 2022-12-10 NOTE — PLAN OF CARE
Problem: OCCUPATIONAL THERAPY ADULT  Goal: Performs self-care activities at highest level of function for planned discharge setting  See evaluation for individualized goals  Description: Treatment Interventions: ADL retraining, Functional transfer training, UE strengthening/ROM, Endurance training, Patient/family training, Equipment evaluation/education, Energy conservation, Activityengagement, Compensatory technique education          See flowsheet documentation for full assessment, interventions and recommendations  Outcome: Progressing  Note: Limitation: Decreased ADL status, Decreased UE strength, Decreased Safe judgement during ADL, Decreased endurance, Decreased self-care trans, Decreased high-level ADLs  Prognosis: Good  Assessment: Patient participated in Skilled OT session this date with interventions consisting of  therapeutic activities to: increase activity tolerance   Patient agreeable to OT treatment session, upon arrival patient was found supine in bed  In comparison to previous session, patient with improvements in bed mobility  Patient requiring verbal cues for safety and frequent rest periods  Patient continues to be functioning below baseline level, occupational performance remains limited secondary to factors listed above and increased risk for falls and injury  From OT standpoint, recommendation at time of d/c would be Short Term Rehab  Patient to benefit from continued Occupational Therapy treatment while in the hospital to address deficits as defined above and maximize level of functional independence with ADLs and functional mobility       OT Discharge Recommendation: Post acute rehabilitation services     [address] [facility], [place], OTR/L

## 2022-12-10 NOTE — PLAN OF CARE
Problem: Potential for Falls  Goal: Patient will remain free of falls  Description: INTERVENTIONS:  - Educate patient/family on patient safety including physical limitations  - Instruct patient to call for assistance with activity   - Consult OT/PT to assist with strengthening/mobility   - Keep Call bell within reach  - Keep bed low and locked with side rails adjusted as appropriate  - Keep care items and personal belongings within reach  - Initiate and maintain comfort rounds  - Make Fall Risk Sign visible to staff  - Offer Toileting every 2 Hours, in advance of need  - Initiate/Maintain BED alarm  - Obtain necessary fall risk management equipment: yellow socks  - Apply yellow socks and bracelet for high fall risk patients  - Consider moving patient to room near nurses station  Outcome: Progressing     Problem: PAIN - ADULT  Goal: Verbalizes/displays adequate comfort level or baseline comfort level  Description: Interventions:  - Encourage patient to monitor pain and request assistance  - Assess pain using appropriate pain scale  - Administer analgesics based on type and severity of pain and evaluate response  - Implement non-pharmacological measures as appropriate and evaluate response  - Consider cultural and social influences on pain and pain management  - Notify physician/advanced practitioner if interventions unsuccessful or patient reports new pain  Outcome: Progressing     Problem: INFECTION - ADULT  Goal: Absence or prevention of progression during hospitalization  Description: INTERVENTIONS:  - Assess and monitor for signs and symptoms of infection  - Monitor lab/diagnostic results  - Monitor all insertion sites, i e  indwelling lines, tubes, and drains  - Monitor endotracheal if appropriate and nasal secretions for changes in amount and color  - [place] appropriate cooling/warming therapies per order  - Administer medications as ordered  - Instruct and encourage patient and family to use good hand hygiene technique  - Identify and instruct in appropriate isolation precautions for identified infection/condition  Outcome: Progressing  Goal: Absence of fever/infection during neutropenic period  Description: INTERVENTIONS:  - Monitor WBC    Outcome: Progressing     Problem: SAFETY ADULT  Goal: Patient will remain free of falls  Description: INTERVENTIONS:  - Educate patient/family on patient safety including physical limitations  - Instruct patient to call for assistance with activity   - Consult OT/PT to assist with strengthening/mobility   - Keep Call bell within reach  - Keep bed low and locked with side rails adjusted as appropriate  - Keep care items and personal belongings within reach  - Initiate and maintain comfort rounds  - Make Fall Risk Sign visible to staff  - Offer Toileting every 2 Hours, in advance of need  - Initiate/Maintain BED alarm  - Obtain necessary fall risk management equipment: yellow socks  - Apply yellow socks and bracelet for high fall risk patients  - Consider moving patient to room near nurses station  Outcome: Progressing

## 2022-12-10 NOTE — ASSESSMENT & PLAN NOTE
· Diagnosed in 2016  · Follows with Pulmonary at [facility], however has not seen them since 2018/2019 as disease has been stable  · Is s/p 2 laser treatments to airways Normal

## 2022-12-10 NOTE — PLAN OF CARE
Problem: PHYSICAL THERAPY ADULT  Goal: Performs mobility at highest level of function for planned discharge setting  See evaluation for individualized goals  Description: Treatment/Interventions: Functional transfer training, LE strengthening/ROM, Elevations, Therapeutic exercise, Endurance training, Patient/family training, Equipment eval/education, Bed mobility, Gait training, Compensatory technique education, OT, Spoke to nursing          See flowsheet documentation for full assessment, interventions and recommendations  Outcome: Progressing  Note: Prognosis: Fair  Problem List: Decreased strength, Decreased endurance, Impaired balance, Decreased mobility, Decreased coordination, Pain, Decreased skin integrity, Obesity  Assessment: Pt seen for PT treatment session this date with interventions consisting of therapeutic activity to reduce fall risk, reduce the risk of medical complications, and advance toward previous level of function consisting of training: supine<>sit transfers, sit<>stand transfers, static sitting tolerance at EOB for >10 minutes w/ unilateral UE support, static standing tolerance for <1 minutes w/ B UE support, vc and tactile cues for static sitting posture faciliation, vc and tactile cues for static standing posture faciliation, stand pivot transfers towards R direction and gait activity initiation with mobilization to recliner  Pt agreeable to PT treatment session upon arrival, pt found supine in bed w/ HOB elevated, A&O x 4  In comparison to previous session, pt with improvements in steps taken out of bed to recliner  Post session: pt returned back to recliner, all needs in reach and RN notified of session findings/recommendations  Continue to recommend post acute rehabilitation services at time of d/c in order to maximize pt's functional independence and safety w/ mobility   Pt continues to be functioning below baseline level, and remains limited 2* factors listed above and including weakness, pain, impaired balance, activity intolerance, decreased   PT will continue to see pt during current hospitalization in order to address the deficits listed above and provide interventions consistent w/ POC in effort to achieve STGs  Barriers to Discharge: Inaccessible home environment, Decreased caregiver support     PT Discharge Recommendation: Post acute rehabilitation services (maintained recommendation)    See flowsheet documentation for full assessment

## 2022-12-10 NOTE — RESPIRATORY THERAPY NOTE
12/10/22 0914   Non-Invasive Information   O2 Interface Device HFNC prongs   Non-Invasive Ventilation Mode HFNC (High flow)   SpO2 (!) 87 %   Non-Invasive Settings   FiO2 (%) (S)  60  (Fio2 increased to 70 % Critical care and RN [facility] aware)   Flow (lpm) 30   Temperature (Set) 33

## 2022-12-10 NOTE — QUICK NOTE
I updated the patients  and sister about the plan of care and status of the patient   All questions answered and concerns addressed

## 2022-12-10 NOTE — ASSESSMENT & PLAN NOTE
· Worsening in PM of 12/04 - AEB hyperthermia, tachypnea, hypotension, leukopenia w/bandemia, elevated INR  · In setting of sigmoid diverticulitis with large perforation   · Imaging as noted above  · COVID/FLU/RSV negative  · Blood cultures w/NGTD  · UA w/out evidence of infection  · Peritoneal fluid positive for E coli  · Lactic acid 1 9  · Procalcitonin 17 61 - 16 07 - 10 75 - 7 12  · Has received aggressive IVF resuscitation  · Vasopressors now off  · Continue IV Zosyn D#7  · Monitor fever and WBC curve  · Trend procalcitonin and follow up cultures

## 2022-12-10 NOTE — ASSESSMENT & PLAN NOTE
· Suspect this is 2/2 sedation vs infection, now improving   · Frequent neuro checks  · Maintain normothermia and normoglycemia  · Delirium precautions  · [facility] ICU

## 2022-12-10 NOTE — PROGRESS NOTES
[address]  Progress Note - Jeffery Dawkinsw 1960, 58 y o  female MRN: [id number]  Unit/Bed#: ICU 11-01 Encounter: [id number]  Primary Care Provider: No primary care provider on file     Date and time admitted to hospital: 12/4/2022  7:07 AM    * Sigmoid diverticulitis  Assessment & Plan  · Presented with abdominal pain, n/v on 12/04  · Imaging revealed acute sigmoid diverticulitis involving a large sigmoid diverticulum w/small amount of pneumoperitoneum suggesting small perforation  · POD#5 - OR on 12/05 for laparoscopic hand-assisted sigmoid colon resection, diverting transverse loop colostomy   · Perforated feculent diverticulitis w/large perforation in mid sigmoid colon noted  · Advance diet as tolerated   · Monitor colostomy stoma  · Strict I/O  · Trend endpoints    Septic shock ([address] )  Assessment & Plan  · Worsening in PM of 12/04 - AEB hyperthermia, tachypnea, hypotension, leukopenia w/bandemia, elevated INR  · In setting of sigmoid diverticulitis with large perforation   · Imaging as noted above  · COVID/FLU/RSV negative  · Blood cultures w/NGTD  · UA w/out evidence of infection  · Peritoneal fluid positive for E coli  · Lactic acid 1 9  · Procalcitonin 17 61 - 16 07 - 10 75 - 7 12  · Has received aggressive IVF resuscitation  · Vasopressors now off  · Continue IV Zosyn D#7  · Monitor fever and WBC curve  · Trend procalcitonin and follow up cultures    Encephalopathy  Assessment & Plan  · Suspect this is 2/2 sedation vs infection, now improving   · Frequent neuro checks  · Maintain normothermia and normoglycemia  · Delirium precautions  · CAM ICU    Acute respiratory failure with hypoxia ([address] )  Assessment & Plan  · Remained intubated post-op given O2 requirement and tenuous status  · Likely in the setting of vascular congestion and post operative atelectasis   · CXR w/mild pulmonary vascular congestion  · Extubated to HFNC on 12/07  · Continue Xopenex/Pulmicort nebs given asthma history  · Continue with IV diuresis PRN, goal -500ml/24 hours   · Aggressive pulmonary hygiene  · Titrate FiO2 for MAP > 90% - currently requiring HFNC 60%/30L    Anemia  Assessment & Plan  · Hgb trended down to 9 8  · Baseline Hgb unknown as records unavailable  · Suspect this is 2/2 ABLA s/p surgical intervention as noted above vs septic shock  · Monitor for active bleeding  · Trend Hgb and transfuse for Hgb < 7 or with evidence of active bleeding    Asthma  Assessment & Plan  · Unclear if she follows with Pulmonology as outpatient  · Prescribed Dulera and PRN Albuterol as outpatient - continue scheduled Xopenex/Pulmicort nebs  · Aggressive pulmonary hygiene    Bronchomalacia  Assessment & Plan  · Diagnosed in 2016  · Follows with Pulmonary at [facility], however has not seen them since 2018/2019 as disease has been stable  · Is s/p 2 laser treatments to airways     Hypertension  Assessment & Plan  · Holding home antihypertensives, restart as appropriate  · Currently normotensive     GERD (gastroesophageal reflux disease)  Assessment & Plan  · Continue PPI        ----------------------------------------------------------------------------------------  HPI/24hr events: Remains on vapotherm 60%/30L  NGT removed and tolerated clears  No other acute events  Patient appropriate for transfer out of the ICU today?: No  Disposition: Continue Stepdown Level 1 level of care   Code Status: Level 1 - Full Code  ---------------------------------------------------------------------------------------  SUBJECTIVE  "I am okay"    Review of Systems   Constitutional: Negative  HENT: Negative  Eyes: Negative  Respiratory: Negative  Cardiovascular: Negative  Gastrointestinal: Positive for abdominal pain  Endocrine: Negative  Genitourinary: Negative  Musculoskeletal: Negative  Skin: Negative  Allergic/Immunologic: Negative  Neurological: Negative  Hematological: Negative      Psychiatric/Behavioral: Negative  Review of systems was reviewed and negative unless stated above in HPI/24-hour events   ---------------------------------------------------------------------------------------  OBJECTIVE    Vitals   Vitals:    22 1900 12/09/22 1928 12/09/22 2000 12/10/22 0000   BP: 150/77  163/81 146/78   BP Location:   Left arm Left arm   Pulse: 64  61 61   Resp: 15  16 14   Temp:  97 6 °F (36 4 °C)  97 8 °F (36 6 °C)   TempSrc:  Tympanic  Tympanic   SpO2: 93%  90% 93%   Weight:       Height:         Temp (24hrs), Av 9 °F (37 2 °C), Min:97 6 °F (36 4 °C), Max:100 °F (37 8 °C)  Current: Temperature: 97 8 °F (36 6 °C)  Arterial Line BP: 157/80  Arterial Line MAP (mmHg): 110 mmHg    Respiratory:  SpO2: SpO2: 93 %, SpO2 Device: O2 Device: High flow nasal cannula  Nasal Cannula O2 Flow Rate (L/min): 30 L/min    Invasive/non-invasive ventilation settings   Respiratory    Lab Data (Last 4 hours)    None         O2/Vent Data (Last 4 hours)    None                Physical Exam  Constitutional:       General: She is not in acute distress  HENT:      Head: Normocephalic and atraumatic  Mouth/Throat:      Mouth: Mucous membranes are moist    Eyes:      Pupils: Pupils are equal, round, and reactive to light  Cardiovascular:      Rate and Rhythm: Normal rate and regular rhythm  Pulses: Normal pulses  Heart sounds: Normal heart sounds  No murmur heard  No friction rub  No gallop  Pulmonary:      Effort: Pulmonary effort is normal  No respiratory distress  Breath sounds: No wheezing, rhonchi or rales  Comments: diminished  Abdominal:      General: There is distension  Palpations: Abdomen is soft  Tenderness: There is abdominal tenderness  Musculoskeletal:         General: No swelling  Normal range of motion  Cervical back: Neck supple  Skin:     General: Skin is warm and dry  Capillary Refill: Capillary refill takes less than 2 seconds     Neurological:      General: No focal deficit present  Mental Status: She is alert and oriented to person, place, and time  Cranial Nerves: No cranial nerve deficit  Sensory: No sensory deficit  Motor: No weakness  Laboratory and Diagnostics:  Results from last 7 days   Lab Units 12/09/22  0603 12/08/22  0522 12/07/22 0418 12/06/22 2039 12/06/22 0358 12/05/22 2119 12/05/22  1741 12/05/22  1600 12/05/22  0507 12/04/22  0718   WBC Thousand/uL 5 88 5 46 3 97* 5 89 2 70* 1 83*  --   --  2 38* 4 74   HEMOGLOBIN g/dL 9 9* 10 1* 9 0* 9 8* 10 4* 11 3*  --   --  13 0 14 1   I STAT HEMOGLOBIN g/dl  --   --   --   --   --   --  10 9*   < >  --   --    HEMATOCRIT % 29 9* 31 1* 27 4* 29 8* 31 1* 34 3*  --   --  39 1 42 1   HEMATOCRIT, ISTAT %  --   --   --   --   --   --  32*   < >  --   --    PLATELETS Thousands/uL 184 191 136* 166 155 166  --   --  181 248   NEUTROS PCT %  --   --   --   --   --  86*  --   --   --  43   BANDS PCT %  --   --   --  1  --   --   --   --  21*  --    MONOS PCT %  --   --   --   --   --  5  --   --   --  10   MONO PCT %  --   --   --  6  --   --   --   --  3*  --     < > = values in this interval not displayed       Results from last 7 days   Lab Units 12/09/22  1629 12/09/22  0603 12/08/22  0522 12/07/22 0418 12/06/22 2039 12/06/22 0358 12/05/22 2119 12/05/22  0507 12/04/22  0718   SODIUM mmol/L 140 142 141 141 140 137 139   < > 138   POTASSIUM mmol/L 4 2 3 3* 3 6 3 9 3 6 3 9 4 1   < > 3 8   CHLORIDE mmol/L 105 107 106 109* 108 109* 110*   < > 104   CO2 mmol/L 26 29 28 25 24 22 21   < > 25   CO2, I-STAT   --   --   --   --   --   --   --    < >  --    ANION GAP mmol/L 9 6 7 7 8 6 8   < > 9   BUN mg/dL 16 17 15 12 15 16 16   < > 18   CREATININE mg/dL 0 64 0 62 0 64 0 67 0 80 0 76 0 78   < > 0 90   CALCIUM mg/dL 9 5 8 6 8 9 8 4 8 4 7 8* 8 1*   < > 10 0   GLUCOSE RANDOM mg/dL 105 122 81 99 117 115 98   < > 104   ALT U/L  --  16 14 12  --  10 13  --  17   AST U/L  --  28 32 28  --  20 18  -- 17   ALK PHOS U/L  --  69 54 37  --  28* 34  --  68   ALBUMIN g/dL  --  2 8* 3 1* 3 0*  --  2 6* 2 9*  --  4 4   TOTAL BILIRUBIN mg/dL  --  0 65 0 75 0 46  --  0 49 0 61  --  0 47    < > = values in this interval not displayed  Results from last 7 days   Lab Units 12/09/22  0603 12/08/22  0522 12/07/22  0418 12/06/22 2039 12/06/22  0358 12/05/22 2119 12/05/22  0507   MAGNESIUM mg/dL 2 0 2 0 2 3 2 2 2 2 2 0 1 6*   PHOSPHORUS mg/dL 2 8 2 2* 2 9 2 1* 2 9 3 3 4 3*      Results from last 7 days   Lab Units 12/05/22 2119 12/04/22  0718   INR  2 19* 0 93   PTT seconds 35 26          Results from last 7 days   Lab Units 12/05/22 2119   LACTIC ACID mmol/L 1 9     ABG:  Results from last 7 days   Lab Units 12/06/22 2034   PH ART  7 399   PCO2 ART mm Hg 42 0   PO2 ART mm Hg 84 4   HCO3 ART mmol/L 25 4   BASE EXC ART mmol/L 0 5   ABG SOURCE  Line, Arterial     VBG:  Results from last 7 days   Lab Units 12/06/22 2034   ABG SOURCE  Line, Arterial     Results from last 7 days   Lab Units 12/09/22  0603 12/08/22  0522 12/07/22  0418 12/06/22  0358 12/05/22 2119   PROCALCITONIN ng/ml 4 41* 7 12* 10 75* 16 07* 17 61*       Micro  Results from last 7 days   Lab Units 12/05/22 2101 12/05/22  1537   BLOOD CULTURE  No Growth at 72 hrs  No Growth at 72 hrs   --    GRAM STAIN RESULT   --  2+ Polys*  2+ Gram negative rods*  1+ Gram positive cocci in pairs*  1+ Gram positive rods*   BODY FLUID CULTURE, STERILE   --  3+ Growth of Escherichia coli*  3+ Growth of Enterobacter cloacae*  2+ Growth of       EKG: NSR rate 60  Imaging: I have personally reviewed pertinent reports  and I have personally reviewed pertinent films in PACS    Intake and Output  I/O       12/08 0701 12/09 0700 12/09 0701 12/10 0700    P  O   480    I V  (mL/kg) 688 3 (8)     IV Piggyback 550 170 2    Feedings 240     Total Intake(mL/kg) 1478 3 (17 2) 650 2 (7 6)    Urine (mL/kg/hr) 2550 (1 2) 400 (0 2)    Stool 825 300    Total Output 3375 700    Net -1896 7 -49 8          Unmeasured Urine Occurrence  4 x          Height and Weights   Height: 5' 5" (165 1 cm)     Body mass index is 31 59 kg/m²  Weight (last 2 days)     Date/Time Weight    12/09/22 0600 86 1 (189 82)    12/08/22 0523 87 6 (193 12)            Nutrition       Diet Orders   (From admission, onward)             Start     Ordered    12/09/22 0834  Diet Clear Liquid  Diet effective now        References:    Nutrtion Support Algorithm Enteral vs  Parenteral   Question Answer Comment   Diet Type Clear Liquid    RD to adjust diet per protocol?  Yes        12/09/22 0835                  Active Medications  Scheduled Meds:  Current Facility-Administered Medications   Medication Dose Route Frequency Provider Last Rate   • acetaminophen  975 mg Oral Q6H BARBIE Hernandez     • budesonide  0 5 mg Nebulization Q12H Jonatan Garcia MD     • enoxaparin  40 mg Subcutaneous Q24H [facility] BARBIE Hernandez     • fentanyl citrate (PF)  50 mcg Intravenous Q2H PRN BARBIE Handley      Or   • fentanyl citrate (PF)  25 mcg Intravenous Q2H PRN BARBIE Handley     • ketorolac  15 mg Intravenous Q6H BARBIE Hernandez     • levalbuterol  1 25 mg Nebulization TID Jonatan Garcia MD      And   • sodium chloride  3 mL Nebulization TID Jonatan Garcia MD     • lidocaine  2 patch Topical Daily BARBIE Handley     • melatonin  6 mg Per NG Tube HS BARBIE Vazquez     • ondansetron  4 mg Intravenous Q6H PRN Janey Townsend PA-C     • pantoprazole  40 mg Intravenous Q12H [facility] Jolly Clinton PA-C     • phenol  1 spray Mouth/Throat Q2H PRN BARBIE Vasquez     • piperacillin-tazobactam  3 375 g Intravenous Q8H BARBIE Handley 3 375 g (12/09/22 2209)   • traMADol  50 mg Oral Q6H PRN BARBIE Hernandez       Continuous Infusions:     PRN Meds:   fentanyl citrate (PF), 50 mcg, Q2H PRN   Or  fentanyl citrate (PF), 25 mcg, Q2H PRN  ondansetron, 4 mg, Q6H PRN  phenol, 1 spray, Q2H PRN  traMADol, 50 mg, Q6H PRN        Invasive Devices Review  Invasive Devices     Peripheral Intravenous Line  Duration           Peripheral IV 12/08/22 Left;Ventral (anterior) Forearm 1 day    Peripheral IV 12/08/22 Right;Upper;Ventral (anterior) Arm 1 day          Drain  Duration           Colostomy RLQ 5 days                ---------------------------------------------------------------------------------------  Advance Directive and Living Will:      Power of :    POLST:    ---------------------------------------------------------------------------------------  Care Time Delivered:   Upon my evaluation, this patient had a high probability of imminent or life-threatening deterioration due to respiratory failure with hypoxia, which required my direct attention, intervention, and personal management  I have personally provided 31 minutes ([phone]) of critical care time, exclusive of procedures, teaching, family meetings, and any prior time recorded by providers other than myself  BARBIE Nova      Portions of the record may have been created with voice recognition software  Occasional wrong word or "sound a like" substitutions may have occurred due to the inherent limitations of voice recognition software    Read the chart carefully and recognize, using context, where substitutions have occurred

## 2022-12-10 NOTE — PHYSICAL THERAPY NOTE
Physical Therapy Treatment Session Note     Patient's Name: Donna Rowell    Admitting Diagnosis  Diverticulitis [K57 92]  Abdominal pain [R10 9]    Problem List  Patient Active Problem List   Diagnosis    Sigmoid diverticulitis    Acute respiratory failure with hypoxia (HCC)    GERD (gastroesophageal reflux disease)    Hypertension    Bronchomalacia    Asthma    Septic shock ([facility] [address] )    Anemia    Encephalopathy       Past Medical History  Past Medical History:   Diagnosis Date    Asthma     [facility] )     Bronchomalacia     GERD (gastroesophageal reflux disease)     Hiatal hernia        Past Surgical History  Past Surgical History:   Procedure Laterality Date    APPENDECTOMY      CHOLECYSTECTOMY      COLON SIGMOID RESECTION LAPAROSCOPIC N/A 12/5/2022    Procedure: RESECTION COLON SIGMOID LAPAROSCOPIC HAND-ASSISTED, diverting transverse loop colostomy;  Surgeon: Daryl Cam MD;  Location: [facility];  Service: General    HYSTERECTOMY      KNEE SURGERY Left         12/10/22 1058   PT Last Visit   PT Visit Date 12/10/22   Note Type   Note Type Treatment   Pain Assessment   Pain Assessment Tool 0-10   Pain Score 6  (Denies at rest, Increased with activity  Pain s with pecifically weight bearing tasks )   Pain Location/Orientation Orientation: Mid;Location: Abdomen   Pain Onset/Description Onset: Ongoing;Frequency: Constant/Continuous; Descriptor: Adelaida Kidney; Descriptor: Sore   Effect of Pain on Daily Activities yes   Patient's Stated Pain Goal No pain   Hospital Pain Intervention(s) Repositioned; Ambulation/increased activity; Emotional support; Environmental changes   Multiple Pain Sites No   Restrictions/Precautions   Weight Bearing Precautions Per Order No   Other Precautions Chair Alarm; Bed Alarm;Multiple lines;Telemetry;O2;Fall Risk  (30 L HF at 70%, surgical site)   General   Chart Reviewed Yes   Additional Pertinent History Myrisa OT present for co-treatment due to medical complexity,required skilled interventions of 2 clinicians  Response to Previous Treatment Patient with no complaints from previous session  Family/Caregiver Present Yes  (spouse and sister, stepped out of room during mobilization)   Cognition   Overall Cognitive Status [facility]   Arousal/Participation Alert; Cooperative   Attention Attends with cues to redirect   Orientation Level Oriented X4   Memory Within functional limits   Following Commands Follows one step commands with increased time or repetition   Comments Fracisco Redd was agreeable to PT treatment session  Subjective   Subjective "I just have an upset stomach"  Bed Mobility   Supine to Sit 3  Moderate assistance   Additional items Assist x 2;HOB elevated; Bedrails; Increased time required;Verbal cues;LE management   Sit to Supine   (DNT pt  was sitting out of bed on recliner upon conclusion )   Additional Comments Verbal cues for proper body mechanics, BUE placement with transitional movements  Tactile moderate support required to establish and sustain static sitting edge of bed  Improvement with time to CGA of 1  Patient vomited in sitting with increased time for symptomatic resolution  Fracisco Redd reported having some relief after vomiting episode  Transfers   Sit to Stand 2  Maximal assistance   Additional items Assist x 2;Bedrails; Increased time required;Verbal cues  (trials x 2)   Stand to Sit 2  Maximal assistance   Additional items Assist x 2;Bedrails; Increased time required;Verbal cues  (RW usage)   Stand pivot 2  Maximal assistance   Additional items Assist x 2; Increased time required;Verbal cues   Additional Comments Verbal cues for base of support widening for stabilization, safety while turning, and B pedal clearance with transition to recliner  First standing trial x 10 seconds, second trial x 15 seconds respectively  External tactile input x 2 to facilitate erect posture and stabilization BLEs  Ambulation/Elevation   Gait pattern Improper Weight shift; Antalgic; Forward Flexion;Decreased foot clearance; Short stride; Step to;Excessively slow   Gait Assistance 2  Maximal assist   Additional items Assist x 2;Verbal cues; Tactile cues   Assistive Device Rolling walker   Distance 1 foot   Ambulation/Elevation Additional Comments Verbal cues for armrest utilization upon descent to chair  Balance   Static Sitting Fair -   Dynamic Sitting Poor +   Static Standing Poor -   Dynamic Standing Poor -   Ambulatory Poor -   Endurance Deficit   Endurance Deficit Yes   Activity Tolerance   Activity Tolerance Patient limited by fatigue;Patient limited by pain; Other (Comment)  (nausea)   Medical Staff Made Aware yes, Ashok Sykeser   Nurse Made Aware Yes, Hair Ahumada RN was present and assisted with mobility  PT appreciated the care coordination  Assessment   Prognosis Fair   Problem List Decreased strength;Decreased endurance; Impaired balance;Decreased mobility; Decreased coordination;Pain;Decreased skin integrity;Obesity   Assessment Pt seen for PT treatment session this date with interventions consisting of therapeutic activity to reduce fall risk, reduce the risk of medical complications, and advance toward previous level of function consisting of training: supine<>sit transfers, sit<>stand transfers, static sitting tolerance at EOB for >10 minutes w/ unilateral UE support, static standing tolerance for <1 minutes w/ B UE support, vc and tactile cues for static sitting posture faciliation, vc and tactile cues for static standing posture faciliation, stand pivot transfers towards R direction and gait activity initiation with mobilization to recliner  Pt agreeable to PT treatment session upon arrival, pt found supine in bed w/ HOB elevated, A&O x 4  In comparison to previous session, pt with improvements in steps taken out of bed to recliner  Post session: pt returned back to recliner, all needs in reach and RN notified of session findings/recommendations   Continue to recommend post acute rehabilitation services at time of d/c in order to maximize pt's functional independence and safety w/ mobility  Pt continues to be functioning below baseline level, and remains limited 2* factors listed above and including weakness, pain, impaired balance, activity intolerance, decreased   PT will continue to see pt during current hospitalization in order to address the deficits listed above and provide interventions consistent w/ POC in effort to achieve STGs  Goals   Patient Goals to get better soon   STG Expiration Date 12/19/22   Short Term Goal #1 STGs remain appropriate   PT Treatment Day 1   Plan   Treatment/Interventions Functional transfer training;LE strengthening/ROM; Therapeutic exercise; Endurance training;Patient/family training;Equipment eval/education; Bed mobility;Spoke to nursing;Spoke to advanced practitioner;OT;Gait training   Progress Slow progress, decreased activity tolerance   PT Frequency 3-5x/wk   Recommendation   PT Discharge Recommendation Post acute rehabilitation services  (maintained recommendation)   Equipment Recommended 9 [facility] Recommended Wheeled walker   Change/add to [employer]? No   Additional Comments Upon conclusion, pt  was resting out of bed on recliner  All needs were within reach     AM-PAC Basic Mobility Inpatient   Turning in Bed Without Bedrails 2   Lying on Back to Sitting on Edge of Flat Bed 2   Moving Bed to Chair 2   Standing Up From Chair 2   Walk in Room 1   Climb 3-5 Stairs 1   Basic Mobility Inpatient Raw Score 10   Turning Head Towards Sound 4   Follow Simple Instructions 3   Low Function Basic Mobility Raw Score 17   Low Function Basic Mobility Standardized Score 27 46   Highest Level Of Mobility   -HLM Goal 4: Move to chair/commode   JH-HLM Achieved 4: Move to chair/commode     Treatment Time: 1763-1825    Arthur Haq, PT

## 2022-12-10 NOTE — ASSESSMENT & PLAN NOTE
· Presented with abdominal pain, n/v on 12/04  · Imaging revealed acute sigmoid diverticulitis involving a large sigmoid diverticulum w/small amount of pneumoperitoneum suggesting small perforation  · POD#5 - OR on 12/05 for laparoscopic hand-assisted sigmoid colon resection, diverting transverse loop colostomy   · Perforated feculent diverticulitis w/large perforation in mid sigmoid colon noted  · Advance diet as tolerated   · Monitor colostomy stoma  · Strict I/O  · Trend endpoints

## 2022-12-11 LAB
ALBUMIN SERPL BCP-MCNC: 2.7 G/DL (ref 3.5–5)
ALP SERPL-CCNC: 83 U/L (ref 34–104)
ALT SERPL W P-5'-P-CCNC: 12 U/L (ref 7–52)
ANION GAP SERPL CALCULATED.3IONS-SCNC: 8 MMOL/L (ref 4–13)
AST SERPL W P-5'-P-CCNC: 20 U/L (ref 13–39)
BACTERIA BLD CULT: NORMAL
BACTERIA BLD CULT: NORMAL
BILIRUB SERPL-MCNC: 0.51 MG/DL (ref 0.2–1)
BUN SERPL-MCNC: 20 MG/DL (ref 5–25)
CA-I BLD-SCNC: 1.14 MMOL/L (ref 1.12–1.32)
CALCIUM ALBUM COR SERPL-MCNC: 9.5 MG/DL (ref 8.3–10.1)
CALCIUM SERPL-MCNC: 8.5 MG/DL (ref 8.4–10.2)
CHLORIDE SERPL-SCNC: 102 MMOL/L (ref 96–108)
CO2 SERPL-SCNC: 26 MMOL/L (ref 21–32)
CREAT SERPL-MCNC: 0.61 MG/DL (ref 0.6–1.3)
ERYTHROCYTE [DISTWIDTH] IN BLOOD BY AUTOMATED COUNT: 13.9 % (ref 11.6–15.1)
GFR SERPL CREATININE-BSD FRML MDRD: 97 ML/MIN/1.73SQ M
GLUCOSE SERPL-MCNC: 144 MG/DL (ref 65–140)
GLUCOSE SERPL-MCNC: 145 MG/DL (ref 65–140)
GLUCOSE SERPL-MCNC: 79 MG/DL (ref 65–140)
GLUCOSE SERPL-MCNC: 82 MG/DL (ref 65–140)
GLUCOSE SERPL-MCNC: 88 MG/DL (ref 65–140)
HCT VFR BLD AUTO: 35.6 % (ref 34.8–46.1)
HGB BLD-MCNC: 11.6 G/DL (ref 11.5–15.4)
MAGNESIUM SERPL-MCNC: 2 MG/DL (ref 1.9–2.7)
MCH RBC QN AUTO: 31.5 PG (ref 26.8–34.3)
MCHC RBC AUTO-ENTMCNC: 32.6 G/DL (ref 31.4–37.4)
MCV RBC AUTO: 97 FL (ref 82–98)
PHOSPHATE SERPL-MCNC: 3.3 MG/DL (ref 2.3–4.1)
PLATELET # BLD AUTO: 168 THOUSANDS/UL (ref 149–390)
PMV BLD AUTO: 9.9 FL (ref 8.9–12.7)
POTASSIUM SERPL-SCNC: 4.2 MMOL/L (ref 3.5–5.3)
PROCALCITONIN SERPL-MCNC: 2.06 NG/ML
PROT SERPL-MCNC: 5.3 G/DL (ref 6.4–8.4)
RBC # BLD AUTO: 3.68 MILLION/UL (ref 3.81–5.12)
SODIUM SERPL-SCNC: 136 MMOL/L (ref 135–147)
WBC # BLD AUTO: 7.78 THOUSAND/UL (ref 4.31–10.16)

## 2022-12-11 RX ORDER — IBUPROFEN 600 MG/1
600 TABLET ORAL EVERY 6 HOURS PRN
Status: DISCONTINUED | OUTPATIENT
Start: 2022-12-11 | End: 2022-12-14

## 2022-12-11 RX ORDER — FUROSEMIDE 10 MG/ML
20 INJECTION INTRAMUSCULAR; INTRAVENOUS ONCE
Status: COMPLETED | OUTPATIENT
Start: 2022-12-11 | End: 2022-12-11

## 2022-12-11 RX ORDER — FUROSEMIDE 10 MG/ML
20 INJECTION INTRAMUSCULAR; INTRAVENOUS DAILY
Status: DISCONTINUED | OUTPATIENT
Start: 2022-12-12 | End: 2022-12-18

## 2022-12-11 RX ADMIN — ACETAMINOPHEN 975 MG: 325 TABLET ORAL at 22:18

## 2022-12-11 RX ADMIN — ACETAMINOPHEN 975 MG: 325 TABLET ORAL at 15:32

## 2022-12-11 RX ADMIN — Medication 3.38 G: at 15:32

## 2022-12-11 RX ADMIN — LEVALBUTEROL 1.25 MG: 1.25 SOLUTION, CONCENTRATE RESPIRATORY (INHALATION) at 07:06

## 2022-12-11 RX ADMIN — ISODIUM CHLORIDE 3 ML: 0.03 SOLUTION RESPIRATORY (INHALATION) at 07:06

## 2022-12-11 RX ADMIN — Medication 3.38 G: at 23:35

## 2022-12-11 RX ADMIN — ISODIUM CHLORIDE 3 ML: 0.03 SOLUTION RESPIRATORY (INHALATION) at 13:18

## 2022-12-11 RX ADMIN — BUDESONIDE 0.5 MG: 0.5 INHALANT ORAL at 07:06

## 2022-12-11 RX ADMIN — ENOXAPARIN SODIUM 40 MG: 100 INJECTION SUBCUTANEOUS at 08:00

## 2022-12-11 RX ADMIN — LEVALBUTEROL 1.25 MG: 1.25 SOLUTION, CONCENTRATE RESPIRATORY (INHALATION) at 13:18

## 2022-12-11 RX ADMIN — PANTOPRAZOLE SODIUM 40 MG: 40 INJECTION, POWDER, LYOPHILIZED, FOR SOLUTION INTRAVENOUS at 21:57

## 2022-12-11 RX ADMIN — FUROSEMIDE 20 MG: 10 INJECTION, SOLUTION INTRAMUSCULAR; INTRAVENOUS at 15:32

## 2022-12-11 RX ADMIN — BUDESONIDE 0.5 MG: 0.5 INHALANT ORAL at 20:03

## 2022-12-11 RX ADMIN — PANTOPRAZOLE SODIUM 40 MG: 40 INJECTION, POWDER, LYOPHILIZED, FOR SOLUTION INTRAVENOUS at 08:00

## 2022-12-11 RX ADMIN — ISODIUM CHLORIDE 3 ML: 0.03 SOLUTION RESPIRATORY (INHALATION) at 20:02

## 2022-12-11 RX ADMIN — FUROSEMIDE 20 MG: 10 INJECTION, SOLUTION INTRAMUSCULAR; INTRAVENOUS at 11:17

## 2022-12-11 RX ADMIN — ONDANSETRON 4 MG: 2 INJECTION INTRAMUSCULAR; INTRAVENOUS at 08:02

## 2022-12-11 RX ADMIN — Medication 3.38 G: at 06:04

## 2022-12-11 RX ADMIN — LEVALBUTEROL 1.25 MG: 1.25 SOLUTION, CONCENTRATE RESPIRATORY (INHALATION) at 20:02

## 2022-12-11 NOTE — ASSESSMENT & PLAN NOTE
· Diagnosed in 2016  · Follows with Pulmonary at [facility], however has not seen them since 2018/2019 as disease has been stable  · Is s/p 2 laser treatments to airways

## 2022-12-11 NOTE — PROGRESS NOTES
Progress Note - General Surgery   Zechariah Mendoza 58 y o  female MRN: [id number]  Unit/Bed#: ICU 11-01 Encounter: [id number]    Assessment:  41-year-old female with PMH significant for asthma, tracheobronchomalacia who presented with complicated sigmoid diverticulitis  HD #7 septic shock 2/2 feculent peritonitis   POD#6 s/p laparoscopic hand-assisted sigmoid resection with creation of transverse loop colostomy  - afebrile, VSS, O2 94% on 6L O2 via NC   - no leukocytosis, WBC 7 7  - Hemoglobin stable   - elytes WNL  - procal downtrending   - 2 episodes of emesis yesterday associated with movement from bed to chair  - Stool: 450cc from loop colostomy     Plan:  - Continue diet as tolerated, encourage patient to take it slow and listen to her body, stop when full or if experiencing N/V  - Ostomy bar out POD#7 (Monday)  - cont IV abx , day #8  - Strict I/Os  - pulm toilet, IS 10x /hr while awake ; discussed importance of these intervention after surgery   - DVT ppx  - Encouraged work with PT/OT and OOB to chair   - PRN analgesia/antiemetics   - Rest of care per CC team     Subjective/Objective   Subjective: Subjective: Patient overall feels well today  Denies abdominal pain at rest, but does endorse discomfort when moving from bed to chair  Had 2 episodes of emesis yesterday, states nausea is much better controlled today and has not had any further episodes of emesis  OOB to chair twice today  Reports she has not been using her incentive spirometer  Denies chest pain, fever/chills  Objective:   Blood pressure 150/87, pulse 69, temperature 97 8 °F (36 6 °C), temperature source Temporal, resp  rate 18, height 5' 5" (1 651 m), weight 73 9 kg (162 lb 14 7 oz), SpO2 96 %  ,Body mass index is 27 11 kg/m²        Intake/Output Summary (Last 24 hours) at 12/11/2022 1006  Last data filed at 12/11/2022 0800  Gross per 24 hour   Intake 100 ml   Output 1750 ml   Net -1650 ml       Invasive Devices     Peripheral Intravenous Line Duration           Peripheral IV 12/08/22 Left;Ventral (anterior) Forearm 3 days    Peripheral IV 12/08/22 Right;Upper;Ventral (anterior) Arm 3 days          Drain  Duration           Colostomy RLQ 6 days    External Urinary Catheter 2 days                Physical Exam  Vitals reviewed  Constitutional:       General: She is not in acute distress  Appearance: She is not ill-appearing or toxic-appearing  Cardiovascular:      Rate and Rhythm: Normal rate and regular rhythm  Pulmonary:      Effort: No respiratory distress  Breath sounds: No wheezing, rhonchi or rales  Abdominal:      General: Bowel sounds are normal  There is no distension  Palpations: Abdomen is soft  Tenderness: There is abdominal tenderness (incisional)  There is no guarding or rebound  Comments: Loop colostomy in place, pink, viable, functioning with stool and gas in bag   Incisions C/D/I with staples in place    Musculoskeletal:      Right lower leg: No edema  Left lower leg: No edema  Skin:     General: Skin is warm and dry  Neurological:      General: No focal deficit present  Mental Status: She is alert  Lab, Imaging and other studies:  I have personally reviewed pertinent lab results    , CBC:   Lab Results   Component Value Date    WBC 7 78 12/11/2022    HGB 11 6 12/11/2022    HCT 35 6 12/11/2022    MCV 97 12/11/2022     12/11/2022    MCH 31 5 12/11/2022    MCHC 32 6 12/11/2022    RDW 13 9 12/11/2022    MPV 9 9 12/11/2022   , CMP:   Lab Results   Component Value Date    SODIUM 136 12/11/2022    K 4 2 12/11/2022     12/11/2022    CO2 26 12/11/2022    BUN 20 12/11/2022    CREATININE 0 61 12/11/2022    CALCIUM 8 5 12/11/2022    AST 20 12/11/2022    ALT 12 12/11/2022    ALKPHOS 83 12/11/2022    EGFR 97 12/11/2022     VTE Pharmacologic Prophylaxis: Enoxaparin (Lovenox)  VTE Mechanical Prophylaxis: sequential compression device    Jannet Figueroa  12/11/22  **Please Note: This note may have been constructed using a voice recognition system  **

## 2022-12-11 NOTE — RESPIRATORY THERAPY NOTE
12/11/22 1339   Respiratory Assessment   Resp Comments Patient placed on 6 liter Mid flow cannula  Dr Katia Wei aware   Oxygen Therapy/Pulse Ox   O2 Device Mid flow nasal cannula   O2 Therapy Oxygen humidified   Nasal Cannula O2 Flow Rate (L/min) 6 L/min   Calculated FIO2 (%) - Nasal Cannula 44   SpO2 94 %   SpO2 Activity At Rest

## 2022-12-11 NOTE — PROGRESS NOTES
Darl[facility] 45  Progress Note - Stalin Hernandez 1960, 58 y o  female MRN: [id number]  Unit/Bed#: ICU 11-01 Encounter: [id number]  Primary Care Provider: No primary care provider on file     Date and time admitted to hospital: 12/4/2022  7:07 AM    * Sigmoid diverticulitis  Assessment & Plan  · Presented with abdominal pain, n/v on 12/04  · Imaging revealed acute sigmoid diverticulitis involving a large sigmoid diverticulum w/small amount of pneumoperitoneum suggesting small perforation  · POD#6 - OR on 12/05 for laparoscopic hand-assisted sigmoid colon resection, diverting transverse loop colostomy   · Perforated feculent diverticulitis w/large perforation in mid sigmoid colon noted  · Advance diet as tolerated   · Monitor colostomy stoma  · Strict I/O  · Trend endpoints    Septic shock ([address] )  Assessment & Plan  · Worsening in PM of 12/04 - AEB hyperthermia, tachypnea, hypotension, leukopenia w/bandemia, elevated INR  · In setting of sigmoid diverticulitis with large perforation   · Imaging as noted above  · COVID/FLU/RSV negative  · Blood cultures w/NGTD  · UA w/out evidence of infection  · Peritoneal fluid positive for E coli  · Has received aggressive IVF resuscitation  · Vasopressors now off  · Continue IV Zosyn D#8  · Monitor fever and WBC curve  · Trend procalcitonin and follow up cultures    Encephalopathy  Assessment & Plan  · Suspect this is 2/2 sedation vs infection, now improving   · Frequent neuro checks  · Maintain normothermia and normoglycemia  · Delirium precautions  · CAM ICU    Acute respiratory failure with hypoxia ([address] )  Assessment & Plan  · Remained intubated post-op given O2 requirement and tenuous status  · Likely in the setting of vascular congestion and post operative atelectasis   · CXR w/mild pulmonary vascular congestion  · Extubated to HFNC on 12/07  · Continue Xopenex/Pulmicort nebs given asthma history  · Continue with IV diuresis PRN, goal -500ml/24 hours · Aggressive pulmonary hygiene  · Titrate FiO2 for MAP > 90% - currently requiring HFNC 60%/30L    Anemia  Assessment & Plan  · Hgb trended down to 9 8  · Baseline Hgb unknown as records unavailable  · Suspect this is 2/2 ABLA s/p surgical intervention as noted above vs septic shock  · Monitor for active bleeding  · Trend Hgb and transfuse for Hgb < 7 or with evidence of active bleeding    Asthma  Assessment & Plan  · Unclear if she follows with Pulmonology as outpatient  · Prescribed Dulera and PRN Albuterol as outpatient - continue scheduled Xopenex/Pulmicort nebs  · Aggressive pulmonary hygiene    Bronchomalacia  Assessment & Plan  · Diagnosed in 2016  · Follows with Pulmonary at [facility], however has not seen them since 2018/2019 as disease has been stable  · Is s/p 2 laser treatments to airways     Hypertension  Assessment & Plan  · Holding home antihypertensives, restart as appropriate  · Currently normotensive     GERD (gastroesophageal reflux disease)  Assessment & Plan  · Continue PPI      ----------------------------------------------------------------------------------------  HPI/24hr events: Remains on 60% 30L Vapotherm  No other acute events  Patient appropriate for transfer out of the ICU today?: No  Disposition: Continue Stepdown Level 1 level of care   Code Status: Level 1 - Full Code  ---------------------------------------------------------------------------------------  SUBJECTIVE  "I am fine"    Review of Systems   Constitutional: Negative  HENT: Negative  Eyes: Negative  Respiratory: Negative  Cardiovascular: Negative  Gastrointestinal: Positive for abdominal pain  Endocrine: Negative  Genitourinary: Negative  Musculoskeletal: Negative  Skin: Negative  Allergic/Immunologic: Negative  Neurological: Negative  Hematological: Negative  Psychiatric/Behavioral: Negative        Review of systems was reviewed and negative unless stated above in HPI/24-hour events ---------------------------------------------------------------------------------------  OBJECTIVE    Vitals   Vitals:    12/10/22 1600 12/10/22 1800 12/10/22 2000 22 0000   BP: 145/89 160/77 134/73 132/69   BP Location:    Left arm   Pulse: 73 68 63 63   Resp: 14 15 21 17   Temp:   98 9 °F (37 2 °C) 98 6 °F (37 °C)   TempSrc:   Temporal Temporal   SpO2: 94% 95% 94% 93%   Weight:       Height:         Temp (24hrs), Av 6 °F (37 °C), Min:98 °F (36 7 °C), Max:99 °F (37 2 °C)  Current: Temperature: 98 6 °F (37 °C)  Arterial Line BP: 157/80  Arterial Line MAP (mmHg): 110 mmHg    Respiratory:  SpO2: SpO2: 93 %, SpO2 Device: O2 Device: High flow nasal cannula  Nasal Cannula O2 Flow Rate (L/min):  (Decreased to 60 %   RN Remonia Genera and Umang VALENTIN aware)    Invasive/non-invasive ventilation settings   Respiratory    Lab Data (Last 4 hours)    None         O2/Vent Data (Last 4 hours)      12/10 2358          Non-Invasive Ventilation Mode HFNC (High flow)                   Physical Exam  Constitutional:       General: She is not in acute distress  HENT:      Head: Normocephalic and atraumatic  Mouth/Throat:      Mouth: Mucous membranes are moist    Eyes:      Pupils: Pupils are equal, round, and reactive to light  Cardiovascular:      Rate and Rhythm: Normal rate and regular rhythm  Pulses: Normal pulses  Heart sounds: Normal heart sounds  No murmur heard  No friction rub  No gallop  Pulmonary:      Effort: Pulmonary effort is normal  No respiratory distress  Breath sounds: No wheezing, rhonchi or rales  Comments: diminished  Abdominal:      General: There is distension  Palpations: Abdomen is soft  Tenderness: There is abdominal tenderness  Musculoskeletal:         General: No swelling  Normal range of motion  Cervical back: Neck supple  Skin:     General: Skin is warm and dry  Capillary Refill: Capillary refill takes less than 2 seconds     Neurological: General: No focal deficit present  Mental Status: She is alert and oriented to person, place, and time  Cranial Nerves: No cranial nerve deficit  Sensory: No sensory deficit  Motor: No weakness  Laboratory and Diagnostics:  Results from last 7 days   Lab Units 12/10/22  0507 12/09/22  0603 12/08/22 0522 12/07/22 0418 12/06/22 2039 12/06/22 0358 12/05/22 2119 12/05/22  1600 12/05/22  0507 12/04/22  0718   WBC Thousand/uL 8 36 5 88 5 46 3 97* 5 89 2 70* 1 83*  --  2 38* 4 74   HEMOGLOBIN g/dL 9 7* 9 9* 10 1* 9 0* 9 8* 10 4* 11 3*  --  13 0 14 1   I STAT HEMOGLOBIN   --   --   --   --   --   --   --    < >  --   --    HEMATOCRIT % 30 5* 29 9* 31 1* 27 4* 29 8* 31 1* 34 3*  --  39 1 42 1   HEMATOCRIT, ISTAT   --   --   --   --   --   --   --    < >  --   --    PLATELETS Thousands/uL 187 184 191 136* 166 155 166  --  181 248   NEUTROS PCT %  --   --   --   --   --   --  86*  --   --  43   BANDS PCT % 5  --   --   --  1  --   --   --  21*  --    MONOS PCT %  --   --   --   --   --   --  5  --   --  10   MONO PCT % 2*  --   --   --  6  --   --   --  3*  --     < > = values in this interval not displayed       Results from last 7 days   Lab Units 12/10/22  1650 12/10/22  0507 12/09/22  1629 12/09/22  0603 12/08/22 0522 12/07/22 0418 12/06/22 2039 12/06/22 0358 12/05/22 2119 12/05/22  0507 12/04/22  0718   SODIUM mmol/L 137 142 140 142 141 141 140 137 139   < > 138   POTASSIUM mmol/L 4 2 3 8 4 2 3 3* 3 6 3 9 3 6 3 9 4 1   < > 3 8   CHLORIDE mmol/L 102 106 105 107 106 109* 108 109* 110*   < > 104   CO2 mmol/L 28 30 26 29 28 25 24 22 21   < > 25   CO2, I-STAT   --   --   --   --   --   --   --   --   --    < >  --    ANION GAP mmol/L 7 6 9 6 7 7 8 6 8   < > 9   BUN mg/dL 21 20 16 17 15 12 15 16 16   < > 18   CREATININE mg/dL 0 68 0 68 0 64 0 62 0 64 0 67 0 80 0 76 0 78   < > 0 90   CALCIUM mg/dL 8 9 8 4 9 5 8 6 8 9 8 4 8 4 7 8* 8 1*   < > 10 0   GLUCOSE RANDOM mg/dL 109 91 105 122 81 99 117 115 98   < > 104   ALT U/L  --   --   --  16 14 12  --  10 13  --  17   AST U/L  --   --   --  28 32 28  --  20 18  --  17   ALK PHOS U/L  --   --   --  69 54 37  --  28* 34  --  68   ALBUMIN g/dL  --   --   --  2 8* 3 1* 3 0*  --  2 6* 2 9*  --  4 4   TOTAL BILIRUBIN mg/dL  --   --   --  0 65 0 75 0 46  --  0 49 0 61  --  0 47    < > = values in this interval not displayed  Results from last 7 days   Lab Units 12/10/22  0507 12/09/22  0603 12/08/22  0522 12/07/22 0418 12/06/22 2039 12/06/22 0358 12/05/22 2119   MAGNESIUM mg/dL 2 0 2 0 2 0 2 3 2 2 2 2 2 0   PHOSPHORUS mg/dL 3 7 2 8 2 2* 2 9 2 1* 2 9 3 3      Results from last 7 days   Lab Units 12/05/22 2119 12/04/22 0718   INR  2 19* 0 93   PTT seconds 35 26          Results from last 7 days   Lab Units 12/05/22 2119   LACTIC ACID mmol/L 1 9     ABG:  Results from last 7 days   Lab Units 12/06/22 2034   PH ART  7 399   PCO2 ART mm Hg 42 0   PO2 ART mm Hg 84 4   HCO3 ART mmol/L 25 4   BASE EXC ART mmol/L 0 5   ABG SOURCE  Line, Arterial     VBG:  Results from last 7 days   Lab Units 12/06/22 2034   ABG SOURCE  Line, Arterial     Results from last 7 days   Lab Units 12/10/22  0507 12/09/22  0603 12/08/22 0522 12/07/22 0418 12/06/22 0358 12/05/22 2119   PROCALCITONIN ng/ml 3 09* 4 41* 7 12* 10 75* 16 07* 17 61*       Micro  Results from last 7 days   Lab Units 12/05/22 2101 12/05/22  1537   BLOOD CULTURE  No Growth After 4 Days  No Growth After 4 Days  --    GRAM STAIN RESULT   --  2+ Polys*  2+ Gram negative rods*  1+ Gram positive cocci in pairs*  1+ Gram positive rods*   BODY FLUID CULTURE, STERILE   --  3+ Growth of Escherichia coli*  3+ Growth of Enterobacter cloacae*  2+ Growth of       EKG: NSR rate 60  Imaging: I have personally reviewed pertinent reports  and I have personally reviewed pertinent films in PACS    Intake and Output  I/O       12/09 0701  12/10 0700 12/10 0701  12/11 0700    P  O  480     IV Piggyback 170 2 35 Total Intake(mL/kg) 650 2 (7 5) 35 (0 4)    Urine (mL/kg/hr) 650 (0 3) 1300 (0 6)    Emesis/NG output  0    Stool 300 450    Total Output 950 1750    Net -299 8 -1715          Unmeasured Urine Occurrence 4 x 2 x    Unmeasured Emesis Occurrence  2 x          Height and Weights   Height: 5' 5" (165 1 cm)     Body mass index is 31 92 kg/m²  Weight (last 2 days)     Date/Time Weight    12/10/22 0600 87 (191 8)    12/09/22 0600 86 1 (189 82)            Nutrition       Diet Orders   (From admission, onward)             Start     Ordered    12/10/22 1153  Diet Surgical; Clear Liquid  Diet effective now        References:    Nutrtion Support Algorithm Enteral vs  Parenteral   Question Answer Comment   Diet Type Surgical    Surgical Clear Liquid    RD to adjust diet per protocol?  Yes        12/10/22 1152                  Active Medications  Scheduled Meds:  Current Facility-Administered Medications   Medication Dose Route Frequency Provider Last Rate   • acetaminophen  975 mg Oral Q6H BARBIE Hernandez     • budesonide  0 5 mg Nebulization Q12H Mario Pierson MD     • enoxaparin  40 mg Subcutaneous Q24H [address] BARBIE Hernandez     • ketorolac  15 mg Intravenous Q6H PRN BARBIE Chahal     • levalbuterol  1 25 mg Nebulization TID Mario Pierson MD      And   • sodium chloride  3 mL Nebulization TID Mario Pierson MD     • lidocaine  2 patch Topical Daily BARBIE Leigh     • melatonin  6 mg Per NG Tube HS BARBIE Vazquez     • ondansetron  4 mg Intravenous Q6H PRN Cheryl Ellis PA-C     • pantoprazole  40 mg Intravenous Q12H [address] Jolly Clinton PA-C     • phenol  1 spray Mouth/Throat Q2H PRN BARBIE Vasquez     • piperacillin-tazobactam  3 375 g Intravenous Q8H BARBIE Leigh 3 375 g (12/10/22 2204)   • traMADol  50 mg Oral Q6H PRN BARBIE Hernandez       Continuous Infusions:     PRN Meds:   ketorolac, 15 mg, Q6H PRN  ondansetron, 4 mg, Q6H PRN  phenol, 1 spray, Q2H PRN  traMADol, 50 mg, Q6H PRN        Invasive Devices Review  Invasive Devices     Peripheral Intravenous Line  Duration           Peripheral IV 12/08/22 Left;Ventral (anterior) Forearm 2 days    Peripheral IV 12/08/22 Right;Upper;Ventral (anterior) Arm 2 days          Drain  Duration           Colostomy RLQ 6 days    External Urinary Catheter 1 day                ---------------------------------------------------------------------------------------  Advance Directive and Living Will:      Power of :    POLST:    ---------------------------------------------------------------------------------------  Care Time Delivered:   Upon my evaluation, this patient had a high probability of imminent or life-threatening deterioration due to respiratory failure, which required my direct attention, intervention, and personal management  I have personally provided 31 minutes (0330 to 0401) of critical care time, exclusive of procedures, teaching, family meetings, and any prior time recorded by providers other than myself  BARBIE Perez      Portions of the record may have been created with voice recognition software  Occasional wrong word or "sound a like" substitutions may have occurred due to the inherent limitations of voice recognition software    Read the chart carefully and recognize, using context, where substitutions have occurred

## 2022-12-11 NOTE — RESPIRATORY THERAPY NOTE
12/11/22 0923   Non-Invasive Information   O2 Interface Device HFNC prongs   Non-Invasive Ventilation Mode HFNC (High flow)   SpO2 96 %   Non-Invasive Settings   FiO2 (%) (S)  60  (Decreased to 50 %   JOANA hidalgo)   Flow (lpm) 25   Temperature (Set) 33

## 2022-12-11 NOTE — RESPIRATORY THERAPY NOTE
12/11/22 1030   Non-Invasive Information   O2 Interface Device HFNC prongs   Non-Invasive Ventilation Mode HFNC (High flow)   SpO2 95 %   Non-Invasive Settings   FiO2 (%) 50   Flow (lpm) (S)  25  (Decreased to 20 liters   JOANA Ahumada and Mekhi VALENTIN aware)   Temperature (Set) 33

## 2022-12-11 NOTE — PLAN OF CARE
Problem: Potential for Falls  Goal: Patient will remain free of falls  Description: INTERVENTIONS:  - Educate patient/family on patient safety including physical limitations  - Instruct patient to call for assistance with activity   - Consult OT/PT to assist with strengthening/mobility   - Keep Call bell within reach  - Keep bed low and locked with side rails adjusted as appropriate  - Keep care items and personal belongings within reach  - Initiate and maintain comfort rounds  - Make Fall Risk Sign visible to staff  - Offer Toileting every 2 Hours, in advance of need  - Initiate/Maintain BED alarm  - Obtain necessary fall risk management equipment: yellow socks  - Apply yellow socks and bracelet for high fall risk patients  - Consider moving patient to room near nurses station  Outcome: Progressing     Problem: PAIN - ADULT  Goal: Verbalizes/displays adequate comfort level or baseline comfort level  Description: Interventions:  - Encourage patient to monitor pain and request assistance  - Assess pain using appropriate pain scale  - Administer analgesics based on type and severity of pain and evaluate response  - Implement non-pharmacological measures as appropriate and evaluate response  - Consider cultural and social influences on pain and pain management  - Notify physician/advanced practitioner if interventions unsuccessful or patient reports new pain  Outcome: Progressing     Problem: INFECTION - ADULT  Goal: Absence or prevention of progression during hospitalization  Description: INTERVENTIONS:  - Assess and monitor for signs and symptoms of infection  - Monitor lab/diagnostic results  - Monitor all insertion sites, i e  indwelling lines, tubes, and drains  - Monitor endotracheal if appropriate and nasal secretions for changes in amount and color  - [place] appropriate cooling/warming therapies per order  - Administer medications as ordered  - Instruct and encourage patient and family to use good hand hygiene technique  - Identify and instruct in appropriate isolation precautions for identified infection/condition  Outcome: Progressing  Goal: Absence of fever/infection during neutropenic period  Description: INTERVENTIONS:  - Monitor WBC    Outcome: Progressing     Problem: SAFETY ADULT  Goal: Patient will remain free of falls  Description: INTERVENTIONS:  - Educate patient/family on patient safety including physical limitations  - Instruct patient to call for assistance with activity   - Consult OT/PT to assist with strengthening/mobility   - Keep Call bell within reach  - Keep bed low and locked with side rails adjusted as appropriate  - Keep care items and personal belongings within reach  - Initiate and maintain comfort rounds  - Make Fall Risk Sign visible to staff  - Offer Toileting every 2 Hours, in advance of need  - Initiate/Maintain BED alarm  - Obtain necessary fall risk management equipment: yellow socks  - Apply yellow socks and bracelet for high fall risk patients  - Consider moving patient to room near nurses station  Outcome: Progressing  Goal: Maintain or return to baseline ADL function  Description: INTERVENTIONS:  - Educate patient/family on patient safety including physical limitations  - Instruct patient to call for assistance with activity   - Consult OT/PT to assist with strengthening/mobility   - Keep Call bell within reach  - Keep bed low and locked with side rails adjusted as appropriate  - Keep care items and personal belongings within reach  - Initiate and maintain comfort rounds  - Make Fall Risk Sign visible to staff  - Offer Toileting every 2 Hours, in advance of need  - Initiate/Maintain bed alarm  - Obtain necessary fall risk management equipment: yellow socks  - Apply yellow socks and bracelet for high fall risk patients  - Consider moving patient to room near nurses station  Outcome: Progressing  Goal: Maintains/Returns to pre admission functional level  Description: INTERVENTIONS:  - Perform BMAT or MOVE assessment daily    - Set and communicate daily mobility goal to care team and patient/family/caregiver  - Collaborate with rehabilitation services on mobility goals if consulted  - Perform Range of Motion 3 times a day  - Reposition patient every 2 hours  - Dangle patient 3 times a day  - Stand patient 3 times a day  - Ambulate patient 3 times a day  - Out of bed to chair 3 times a day   - Out of bed for meals 3 times a day  - Out of bed for toileting  - Record patient progress and toleration of activity level   Outcome: Progressing     Problem: DISCHARGE PLANNING  Goal: Discharge to home or other facility with appropriate resources  Description: INTERVENTIONS:  - Identify barriers to discharge w/patient and caregiver  - Arrange for needed discharge resources and transportation as appropriate  - Identify discharge learning needs (meds, wound care, etc )  - Arrange for interpretive services to assist at discharge as needed  - Refer to Case Management Department for coordinating discharge planning if the patient needs post-hospital services based on physician/advanced practitioner order or complex needs related to functional status, cognitive ability, or social support system  Outcome: Progressing     Problem: Knowledge Deficit  Goal: Patient/family/caregiver demonstrates understanding of disease process, treatment plan, medications, and discharge instructions  Description: Complete learning assessment and assess knowledge base    Interventions:  - Provide teaching at level of understanding  - Provide teaching via preferred learning methods  Outcome: Progressing     Problem: MOBILITY - ADULT  Goal: Maintain or return to baseline ADL function  Description: INTERVENTIONS:  - Educate patient/family on patient safety including physical limitations  - Instruct patient to call for assistance with activity   - Consult OT/PT to assist with strengthening/mobility   - Keep Call bell within reach  - Keep bed low and locked with side rails adjusted as appropriate  - Keep care items and personal belongings within reach  - Initiate and maintain comfort rounds  - Make Fall Risk Sign visible to staff  - Offer Toileting every 2 Hours, in advance of need  - Initiate/Maintain bed alarm  - Obtain necessary fall risk management equipment: yellow socks  - Apply yellow socks and bracelet for high fall risk patients  - Consider moving patient to room near nurses station  Outcome: Progressing  Goal: Maintains/Returns to pre admission functional level  Description: INTERVENTIONS:  - Perform BMAT or MOVE assessment daily    - Set and communicate daily mobility goal to care team and patient/family/caregiver  - Collaborate with rehabilitation services on mobility goals if consulted  - Out of bed for toileting  - Record patient progress and toleration of activity level   Outcome: Progressing     Problem: Prexisting or High Potential for Compromised Skin Integrity  Goal: Skin integrity is maintained or improved  Description: INTERVENTIONS:  - Identify patients at risk for skin breakdown  - Assess and monitor skin integrity  - Assess and monitor nutrition and hydration status  - Monitor labs   - Assess for incontinence   - Turn and reposition patient  - Assist with mobility/ambulation  - Relieve pressure over bony prominences  - Avoid friction and shearing  - Provide appropriate hygiene as needed including keeping skin clean and dry  - Evaluate need for skin moisturizer/barrier cream  - Collaborate with interdisciplinary team   - Patient/family teaching  - Consider wound care consult   Outcome: Progressing     Problem: Nutrition/Hydration-ADULT  Goal: Nutrient/Hydration intake appropriate for improving, restoring or maintaining nutritional needs  Description: Monitor and assess patient's nutrition/hydration status for malnutrition   Collaborate with interdisciplinary team and initiate plan and interventions as ordered  Monitor patient's weight and dietary intake as ordered or per policy  Utilize nutrition screening tool and intervene as necessary  Determine patient's food preferences and provide high-protein, high-caloric foods as appropriate       INTERVENTIONS:  - Monitor oral intake, urinary output, labs, and treatment plans  - Assess nutrition and hydration status and recommend course of action  - Evaluate amount of meals eaten  - Assist patient with eating if necessary   - Allow adequate time for meals  - Recommend/ encourage appropriate diets, oral nutritional supplements, and vitamin/mineral supplements  - Order, calculate, and assess calorie counts as needed  - Recommend, monitor, and adjust tube feedings and TPN/PPN based on assessed needs  - Assess need for intravenous fluids  - Provide specific nutrition/hydration education as appropriate  - Include patient/family/caregiver in decisions related to nutrition  Outcome: Progressing

## 2022-12-11 NOTE — ASSESSMENT & PLAN NOTE
· Worsening in PM of 12/04 - AEB hyperthermia, tachypnea, hypotension, leukopenia w/bandemia, elevated INR  · In setting of sigmoid diverticulitis with large perforation   · Imaging as noted above  · COVID/FLU/RSV negative  · Blood cultures w/NGTD  · UA w/out evidence of infection  · Peritoneal fluid positive for E coli  · Has received aggressive IVF resuscitation  · Vasopressors now off  · Continue IV Zosyn D#8  · Monitor fever and WBC curve  · Trend procalcitonin and follow up cultures

## 2022-12-11 NOTE — PROGRESS NOTES
Patient seen and evaluated by Critical Care today and deemed to be appropriate for transfer to Med-Surg   Spoke to Anne Moyer from General Surgery to accept patient transfer  Patient did not move from ICU on the day of transfer  See progress note from 12/11/22 for the most up-to-date treatment therapy plans  Critical care can be contacted via [employer] with any questions or concerns

## 2022-12-11 NOTE — ASSESSMENT & PLAN NOTE
· Presented with abdominal pain, n/v on 12/04  · Imaging revealed acute sigmoid diverticulitis involving a large sigmoid diverticulum w/small amount of pneumoperitoneum suggesting small perforation  · POD#6 - OR on 12/05 for laparoscopic hand-assisted sigmoid colon resection, diverting transverse loop colostomy   · Perforated feculent diverticulitis w/large perforation in mid sigmoid colon noted  · Advance diet as tolerated   · Monitor colostomy stoma  · Strict I/O  · Trend endpoints

## 2022-12-11 NOTE — RESPIRATORY THERAPY NOTE
12/11/22 1221   Non-Invasive Information   O2 Interface Device HFNC prongs   Non-Invasive Ventilation Mode HFNC (High flow)   SpO2 93 %   Non-Invasive Settings   FiO2 (%) (S)  50  (Decreased to 45 %   JOANA Conte and Umang VALENTIN aware)   Flow (lpm) (S)  20  (Decreased to 15 liters   RN Eligha Dg and [place] n[place] CRNP aware)   Temperature (Set) 33

## 2022-12-11 NOTE — PLAN OF CARE
Problem: Potential for Falls  Goal: Patient will remain free of falls  Description: INTERVENTIONS:  - Educate patient/family on patient safety including physical limitations  - Instruct patient to call for assistance with activity   - Consult OT/PT to assist with strengthening/mobility   - Keep Call bell within reach  - Keep bed low and locked with side rails adjusted as appropriate  - Keep care items and personal belongings within reach  - Initiate and maintain comfort rounds  - Make Fall Risk Sign visible to staff  - Offer Toileting every 2 Hours, in advance of need  - Initiate/Maintain BED alarm  - Obtain necessary fall risk management equipment: yellow socks  - Apply yellow socks and bracelet for high fall risk patients  - Consider moving patient to room near nurses station  Outcome: Progressing     Problem: PAIN - ADULT  Goal: Verbalizes/displays adequate comfort level or baseline comfort level  Description: Interventions:  - Encourage patient to monitor pain and request assistance  - Assess pain using appropriate pain scale  - Administer analgesics based on type and severity of pain and evaluate response  - Implement non-pharmacological measures as appropriate and evaluate response  - Consider cultural and social influences on pain and pain management  - Notify physician/advanced practitioner if interventions unsuccessful or patient reports new pain  Outcome: Progressing     Problem: INFECTION - ADULT  Goal: Absence of fever/infection during neutropenic period  Description: INTERVENTIONS:  - Monitor WBC    Outcome: Progressing     Problem: SAFETY ADULT  Goal: Patient will remain free of falls  Description: INTERVENTIONS:  - Educate patient/family on patient safety including physical limitations  - Instruct patient to call for assistance with activity   - Consult OT/PT to assist with strengthening/mobility   - Keep Call bell within reach  - Keep bed low and locked with side rails adjusted as appropriate  - Keep care items and personal belongings within reach  - Initiate and maintain comfort rounds  - Make Fall Risk Sign visible to staff  - Offer Toileting every 2 Hours, in advance of need  - Initiate/Maintain BED alarm  - Obtain necessary fall risk management equipment: yellow socks  - Apply yellow socks and bracelet for high fall risk patients  - Consider moving patient to room near nurses station  Outcome: Progressing

## 2022-12-12 LAB
ANION GAP SERPL CALCULATED.3IONS-SCNC: 8 MMOL/L (ref 4–13)
BUN SERPL-MCNC: 18 MG/DL (ref 5–25)
CA-I BLD-SCNC: 1.14 MMOL/L (ref 1.12–1.32)
CALCIUM SERPL-MCNC: 8.7 MG/DL (ref 8.4–10.2)
CHLORIDE SERPL-SCNC: 101 MMOL/L (ref 96–108)
CO2 SERPL-SCNC: 29 MMOL/L (ref 21–32)
CREAT SERPL-MCNC: 0.66 MG/DL (ref 0.6–1.3)
ERYTHROCYTE [DISTWIDTH] IN BLOOD BY AUTOMATED COUNT: 13.6 % (ref 11.6–15.1)
GFR SERPL CREATININE-BSD FRML MDRD: 94 ML/MIN/1.73SQ M
GLUCOSE SERPL-MCNC: 102 MG/DL (ref 65–140)
GLUCOSE SERPL-MCNC: 121 MG/DL (ref 65–140)
GLUCOSE SERPL-MCNC: 126 MG/DL (ref 65–140)
GLUCOSE SERPL-MCNC: 128 MG/DL (ref 65–140)
GLUCOSE SERPL-MCNC: 132 MG/DL (ref 65–140)
GLUCOSE SERPL-MCNC: 175 MG/DL (ref 65–140)
HCT VFR BLD AUTO: 31.4 % (ref 34.8–46.1)
HGB BLD-MCNC: 10.1 G/DL (ref 11.5–15.4)
MAGNESIUM SERPL-MCNC: 2 MG/DL (ref 1.9–2.7)
MCH RBC QN AUTO: 31 PG (ref 26.8–34.3)
MCHC RBC AUTO-ENTMCNC: 32.2 G/DL (ref 31.4–37.4)
MCV RBC AUTO: 96 FL (ref 82–98)
PHOSPHATE SERPL-MCNC: 2.9 MG/DL (ref 2.3–4.1)
PLATELET # BLD AUTO: 264 THOUSANDS/UL (ref 149–390)
PMV BLD AUTO: 9.7 FL (ref 8.9–12.7)
POTASSIUM SERPL-SCNC: 3.4 MMOL/L (ref 3.5–5.3)
PROCALCITONIN SERPL-MCNC: 1.47 NG/ML
RBC # BLD AUTO: 3.26 MILLION/UL (ref 3.81–5.12)
SODIUM SERPL-SCNC: 138 MMOL/L (ref 135–147)
WBC # BLD AUTO: 9.11 THOUSAND/UL (ref 4.31–10.16)

## 2022-12-12 RX ORDER — POTASSIUM CHLORIDE 20 MEQ/1
40 TABLET, EXTENDED RELEASE ORAL ONCE
Status: COMPLETED | OUTPATIENT
Start: 2022-12-12 | End: 2022-12-12

## 2022-12-12 RX ORDER — LISINOPRIL 20 MG/1
40 TABLET ORAL DAILY
Status: DISCONTINUED | OUTPATIENT
Start: 2022-12-13 | End: 2022-12-18

## 2022-12-12 RX ORDER — AMLODIPINE BESYLATE 10 MG/1
10 TABLET ORAL DAILY
Status: DISCONTINUED | OUTPATIENT
Start: 2022-12-13 | End: 2023-01-04 | Stop reason: HOSPADM

## 2022-12-12 RX ORDER — NEBIVOLOL 5 MG/1
5 TABLET ORAL DAILY
Status: DISCONTINUED | OUTPATIENT
Start: 2022-12-13 | End: 2023-01-04 | Stop reason: HOSPADM

## 2022-12-12 RX ADMIN — ACETAMINOPHEN 975 MG: 325 TABLET ORAL at 13:17

## 2022-12-12 RX ADMIN — ONDANSETRON 4 MG: 2 INJECTION INTRAMUSCULAR; INTRAVENOUS at 20:02

## 2022-12-12 RX ADMIN — ACETAMINOPHEN 975 MG: 325 TABLET ORAL at 05:42

## 2022-12-12 RX ADMIN — LEVALBUTEROL 1.25 MG: 1.25 SOLUTION, CONCENTRATE RESPIRATORY (INHALATION) at 07:21

## 2022-12-12 RX ADMIN — POTASSIUM CHLORIDE 40 MEQ: 1500 TABLET, EXTENDED RELEASE ORAL at 09:29

## 2022-12-12 RX ADMIN — FUROSEMIDE 20 MG: 10 INJECTION, SOLUTION INTRAMUSCULAR; INTRAVENOUS at 09:29

## 2022-12-12 RX ADMIN — PANTOPRAZOLE SODIUM 40 MG: 40 INJECTION, POWDER, LYOPHILIZED, FOR SOLUTION INTRAVENOUS at 09:29

## 2022-12-12 RX ADMIN — LIDOCAINE 5% 2 PATCH: 700 PATCH TOPICAL at 09:30

## 2022-12-12 RX ADMIN — ISODIUM CHLORIDE 3 ML: 0.03 SOLUTION RESPIRATORY (INHALATION) at 07:21

## 2022-12-12 RX ADMIN — PANTOPRAZOLE SODIUM 40 MG: 40 INJECTION, POWDER, LYOPHILIZED, FOR SOLUTION INTRAVENOUS at 21:57

## 2022-12-12 RX ADMIN — ACETAMINOPHEN 975 MG: 325 TABLET ORAL at 19:45

## 2022-12-12 RX ADMIN — Medication 3.38 G: at 07:37

## 2022-12-12 RX ADMIN — ONDANSETRON 4 MG: 2 INJECTION INTRAMUSCULAR; INTRAVENOUS at 13:17

## 2022-12-12 RX ADMIN — Medication 3.38 G: at 15:22

## 2022-12-12 RX ADMIN — ENOXAPARIN SODIUM 40 MG: 100 INJECTION SUBCUTANEOUS at 09:29

## 2022-12-12 RX ADMIN — BUDESONIDE 0.5 MG: 0.5 INHALANT ORAL at 07:21

## 2022-12-12 NOTE — CASE MANAGEMENT
Case Management Discharge Planning Note    Patient name [facility]  MRN [id number]  : 1960 Date 2022       Current Admission Date: 2022  Current Admission Diagnosis:Sigmoid diverticulitis   Patient Active Problem List    Diagnosis Date Noted   • Anemia 2022   • Encephalopathy 2022   • Acute respiratory failure with hypoxia ([facility] [address] ) 2022   • GERD (gastroesophageal reflux disease) 2022   • Hypertension 2022   • Bronchomalacia 2022   • Asthma 2022   • Septic shock ([facility] [address] ) 2022   • Sigmoid diverticulitis 2022      LOS (days): 8  Geometric Mean LOS (GMLOS) (days): 9 60  Days to GMLOS:1 2     OBJECTIVE:  Risk of Unplanned Readmission Score: 10 48         Current admission status: Inpatient   Preferred Pharmacy: No Pharmacies Listed  Primary Care Provider: No primary care provider on file  Primary Insurance: [employer]  Secondary Insurance: MEDICARE    DISCHARGE DETAILS:    Discharge planning discussed with[de-identified] patient and spouse  Freedom of Choice: Yes  Comments - Freedom of Choice: referrals have been sent for rehab and hhc - cm has not been able to find an accepting snf or hhc - pt's spouse did given me [facility] phone# of the cm at the [place] [address]   # [phone]  CM contacted family/caregiver?: Yes             Contacts  Patient Contacts: Hortencia King  Relationship to Patient[de-identified] Family (spouse)  Contact Method:  In Person  Reason/Outcome: Discharge [address]         Is the patient interested in [facility] at discharge?: Yes         Other Referral/Resources/Interventions Provided:  Interventions: Short Term Rehab, [facility], Acute Rehab  Referral Comments: referrals sent for hhc and snf rehab and aru cm will call [facility] cm at [facility] insurance company to find out what facilities and c agency accepts her insurance    Would you like to participate in our [address] service program?  : No - Declined    Treatment Team Recommendation:  (d/c plan tbd- tbd)

## 2022-12-12 NOTE — PLAN OF CARE
Problem: Potential for Falls  Goal: Patient will remain free of falls  Description: INTERVENTIONS:  - Educate patient/family on patient safety including physical limitations  - Instruct patient to call for assistance with activity   - Consult OT/PT to assist with strengthening/mobility   - Keep Call bell within reach  - Keep bed low and locked with side rails adjusted as appropriate  - Keep care items and personal belongings within reach  - Initiate and maintain comfort rounds  - Make Fall Risk Sign visible to staff  - Offer Toileting every 2 Hours, in advance of need  - Initiate/Maintain BED alarm  - Obtain necessary fall risk management equipment: yellow socks  - Apply yellow socks and bracelet for high fall risk patients  - Consider moving patient to room near nurses station  Outcome: Progressing     Problem: Potential for Falls  Goal: Patient will remain free of falls  Description: INTERVENTIONS:  - Educate patient/family on patient safety including physical limitations  - Instruct patient to call for assistance with activity   - Consult OT/PT to assist with strengthening/mobility   - Keep Call bell within reach  - Keep bed low and locked with side rails adjusted as appropriate  - Keep care items and personal belongings within reach  - Initiate and maintain comfort rounds  - Make Fall Risk Sign visible to staff  - Offer Toileting every 2 Hours, in advance of need  - Initiate/Maintain BED alarm  - Obtain necessary fall risk management equipment: yellow socks  - Apply yellow socks and bracelet for high fall risk patients  - Consider moving patient to room near nurses station  12/12/2022 0402 by Mat Mohs, RN  Outcome: Progressing  12/12/2022 0402 by Mat Mohs, RN  Outcome: Progressing

## 2022-12-12 NOTE — PROGRESS NOTES
Progress Note - General Surgery   Rohini Caruso 58 y o  female MRN: [id number]  Unit/Bed#: ICU  Encounter: [id number]    Assessment:  60-year-old female with PMH significant for asthma, tracheobronchomalacia who presented with complicated sigmoid diverticulitis  HD #8 septic shock 2/2 feculent peritonitis   POD#7 s/p laparoscopic hand-assisted sigmoid resection with creation of transverse loop colostomy  -Afebrile, vital signs stable, on 4 L of supplemental oxygen  -WBC 9 1 (7 7 yesterday)  - HGB 10 1 (11 6 yesterday, 9 7 two days ago)  -Hypokalemia, 3 4  -Procalcitonin continues to downtrend  -Stool output in bag (500cc stool output recorded); ostomy bar removed today    Plan:  - Continue surgical soft diet  - nutritional supplements added   - Monitor abdominal exams, vitals, and labs  - I&O's  - Abx to  today -patient is afebrile and does not have a leukocytosis, antibiotics will be finished today with a 7-day postoperative course  - ostomy teaching  - ostomy ambreen removed today  - restart home meds  - replete electrolytes  - Analgesia and antiemetics prn   - Encourage ambulation   - Incentive spirometer   - oob to chair  - PT/OT -most recent plans documenting recommendation for postacute rehab  -Discussed placement with case management; patient is from [facility] and case management is actively sending out referrals to that area  - DVT ppx   - SLIM following  - Discussed with attending on rounds      Subjective/Objective   Subjective: Patient states that she is having a lot of pain today  Patient unable to tolerate narcotic pain medications as it induces nausea  Patient taking Tylenol and Motrin  Patient states that she has minimal nausea and is tolerating small quantities of surgical soft diet  Denies any vomiting in last 24 hours  Denies fevers, chills, chest pain, shortness of breath, calf tenderness      Objective:     Blood pressure 156/81, pulse 63, temperature 98 6 °F (37 °C), temperature source Tympanic, resp  rate 19, height 5' 5" (1 651 m), weight 85 6 kg (188 lb 11 4 oz), SpO2 94 %  ,Body mass index is 31 4 kg/m²  Intake/Output Summary (Last 24 hours) at 12/12/2022 3890  Last data filed at 12/12/2022 0400  Gross per 24 hour   Intake --   Output 1400 ml   Net -1400 ml       Invasive Devices     Peripheral Intravenous Line  Duration           Peripheral IV 12/08/22 Left;Ventral (anterior) Forearm 4 days    Peripheral IV 12/08/22 Right;Upper;Ventral (anterior) Arm 4 days          Drain  Duration           Colostomy RLQ 7 days    External Urinary Catheter 2 days                Physical Exam:   General - no acute distress  Heart - RRR  Lungs - right lung base with rhonchi that cleared with coughing; on supplemental O2  Abdomen - Bowel sounds present  Soft, generalized tenderness to light palpation  No guarding or rebound  Colostomy functioning with stool in bag; ambreen in place - removed; mucocutaneous junction intact with mild amount of slough; 2 areas of staples over incisions in RLQ with mild erythema; Incision in LLQ intact with staples in place, clean, dry, and with mild erythema  Extremities - no lower extremity pitting edema bilaterally    Lab, Imaging and other studies:I have personally reviewed pertinent lab results      VTE Pharmacologic Prophylaxis: Enoxaparin (Lovenox)  VTE Mechanical Prophylaxis: sequential compression device     Jocelyn Guillaume [place]  12/12/2022

## 2022-12-13 LAB
ANION GAP SERPL CALCULATED.3IONS-SCNC: 8 MMOL/L (ref 4–13)
BACTERIA UR QL AUTO: ABNORMAL /HPF
BILIRUB UR QL STRIP: NEGATIVE
BUN SERPL-MCNC: 16 MG/DL (ref 5–25)
CALCIUM SERPL-MCNC: 8.6 MG/DL (ref 8.4–10.2)
CHLORIDE SERPL-SCNC: 104 MMOL/L (ref 96–108)
CLARITY UR: CLEAR
CO2 SERPL-SCNC: 26 MMOL/L (ref 21–32)
COLOR UR: YELLOW
CREAT SERPL-MCNC: 0.57 MG/DL (ref 0.6–1.3)
ERYTHROCYTE [DISTWIDTH] IN BLOOD BY AUTOMATED COUNT: 13.3 % (ref 11.6–15.1)
GFR SERPL CREATININE-BSD FRML MDRD: 99 ML/MIN/1.73SQ M
GLUCOSE SERPL-MCNC: 106 MG/DL (ref 65–140)
GLUCOSE SERPL-MCNC: 111 MG/DL (ref 65–140)
GLUCOSE SERPL-MCNC: 121 MG/DL (ref 65–140)
GLUCOSE SERPL-MCNC: 151 MG/DL (ref 65–140)
GLUCOSE UR STRIP-MCNC: NEGATIVE MG/DL
HCT VFR BLD AUTO: 30.7 % (ref 34.8–46.1)
HGB BLD-MCNC: 9.9 G/DL (ref 11.5–15.4)
HGB UR QL STRIP.AUTO: NEGATIVE
KETONES UR STRIP-MCNC: NEGATIVE MG/DL
LEUKOCYTE ESTERASE UR QL STRIP: NEGATIVE
MCH RBC QN AUTO: 31 PG (ref 26.8–34.3)
MCHC RBC AUTO-ENTMCNC: 32.2 G/DL (ref 31.4–37.4)
MCV RBC AUTO: 96 FL (ref 82–98)
NITRITE UR QL STRIP: NEGATIVE
NON-SQ EPI CELLS URNS QL MICRO: ABNORMAL /HPF
PH UR STRIP.AUTO: 5.5 [PH]
PLATELET # BLD AUTO: 322 THOUSANDS/UL (ref 149–390)
PMV BLD AUTO: 9.8 FL (ref 8.9–12.7)
POTASSIUM SERPL-SCNC: 3.7 MMOL/L (ref 3.5–5.3)
PROT UR STRIP-MCNC: NEGATIVE MG/DL
RBC # BLD AUTO: 3.19 MILLION/UL (ref 3.81–5.12)
RBC #/AREA URNS AUTO: ABNORMAL /HPF
SODIUM SERPL-SCNC: 138 MMOL/L (ref 135–147)
SP GR UR STRIP.AUTO: 1.01
UROBILINOGEN UR QL STRIP.AUTO: 0.2 E.U./DL
WBC # BLD AUTO: 8.82 THOUSAND/UL (ref 4.31–10.16)
WBC #/AREA URNS AUTO: ABNORMAL /HPF

## 2022-12-13 RX ORDER — KETOROLAC TROMETHAMINE 30 MG/ML
15 INJECTION, SOLUTION INTRAMUSCULAR; INTRAVENOUS EVERY 6 HOURS SCHEDULED
Status: COMPLETED | OUTPATIENT
Start: 2022-12-13 | End: 2022-12-18

## 2022-12-13 RX ADMIN — ACETAMINOPHEN 975 MG: 325 TABLET ORAL at 02:06

## 2022-12-13 RX ADMIN — BUDESONIDE 0.5 MG: 0.5 INHALANT ORAL at 07:38

## 2022-12-13 RX ADMIN — Medication 3.38 G: at 04:00

## 2022-12-13 RX ADMIN — PANTOPRAZOLE SODIUM 40 MG: 40 INJECTION, POWDER, LYOPHILIZED, FOR SOLUTION INTRAVENOUS at 08:26

## 2022-12-13 RX ADMIN — ISODIUM CHLORIDE 3 ML: 0.03 SOLUTION RESPIRATORY (INHALATION) at 07:38

## 2022-12-13 RX ADMIN — LEVALBUTEROL 1.25 MG: 1.25 SOLUTION, CONCENTRATE RESPIRATORY (INHALATION) at 13:48

## 2022-12-13 RX ADMIN — FUROSEMIDE 20 MG: 10 INJECTION, SOLUTION INTRAMUSCULAR; INTRAVENOUS at 08:27

## 2022-12-13 RX ADMIN — ISODIUM CHLORIDE 3 ML: 0.03 SOLUTION RESPIRATORY (INHALATION) at 19:57

## 2022-12-13 RX ADMIN — KETOROLAC TROMETHAMINE 15 MG: 30 INJECTION, SOLUTION INTRAMUSCULAR; INTRAVENOUS at 11:49

## 2022-12-13 RX ADMIN — Medication: at 04:05

## 2022-12-13 RX ADMIN — ACETAMINOPHEN 975 MG: 325 TABLET ORAL at 13:39

## 2022-12-13 RX ADMIN — ACETAMINOPHEN 975 MG: 325 TABLET ORAL at 08:22

## 2022-12-13 RX ADMIN — ENOXAPARIN SODIUM 40 MG: 100 INJECTION SUBCUTANEOUS at 08:23

## 2022-12-13 RX ADMIN — LEVALBUTEROL 1.25 MG: 1.25 SOLUTION, CONCENTRATE RESPIRATORY (INHALATION) at 07:38

## 2022-12-13 RX ADMIN — ACETAMINOPHEN 975 MG: 325 TABLET ORAL at 21:07

## 2022-12-13 RX ADMIN — LIDOCAINE 5% 2 PATCH: 700 PATCH TOPICAL at 08:40

## 2022-12-13 RX ADMIN — ONDANSETRON 4 MG: 2 INJECTION INTRAMUSCULAR; INTRAVENOUS at 11:55

## 2022-12-13 RX ADMIN — LISINOPRIL 40 MG: 20 TABLET ORAL at 08:24

## 2022-12-13 RX ADMIN — ISODIUM CHLORIDE 3 ML: 0.03 SOLUTION RESPIRATORY (INHALATION) at 13:48

## 2022-12-13 RX ADMIN — IBUPROFEN 600 MG: 600 TABLET ORAL at 10:33

## 2022-12-13 RX ADMIN — BUDESONIDE 0.5 MG: 0.5 INHALANT ORAL at 19:57

## 2022-12-13 RX ADMIN — NEBIVOLOL 5 MG: 5 TABLET ORAL at 08:25

## 2022-12-13 RX ADMIN — PANTOPRAZOLE SODIUM 40 MG: 40 INJECTION, POWDER, LYOPHILIZED, FOR SOLUTION INTRAVENOUS at 21:08

## 2022-12-13 RX ADMIN — AMLODIPINE BESYLATE 10 MG: 10 TABLET ORAL at 08:25

## 2022-12-13 RX ADMIN — KETOROLAC TROMETHAMINE 15 MG: 30 INJECTION, SOLUTION INTRAMUSCULAR; INTRAVENOUS at 18:34

## 2022-12-13 RX ADMIN — LEVALBUTEROL 1.25 MG: 1.25 SOLUTION, CONCENTRATE RESPIRATORY (INHALATION) at 19:57

## 2022-12-13 NOTE — PROGRESS NOTES
Progress Note - General Surgery   Jeffery Maria 58 y o  female MRN: [id number]  Unit/Bed#: ICU 11-01 Encounter: [id number]    Assessment:  58-year-old female with past medical history significant for asthma and tracheobronchomalacia presenting with complicated sigmoid diverticulitis  Patient meeting SIRS/sepsis criteria 12 hours after admission as evidenced by fever, tachypnea, hypotension, leukopenia  HD#5 septic shock secondary to feculent peritonitis  POD#8 s/p laparoscopic hand-assisted sigmoid resection with transverse loop colostomy   Intraoperative findings of perforated feculent diverticulitis with large perforation at mid sigmoid colon  - AVSS on 3 L O2 via NC  - UO 1 3 L, patient complaining of UTI symptoms  - Ostomy viable with 10 cc recorded brown paste stool output, bar removed 12/12  - WBC 8 82  - Hgb 9 9  - CR 0 57  - Electrolytes unremarkable    Plan:  Surg soft diet with ensures tolerated, monitor for increasing abdominal pain, nausea, vomiting  Serial abdominal exams  S/p IV Zosyn, continue to monitor vitals and labs of antibiotics  May consider adding UA  Ostomy teaching  Antiemetics and analgesics as needed  Encourage use of incentive spirometer and ambulation  Encourage work with PT/OT  CM dispo planning  To be evaluated at bedside by attending    Subjective/Objective   Subjective: No acute overnight events  Patient overall doing well this morning  Notes incisional abdominal tenderness otherwise controlled with pain regimen  Also notes to have generalized abdominal bloating  Continues to have output from ostomy  Notes that she has been having UTI symptoms but is unsure if related to pure wick or true UTI  Was tolerating diet without increase in abdominal pain, nausea, vomiting  Objective:   Blood pressure (!) 134/63, pulse 75, temperature 98 9 °F (37 2 °C), temperature source Tympanic, resp  rate 20, height 5' 5" (1 651 m), weight 82 7 kg (182 lb 5 1 oz), SpO2 92 %  ,Body mass index is 30 34 kg/m²  Intake/Output Summary (Last 24 hours) at 12/13/2022 0905  Last data filed at 12/13/2022 0801  Gross per 24 hour   Intake --   Output 1845 ml   Net -1845 ml       Invasive Devices     Peripheral Intravenous Line  Duration           Peripheral IV 12/12/22 Left;Proximal;Ventral (anterior) Forearm <1 day    Peripheral IV 12/13/22 Distal;Left;Upper;Ventral (anterior) Arm <1 day          Drain  Duration           Colostomy RLQ 8 days    External Urinary Catheter 3 days              Physical Exam  Vitals and nursing note reviewed  Constitutional:       General: She is not in acute distress  Appearance: Normal appearance  She is obese  She is not ill-appearing or toxic-appearing  Interventions: Nasal cannula in place  HENT:      Head: Normocephalic and atraumatic  Cardiovascular:      Rate and Rhythm: Normal rate and regular rhythm  Pulses: Normal pulses  Heart sounds: Normal heart sounds  No murmur heard  No friction rub  No gallop  Pulmonary:      Effort: Pulmonary effort is normal    Abdominal:      General: The ostomy site is clean  Bowel sounds are normal  There is distension  Palpations: Abdomen is soft  Tenderness: There is abdominal tenderness (Incisional)  There is no guarding or rebound  Comments: Laparoscopic surgical sites are clean, dry, intact with mild erythema, noted to have maceration above RLQ site; left paramedian incision is clean, dry, intact with mild erythema   Musculoskeletal:         General: Normal range of motion  Skin:     General: Skin is warm and dry  Neurological:      General: No focal deficit present  Mental Status: She is alert and oriented to person, place, and time  Psychiatric:         Mood and Affect: Mood normal          Behavior: Behavior normal  Behavior is cooperative  Thought Content: Thought content normal        Lab, Imaging and other studies:  I have personally reviewed pertinent lab results  CBC:   Lab Results   Component Value Date    WBC 8 82 12/13/2022    HGB 9 9 (L) 12/13/2022    HCT 30 7 (L) 12/13/2022    MCV 96 12/13/2022     12/13/2022    MCH 31 0 12/13/2022    MCHC 32 2 12/13/2022    RDW 13 3 12/13/2022    MPV 9 8 12/13/2022     CMP:   Lab Results   Component Value Date    SODIUM 138 12/13/2022    K 3 7 12/13/2022     12/13/2022    CO2 26 12/13/2022    BUN 16 12/13/2022    CREATININE 0 57 (L) 12/13/2022    CALCIUM 8 6 12/13/2022    EGFR 99 12/13/2022     VTE Pharmacologic Prophylaxis: Enoxaparin (Lovenox)  VTE Mechanical Prophylaxis: sequential compression device    Michelle Montilla PA-C

## 2022-12-13 NOTE — ASSESSMENT & PLAN NOTE
Diagnosed 2016  Follows with pulmonology  Prescribed Dulera and albuterol as outpatient    -Continue Xopenex and Pulmicort

## 2022-12-13 NOTE — ASSESSMENT & PLAN NOTE
Postop day 8 laparoscopic hand-assisted sigmoid resection with transverse loop colostomy    -Pain control as per primary  -Soft surgical diet  -PT/OT  -Ostomy care

## 2022-12-13 NOTE — PLAN OF CARE
Problem: Potential for Falls  Goal: Patient will remain free of falls  Description: INTERVENTIONS:  - Educate patient/family on patient safety including physical limitations  - Instruct patient to call for assistance with activity   - Consult OT/PT to assist with strengthening/mobility   - Keep Call bell within reach  - Keep bed low and locked with side rails adjusted as appropriate  - Keep care items and personal belongings within reach  - Initiate and maintain comfort rounds  - Make Fall Risk Sign visible to staff  - Offer Toileting every 2 Hours, in advance of need  - Initiate/Maintain BED alarm  - Obtain necessary fall risk management equipment: yellow socks  - Apply yellow socks and bracelet for high fall risk patients  - Consider moving patient to room near nurses station  Outcome: Progressing     Problem: PAIN - ADULT  Goal: Verbalizes/displays adequate comfort level or baseline comfort level  Description: Interventions:  - Encourage patient to monitor pain and request assistance  - Assess pain using appropriate pain scale  - Administer analgesics based on type and severity of pain and evaluate response  - Implement non-pharmacological measures as appropriate and evaluate response  - Consider cultural and social influences on pain and pain management  - Notify physician/advanced practitioner if interventions unsuccessful or patient reports new pain  Outcome: Progressing     Problem: INFECTION - ADULT  Goal: Absence or prevention of progression during hospitalization  Description: INTERVENTIONS:  - Assess and monitor for signs and symptoms of infection  - Monitor lab/diagnostic results  - Monitor all insertion sites, i e  indwelling lines, tubes, and drains  - Monitor endotracheal if appropriate and nasal secretions for changes in amount and color  - [place] appropriate cooling/warming therapies per order  - Administer medications as ordered  - Instruct and encourage patient and family to use good hand hygiene technique  - Identify and instruct in appropriate isolation precautions for identified infection/condition  Outcome: Progressing     Problem: SAFETY ADULT  Goal: Patient will remain free of falls  Description: INTERVENTIONS:  - Educate patient/family on patient safety including physical limitations  - Instruct patient to call for assistance with activity   - Consult OT/PT to assist with strengthening/mobility   - Keep Call bell within reach  - Keep bed low and locked with side rails adjusted as appropriate  - Keep care items and personal belongings within reach  - Initiate and maintain comfort rounds  - Make Fall Risk Sign visible to staff  - Offer Toileting every 2 Hours, in advance of need  - Initiate/Maintain BED alarm  - Obtain necessary fall risk management equipment: yellow socks  - Apply yellow socks and bracelet for high fall risk patients  - Consider moving patient to room near nurses station  Outcome: Progressing     Problem: DISCHARGE PLANNING  Goal: Discharge to home or other facility with appropriate resources  Description: INTERVENTIONS:  - Identify barriers to discharge w/patient and caregiver  - Arrange for needed discharge resources and transportation as appropriate  - Identify discharge learning needs (meds, wound care, etc )  - Arrange for interpretive services to assist at discharge as needed  - Refer to Case Management Department for coordinating discharge planning if the patient needs post-hospital services based on physician/advanced practitioner order or complex needs related to functional status, cognitive ability, or social support system  Outcome: Progressing     Problem: Knowledge Deficit  Goal: Patient/family/caregiver demonstrates understanding of disease process, treatment plan, medications, and discharge instructions  Description: Complete learning assessment and assess knowledge base    Interventions:  - Provide teaching at level of understanding  - Provide teaching via preferred learning methods  Outcome: Progressing     Problem: Prexisting or High Potential for Compromised Skin Integrity  Goal: Skin integrity is maintained or improved  Description: INTERVENTIONS:  - Identify patients at risk for skin breakdown  - Assess and monitor skin integrity  - Assess and monitor nutrition and hydration status  - Monitor labs   - Assess for incontinence   - Turn and reposition patient  - Assist with mobility/ambulation  - Relieve pressure over bony prominences  - Avoid friction and shearing  - Provide appropriate hygiene as needed including keeping skin clean and dry  - Evaluate need for skin moisturizer/barrier cream  - Collaborate with interdisciplinary team   - Patient/family teaching  - Consider wound care consult   Outcome: Progressing     Problem: Nutrition/Hydration-ADULT  Goal: Nutrient/Hydration intake appropriate for improving, restoring or maintaining nutritional needs  Description: Monitor and assess patient's nutrition/hydration status for malnutrition  Collaborate with interdisciplinary team and initiate plan and interventions as ordered  Monitor patient's weight and dietary intake as ordered or per policy  Utilize nutrition screening tool and intervene as necessary  Determine patient's food preferences and provide high-protein, high-caloric foods as appropriate       INTERVENTIONS:  - Monitor oral intake, urinary output, labs, and treatment plans  - Assess nutrition and hydration status and recommend course of action  - Evaluate amount of meals eaten  - Assist patient with eating if necessary   - Allow adequate time for meals  - Recommend/ encourage appropriate diets, oral nutritional supplements, and vitamin/mineral supplements  - Order, calculate, and assess calorie counts as needed  - Recommend, monitor, and adjust tube feedings and TPN/PPN based on assessed needs  - Assess need for intravenous fluids  - Provide specific nutrition/hydration education as appropriate  - Include patient/family/caregiver in decisions related to nutrition  Outcome: Progressing

## 2022-12-13 NOTE — PLAN OF CARE
Problem: Potential for Falls  Goal: Patient will remain free of falls  Description: INTERVENTIONS:  - Educate patient/family on patient safety including physical limitations  - Instruct patient to call for assistance with activity   - Consult OT/PT to assist with strengthening/mobility   - Keep Call bell within reach  - Keep bed low and locked with side rails adjusted as appropriate  - Keep care items and personal belongings within reach  - Initiate and maintain comfort rounds  - Make Fall Risk Sign visible to staff  - Offer Toileting every 2 Hours, in advance of need  - Initiate/Maintain BED alarm  - Obtain necessary fall risk management equipment: yellow socks  - Apply yellow socks and bracelet for high fall risk patients  - Consider moving patient to room near nurses station  Outcome: Progressing     Problem: PAIN - ADULT  Goal: Verbalizes/displays adequate comfort level or baseline comfort level  Description: Interventions:  - Encourage patient to monitor pain and request assistance  - Assess pain using appropriate pain scale  - Administer analgesics based on type and severity of pain and evaluate response  - Implement non-pharmacological measures as appropriate and evaluate response  - Consider cultural and social influences on pain and pain management  - Notify physician/advanced practitioner if interventions unsuccessful or patient reports new pain  Outcome: Progressing     Problem: INFECTION - ADULT  Goal: Absence or prevention of progression during hospitalization  Description: INTERVENTIONS:  - Assess and monitor for signs and symptoms of infection  - Monitor lab/diagnostic results  - Monitor all insertion sites, i e  indwelling lines, tubes, and drains  - Monitor endotracheal if appropriate and nasal secretions for changes in amount and color  - [place] appropriate cooling/warming therapies per order  - Administer medications as ordered  - Instruct and encourage patient and family to use good hand hygiene technique  - Identify and instruct in appropriate isolation precautions for identified infection/condition  Outcome: Progressing  Goal: Absence of fever/infection during neutropenic period  Description: INTERVENTIONS:  - Monitor WBC    Outcome: Progressing     Problem: SAFETY ADULT  Goal: Patient will remain free of falls  Description: INTERVENTIONS:  - Educate patient/family on patient safety including physical limitations  - Instruct patient to call for assistance with activity   - Consult OT/PT to assist with strengthening/mobility   - Keep Call bell within reach  - Keep bed low and locked with side rails adjusted as appropriate  - Keep care items and personal belongings within reach  - Initiate and maintain comfort rounds  - Make Fall Risk Sign visible to staff  - Offer Toileting every 2 Hours, in advance of need  - Initiate/Maintain BED alarm  - Obtain necessary fall risk management equipment: yellow socks  - Apply yellow socks and bracelet for high fall risk patients  - Consider moving patient to room near nurses station  Outcome: Progressing  Goal: Maintain or return to baseline ADL function  Description: INTERVENTIONS:  - Educate patient/family on patient safety including physical limitations  - Instruct patient to call for assistance with activity   - Consult OT/PT to assist with strengthening/mobility   - Keep Call bell within reach  - Keep bed low and locked with side rails adjusted as appropriate  - Keep care items and personal belongings within reach  - Initiate and maintain comfort rounds  - Make Fall Risk Sign visible to staff  - Offer Toileting every 2 Hours, in advance of need  - Initiate/Maintain bed alarm  - Obtain necessary fall risk management equipment: yellow socks  - Apply yellow socks and bracelet for high fall risk patients  - Consider moving patient to room near nurses station  Outcome: Progressing  Goal: Maintains/Returns to pre admission functional level  Description: INTERVENTIONS:  - Perform BMAT or MOVE assessment daily    - Set and communicate daily mobility goal to care team and patient/family/caregiver  - Collaborate with rehabilitation services on mobility goals if consulted  - Perform Range of Motion 2 times a day  - Reposition patient every 2 hours  - Dangle patient 3 times a day  - Stand patient 3 times a day  - Ambulate patient 3 times a day  - Out of bed to chair 3 times a day   - Out of bed for meals 3 times a day  - Out of bed for toileting  - Record patient progress and toleration of activity level   Outcome: Progressing     Problem: DISCHARGE PLANNING  Goal: Discharge to home or other facility with appropriate resources  Description: INTERVENTIONS:  - Identify barriers to discharge w/patient and caregiver  - Arrange for needed discharge resources and transportation as appropriate  - Identify discharge learning needs (meds, wound care, etc )  - Arrange for interpretive services to assist at discharge as needed  - Refer to Case Management Department for coordinating discharge planning if the patient needs post-hospital services based on physician/advanced practitioner order or complex needs related to functional status, cognitive ability, or social support system  Outcome: Progressing     Problem: Knowledge Deficit  Goal: Patient/family/caregiver demonstrates understanding of disease process, treatment plan, medications, and discharge instructions  Description: Complete learning assessment and assess knowledge base    Interventions:  - Provide teaching at level of understanding  - Provide teaching via preferred learning methods  Outcome: Progressing     Problem: MOBILITY - ADULT  Goal: Maintain or return to baseline ADL function  Description: INTERVENTIONS:  - Educate patient/family on patient safety including physical limitations  - Instruct patient to call for assistance with activity   - Consult OT/PT to assist with strengthening/mobility   - Keep Call bell within reach  - Keep bed low and locked with side rails adjusted as appropriate  - Keep care items and personal belongings within reach  - Initiate and maintain comfort rounds  - Make Fall Risk Sign visible to staff  - Offer Toileting every 2 Hours, in advance of need  - Initiate/Maintain bed alarm  - Obtain necessary fall risk management equipment: yellow socks  - Apply yellow socks and bracelet for high fall risk patients  - Consider moving patient to room near nurses station  Outcome: Progressing  Goal: Maintains/Returns to pre admission functional level  Description: INTERVENTIONS:  - Perform BMAT or MOVE assessment daily    - Set and communicate daily mobility goal to care team and patient/family/caregiver  - Collaborate with rehabilitation services on mobility goals if consulted  - Perform Range of Motion 2 times a day  - Reposition patient every 2 hours    - Dangle patient 2 times a day  - Stand patient 3 times a day  - Ambulate patient 3 times a day  - Out of bed to chair 3 times a day   - Out of bed for meals 3 times a day  - Out of bed for toileting  - Record patient progress and toleration of activity level   Outcome: Progressing     Problem: Prexisting or High Potential for Compromised Skin Integrity  Goal: Skin integrity is maintained or improved  Description: INTERVENTIONS:  - Identify patients at risk for skin breakdown  - Assess and monitor skin integrity  - Assess and monitor nutrition and hydration status  - Monitor labs   - Assess for incontinence   - Turn and reposition patient  - Assist with mobility/ambulation  - Relieve pressure over bony prominences  - Avoid friction and shearing  - Provide appropriate hygiene as needed including keeping skin clean and dry  - Evaluate need for skin moisturizer/barrier cream  - Collaborate with interdisciplinary team   - Patient/family teaching  - Consider wound care consult   Outcome: Progressing     Problem: Nutrition/Hydration-ADULT  Goal: Nutrient/Hydration intake appropriate for improving, restoring or maintaining nutritional needs  Description: Monitor and assess patient's nutrition/hydration status for malnutrition  Collaborate with interdisciplinary team and initiate plan and interventions as ordered  Monitor patient's weight and dietary intake as ordered or per policy  Utilize nutrition screening tool and intervene as necessary  Determine patient's food preferences and provide high-protein, high-caloric foods as appropriate       INTERVENTIONS:  - Monitor oral intake, urinary output, labs, and treatment plans  - Assess nutrition and hydration status and recommend course of action  - Evaluate amount of meals eaten  - Assist patient with eating if necessary   - Allow adequate time for meals  - Recommend/ encourage appropriate diets, oral nutritional supplements, and vitamin/mineral supplements  - Order, calculate, and assess calorie counts as needed  - Recommend, monitor, and adjust tube feedings and TPN/PPN based on assessed needs  - Assess need for intravenous fluids  - Provide specific nutrition/hydration education as appropriate  - Include patient/family/caregiver in decisions related to nutrition  Outcome: Progressing

## 2022-12-13 NOTE — ASSESSMENT & PLAN NOTE
Likely secondary to vascular congestion and postoperative atelectasis  Extubated on 12/7  Currently on 3 L nasal cannula    -Continue Lasix 20 mg IV daily if patent and stable tomorrow consider increasing Lasix dose to 40 mg IV  -Titrate oxygen as tolerated  -Continue Xopenex and Pulmicort  -Pulmonary hygiene

## 2022-12-13 NOTE — ASSESSMENT & PLAN NOTE
Patient developed postoperative septic shock secondary to feculent peritonitis   Received postoperative 7-day course of Zosyn    -Continue to monitor vitals and WBCs  -Continue to monitor off antibiotics

## 2022-12-13 NOTE — H&P (VIEW-ONLY)
[address] 1960, 58 y o  female MRN: [id number]  Unit/Bed#: ICU 11-01 Encounter: [id number]  Primary Care Provider: No primary care provider on file  Date and time admitted to hospital: 12/4/2022  7:07 AM    Inpatient consult to Internal Medicine  Consult performed by: Alberto Cruz DO  Consult ordered by: BARBIE Foy          * Sigmoid diverticulitis  Assessment & Plan  Postop day 8 laparoscopic hand-assisted sigmoid resection with transverse loop colostomy    -Pain control as per primary  -Soft surgical diet  -PT/OT  -Ostomy care    Septic shock [facility])  Assessment & Plan  Patient developed postoperative septic shock secondary to feculent peritonitis   Received postoperative 7-day course of Zosyn    -Continue to monitor vitals and WBCs  -Continue to monitor off antibiotics    Acute respiratory failure with hypoxia [facility])  Assessment & Plan  Likely secondary to vascular congestion and postoperative atelectasis  Extubated on 12/7  Currently on 3 L nasal cannula    -Continue Lasix 20 mg IV daily if patent and stable tomorrow consider increasing Lasix dose to 40 mg IV  -Titrate oxygen as tolerated  -Continue Xopenex and Pulmicort  -Pulmonary hygiene    Encephalopathy  Assessment & Plan  Patient's mentation appears to be at baseline    -Continue to monitor    Anemia  Assessment & Plan  Likely secondary to surgical intervention    -Continue to monitor for signs of bleeding    Asthma  Assessment & Plan  Diagnosed 2016  Follows with pulmonology  Prescribed Dulera and albuterol as outpatient    -Continue Xopenex and Pulmicort    Hypertension  Assessment & Plan  -Continue home lisinopril, amlodipine, and nebivolol    GERD (gastroesophageal reflux disease)  Assessment & Plan  - Continue PPI      VTE Prophylaxis: VTE Score: 9 High Risk (Score >/= 5) - Pharmacological DVT Prophylaxis Ordered: enoxaparin (Lovenox)   Sequential Compression Devices Ordered  Recommendations for Discharge:  · Titrated off oxygen    Counseling / Coordination of Care Time: 30 minutes Greater than 50% of total time spent on patient counseling and coordination of care  Collaboration of Care: Were Recommendations Directly Discussed with Primary Treatment Team? Yes    History of Present Illness:  Areli Mendez is a 58 y o  female who is originally admitted to the general surgery service due to sigmoid diverticulitis  We are consulted for assistance with medical management  Patient was initially admitted for sigmoid diverticulitis, underwent laparoscopic sigmoid resection on 12/5  Postoperative state was complicated with shock secondary to perforation  Patient completed of Zosyn  Today, patient requesting medication change so she can have pain meds approximately 30 minutes before attempting to get out of bed  Review of Systems:  Review of Systems   Constitutional: Positive for fatigue  HENT: Negative  Eyes: Negative  Respiratory: Positive for shortness of breath  Gastrointestinal: Positive for abdominal pain  Genitourinary: Negative  Musculoskeletal: Negative  Skin: Negative  Neurological: Negative  Hematological: Negative  Psychiatric/Behavioral: Negative  Past Medical and Surgical History:   Past Medical History:   Diagnosis Date   • Asthma    • Bronchiectasis ([address] )    • Bronchomalacia    • GERD (gastroesophageal reflux disease)    • Hiatal hernia        Past Surgical History:   Procedure Laterality Date   • APPENDECTOMY     • CHOLECYSTECTOMY     • COLON SIGMOID RESECTION LAPAROSCOPIC N/A 12/5/2022    Procedure: RESECTION COLON SIGMOID LAPAROSCOPIC HAND-ASSISTED, diverting transverse loop colostomy;  Surgeon: Brianda David MD;  Location: [facility];  Service: General   • HYSTERECTOMY     • KNEE SURGERY Left        Meds/Allergies:  all medications and allergies reviewed    Allergies:    Allergies   Allergen Reactions   • Dilaudid [Hydromorphone] Vomiting   • Doxycycline Vomiting   • Hydrochlorothiazide Vomiting   • Sulfa Antibiotics Vomiting       Social History:  Marital Status: /Civil Union  Substance Use History:   Social History     Substance and Sexual Activity   Alcohol Use Not Currently     Social History     Tobacco Use   Smoking Status Never   Smokeless Tobacco Never     Social History     Substance and Sexual Activity   Drug Use Not Currently       Family History:  History reviewed  No pertinent family history  Physical Exam:   Vitals:   Blood Pressure: 117/56 (12/13/22 1600)  Pulse: 65 (12/13/22 1600)  Temperature: 99 5 °F (37 5 °C) (12/13/22 1600)  Temp Source: Tympanic (12/13/22 1600)  Respirations: 18 (12/13/22 1600)  Height: 5' 5" (165 1 cm) (12/04/22 1231)  Weight - Scale: 82 7 kg (182 lb 5 1 oz) (12/13/22 0600)  SpO2: 92 % (12/13/22 1600)    Physical Exam  HENT:      Head: Normocephalic  Cardiovascular:      Rate and Rhythm: Normal rate and regular rhythm  Pulses: Normal pulses  Heart sounds: Normal heart sounds  Pulmonary:      Effort: Pulmonary effort is normal    Abdominal:      General: Abdomen is flat  Tenderness: There is abdominal tenderness  Comments: Ostomy bag in place   Musculoskeletal:         General: Normal range of motion  Cervical back: Normal range of motion  Skin:     General: Skin is warm and dry  Neurological:      General: No focal deficit present  Mental Status: She is alert and oriented to person, place, and time  Mental status is at baseline  Psychiatric:         Mood and Affect: Mood normal          Behavior: Behavior normal          Thought Content:  Thought content normal          Judgment: Judgment normal           Additional Data:   Lab Results:    Results from last 7 days   Lab Units 12/13/22  0523 12/11/22  0542 12/10/22  0507   WBC Thousand/uL 8 82   < > 8 36   HEMOGLOBIN g/dL 9 9*   < > 9 7*   HEMATOCRIT % 30 7*   < > 30 5*   PLATELETS Thousands/uL 322   < > 187   BANDS PCT %  --   --  5   LYMPHO PCT %  --   --  17   MONO PCT %  --   --  2*   EOS PCT %  --   --  1    < > = values in this interval not displayed  Results from last 7 days   Lab Units 12/13/22  0523 12/12/22  0509 12/11/22  0542   SODIUM mmol/L 138   < > 136   POTASSIUM mmol/L 3 7   < > 4 2   CHLORIDE mmol/L 104   < > 102   CO2 mmol/L 26   < > 26   BUN mg/dL 16   < > 20   CREATININE mg/dL 0 57*   < > 0 61   ANION GAP mmol/L 8   < > 8   CALCIUM mg/dL 8 6   < > 8 5   ALBUMIN g/dL  --   --  2 7*   TOTAL BILIRUBIN mg/dL  --   --  0 51   ALK PHOS U/L  --   --  83   ALT U/L  --   --  12   AST U/L  --   --  20   GLUCOSE RANDOM mg/dL 106   < > 82    < > = values in this interval not displayed  No results found for: HGBA1C  Results from last 7 days   Lab Units 12/13/22  1148 12/13/22  0526 12/12/22  2358 12/12/22  1722 12/12/22  1246 12/12/22  0545 12/12/22  0027 12/11/22  1819 12/11/22  1223 12/11/22  0545 12/11/22  0011 12/10/22  1746   POC GLUCOSE mg/dl 121 111 128 132 121 126 175* 144* 145* 79 88 109     Results from last 7 days   Lab Units 12/12/22  0509 12/11/22  0542 12/10/22  0507 12/09/22  0603 12/08/22  0522   PROCALCITONIN ng/ml 1 47* 2 06* 3 09* 4 41* 7 12*       Imaging: No pertinent imaging reviewed  XR chest portable ICU   Final Result by Maci Ross MD (12/07 1846)      Possible mild congestive changes and small effusions  Workstation performed: [id number]         XR chest portable ICU   Final Result by Maci Ross MD (12/07 1052)   ET tube at the annamarie  Small right effusion suggested  Workstation performed: [id number]         XR chest portable ICU   Final Result by Wes Jimenes MD (12/06 1113)      1  Endotracheal tube positioning appropriate, 2 7 cm above the annamarie   2  Shallow depth of inspiration with crowding of bronchovascular markings or atelectasis   3   Nasogastric tube side port at level of left hemidiaphragm, consider advancement to assure the side port is within the stomach        The examination demonstrates a significant  finding and was documented as such in [facility] for liaison and referring practitioner notification  Workstation performed: [id number]         XR chest portable ICU   Final Result by Harman Pierce DO (12/05 1341)      No consolidation or overt pulmonary edema  Workstation performed: [id number]         CT abdomen pelvis with contrast   Final Result by Jesus Mixon DO (12/04 0808)      Acute sigmoid diverticulitis involving a large sigmoid diverticulum  There is a small amount of pneumoperitoneum suggesting small perforation  No abscess identified  The study was marked in [facility] for immediate notification  Workstation performed: [id number]             EKG, Pathology, and Other Studies Reviewed on Admission:   · EKG: Normal sinus rhythm    ** Please Note: This note may have been constructed using a voice recognition system   **

## 2022-12-13 NOTE — CONSULTS
[address] 1960, 58 y o  female MRN: [id number]  Unit/Bed#: ICU 11-01 Encounter: [id number]  Primary Care Provider: No primary care provider on file  Date and time admitted to hospital: 12/4/2022  7:07 AM    Inpatient consult to Internal Medicine  Consult performed by: Iam Arriaga DO  Consult ordered by: BARBIE Eckert          * Sigmoid diverticulitis  Assessment & Plan  Postop day 8 laparoscopic hand-assisted sigmoid resection with transverse loop colostomy    -Pain control as per primary  -Soft surgical diet  -PT/OT  -Ostomy care    Septic shock [facility])  Assessment & Plan  Patient developed postoperative septic shock secondary to feculent peritonitis   Received postoperative 7-day course of Zosyn    -Continue to monitor vitals and WBCs  -Continue to monitor off antibiotics    Acute respiratory failure with hypoxia [facility])  Assessment & Plan  Likely secondary to vascular congestion and postoperative atelectasis  Extubated on 12/7  Currently on 3 L nasal cannula    -Continue Lasix 20 mg IV daily if patent and stable tomorrow consider increasing Lasix dose to 40 mg IV  -Titrate oxygen as tolerated  -Continue Xopenex and Pulmicort  -Pulmonary hygiene    Encephalopathy  Assessment & Plan  Patient's mentation appears to be at baseline    -Continue to monitor    Anemia  Assessment & Plan  Likely secondary to surgical intervention    -Continue to monitor for signs of bleeding    Asthma  Assessment & Plan  Diagnosed 2016  Follows with pulmonology  Prescribed Dulera and albuterol as outpatient    -Continue Xopenex and Pulmicort    Hypertension  Assessment & Plan  -Continue home lisinopril, amlodipine, and nebivolol    GERD (gastroesophageal reflux disease)  Assessment & Plan  - Continue PPI      VTE Prophylaxis: VTE Score: 9 High Risk (Score >/= 5) - Pharmacological DVT Prophylaxis Ordered: enoxaparin (Lovenox)   Sequential Compression Devices Ordered  Recommendations for Discharge:  · Titrated off oxygen    Counseling / Coordination of Care Time: 30 minutes Greater than 50% of total time spent on patient counseling and coordination of care  Collaboration of Care: Were Recommendations Directly Discussed with Primary Treatment Team? Yes    History of Present Illness:  Zechariah Mendoza is a 58 y o  female who is originally admitted to the general surgery service due to sigmoid diverticulitis  We are consulted for assistance with medical management  Patient was initially admitted for sigmoid diverticulitis, underwent laparoscopic sigmoid resection on 12/5  Postoperative state was complicated with shock secondary to perforation  Patient completed of Zosyn  Today, patient requesting medication change so she can have pain meds approximately 30 minutes before attempting to get out of bed  Review of Systems:  Review of Systems   Constitutional: Positive for fatigue  HENT: Negative  Eyes: Negative  Respiratory: Positive for shortness of breath  Gastrointestinal: Positive for abdominal pain  Genitourinary: Negative  Musculoskeletal: Negative  Skin: Negative  Neurological: Negative  Hematological: Negative  Psychiatric/Behavioral: Negative  Past Medical and Surgical History:   Past Medical History:   Diagnosis Date   • Asthma    • Bronchiectasis ([address] )    • Bronchomalacia    • GERD (gastroesophageal reflux disease)    • Hiatal hernia        Past Surgical History:   Procedure Laterality Date   • APPENDECTOMY     • CHOLECYSTECTOMY     • COLON SIGMOID RESECTION LAPAROSCOPIC N/A 12/5/2022    Procedure: RESECTION COLON SIGMOID LAPAROSCOPIC HAND-ASSISTED, diverting transverse loop colostomy;  Surgeon: Arturo Lacey MD;  Location: [facility];  Service: General   • HYSTERECTOMY     • KNEE SURGERY Left        Meds/Allergies:  all medications and allergies reviewed    Allergies:    Allergies   Allergen Reactions   • Dilaudid [Hydromorphone] Vomiting   • Doxycycline Vomiting   • Hydrochlorothiazide Vomiting   • Sulfa Antibiotics Vomiting       Social History:  Marital Status: /Civil Union  Substance Use History:   Social History     Substance and Sexual Activity   Alcohol Use Not Currently     Social History     Tobacco Use   Smoking Status Never   Smokeless Tobacco Never     Social History     Substance and Sexual Activity   Drug Use Not Currently       Family History:  History reviewed  No pertinent family history  Physical Exam:   Vitals:   Blood Pressure: 117/56 (12/13/22 1600)  Pulse: 65 (12/13/22 1600)  Temperature: 99 5 °F (37 5 °C) (12/13/22 1600)  Temp Source: Tympanic (12/13/22 1600)  Respirations: 18 (12/13/22 1600)  Height: 5' 5" (165 1 cm) (12/04/22 1231)  Weight - Scale: 82 7 kg (182 lb 5 1 oz) (12/13/22 0600)  SpO2: 92 % (12/13/22 1600)    Physical Exam  HENT:      Head: Normocephalic  Cardiovascular:      Rate and Rhythm: Normal rate and regular rhythm  Pulses: Normal pulses  Heart sounds: Normal heart sounds  Pulmonary:      Effort: Pulmonary effort is normal    Abdominal:      General: Abdomen is flat  Tenderness: There is abdominal tenderness  Comments: Ostomy bag in place   Musculoskeletal:         General: Normal range of motion  Cervical back: Normal range of motion  Skin:     General: Skin is warm and dry  Neurological:      General: No focal deficit present  Mental Status: She is alert and oriented to person, place, and time  Mental status is at baseline  Psychiatric:         Mood and Affect: Mood normal          Behavior: Behavior normal          Thought Content:  Thought content normal          Judgment: Judgment normal           Additional Data:   Lab Results:    Results from last 7 days   Lab Units 12/13/22  0523 12/11/22  0542 12/10/22  0507   WBC Thousand/uL 8 82   < > 8 36   HEMOGLOBIN g/dL 9 9*   < > 9 7*   HEMATOCRIT % 30 7*   < > 30 5*   PLATELETS Thousands/uL 322   < > 187   BANDS PCT %  --   --  5   LYMPHO PCT %  --   --  17   MONO PCT %  --   --  2*   EOS PCT %  --   --  1    < > = values in this interval not displayed  Results from last 7 days   Lab Units 12/13/22  0523 12/12/22  0509 12/11/22  0542   SODIUM mmol/L 138   < > 136   POTASSIUM mmol/L 3 7   < > 4 2   CHLORIDE mmol/L 104   < > 102   CO2 mmol/L 26   < > 26   BUN mg/dL 16   < > 20   CREATININE mg/dL 0 57*   < > 0 61   ANION GAP mmol/L 8   < > 8   CALCIUM mg/dL 8 6   < > 8 5   ALBUMIN g/dL  --   --  2 7*   TOTAL BILIRUBIN mg/dL  --   --  0 51   ALK PHOS U/L  --   --  83   ALT U/L  --   --  12   AST U/L  --   --  20   GLUCOSE RANDOM mg/dL 106   < > 82    < > = values in this interval not displayed  No results found for: HGBA1C  Results from last 7 days   Lab Units 12/13/22  1148 12/13/22  0526 12/12/22  2358 12/12/22  1722 12/12/22  1246 12/12/22  0545 12/12/22  0027 12/11/22  1819 12/11/22  1223 12/11/22  0545 12/11/22  0011 12/10/22  1746   POC GLUCOSE mg/dl 121 111 128 132 121 126 175* 144* 145* 79 88 109     Results from last 7 days   Lab Units 12/12/22  0509 12/11/22  0542 12/10/22  0507 12/09/22  0603 12/08/22  0522   PROCALCITONIN ng/ml 1 47* 2 06* 3 09* 4 41* 7 12*       Imaging: No pertinent imaging reviewed  XR chest portable ICU   Final Result by Vladimir Oliva MD (12/07 4830)      Possible mild congestive changes and small effusions  Workstation performed: [id number]         XR chest portable ICU   Final Result by Vladimir Oliva MD (12/07 1179)   ET tube at the annamarie  Small right effusion suggested  Workstation performed: [id number]         XR chest portable ICU   Final Result by Jeannine Wallace MD (12/06 1113)      1  Endotracheal tube positioning appropriate, 2 7 cm above the nanamarie   2  Shallow depth of inspiration with crowding of bronchovascular markings or atelectasis   3   Nasogastric tube side port at level of left hemidiaphragm, consider advancement to assure the side port is within the stomach        The examination demonstrates a significant  finding and was documented as such in [facility] for liaison and referring practitioner notification  Workstation performed: [id number]         XR chest portable ICU   Final Result by Nicole Tijerina DO (12/05 5291)      No consolidation or overt pulmonary edema  Workstation performed: [id number]         CT abdomen pelvis with contrast   Final Result by Farrah Alva DO (12/04 2592)      Acute sigmoid diverticulitis involving a large sigmoid diverticulum  There is a small amount of pneumoperitoneum suggesting small perforation  No abscess identified  The study was marked in [facility] for immediate notification  Workstation performed: [id number]             EKG, Pathology, and Other Studies Reviewed on Admission:   · EKG: Normal sinus rhythm    ** Please Note: This note may have been constructed using a voice recognition system   **

## 2022-12-14 LAB
ANION GAP SERPL CALCULATED.3IONS-SCNC: 7 MMOL/L (ref 4–13)
BASOPHILS # BLD AUTO: 0.02 THOUSANDS/ÂΜL (ref 0–0.1)
BASOPHILS NFR BLD AUTO: 0 % (ref 0–1)
BNP SERPL-MCNC: 39 PG/ML (ref 0–100)
BUN SERPL-MCNC: 20 MG/DL (ref 5–25)
CALCIUM SERPL-MCNC: 8.7 MG/DL (ref 8.4–10.2)
CHLORIDE SERPL-SCNC: 101 MMOL/L (ref 96–108)
CO2 SERPL-SCNC: 28 MMOL/L (ref 21–32)
CREAT SERPL-MCNC: 0.66 MG/DL (ref 0.6–1.3)
EOSINOPHIL # BLD AUTO: 0.06 THOUSAND/ÂΜL (ref 0–0.61)
EOSINOPHIL NFR BLD AUTO: 1 % (ref 0–6)
ERYTHROCYTE [DISTWIDTH] IN BLOOD BY AUTOMATED COUNT: 13.2 % (ref 11.6–15.1)
GFR SERPL CREATININE-BSD FRML MDRD: 94 ML/MIN/1.73SQ M
GLUCOSE SERPL-MCNC: 130 MG/DL (ref 65–140)
GLUCOSE SERPL-MCNC: 97 MG/DL (ref 65–140)
HCT VFR BLD AUTO: 30.3 % (ref 34.8–46.1)
HGB BLD-MCNC: 9.7 G/DL (ref 11.5–15.4)
IMM GRANULOCYTES # BLD AUTO: 0.17 THOUSAND/UL (ref 0–0.2)
IMM GRANULOCYTES NFR BLD AUTO: 2 % (ref 0–2)
LYMPHOCYTES # BLD AUTO: 0.92 THOUSANDS/ÂΜL (ref 0.6–4.47)
LYMPHOCYTES NFR BLD AUTO: 10 % (ref 14–44)
MCH RBC QN AUTO: 31 PG (ref 26.8–34.3)
MCHC RBC AUTO-ENTMCNC: 32 G/DL (ref 31.4–37.4)
MCV RBC AUTO: 97 FL (ref 82–98)
MONOCYTES # BLD AUTO: 0.73 THOUSAND/ÂΜL (ref 0.17–1.22)
MONOCYTES NFR BLD AUTO: 8 % (ref 4–12)
NEUTROPHILS # BLD AUTO: 7.04 THOUSANDS/ÂΜL (ref 1.85–7.62)
NEUTS SEG NFR BLD AUTO: 79 % (ref 43–75)
NRBC BLD AUTO-RTO: 0 /100 WBCS
PLATELET # BLD AUTO: 408 THOUSANDS/UL (ref 149–390)
PMV BLD AUTO: 9.8 FL (ref 8.9–12.7)
POTASSIUM SERPL-SCNC: 3.7 MMOL/L (ref 3.5–5.3)
RBC # BLD AUTO: 3.13 MILLION/UL (ref 3.81–5.12)
SODIUM SERPL-SCNC: 136 MMOL/L (ref 135–147)
WBC # BLD AUTO: 8.94 THOUSAND/UL (ref 4.31–10.16)

## 2022-12-14 RX ORDER — FUROSEMIDE 10 MG/ML
20 INJECTION INTRAMUSCULAR; INTRAVENOUS ONCE
Status: DISCONTINUED | OUTPATIENT
Start: 2022-12-14 | End: 2022-12-15

## 2022-12-14 RX ORDER — FUROSEMIDE 10 MG/ML
20 INJECTION INTRAMUSCULAR; INTRAVENOUS ONCE
Status: DISCONTINUED | OUTPATIENT
Start: 2022-12-14 | End: 2022-12-14

## 2022-12-14 RX ADMIN — KETOROLAC TROMETHAMINE 15 MG: 30 INJECTION, SOLUTION INTRAMUSCULAR; INTRAVENOUS at 13:37

## 2022-12-14 RX ADMIN — KETOROLAC TROMETHAMINE 15 MG: 30 INJECTION, SOLUTION INTRAMUSCULAR; INTRAVENOUS at 00:26

## 2022-12-14 RX ADMIN — ISODIUM CHLORIDE 3 ML: 0.03 SOLUTION RESPIRATORY (INHALATION) at 13:13

## 2022-12-14 RX ADMIN — ISODIUM CHLORIDE 3 ML: 0.03 SOLUTION RESPIRATORY (INHALATION) at 19:54

## 2022-12-14 RX ADMIN — LISINOPRIL 40 MG: 20 TABLET ORAL at 09:42

## 2022-12-14 RX ADMIN — ISODIUM CHLORIDE 3 ML: 0.03 SOLUTION RESPIRATORY (INHALATION) at 07:25

## 2022-12-14 RX ADMIN — ACETAMINOPHEN 975 MG: 325 TABLET ORAL at 21:50

## 2022-12-14 RX ADMIN — LEVALBUTEROL 1.25 MG: 1.25 SOLUTION, CONCENTRATE RESPIRATORY (INHALATION) at 07:25

## 2022-12-14 RX ADMIN — PANTOPRAZOLE SODIUM 40 MG: 40 INJECTION, POWDER, LYOPHILIZED, FOR SOLUTION INTRAVENOUS at 09:42

## 2022-12-14 RX ADMIN — PANTOPRAZOLE SODIUM 40 MG: 40 INJECTION, POWDER, LYOPHILIZED, FOR SOLUTION INTRAVENOUS at 21:51

## 2022-12-14 RX ADMIN — FUROSEMIDE 20 MG: 10 INJECTION, SOLUTION INTRAMUSCULAR; INTRAVENOUS at 09:42

## 2022-12-14 RX ADMIN — BUDESONIDE 0.5 MG: 0.5 INHALANT ORAL at 07:25

## 2022-12-14 RX ADMIN — BUDESONIDE 0.5 MG: 0.5 INHALANT ORAL at 19:54

## 2022-12-14 RX ADMIN — LEVALBUTEROL 1.25 MG: 1.25 SOLUTION, CONCENTRATE RESPIRATORY (INHALATION) at 13:13

## 2022-12-14 RX ADMIN — AMLODIPINE BESYLATE 10 MG: 10 TABLET ORAL at 09:41

## 2022-12-14 RX ADMIN — ENOXAPARIN SODIUM 40 MG: 100 INJECTION SUBCUTANEOUS at 09:37

## 2022-12-14 RX ADMIN — NEBIVOLOL 5 MG: 5 TABLET ORAL at 09:40

## 2022-12-14 RX ADMIN — LEVALBUTEROL 1.25 MG: 1.25 SOLUTION, CONCENTRATE RESPIRATORY (INHALATION) at 19:54

## 2022-12-14 RX ADMIN — ACETAMINOPHEN 975 MG: 325 TABLET ORAL at 13:15

## 2022-12-14 RX ADMIN — KETOROLAC TROMETHAMINE 15 MG: 30 INJECTION, SOLUTION INTRAMUSCULAR; INTRAVENOUS at 07:04

## 2022-12-14 RX ADMIN — ACETAMINOPHEN 975 MG: 325 TABLET ORAL at 09:39

## 2022-12-14 RX ADMIN — ONDANSETRON 4 MG: 2 INJECTION INTRAMUSCULAR; INTRAVENOUS at 09:50

## 2022-12-14 RX ADMIN — Medication 6 MG: at 21:51

## 2022-12-14 RX ADMIN — KETOROLAC TROMETHAMINE 15 MG: 30 INJECTION, SOLUTION INTRAMUSCULAR; INTRAVENOUS at 18:22

## 2022-12-14 NOTE — PROGRESS NOTES
Progress Note - General Surgery   Lorri Dugan 58 y o  female MRN: [id number]  Unit/Bed#: ICU 11-01 Encounter: [id number]    Assessment:  77-year-old female with past medical history significant for asthma and tracheobronchomalacia presenting with complicated sigmoid diverticulitis  Patient meeting SIRS/sepsis criteria 12 hours after admission as evidenced by fever, tachypnea, hypotension, leukopenia  HD#11 septic shock secondary to feculent peritonitis  POD#9 s/p laparoscopic hand-assisted sigmoid resection with transverse loop colostomy   Intraoperative findings of perforated feculent diverticulitis with large perforation at mid sigmoid colon  - AVSS on 2 L via nasal cannula  - UO 1 5 L  - Stool 50 cc, brown paste stool present in bag  - BBC 8 94  - Hgb 9 7  - CR 0 66  - Electrolytes unremarkable  - UA unremarkable, 0-1 WBC  - BC x2 with no growth  - Body fluid culture and gram stain positive for E  coli and Enterobacter    Plan:  Surg soft diet with ensures as tolerated, monitor for increasing abdominal pain, nausea, vomiting  Continue to trend WBC/fever curve, antibiotic course completed 12/12  Continue to follow clinically if repeat imaging needed  Serial abdominal exams  Scheduled Toradol  Antiemetics analgesics as needed   Medical management per SLIM, apprecaite recommendations  Strict I/O  Encourage out of bed to chair and ambulation  Ostomy teaching  Wean O2 as able  CM for dispo planning    Subjective/Objective   Subjective: No acute overnight events  Patient overall doing well this morning with no acute complaints  Notes mild improvement in abdominal pain yesterday afternoon  Continues to have output from ostomy  Denies chest pain, palpitations, shortness of breath  Denies nausea, vomiting  Denies fever, chills  Denies additional urinary symptoms  Objective:   Blood pressure 108/59, pulse 73, temperature 98 8 °F (37 1 °C), temperature source Oral, resp   rate 14, height 5' 5" (1 651 m), weight 66 4 kg (146 lb 6 2 oz), SpO2 92 %  ,Body mass index is 24 36 kg/m²  Intake/Output Summary (Last 24 hours) at 12/14/2022 0829  Last data filed at 12/14/2022 0600  Gross per 24 hour   Intake 430 ml   Output 1105 ml   Net -675 ml       Invasive Devices     Peripheral Intravenous Line  Duration           Peripheral IV 12/12/22 Left;Proximal;Ventral (anterior) Forearm 1 day    Peripheral IV 12/13/22 Distal;Left;Upper;Ventral (anterior) Arm 1 day          Drain  Duration           Colostomy RLQ 9 days    External Urinary Catheter 4 days              Physical Exam  Vitals and nursing note reviewed  Constitutional:       General: She is not in acute distress  Appearance: Normal appearance  She is normal weight  She is not ill-appearing or toxic-appearing  HENT:      Head: Normocephalic and atraumatic  Cardiovascular:      Rate and Rhythm: Normal rate and regular rhythm  Pulses: Normal pulses  Heart sounds: Normal heart sounds  Pulmonary:      Effort: Pulmonary effort is normal    Abdominal:      General: The ostomy site is clean  Bowel sounds are normal  There is distension  Palpations: Abdomen is soft  Tenderness: There is generalized abdominal tenderness  There is no guarding or rebound  Comments: Surgical sites are clean, dry, intact with surrounding erythema, no fluctuance purulent drainage noted; lower right lower quadrant surgical site with skin breakdown   Musculoskeletal:         General: Normal range of motion  Skin:     General: Skin is warm and dry  Findings: Erythema present  Neurological:      General: No focal deficit present  Mental Status: She is alert and oriented to person, place, and time  Psychiatric:         Mood and Affect: Mood normal          Behavior: Behavior normal          Thought Content: Thought content normal        Lab, Imaging and other studies:  I have personally reviewed pertinent lab results      CBC:   Lab Results   Component Value Date    WBC 8 94 12/14/2022    HGB 9 7 (L) 12/14/2022    HCT 30 3 (L) 12/14/2022    MCV 97 12/14/2022     (H) 12/14/2022    MCH 31 0 12/14/2022    MCHC 32 0 12/14/2022    RDW 13 2 12/14/2022    MPV 9 8 12/14/2022    NRBC 0 12/14/2022     CMP:   Lab Results   Component Value Date    SODIUM 136 12/14/2022    K 3 7 12/14/2022     12/14/2022    CO2 28 12/14/2022    BUN 20 12/14/2022    CREATININE 0 66 12/14/2022    CALCIUM 8 7 12/14/2022    EGFR 94 12/14/2022     VTE Pharmacologic Prophylaxis: Enoxaparin (Lovenox)  VTE Mechanical Prophylaxis: sequential compression device    Phoebe Dillard PA-C

## 2022-12-14 NOTE — PLAN OF CARE
Problem: PHYSICAL THERAPY ADULT  Goal: Performs mobility at highest level of function for planned discharge setting  See evaluation for individualized goals  Description: Treatment/Interventions: Functional transfer training, LE strengthening/ROM, Elevations, Therapeutic exercise, Endurance training, Patient/family training, Equipment eval/education, Bed mobility, Gait training, Compensatory technique education, OT, Spoke to nursing          See flowsheet documentation for full assessment, interventions and recommendations  12/14/2022 1108 by Dorothy Monteiro  Outcome: Progressing  Note: Prognosis: Fair  Problem List: Decreased strength, Decreased endurance, Impaired balance, Decreased mobility, Decreased coordination, Pain, Decreased skin integrity, Obesity  Assessment: Pt seen for PT treatment session on this day consisting of ther act focusing on improving functional activity performance, transfers, and static and dynamic sitting balance; gait training over level surfaces to improve safety and independence w/ ambulation; and ther ex focusing on improving LE muscle strength and overall tolerance to activity  Since last session, pt has made good progress in terms of improving independence w/ transfers and ambulation, improving balance, and tolerance to activity evidenced by performance of ther ex program and ambulation  Pertinent barriers during this treatment session include management of ostomy bag and decreased SpO2 requiring frequent rest breaks  Current goals and POC remain appropriate, pt continues to have rehab potential and make progress towards achieving STGs  Pt prognosis for achieving STGs is fair pending pt hospitalization/medical status improvements, and indicated by motivation, participation and engagement in therapy sessions, and improvements in functional performance to date  Pt limited this session d/t fatigue  PT recommends post acute rehabilitation services upon discharge   Pt continues to be functioning below baseline level, and remains limited 2* factors listed above  PT will continue to see patient during current hospitalization in order to address deficits listed above and provide interventions consistent with POC in effort to achieve STGs  Barriers to Discharge: Inaccessible home environment, Decreased caregiver support     PT Discharge Recommendation: Post acute rehabilitation services (consider change to home with home health rehabilitation if patient continues to progress)    See flowsheet documentation for full assessment

## 2022-12-14 NOTE — OCCUPATIONAL THERAPY NOTE
12/14/22 0925   OT Last Visit   OT Visit Date 12/14/22   Note Type   Note Type Treatment   Pain Assessment   Pain Assessment Tool 0-10   Pain Score No Pain   Restrictions/Precautions   Weight Bearing Precautions Per Order No   Other Precautions Bed Alarm; Chair Alarm;Multiple lines;Telemetry;O2;Fall Risk;Pain  (3L O2, colostomy bag)   ADL   Where Assessed Supine, bed   Grooming Assistance 5  Supervision/Setup   Grooming Deficit Setup;Verbal cueing;Supervision/safety; Increased time to complete;Wash/dry hands; Wash/dry face; Teeth care   UB Bathing Assistance 5  Supervision/Setup   UB Bathing Deficit Setup;Verbal cueing;Supervision/safety; Increased time to complete   LB Bathing Assistance 3  Moderate Assistance   LB Bathing Deficit Setup;Verbal cueing;Supervision/safety; Increased time to complete;Right lower leg including foot; Left lower leg including foot; Buttocks   UB Dressing Assistance 4  Minimal Assistance   UB Dressing Deficit Setup;Verbal cueing;Supervision/safety; Increased time to complete; Thread RUE; Thread LUE; Fasteners   LB Dressing Deficit Don/doff R sock; Don/doff L sock   LB Dressing Comments Dep doff/don socks   Bed Mobility   Supine to Sit 4  Minimal assistance   Additional items HOB elevated; Bedrails; Increased time required;Verbal cues; Assist x 2;LE management   Transfers   Sit to Stand 4  Minimal assistance   Additional items Assist x 2;Bedrails; Increased time required;Verbal cues   Stand to Sit 4  Minimal assistance   Additional items Assist x 2;Armrests; Increased time required;Verbal cues   Stand pivot 4  Minimal assistance   Additional items Assist x 2; Increased time required;Verbal cues   Additional Comments Pt reports nausea upon sitting EOB  Functional Mobility   Functional Mobility 4  Minimal assistance   Additional Comments Pt ambulates short distance from bed to recliner with Min A x 2 and VCs for safety     Additional items Rolling walker   Therapeutic Excerise-Strength   UE Strength Yes Right Upper Extremity- Strength   R Shoulder Flexion; Extension  (scapular pro/ret)   R Elbow Elbow extension;Elbow flexion   R Wrist   (wrist rotation)   R Weight/Reps/Sets 1# weight x 20 reps   Left Upper Extremity-Strength   L Weights/Reps/Sets TE same as R UE above   Cognition   Overall Cognitive Status WFL   Arousal/Participation Alert; Responsive; Cooperative   Attention Attends with cues to redirect   Orientation Level Oriented X4   Memory Within functional limits   Following Commands Follows all commands and directions without difficulty   Comments Pt agreeable to OT treatment   Activity Tolerance   Activity Tolerance Patient tolerated treatment well   Assessment   Assessment Patient participated in Skilled OT session this date with interventions consisting of functional transfer training, ADL re training with the use of correct body mechnaics, Energy Conservation techniques, therapeutic exercise to: increase functional use of BUEs, increase BUE muscle strength  and increase dynamic sit/ stand balance during functional activity    Patient agreeable to OT treatment session, upon arrival patient was found supine in bed, alert, responsive  and in no apparent distress  Patient performs self care ADL as detailed in flow sheet  Patient transfers supine to sit EOB with Min A x 2 and then sits unsupported x 15 mins with no LOB noted, however, became slightly nauseated (passed with brief rest period)  Patient then transfers sit to stand and ambulates short distance from bed to recliner with Min A x 2 and VCs for safety/hand placement  Once seated, patient then performs UB TE using 1# weight as detailed in flow sheet  Patient had no c o of pain  Session ended with patient seated OOB to recliner, all needs met, and call bell within reach  In comparison to previous session, patient with improvements in UB strength, endurance, functional transfers, and self care ADL performance   Patient requiring frequent re direction, verbal cues for safety, verbal cues for correct technique, verbal cues for pacing thru activity steps and frequent rest periods  Patient performance  demonstrated good carryover of learned techniques and strategies to facilitate safety during functional tasks  Patient continues to be functioning below baseline level, occupational performance remains limited secondary to factors listed above and increased risk for falls and injury  From OT standpoint, recommendation at time of d/c would be Short Term Rehab  Patient to benefit from continued Occupational Therapy treatment while in the hospital to address deficits as defined above and maximize level of functional independence with ADLs and functional mobility in order to return to [facility]  Plan   Treatment Interventions ADL retraining;Functional transfer training;UE strengthening/ROM; Energy conservation   Goal Expiration Date 12/19/22   OT Treatment Day 2   OT Frequency 3-5x/wk   Recommendation   OT Discharge Recommendation Post acute rehabilitation services   AM-PAC Daily Activity Inpatient   Lower Body Dressing 1   Bathing 2   Toileting 1   Upper Body Dressing 3   Grooming 3   Eating 3   Daily Activity Raw Score 13   Daily Activity Standardized Score (Calc for Raw Score >=11) 32 03   AM-PAC Applied Cognition Inpatient   Following a Speech/Presentation 4   Understanding Ordinary Conversation 4   Taking Medications 3   Remembering Where Things Are Placed or Put Away 4   Remembering List of 4-5 Errands 4   Taking Care of Complicated Tasks 4   Applied Cognition Raw Score 23   Applied Cognition Standardized Score 53 08   Avni Melendez

## 2022-12-14 NOTE — NURSING NOTE
Transfer to [facility] [phone] via wheelchair Report to Radhika Julian

## 2022-12-14 NOTE — PLAN OF CARE
Problem: OCCUPATIONAL THERAPY ADULT  Goal: Performs self-care activities at highest level of function for planned discharge setting  See evaluation for individualized goals  Description: Treatment Interventions: ADL retraining, Functional transfer training, UE strengthening/ROM, Endurance training, Patient/family training, Equipment evaluation/education, Energy conservation, Activityengagement, Compensatory technique education          See flowsheet documentation for full assessment, interventions and recommendations  Outcome: Progressing  Note: Limitation: Decreased ADL status, Decreased UE strength, Decreased Safe judgement during ADL, Decreased endurance, Decreased self-care trans, Decreased high-level ADLs  Prognosis: Good  Assessment: Patient participated in Skilled OT session this date with interventions consisting of functional transfer training, ADL re training with the use of correct body mechnaics, Energy Conservation techniques, therapeutic exercise to: increase functional use of BUEs, increase BUE muscle strength  and increase dynamic sit/ stand balance during functional activity    Patient agreeable to OT treatment session, upon arrival patient was found supine in bed, alert, responsive  and in no apparent distress  Patient performs self care ADL as detailed in flow sheet  Patient transfers supine to sit EOB with Min A x 2 and then sits unsupported x 15 mins with no LOB noted, however, became slightly nauseated (passed with brief rest period)  Patient then transfers sit to stand and ambulates short distance from bed to recliner with Min A x 2 and VCs for safety/hand placement  Once seated, patient then performs UB TE using 1# weight as detailed in flow sheet  Patient had no c o of pain  Session ended with patient seated OOB to recliner, all needs met, and call bell within reach   In comparison to previous session, patient with improvements in UB strength, endurance, functional transfers, and self care ADL performance  Patient requiring frequent re direction, verbal cues for safety, verbal cues for correct technique, verbal cues for pacing thru activity steps and frequent rest periods  Patient performance  demonstrated good carryover of learned techniques and strategies to facilitate safety during functional tasks  Patient continues to be functioning below baseline level, occupational performance remains limited secondary to factors listed above and increased risk for falls and injury  From OT standpoint, recommendation at time of d/c would be Short Term Rehab  Patient to benefit from continued Occupational Therapy treatment while in the hospital to address deficits as defined above and maximize level of functional independence with ADLs and functional mobility in order to return to [facility]       OT Discharge Recommendation: Post acute rehabilitation services

## 2022-12-14 NOTE — PHYSICAL THERAPY NOTE
Physical Therapy Treatment Note    Treatment Start Time: 9:30  Treatment End Time: 10:32  Total Treatment Time: 62 minutes       12/14/22 0932   PT Last Visit   PT Visit Date 12/14/22   Pain Assessment   Pain Assessment Tool 0-10   Pain Score No Pain  (at rest,)   Restrictions/Precautions   Weight Bearing Precautions Per Order No   Other Precautions Bed Alarm; Chair Alarm;Multiple lines;Telemetry;O2;Fall Risk;Pain  (3L O2, colostomy bag)   General   Family/Caregiver Present No   Cognition   Overall Cognitive Status WFL   Attention Attends with cues to redirect   Orientation Level Oriented X4   Memory Within functional limits   Following Commands Follows all commands and directions without difficulty   Comments Pt eager to participate in therapy session   Subjective   Subjective "I want to get out of bed"   Bed Mobility   Supine to Sit 4  Minimal assistance   Additional items HOB elevated; Bedrails; Increased time required;Verbal cues;LE management;Assist x 2   Sit to Supine   (Not assessed, pt seated OOB in recliner upon conclusion of session)   Additional Comments Pt denies dizziness/lightheadedness w/ bed mobility   Transfers   Sit to Stand 4  Minimal assistance   Additional items Assist x 2; Increased time required;Verbal cues  (VC for hand placement)   Stand to Sit 4  Minimal assistance   Additional items Assist x 2;Armrests; Increased time required;Verbal cues  (VC for proper hand placement)   Stand pivot 4  Minimal assistance   Additional items Assist x 2; Increased time required;Verbal cues; Other  (RW usage)   Additional Comments Pt reports nausea upon completion of bed mobility and transfers, resolves following resting 2 minutes in recliner   Ambulation/Elevation   Gait pattern Improper Weight shift; Antalgic;Narrow DEBBIE; Forward Flexion; Short stride; Step to;Excessively slow   Gait Assistance 4  Minimal assist   Additional items Assist x 2;Verbal cues  (VC for sequencing)   Assistive Device Rolling walker   Distance 3' for transfer to [facility] 33 Not tested   Ambulation/Elevation Additional Comments Pt denies dizziness/lightheadedness w/ ambulation   Balance   Static Sitting Fair +   Dynamic Sitting Fair   Static Standing Fair -   Dynamic Standing Poor +   Ambulatory Poor +   Endurance Deficit   Endurance Deficit Yes   Endurance Deficit Description increased HERNANDEZ following activity   Activity Tolerance   Activity Tolerance Patient limited by fatigue;Treatment limited secondary to medical complications (Comment)  (decreased SpO2)   Medical Staff Made Aware Coordination of care provided w/ Genoveva Jeter  D/t medical complexity of patient's case, two therapists were required to provide skilled intervention  Nurse Made Aware JOANA Wilson   Assessment   Prognosis Fair   Problem List Decreased strength;Decreased endurance; Impaired balance;Decreased mobility; Decreased coordination;Pain;Decreased skin integrity;Obesity   Assessment Pt seen for PT treatment session on this day consisting of ther act focusing on improving functional activity performance, transfers, and static and dynamic sitting balance; gait training over level surfaces to improve safety and independence w/ ambulation; and ther ex focusing on improving LE muscle strength and overall tolerance to activity  Since last session, pt has made good progress in terms of improving independence w/ transfers and ambulation, improving balance, and tolerance to activity evidenced by performance of ther ex program and ambulation  Pertinent barriers during this treatment session include management of ostomy bag and decreased SpO2 requiring frequent rest breaks  Current goals and POC remain appropriate, pt continues to have rehab potential and make progress towards achieving STGs   Pt prognosis for achieving STGs is fair pending pt hospitalization/medical status improvements, and indicated by motivation, participation and engagement in therapy sessions, and improvements in functional performance to date  Pt limited this session d/t fatigue  PT recommends post acute rehabilitation services upon discharge  Pt continues to be functioning below baseline level, and remains limited 2* factors listed above  PT will continue to see patient during current hospitalization in order to address deficits listed above and provide interventions consistent with POC in effort to achieve STGs  Barriers to Discharge Inaccessible home environment;Decreased caregiver support   Goals   Patient Goals "To get out of bed"   STG Expiration Date 12/19/22   Short Term Goal #1 Goals remain appropriate   PT Treatment Day 2   Plan   Treatment/Interventions Functional transfer training;LE strengthening/ROM; Elevations; Therapeutic exercise; Endurance training;Patient/family training;Equipment eval/education; Bed mobility;Gait training; Compensatory technique education;Spoke to nursing;OT   PT Frequency 3-5x/wk   Recommendation   PT Discharge Recommendation Post acute rehabilitation services  (consider change to home with home health rehabilitation if patient continues to progress)   Equipment Recommended Members[employer] walker   Change/add to [employer]? No   Additional Comments Pt raw score on [facility] Basic Mobility short form is 15/24, standardized score is 36 97  Pts at this level are likely to benefit from DC to home with home health rehabilitation, however please refer to therapist recommendation for safe planning     AM-PAC Basic Mobility Inpatient   Turning in Bed Without Bedrails 3   Lying on Back to Sitting on Edge of Flat Bed 3   Moving Bed to Chair 3   Standing Up From Chair 3   Walk in Room 2   Climb 3-5 Stairs 1   Basic Mobility Inpatient Raw Score 15   Basic Mobility Standardized Score 36 97   Highest Level Of Mobility   JH-HLM Goal 4: Move to chair/commode   JH-HLM Achieved 5: Stand (1 or more minutes)   Exercises   Knee AROM [employer] reps;AROM; Right;Left   Ankle Pumps Sitting;20 reps;AROM; Right;Left   Marching Sitting;20 reps;AROM; Right;Left   Balance training  Pt required frequent rest breaks throughout session to maintain SpO2 level  SpO2 decreased to 85% multiple times during session but returned to 89-90% w/ rest and instruction in deep breathing   End of Consult   Patient Position at End of Consult Bedside chair; All needs within reach       SHUKRI RUSSELL AT [facility]

## 2022-12-14 NOTE — WOUND OSTOMY CARE
Progress Note- Ostomy  Candice Gist 58 y o  female  [id number]  ICU 11-ICU 11-01    Assessment:  58year old patient POD#7 due for pouch change and removal of bar  Surgical team at the bedside  Patient is awake, alert and oriented   at bedside to observe pouch change, peristomal care and application of new pouch  Reinforced ostomy teaching with , and patient was more alert and receptive to learning  Patient's ostomy pouch is functional for loose brown stool  Stoma is deep red, budded, mucocutaneous junction intact, peristomal skin intact    Proximal to stoma is crease, does not affect pouch adhesion at this time  Ostomy teaching with  and patient included:  Burping pouch  Emptying pouch when 1/3-1/2 full of gas or stool  How to empty pouch, cleanse drainage end and closing pouch  Removal of pouch using push pull method  Appearance of stoma, normal and abnormal-call physician if color change to brown, black or purple  Call physician for continuous bleeding from stoma, scant amount when cleansing is normal, continuous bleeding, apply pressure and call physician  Cleansing the peristomal skin with warm water and washcloth  How to measure stoma with measuring tool, cutting to fit one piece pouch, application of barrier film, and application of new pouch  Discussed showering with and without pouch, drying back of pouch with lowest setting of hair dryer  Changing pouch first thing in AM on empty stomach  Change with any signs of leakage  Discussed one and two piece pouches and application of both  Discussed clothing choices and use of pillow or blanket in the car to protect stoma with pillow or blanket  Empty pouch in the toilet, place tissue in the toilet to prevent splashing of stool    Skin and Ostomy Care Plan:  1  Empty colostomy pouch when 1/3-1/2 full of stool or inflated with gas  Cleanse drainage end prior to closing pouch  Change pouch every 3 days and PRN with signs of leakage  Encourage patient to observe emptying pouch  2  Ehob offloading cushion to chair when OOB  3  Elevate heels off of bed with pillow to offload  4  Apply hydraguard to b/l heels, buttocks and sacrum BID and PRN  5  Turn and reposition patient Q2 hours     Wound 12/05/22 Abdomen N/A (Active)   Wound Image   12/09/22 1413   Wound Description Beefy red 12/13/22 2100   Devi-wound Assessment Clean;Dry; Intact 12/13/22 2100   Drainage Amount None 12/13/22 2100   Dressing Other (Comment) 12/13/22 2100   Dressing Status Clean;Dry; Intact 12/13/22 2100   Number of days: 8       Colostomy RLQ (Active)   Stomal Appliance 1 piece 12/13/22 1645   Stoma Assessment Pink;Red 12/13/22 1645   Stoma Shape Oval;Budded 12/13/22 1645   Peristomal Assessment Intact; Clean;Mucocutaneous intact 12/13/22 1645   Treatment Site care 12/13/22 1645   Output (mL) 50 mL 12/13/22 1645   Number of days: 8         Reviewed plan of care with primary RN Yamil Marquez  Recommendations written as orders  Wound care team to follow weekly while admitted  Questions or concerns [address] [facility] Nurse    Aye Kaye BSN, RN, Nyasia Krause

## 2022-12-15 LAB
ANION GAP SERPL CALCULATED.3IONS-SCNC: 7 MMOL/L (ref 4–13)
BASOPHILS # BLD AUTO: 0.03 THOUSANDS/ÂΜL (ref 0–0.1)
BASOPHILS NFR BLD AUTO: 0 % (ref 0–1)
BUN SERPL-MCNC: 25 MG/DL (ref 5–25)
CALCIUM SERPL-MCNC: 9.3 MG/DL (ref 8.4–10.2)
CHLORIDE SERPL-SCNC: 104 MMOL/L (ref 96–108)
CO2 SERPL-SCNC: 29 MMOL/L (ref 21–32)
CREAT SERPL-MCNC: 0.88 MG/DL (ref 0.6–1.3)
EOSINOPHIL # BLD AUTO: 0.08 THOUSAND/ÂΜL (ref 0–0.61)
EOSINOPHIL NFR BLD AUTO: 1 % (ref 0–6)
ERYTHROCYTE [DISTWIDTH] IN BLOOD BY AUTOMATED COUNT: 13.4 % (ref 11.6–15.1)
GFR SERPL CREATININE-BSD FRML MDRD: 70 ML/MIN/1.73SQ M
GLUCOSE SERPL-MCNC: 110 MG/DL (ref 65–140)
GLUCOSE SERPL-MCNC: 130 MG/DL (ref 65–140)
HCT VFR BLD AUTO: 30.1 % (ref 34.8–46.1)
HGB BLD-MCNC: 9.7 G/DL (ref 11.5–15.4)
IMM GRANULOCYTES # BLD AUTO: 0.21 THOUSAND/UL (ref 0–0.2)
IMM GRANULOCYTES NFR BLD AUTO: 2 % (ref 0–2)
LYMPHOCYTES # BLD AUTO: 0.91 THOUSANDS/ÂΜL (ref 0.6–4.47)
LYMPHOCYTES NFR BLD AUTO: 9 % (ref 14–44)
MCH RBC QN AUTO: 31.2 PG (ref 26.8–34.3)
MCHC RBC AUTO-ENTMCNC: 32.2 G/DL (ref 31.4–37.4)
MCV RBC AUTO: 97 FL (ref 82–98)
MONOCYTES # BLD AUTO: 0.85 THOUSAND/ÂΜL (ref 0.17–1.22)
MONOCYTES NFR BLD AUTO: 9 % (ref 4–12)
NEUTROPHILS # BLD AUTO: 7.56 THOUSANDS/ÂΜL (ref 1.85–7.62)
NEUTS SEG NFR BLD AUTO: 79 % (ref 43–75)
NRBC BLD AUTO-RTO: 0 /100 WBCS
PLATELET # BLD AUTO: 539 THOUSANDS/UL (ref 149–390)
PMV BLD AUTO: 9.8 FL (ref 8.9–12.7)
POTASSIUM SERPL-SCNC: 5.1 MMOL/L (ref 3.5–5.3)
RBC # BLD AUTO: 3.11 MILLION/UL (ref 3.81–5.12)
SODIUM SERPL-SCNC: 140 MMOL/L (ref 135–147)
WBC # BLD AUTO: 9.64 THOUSAND/UL (ref 4.31–10.16)

## 2022-12-15 RX ADMIN — PANTOPRAZOLE SODIUM 40 MG: 40 INJECTION, POWDER, LYOPHILIZED, FOR SOLUTION INTRAVENOUS at 09:28

## 2022-12-15 RX ADMIN — KETOROLAC TROMETHAMINE 15 MG: 30 INJECTION, SOLUTION INTRAMUSCULAR; INTRAVENOUS at 02:34

## 2022-12-15 RX ADMIN — ACETAMINOPHEN 975 MG: 325 TABLET ORAL at 20:00

## 2022-12-15 RX ADMIN — LEVALBUTEROL 1.25 MG: 1.25 SOLUTION, CONCENTRATE RESPIRATORY (INHALATION) at 19:14

## 2022-12-15 RX ADMIN — LEVALBUTEROL 1.25 MG: 1.25 SOLUTION, CONCENTRATE RESPIRATORY (INHALATION) at 13:13

## 2022-12-15 RX ADMIN — ISODIUM CHLORIDE 3 ML: 0.03 SOLUTION RESPIRATORY (INHALATION) at 19:14

## 2022-12-15 RX ADMIN — PANTOPRAZOLE SODIUM 40 MG: 40 INJECTION, POWDER, LYOPHILIZED, FOR SOLUTION INTRAVENOUS at 20:01

## 2022-12-15 RX ADMIN — KETOROLAC TROMETHAMINE 15 MG: 30 INJECTION, SOLUTION INTRAMUSCULAR; INTRAVENOUS at 20:01

## 2022-12-15 RX ADMIN — BUDESONIDE 0.5 MG: 0.5 INHALANT ORAL at 19:14

## 2022-12-15 RX ADMIN — TRAMADOL HYDROCHLORIDE 50 MG: 50 TABLET, COATED ORAL at 21:16

## 2022-12-15 RX ADMIN — KETOROLAC TROMETHAMINE 15 MG: 30 INJECTION, SOLUTION INTRAMUSCULAR; INTRAVENOUS at 13:42

## 2022-12-15 RX ADMIN — TRAMADOL HYDROCHLORIDE 50 MG: 50 TABLET, COATED ORAL at 12:26

## 2022-12-15 RX ADMIN — KETOROLAC TROMETHAMINE 15 MG: 30 INJECTION, SOLUTION INTRAMUSCULAR; INTRAVENOUS at 07:56

## 2022-12-15 RX ADMIN — LEVALBUTEROL 1.25 MG: 1.25 SOLUTION, CONCENTRATE RESPIRATORY (INHALATION) at 07:11

## 2022-12-15 RX ADMIN — BUDESONIDE 0.5 MG: 0.5 INHALANT ORAL at 07:11

## 2022-12-15 RX ADMIN — ISODIUM CHLORIDE 3 ML: 0.03 SOLUTION RESPIRATORY (INHALATION) at 13:13

## 2022-12-15 RX ADMIN — ACETAMINOPHEN 975 MG: 325 TABLET ORAL at 07:56

## 2022-12-15 RX ADMIN — ENOXAPARIN SODIUM 40 MG: 100 INJECTION SUBCUTANEOUS at 09:29

## 2022-12-15 RX ADMIN — ACETAMINOPHEN 975 MG: 325 TABLET ORAL at 13:42

## 2022-12-15 NOTE — NURSING NOTE
Pt showered min ax1  C/o moderate pain to incision L abd  Dressing changed per order  Analgesia administered as ordered, effective  Pt continues on 1L NC, c/o HERNANDEZ  Pt made comfortable in bed  Personal needs and call bell within reach

## 2022-12-15 NOTE — PROGRESS NOTES
Progress Note - General Surgery   Angelica Valdez 58 y o  female MRN: [id number]  Unit/Bed#: -01 Encounter: [id number]    Assessment:  35-year-old female with past medical history significant for asthma and tracheobronchomalacia presenting with complicated sigmoid diverticulitis  Patient meeting SIRS/sepsis criteria 12 hours after admission as evidenced by fever, tachypnea, hypotension, leukopenia  HD#11 septic shock secondary to feculent peritonitis  POD#10 s/p laparoscopic hand-assisted sigmoid resection with transverse loop colostomy   Intraoperative findings of perforated feculent diverticulitis with large perforation at mid sigmoid colon  - AVSS on 1 L via NC  - 204 100 cc recorded  - Stool 151 cc recorded, brown paste stool present in bag  - BBC is 9 64  - Hemoglobin 9 7  - CR 0 88  - Electrolytes unremarkable  - Left paramedian incision with continued drainage upon evaluation    Plan:  Left paramedian incision with staples removed at bedside by Dr Nichole Hand, cultures obtained, wet to dry dressing changes daily  Follow-up culture  Soft diet with ensures as tolerated  Continue to trend WBC/fever curve, antibiotic course completed 12/12  Continue to follow clinically if repeat imaging necessary  Serial abdominal exams  Antiemetics and analgesics as needed  Management per SLIM, appreciate recommendations  Strict I/O  Encourage out of bed to chair and ambulation  Ostomy care  Wean O2 as able    Subjective/Objective   Subjective: No acute overnight events  Patient overall doing well this morning  Tinges to note burning sensation with urination however patient with unremarkable UA two days ago  Otherwise tolerating small portions of diet without increase in abdominal pain, nausea, vomiting  Continues to have functioning ostomy  Denies chest pain, palpitations, shortness of breath, calf tenderness  Denies fever, chills  Objective:   Blood pressure 106/62, pulse 71, temperature 98 8 °F (37 1 °C), resp  rate 18, height 5' 5" (1 651 m), weight 77 7 kg (171 lb 4 8 oz), SpO2 (!) 89 %  ,Body mass index is 28 51 kg/m²  Intake/Output Summary (Last 24 hours) at 12/15/2022 0953  Last data filed at 12/15/2022 0900  Gross per 24 hour   Intake 560 ml   Output 551 ml   Net 9 ml       Invasive Devices     Peripheral Intravenous Line  Duration           Peripheral IV 12/12/22 Left;Proximal;Ventral (anterior) Forearm 3 days    Peripheral IV 12/13/22 Distal;Left;Upper;Ventral (anterior) Arm 2 days          Drain  Duration           Colostomy RLQ 10 days    External Urinary Catheter 5 days              Physical Exam  Vitals and nursing note reviewed  Constitutional:       General: She is not in acute distress  Appearance: Normal appearance  She is obese  She is not ill-appearing or toxic-appearing  HENT:      Head: Normocephalic and atraumatic  Cardiovascular:      Rate and Rhythm: Normal rate and regular rhythm  Pulses: Normal pulses  Heart sounds: Normal heart sounds  Pulmonary:      Effort: Pulmonary effort is normal       Breath sounds: Wheezing present  Abdominal:      General: The ostomy site is clean  Bowel sounds are normal  There is distension  Palpations: Abdomen is soft  Tenderness: There is abdominal tenderness  There is no guarding or rebound  Comments: Laparoscopic surgical sites are clean, dry, intact with staples in place, mild surrounding erythema, no fluctuance or purulent drainage noted; paramedian incision continued serosanguineous/purulent fluid draining requiring takedown of staples and packing with wet-to-dry dressings   Musculoskeletal:         General: Normal range of motion  Right lower leg: No edema  Left lower leg: No edema  Skin:     General: Skin is warm and dry  Coloration: Skin is pale  Neurological:      General: No focal deficit present  Mental Status: She is alert and oriented to person, place, and time     Psychiatric:         Mood and Affect: Mood normal          Behavior: Behavior normal          Thought Content: Thought content normal        Lab, Imaging and other studies:  I have personally reviewed pertinent lab results      CBC:   Lab Results   Component Value Date    WBC 9 64 12/15/2022    HGB 9 7 (L) 12/15/2022    HCT 30 1 (L) 12/15/2022    MCV 97 12/15/2022     (H) 12/15/2022    MCH 31 2 12/15/2022    MCHC 32 2 12/15/2022    RDW 13 4 12/15/2022    MPV 9 8 12/15/2022    NRBC 0 12/15/2022     CMP:   Lab Results   Component Value Date    SODIUM 140 12/15/2022    K 5 1 12/15/2022     12/15/2022    CO2 29 12/15/2022    BUN 25 12/15/2022    CREATININE 0 88 12/15/2022    CALCIUM 9 3 12/15/2022    EGFR 70 12/15/2022     VTE Pharmacologic Prophylaxis: Enoxaparin (Lovenox)  VTE Mechanical Prophylaxis: sequential compression device    Raphael Chavez PA-C

## 2022-12-15 NOTE — PLAN OF CARE
Problem: Potential for Falls  Goal: Patient will remain free of falls  Description: INTERVENTIONS:  - Educate patient/family on patient safety including physical limitations  - Instruct patient to call for assistance with activity   - Consult OT/PT to assist with strengthening/mobility   - Keep Call bell within reach  - Keep bed low and locked with side rails adjusted as appropriate  - Keep care items and personal belongings within reach  - Initiate and maintain comfort rounds  - Make Fall Risk Sign visible to staff  - Offer Toileting every 2 Hours, in advance of need  - Initiate/Maintain BED alarm  - Obtain necessary fall risk management equipment: yellow socks  - Apply yellow socks and bracelet for high fall risk patients  - Consider moving patient to room near nurses station  Outcome: Progressing     Problem: PAIN - ADULT  Goal: Verbalizes/displays adequate comfort level or baseline comfort level  Description: Interventions:  - Encourage patient to monitor pain and request assistance  - Assess pain using appropriate pain scale  - Administer analgesics based on type and severity of pain and evaluate response  - Implement non-pharmacological measures as appropriate and evaluate response  - Consider cultural and social influences on pain and pain management  - Notify physician/advanced practitioner if interventions unsuccessful or patient reports new pain  Outcome: Progressing     Problem: INFECTION - ADULT  Goal: Absence or prevention of progression during hospitalization  Description: INTERVENTIONS:  - Assess and monitor for signs and symptoms of infection  - Monitor lab/diagnostic results  - Monitor all insertion sites, i e  indwelling lines, tubes, and drains  - Monitor endotracheal if appropriate and nasal secretions for changes in amount and color  - [place] appropriate cooling/warming therapies per order  - Administer medications as ordered  - Instruct and encourage patient and family to use good hand hygiene technique  - Identify and instruct in appropriate isolation precautions for identified infection/condition  Outcome: Progressing  Goal: Absence of fever/infection during neutropenic period  Description: INTERVENTIONS:  - Monitor WBC    Outcome: Progressing     Problem: SAFETY ADULT  Goal: Patient will remain free of falls  Description: INTERVENTIONS:  - Educate patient/family on patient safety including physical limitations  - Instruct patient to call for assistance with activity   - Consult OT/PT to assist with strengthening/mobility   - Keep Call bell within reach  - Keep bed low and locked with side rails adjusted as appropriate  - Keep care items and personal belongings within reach  - Initiate and maintain comfort rounds  - Make Fall Risk Sign visible to staff  - Offer Toileting every 2 Hours, in advance of need  - Initiate/Maintain BED alarm  - Obtain necessary fall risk management equipment: yellow socks  - Apply yellow socks and bracelet for high fall risk patients  - Consider moving patient to room near nurses station  Outcome: Progressing  Goal: Maintain or return to baseline ADL function  Description: INTERVENTIONS:  - Educate patient/family on patient safety including physical limitations  - Instruct patient to call for assistance with activity   - Consult OT/PT to assist with strengthening/mobility   - Keep Call bell within reach  - Keep bed low and locked with side rails adjusted as appropriate  - Keep care items and personal belongings within reach  - Initiate and maintain comfort rounds  - Make Fall Risk Sign visible to staff  - Offer Toileting every 2 Hours, in advance of need  - Initiate/Maintain bed alarm  - Obtain necessary fall risk management equipment: yellow socks  - Apply yellow socks and bracelet for high fall risk patients  - Consider moving patient to room near nurses station  Outcome: Progressing  Goal: Maintains/Returns to pre admission functional level  Description: INTERVENTIONS:  - Perform BMAT or MOVE assessment daily    - Set and communicate daily mobility goal to care team and patient/family/caregiver  - Collaborate with rehabilitation services on mobility goals if consulted  - Perform Range of Motion 2 times a day  - Reposition patient every 2 hours  - Dangle patient 3 times a day  - Stand patient 3 times a day  - Ambulate patient 3 times a day  - Out of bed to chair 3 times a day   - Out of bed for meals 3 times a day  - Out of bed for toileting  - Record patient progress and toleration of activity level   Outcome: Progressing     Problem: DISCHARGE PLANNING  Goal: Discharge to home or other facility with appropriate resources  Description: INTERVENTIONS:  - Identify barriers to discharge w/patient and caregiver  - Arrange for needed discharge resources and transportation as appropriate  - Identify discharge learning needs (meds, wound care, etc )  - Arrange for interpretive services to assist at discharge as needed  - Refer to Case Management Department for coordinating discharge planning if the patient needs post-hospital services based on physician/advanced practitioner order or complex needs related to functional status, cognitive ability, or social support system  Outcome: Progressing     Problem: Knowledge Deficit  Goal: Patient/family/caregiver demonstrates understanding of disease process, treatment plan, medications, and discharge instructions  Description: Complete learning assessment and assess knowledge base    Interventions:  - Provide teaching at level of understanding  - Provide teaching via preferred learning methods  Outcome: Progressing     Problem: MOBILITY - ADULT  Goal: Maintain or return to baseline ADL function  Description: INTERVENTIONS:  - Educate patient/family on patient safety including physical limitations  - Instruct patient to call for assistance with activity   - Consult OT/PT to assist with strengthening/mobility   - Keep Call bell within reach  - Keep bed low and locked with side rails adjusted as appropriate  - Keep care items and personal belongings within reach  - Initiate and maintain comfort rounds  - Make Fall Risk Sign visible to staff  - Offer Toileting every 2 Hours, in advance of need  - Initiate/Maintain bed alarm  - Obtain necessary fall risk management equipment: yellow socks  - Apply yellow socks and bracelet for high fall risk patients  - Consider moving patient to room near nurses station  Outcome: Progressing  Goal: Maintains/Returns to pre admission functional level  Description: INTERVENTIONS:  - Perform BMAT or MOVE assessment daily    - Set and communicate daily mobility goal to care team and patient/family/caregiver  - Collaborate with rehabilitation services on mobility goals if consulted  - Perform Range of Motion 2 times a day  - Reposition patient every 2 hours    - Dangle patient 3 times a day  - Stand patient 3 times a day  - Ambulate patient 3 times a day  - Out of bed to chair 3 times a day   - Out of bed for meals 3 times a day  - Out of bed for toileting  - Record patient progress and toleration of activity level   Outcome: Progressing     Problem: Prexisting or High Potential for Compromised Skin Integrity  Goal: Skin integrity is maintained or improved  Description: INTERVENTIONS:  - Identify patients at risk for skin breakdown  - Assess and monitor skin integrity  - Assess and monitor nutrition and hydration status  - Monitor labs   - Assess for incontinence   - Turn and reposition patient  - Assist with mobility/ambulation  - Relieve pressure over bony prominences  - Avoid friction and shearing  - Provide appropriate hygiene as needed including keeping skin clean and dry  - Evaluate need for skin moisturizer/barrier cream  - Collaborate with interdisciplinary team   - Patient/family teaching  - Consider wound care consult   Outcome: Progressing     Problem: Nutrition/Hydration-ADULT  Goal: Nutrient/Hydration intake appropriate for improving, restoring or maintaining nutritional needs  Description: Monitor and assess patient's nutrition/hydration status for malnutrition  Collaborate with interdisciplinary team and initiate plan and interventions as ordered  Monitor patient's weight and dietary intake as ordered or per policy  Utilize nutrition screening tool and intervene as necessary  Determine patient's food preferences and provide high-protein, high-caloric foods as appropriate       INTERVENTIONS:  - Monitor oral intake, urinary output, labs, and treatment plans  - Assess nutrition and hydration status and recommend course of action  - Evaluate amount of meals eaten  - Assist patient with eating if necessary   - Allow adequate time for meals  - Recommend/ encourage appropriate diets, oral nutritional supplements, and vitamin/mineral supplements  - Order, calculate, and assess calorie counts as needed  - Recommend, monitor, and adjust tube feedings and TPN/PPN based on assessed needs  - Assess need for intravenous fluids  - Provide specific nutrition/hydration education as appropriate  - Include patient/family/caregiver in decisions related to nutrition  Outcome: Progressing

## 2022-12-15 NOTE — PLAN OF CARE
Problem: Potential for Falls  Goal: Patient will remain free of falls  Description: INTERVENTIONS:  - Educate patient/family on patient safety including physical limitations  - Instruct patient to call for assistance with activity   - Consult OT/PT to assist with strengthening/mobility   - Keep Call bell within reach  - Keep bed low and locked with side rails adjusted as appropriate  - Keep care items and personal belongings within reach  - Initiate and maintain comfort rounds  - Make Fall Risk Sign visible to staff  - Offer Toileting every 2 Hours, in advance of need  - Initiate/Maintain BED alarm  - Obtain necessary fall risk management equipment: yellow socks  - Apply yellow socks and bracelet for high fall risk patients  - Consider moving patient to room near nurses station  Outcome: Progressing     Problem: PAIN - ADULT  Goal: Verbalizes/displays adequate comfort level or baseline comfort level  Description: Interventions:  - Encourage patient to monitor pain and request assistance  - Assess pain using appropriate pain scale  - Administer analgesics based on type and severity of pain and evaluate response  - Implement non-pharmacological measures as appropriate and evaluate response  - Consider cultural and social influences on pain and pain management  - Notify physician/advanced practitioner if interventions unsuccessful or patient reports new pain  Outcome: Progressing     Problem: INFECTION - ADULT  Goal: Absence or prevention of progression during hospitalization  Description: INTERVENTIONS:  - Assess and monitor for signs and symptoms of infection  - Monitor lab/diagnostic results  - Monitor all insertion sites, i e  indwelling lines, tubes, and drains  - Monitor endotracheal if appropriate and nasal secretions for changes in amount and color  - [place] appropriate cooling/warming therapies per order  - Administer medications as ordered  - Instruct and encourage patient and family to use good hand hygiene technique  - Identify and instruct in appropriate isolation precautions for identified infection/condition  Outcome: Progressing  Goal: Absence of fever/infection during neutropenic period  Description: INTERVENTIONS:  - Monitor WBC    Outcome: Progressing     Problem: SAFETY ADULT  Goal: Patient will remain free of falls  Description: INTERVENTIONS:  - Educate patient/family on patient safety including physical limitations  - Instruct patient to call for assistance with activity   - Consult OT/PT to assist with strengthening/mobility   - Keep Call bell within reach  - Keep bed low and locked with side rails adjusted as appropriate  - Keep care items and personal belongings within reach  - Initiate and maintain comfort rounds  - Make Fall Risk Sign visible to staff  - Offer Toileting every 2 Hours, in advance of need  - Initiate/Maintain BED alarm  - Obtain necessary fall risk management equipment: yellow socks  - Apply yellow socks and bracelet for high fall risk patients  - Consider moving patient to room near nurses station  Outcome: Progressing  Goal: Maintain or return to baseline ADL function  Description: INTERVENTIONS:  - Educate patient/family on patient safety including physical limitations  - Instruct patient to call for assistance with activity   - Consult OT/PT to assist with strengthening/mobility   - Keep Call bell within reach  - Keep bed low and locked with side rails adjusted as appropriate  - Keep care items and personal belongings within reach  - Initiate and maintain comfort rounds  - Make Fall Risk Sign visible to staff  - Offer Toileting every 2 Hours, in advance of need  - Initiate/Maintain bed alarm  - Obtain necessary fall risk management equipment: yellow socks  - Apply yellow socks and bracelet for high fall risk patients  - Consider moving patient to room near nurses station  Outcome: Progressing  Goal: Maintains/Returns to pre admission functional level  Description: INTERVENTIONS:  - Perform BMAT or MOVE assessment daily    - Set and communicate daily mobility goal to care team and patient/family/caregiver  - Collaborate with rehabilitation services on mobility goals if consulted  - Perform Range of Motion 3 times a day  - Reposition patient every 2 hours  - Dangle patient 3 times a day  - Stand patient 3 times a day  - Ambulate patient 3 times a day  - Out of bed to chair 3 times a day   - Out of bed for meals 3 times a day  - Out of bed for toileting  - Record patient progress and toleration of activity level   Outcome: Progressing     Problem: DISCHARGE PLANNING  Goal: Discharge to home or other facility with appropriate resources  Description: INTERVENTIONS:  - Identify barriers to discharge w/patient and caregiver  - Arrange for needed discharge resources and transportation as appropriate  - Identify discharge learning needs (meds, wound care, etc )  - Arrange for interpretive services to assist at discharge as needed  - Refer to Case Management Department for coordinating discharge planning if the patient needs post-hospital services based on physician/advanced practitioner order or complex needs related to functional status, cognitive ability, or social support system  Outcome: Progressing     Problem: Knowledge Deficit  Goal: Patient/family/caregiver demonstrates understanding of disease process, treatment plan, medications, and discharge instructions  Description: Complete learning assessment and assess knowledge base    Interventions:  - Provide teaching at level of understanding  - Provide teaching via preferred learning methods  Outcome: Progressing     Problem: MOBILITY - ADULT  Goal: Maintain or return to baseline ADL function  Description: INTERVENTIONS:  - Educate patient/family on patient safety including physical limitations  - Instruct patient to call for assistance with activity   - Consult OT/PT to assist with strengthening/mobility   - Keep Call bell within reach  - Keep bed low and locked with side rails adjusted as appropriate  - Keep care items and personal belongings within reach  - Initiate and maintain comfort rounds  - Make Fall Risk Sign visible to staff  - Offer Toileting every 2 Hours, in advance of need  - Initiate/Maintain bed alarm  - Obtain necessary fall risk management equipment: yellow socks  - Apply yellow socks and bracelet for high fall risk patients  - Consider moving patient to room near nurses station  Outcome: Progressing  Goal: Maintains/Returns to pre admission functional level  Description: INTERVENTIONS:  - Perform BMAT or MOVE assessment daily    - Set and communicate daily mobility goal to care team and patient/family/caregiver  - Collaborate with rehabilitation services on mobility goals if consulted  - Perform Range of Motion 3 times a day  - Reposition patient every 2 hours    - Dangle patient 3 times a day  - Stand patient 3 times a day  - Ambulate patient 3 times a day  - Out of bed to chair 3 times a day   - Out of bed for meals 3 times a day  - Out of bed for toileting  - Record patient progress and toleration of activity level   Outcome: Progressing     Problem: Prexisting or High Potential for Compromised Skin Integrity  Goal: Skin integrity is maintained or improved  Description: INTERVENTIONS:  - Identify patients at risk for skin breakdown  - Assess and monitor skin integrity  - Assess and monitor nutrition and hydration status  - Monitor labs   - Assess for incontinence   - Turn and reposition patient  - Assist with mobility/ambulation  - Relieve pressure over bony prominences  - Avoid friction and shearing  - Provide appropriate hygiene as needed including keeping skin clean and dry  - Evaluate need for skin moisturizer/barrier cream  - Collaborate with interdisciplinary team   - Patient/family teaching  - Consider wound care consult   Outcome: Progressing     Problem: Nutrition/Hydration-ADULT  Goal: Nutrient/Hydration intake appropriate for improving, restoring or maintaining nutritional needs  Description: Monitor and assess patient's nutrition/hydration status for malnutrition  Collaborate with interdisciplinary team and initiate plan and interventions as ordered  Monitor patient's weight and dietary intake as ordered or per policy  Utilize nutrition screening tool and intervene as necessary  Determine patient's food preferences and provide high-protein, high-caloric foods as appropriate       INTERVENTIONS:  - Monitor oral intake, urinary output, labs, and treatment plans  - Assess nutrition and hydration status and recommend course of action  - Evaluate amount of meals eaten  - Assist patient with eating if necessary   - Allow adequate time for meals  - Recommend/ encourage appropriate diets, oral nutritional supplements, and vitamin/mineral supplements  - Order, calculate, and assess calorie counts as needed  - Recommend, monitor, and adjust tube feedings and TPN/PPN based on assessed needs  - Assess need for intravenous fluids  - Provide specific nutrition/hydration education as appropriate  - Include patient/family/caregiver in decisions related to nutrition  Outcome: Progressing

## 2022-12-15 NOTE — NURSING NOTE
Pt transferred from ICU to [facility], room 224  Pt assisted to bed and placed on masimo  Oriented to room and unit  Assessment as noted in computer charting  Safety in place

## 2022-12-16 LAB
ANION GAP SERPL CALCULATED.3IONS-SCNC: 8 MMOL/L (ref 4–13)
BASOPHILS # BLD AUTO: 0.02 THOUSANDS/ÂΜL (ref 0–0.1)
BASOPHILS NFR BLD AUTO: 0 % (ref 0–1)
BUN SERPL-MCNC: 23 MG/DL (ref 5–25)
CALCIUM SERPL-MCNC: 9.1 MG/DL (ref 8.4–10.2)
CHLORIDE SERPL-SCNC: 102 MMOL/L (ref 96–108)
CO2 SERPL-SCNC: 28 MMOL/L (ref 21–32)
CREAT SERPL-MCNC: 0.68 MG/DL (ref 0.6–1.3)
EOSINOPHIL # BLD AUTO: 0.08 THOUSAND/ÂΜL (ref 0–0.61)
EOSINOPHIL NFR BLD AUTO: 1 % (ref 0–6)
ERYTHROCYTE [DISTWIDTH] IN BLOOD BY AUTOMATED COUNT: 13.3 % (ref 11.6–15.1)
GFR SERPL CREATININE-BSD FRML MDRD: 94 ML/MIN/1.73SQ M
GLUCOSE SERPL-MCNC: 96 MG/DL (ref 65–140)
HCT VFR BLD AUTO: 27.3 % (ref 34.8–46.1)
HGB BLD-MCNC: 8.8 G/DL (ref 11.5–15.4)
IMM GRANULOCYTES # BLD AUTO: 0.09 THOUSAND/UL (ref 0–0.2)
IMM GRANULOCYTES NFR BLD AUTO: 1 % (ref 0–2)
LYMPHOCYTES # BLD AUTO: 0.93 THOUSANDS/ÂΜL (ref 0.6–4.47)
LYMPHOCYTES NFR BLD AUTO: 11 % (ref 14–44)
MCH RBC QN AUTO: 31.2 PG (ref 26.8–34.3)
MCHC RBC AUTO-ENTMCNC: 32.2 G/DL (ref 31.4–37.4)
MCV RBC AUTO: 97 FL (ref 82–98)
MONOCYTES # BLD AUTO: 0.8 THOUSAND/ÂΜL (ref 0.17–1.22)
MONOCYTES NFR BLD AUTO: 10 % (ref 4–12)
NEUTROPHILS # BLD AUTO: 6.3 THOUSANDS/ÂΜL (ref 1.85–7.62)
NEUTS SEG NFR BLD AUTO: 77 % (ref 43–75)
NRBC BLD AUTO-RTO: 0 /100 WBCS
PLATELET # BLD AUTO: 638 THOUSANDS/UL (ref 149–390)
PMV BLD AUTO: 9.7 FL (ref 8.9–12.7)
POTASSIUM SERPL-SCNC: 3.7 MMOL/L (ref 3.5–5.3)
RBC # BLD AUTO: 2.82 MILLION/UL (ref 3.81–5.12)
SODIUM SERPL-SCNC: 138 MMOL/L (ref 135–147)
WBC # BLD AUTO: 8.22 THOUSAND/UL (ref 4.31–10.16)

## 2022-12-16 RX ORDER — FLUTICASONE FUROATE AND VILANTEROL 100; 25 UG/1; UG/1
1 POWDER RESPIRATORY (INHALATION) DAILY
Status: DISCONTINUED | OUTPATIENT
Start: 2022-12-17 | End: 2022-12-17

## 2022-12-16 RX ADMIN — PANTOPRAZOLE SODIUM 40 MG: 40 INJECTION, POWDER, LYOPHILIZED, FOR SOLUTION INTRAVENOUS at 08:30

## 2022-12-16 RX ADMIN — ACETAMINOPHEN 975 MG: 325 TABLET ORAL at 08:29

## 2022-12-16 RX ADMIN — ACETAMINOPHEN 975 MG: 325 TABLET ORAL at 02:46

## 2022-12-16 RX ADMIN — TRAMADOL HYDROCHLORIDE 50 MG: 50 TABLET, COATED ORAL at 05:13

## 2022-12-16 RX ADMIN — ISODIUM CHLORIDE 3 ML: 0.03 SOLUTION RESPIRATORY (INHALATION) at 20:42

## 2022-12-16 RX ADMIN — ENOXAPARIN SODIUM 40 MG: 100 INJECTION SUBCUTANEOUS at 08:30

## 2022-12-16 RX ADMIN — ACETAMINOPHEN 975 MG: 325 TABLET ORAL at 15:09

## 2022-12-16 RX ADMIN — KETOROLAC TROMETHAMINE 15 MG: 30 INJECTION, SOLUTION INTRAMUSCULAR; INTRAVENOUS at 20:08

## 2022-12-16 RX ADMIN — ONDANSETRON 4 MG: 2 INJECTION INTRAMUSCULAR; INTRAVENOUS at 15:09

## 2022-12-16 RX ADMIN — KETOROLAC TROMETHAMINE 15 MG: 30 INJECTION, SOLUTION INTRAMUSCULAR; INTRAVENOUS at 02:46

## 2022-12-16 RX ADMIN — LISINOPRIL 40 MG: 20 TABLET ORAL at 08:30

## 2022-12-16 RX ADMIN — KETOROLAC TROMETHAMINE 15 MG: 30 INJECTION, SOLUTION INTRAMUSCULAR; INTRAVENOUS at 08:30

## 2022-12-16 RX ADMIN — LEVALBUTEROL 1.25 MG: 1.25 SOLUTION, CONCENTRATE RESPIRATORY (INHALATION) at 13:49

## 2022-12-16 RX ADMIN — AMLODIPINE BESYLATE 10 MG: 10 TABLET ORAL at 08:30

## 2022-12-16 RX ADMIN — LEVALBUTEROL 1.25 MG: 1.25 SOLUTION, CONCENTRATE RESPIRATORY (INHALATION) at 20:42

## 2022-12-16 RX ADMIN — LEVALBUTEROL 1.25 MG: 1.25 SOLUTION, CONCENTRATE RESPIRATORY (INHALATION) at 07:52

## 2022-12-16 RX ADMIN — KETOROLAC TROMETHAMINE 15 MG: 30 INJECTION, SOLUTION INTRAMUSCULAR; INTRAVENOUS at 15:08

## 2022-12-16 RX ADMIN — NEBIVOLOL 5 MG: 5 TABLET ORAL at 08:30

## 2022-12-16 RX ADMIN — BUDESONIDE 0.5 MG: 0.5 INHALANT ORAL at 07:52

## 2022-12-16 RX ADMIN — PANTOPRAZOLE SODIUM 40 MG: 40 INJECTION, POWDER, LYOPHILIZED, FOR SOLUTION INTRAVENOUS at 20:07

## 2022-12-16 RX ADMIN — ISODIUM CHLORIDE 3 ML: 0.03 SOLUTION RESPIRATORY (INHALATION) at 13:49

## 2022-12-16 RX ADMIN — FUROSEMIDE 20 MG: 10 INJECTION, SOLUTION INTRAMUSCULAR; INTRAVENOUS at 08:37

## 2022-12-16 RX ADMIN — ACETAMINOPHEN 975 MG: 325 TABLET ORAL at 20:08

## 2022-12-16 RX ADMIN — ISODIUM CHLORIDE 3 ML: 0.03 SOLUTION RESPIRATORY (INHALATION) at 07:52

## 2022-12-16 NOTE — PROGRESS NOTES
Progress Note - General Surgery   [facility] 58 y o  female MRN: [id number]  Unit/Bed#: -01 Encounter: [id number]    Assessment:  80-year-old female with past medical history significant for asthma and tracheobronchomalacia presenting with complicated sigmoid diverticulitis  Patient meeting SIRS/sepsis criteria 12 hours after admission as evidenced by fever, tachypnea, hypotension, leukopenia  HD#12 septic shock secondary to feculent peritonitis  POD#11 s/p laparoscopic hand-assisted sigmoid resection with transverse loop colostomy   Intraoperative findings of perforated feculent diverticulitis with large perforation at mid sigmoid colon  -AVSS on 1L O2 via NC  -UO 450cc  -Stool 200 cc  -WBC 8 22  -Hgb 8 8  -Plts 638   -Cr 0 68   -electrolytes unremarkable   -Wound culture negative     Plan:  Surg soft diet with ensures as tolerated  Daily wound care to surgical incision with wet to dry packing   Continue to monitor clinically if repeat imaging necessary, so far as been surgically stable   Monitor urinary output  Antiemetics and analgesics prn   Strict I/O  Wean O2 as able   Encourage OOB to chair, ambulation, work with PT/OT  Ostomy care   To be evaluated at bedside by attending   CM for dispo planning, no beds available at [facility] and working on [place] [id number]    Subjective/Objective   Subjective: No acute overnight events  Patient overall doing well but note constant 5/10 abdominal pain yesterday afternoon/this morning  She states that she is unsure if related to gas pain as she has had much gas in bag  However, does have brown paste stool in bag  Objective:  Blood pressure 130/72, pulse 79, temperature 98 °F (36 7 °C), resp  rate 18, height 5' 5" (1 651 m), weight 77 7 kg (171 lb 4 8 oz), SpO2 91 %  ,Body mass index is 28 51 kg/m²        Intake/Output Summary (Last 24 hours) at 12/16/2022 0737  Last data filed at 12/16/2022 0500  Gross per 24 hour   Intake 465 ml   Output 650 ml   Net -185 ml       Invasive Devices     Peripheral Intravenous Line  Duration           Peripheral IV 12/13/22 Distal;Left;Upper;Ventral (anterior) Arm 3 days          Drain  Duration           Colostomy RLQ 11 days              Physical Exam  Vitals and nursing note reviewed  Constitutional:       General: She is not in acute distress  Appearance: Normal appearance  She is obese  She is not ill-appearing or toxic-appearing  Interventions: Nasal cannula in place  HENT:      Head: Normocephalic and atraumatic  Cardiovascular:      Rate and Rhythm: Normal rate and regular rhythm  Pulses: Normal pulses  Heart sounds: Normal heart sounds  Pulmonary:      Effort: Pulmonary effort is normal       Breath sounds: Normal breath sounds  No wheezing, rhonchi or rales  Abdominal:      General: The ostomy site is clean  Bowel sounds are normal  There is distension  Palpations: Abdomen is soft  Tenderness: There is abdominal tenderness  There is no guarding or rebound  Comments: Incisions with mild erythema but no fluctuance or drainage; hand port incision with packing removed at bedside and picture uploaded to patients chart   Musculoskeletal:         General: Normal range of motion  Right lower leg: No edema  Left lower leg: No edema  Skin:     General: Skin is warm and dry  Neurological:      General: No focal deficit present  Mental Status: She is alert and oriented to person, place, and time  Psychiatric:         Mood and Affect: Mood normal          Behavior: Behavior normal          Thought Content: Thought content normal        Lab, Imaging and other studies:  I have personally reviewed pertinent lab results      CBC:   Lab Results   Component Value Date    WBC 8 22 12/16/2022    HGB 8 8 (L) 12/16/2022    HCT 27 3 (L) 12/16/2022    MCV 97 12/16/2022     (H) 12/16/2022    MCH 31 2 12/16/2022    MCHC 32 2 12/16/2022    RDW 13 3 12/16/2022    MPV 9 7 12/16/2022    NRBC 0 12/16/2022 CMP:   Lab Results   Component Value Date    SODIUM 138 12/16/2022    K 3 7 12/16/2022     12/16/2022    CO2 28 12/16/2022    BUN 23 12/16/2022    CREATININE 0 68 12/16/2022    CALCIUM 9 1 12/16/2022    EGFR 94 12/16/2022     VTE Pharmacologic Prophylaxis: Enoxaparin (Lovenox)  VTE Mechanical Prophylaxis: sequential compression device    Ye Santiago PA-C

## 2022-12-16 NOTE — PLAN OF CARE
Problem: Potential for Falls  Goal: Patient will remain free of falls  Description: INTERVENTIONS:  - Educate patient/family on patient safety including physical limitations  - Instruct patient to call for assistance with activity   - Consult OT/PT to assist with strengthening/mobility   - Keep Call bell within reach  - Keep bed low and locked with side rails adjusted as appropriate  - Keep care items and personal belongings within reach  - Initiate and maintain comfort rounds  - Make Fall Risk Sign visible to staff  - Offer Toileting every 2 Hours, in advance of need  - Initiate/Maintain BED alarm  - Obtain necessary fall risk management equipment: yellow socks  - Apply yellow socks and bracelet for high fall risk patients  - Consider moving patient to room near nurses station  Outcome: Progressing     Problem: PAIN - ADULT  Goal: Verbalizes/displays adequate comfort level or baseline comfort level  Description: Interventions:  - Encourage patient to monitor pain and request assistance  - Assess pain using appropriate pain scale  - Administer analgesics based on type and severity of pain and evaluate response  - Implement non-pharmacological measures as appropriate and evaluate response  - Consider cultural and social influences on pain and pain management  - Notify physician/advanced practitioner if interventions unsuccessful or patient reports new pain  Outcome: Progressing     Problem: INFECTION - ADULT  Goal: Absence or prevention of progression during hospitalization  Description: INTERVENTIONS:  - Assess and monitor for signs and symptoms of infection  - Monitor lab/diagnostic results  - Monitor all insertion sites, i e  indwelling lines, tubes, and drains  - Monitor endotracheal if appropriate and nasal secretions for changes in amount and color  - [place] appropriate cooling/warming therapies per order  - Administer medications as ordered  - Instruct and encourage patient and family to use good hand hygiene technique  - Identify and instruct in appropriate isolation precautions for identified infection/condition  Outcome: Progressing  Goal: Absence of fever/infection during neutropenic period  Description: INTERVENTIONS:  - Monitor WBC    Outcome: Progressing     Problem: SAFETY ADULT  Goal: Patient will remain free of falls  Description: INTERVENTIONS:  - Educate patient/family on patient safety including physical limitations  - Instruct patient to call for assistance with activity   - Consult OT/PT to assist with strengthening/mobility   - Keep Call bell within reach  - Keep bed low and locked with side rails adjusted as appropriate  - Keep care items and personal belongings within reach  - Initiate and maintain comfort rounds  - Make Fall Risk Sign visible to staff  - Offer Toileting every 2 Hours, in advance of need  - Initiate/Maintain BED alarm  - Obtain necessary fall risk management equipment: yellow socks  - Apply yellow socks and bracelet for high fall risk patients  - Consider moving patient to room near nurses station  Outcome: Progressing  Goal: Maintain or return to baseline ADL function  Description: INTERVENTIONS:  - Educate patient/family on patient safety including physical limitations  - Instruct patient to call for assistance with activity   - Consult OT/PT to assist with strengthening/mobility   - Keep Call bell within reach  - Keep bed low and locked with side rails adjusted as appropriate  - Keep care items and personal belongings within reach  - Initiate and maintain comfort rounds  - Make Fall Risk Sign visible to staff  - Offer Toileting every 2 Hours, in advance of need  - Initiate/Maintain bed alarm  - Obtain necessary fall risk management equipment: yellow socks  - Apply yellow socks and bracelet for high fall risk patients  - Consider moving patient to room near nurses station  Outcome: Progressing  Goal: Maintains/Returns to pre admission functional level  Description: INTERVENTIONS:  - Perform BMAT or MOVE assessment daily    - Set and communicate daily mobility goal to care team and patient/family/caregiver  - Collaborate with rehabilitation services on mobility goals if consulted  - Perform Range of Motion 3 times a day  - Reposition patient every 2 hours  - Dangle patient 3 times a day  - Stand patient 3 times a day  - Ambulate patient 3 times a day  - Out of bed to chair 3 times a day   - Out of bed for meals 3 times a day  - Out of bed for toileting  - Record patient progress and toleration of activity level   Outcome: Progressing     Problem: DISCHARGE PLANNING  Goal: Discharge to home or other facility with appropriate resources  Description: INTERVENTIONS:  - Identify barriers to discharge w/patient and caregiver  - Arrange for needed discharge resources and transportation as appropriate  - Identify discharge learning needs (meds, wound care, etc )  - Arrange for interpretive services to assist at discharge as needed  - Refer to Case Management Department for coordinating discharge planning if the patient needs post-hospital services based on physician/advanced practitioner order or complex needs related to functional status, cognitive ability, or social support system  Outcome: Progressing     Problem: Knowledge Deficit  Goal: Patient/family/caregiver demonstrates understanding of disease process, treatment plan, medications, and discharge instructions  Description: Complete learning assessment and assess knowledge base    Interventions:  - Provide teaching at level of understanding  - Provide teaching via preferred learning methods  Outcome: Progressing     Problem: MOBILITY - ADULT  Goal: Maintain or return to baseline ADL function  Description: INTERVENTIONS:  - Educate patient/family on patient safety including physical limitations  - Instruct patient to call for assistance with activity   - Consult OT/PT to assist with strengthening/mobility   - Keep Call bell within reach  - Keep bed low and locked with side rails adjusted as appropriate  - Keep care items and personal belongings within reach  - Initiate and maintain comfort rounds  - Make Fall Risk Sign visible to staff  - Offer Toileting every 2 Hours, in advance of need  - Initiate/Maintain bed alarm  - Obtain necessary fall risk management equipment: yellow socks  - Apply yellow socks and bracelet for high fall risk patients  - Consider moving patient to room near nurses station  Outcome: Progressing  Goal: Maintains/Returns to pre admission functional level  Description: INTERVENTIONS:  - Perform BMAT or MOVE assessment daily    - Set and communicate daily mobility goal to care team and patient/family/caregiver  - Collaborate with rehabilitation services on mobility goals if consulted  - Perform Range of Motion 3 times a day  - Reposition patient every 2 hours    - Dangle patient 3 times a day  - Stand patient 3 times a day  - Ambulate patient 3 times a day  - Out of bed to chair 3 times a day   - Out of bed for meals 3 times a day  - Out of bed for toileting  - Record patient progress and toleration of activity level   Outcome: Progressing     Problem: Prexisting or High Potential for Compromised Skin Integrity  Goal: Skin integrity is maintained or improved  Description: INTERVENTIONS:  - Identify patients at risk for skin breakdown  - Assess and monitor skin integrity  - Assess and monitor nutrition and hydration status  - Monitor labs   - Assess for incontinence   - Turn and reposition patient  - Assist with mobility/ambulation  - Relieve pressure over bony prominences  - Avoid friction and shearing  - Provide appropriate hygiene as needed including keeping skin clean and dry  - Evaluate need for skin moisturizer/barrier cream  - Collaborate with interdisciplinary team   - Patient/family teaching  - Consider wound care consult   Outcome: Progressing     Problem: Nutrition/Hydration-ADULT  Goal: Nutrient/Hydration intake appropriate for improving, restoring or maintaining nutritional needs  Description: Monitor and assess patient's nutrition/hydration status for malnutrition  Collaborate with interdisciplinary team and initiate plan and interventions as ordered  Monitor patient's weight and dietary intake as ordered or per policy  Utilize nutrition screening tool and intervene as necessary  Determine patient's food preferences and provide high-protein, high-caloric foods as appropriate       INTERVENTIONS:  - Monitor oral intake, urinary output, labs, and treatment plans  - Assess nutrition and hydration status and recommend course of action  - Evaluate amount of meals eaten  - Assist patient with eating if necessary   - Allow adequate time for meals  - Recommend/ encourage appropriate diets, oral nutritional supplements, and vitamin/mineral supplements  - Order, calculate, and assess calorie counts as needed  - Recommend, monitor, and adjust tube feedings and TPN/PPN based on assessed needs  - Assess need for intravenous fluids  - Provide specific nutrition/hydration education as appropriate  - Include patient/family/caregiver in decisions related to nutrition  Outcome: Progressing

## 2022-12-16 NOTE — PLAN OF CARE
Problem: Potential for Falls  Goal: Patient will remain free of falls  Description: INTERVENTIONS:  - Educate patient/family on patient safety including physical limitations  - Instruct patient to call for assistance with activity   - Consult OT/PT to assist with strengthening/mobility   - Keep Call bell within reach  - Keep bed low and locked with side rails adjusted as appropriate  - Keep care items and personal belongings within reach  - Initiate and maintain comfort rounds  - Make Fall Risk Sign visible to staff  - Offer Toileting every 2 Hours, in advance of need  - Initiate/Maintain BED alarm  - Obtain necessary fall risk management equipment: yellow socks  - Apply yellow socks and bracelet for high fall risk patients  - Consider moving patient to room near nurses station  12/16/2022 0847 by Ricardo Stephen RN  Outcome: Progressing  12/16/2022 0847 by Ricardo Stephen RN  Outcome: Progressing     Problem: PAIN - ADULT  Goal: Verbalizes/displays adequate comfort level or baseline comfort level  Description: Interventions:  - Encourage patient to monitor pain and request assistance  - Assess pain using appropriate pain scale  - Administer analgesics based on type and severity of pain and evaluate response  - Implement non-pharmacological measures as appropriate and evaluate response  - Consider cultural and social influences on pain and pain management  - Notify physician/advanced practitioner if interventions unsuccessful or patient reports new pain  12/16/2022 0847 by Ricardo Stephen RN  Outcome: Progressing  12/16/2022 0847 by Ricardo Stephen RN  Outcome: Progressing     Problem: INFECTION - ADULT  Goal: Absence or prevention of progression during hospitalization  Description: INTERVENTIONS:  - Assess and monitor for signs and symptoms of infection  - Monitor lab/diagnostic results  - Monitor all insertion sites, i e  indwelling lines, tubes, and drains  - Monitor endotracheal if appropriate and nasal secretions for changes in amount and color  - [place] appropriate cooling/warming therapies per order  - Administer medications as ordered  - Instruct and encourage patient and family to use good hand hygiene technique  - Identify and instruct in appropriate isolation precautions for identified infection/condition  12/16/2022 0847 by Jacky Palmer RN  Outcome: Progressing  12/16/2022 0847 by Jacky Palmer RN  Outcome: Progressing  Goal: Absence of fever/infection during neutropenic period  Description: INTERVENTIONS:  - Monitor WBC    12/16/2022 0847 by Jacky Palmer RN  Outcome: Progressing  12/16/2022 0847 by Jacky Palmer RN  Outcome: Progressing     Problem: SAFETY ADULT  Goal: Patient will remain free of falls  Description: INTERVENTIONS:  - Educate patient/family on patient safety including physical limitations  - Instruct patient to call for assistance with activity   - Consult OT/PT to assist with strengthening/mobility   - Keep Call bell within reach  - Keep bed low and locked with side rails adjusted as appropriate  - Keep care items and personal belongings within reach  - Initiate and maintain comfort rounds  - Make Fall Risk Sign visible to staff  - Offer Toileting every 2 Hours, in advance of need  - Initiate/Maintain BED alarm  - Obtain necessary fall risk management equipment: yellow socks  - Apply yellow socks and bracelet for high fall risk patients  - Consider moving patient to room near nurses station  12/16/2022 0847 by Jacky Palmer RN  Outcome: Progressing  12/16/2022 0847 by Jacky Palmer RN  Outcome: Progressing  Goal: Maintain or return to baseline ADL function  Description: INTERVENTIONS:  - Educate patient/family on patient safety including physical limitations  - Instruct patient to call for assistance with activity   - Consult OT/PT to assist with strengthening/mobility   - Keep Call bell within reach  - Keep bed low and locked with side rails adjusted as appropriate  - Keep care items and personal belongings within reach  - Initiate and maintain comfort rounds  - Make Fall Risk Sign visible to staff  - Offer Toileting every 2 Hours, in advance of need  - Initiate/Maintain bed alarm  - Obtain necessary fall risk management equipment: yellow socks  - Apply yellow socks and bracelet for high fall risk patients  - Consider moving patient to room near nurses station  12/16/2022 0847 by Johnny Elizalde RN  Outcome: Progressing  12/16/2022 0847 by Johnny Elizalde RN  Outcome: Progressing  Goal: Maintains/Returns to pre admission functional level  Description: INTERVENTIONS:  - Perform BMAT or MOVE assessment daily    - Set and communicate daily mobility goal to care team and patient/family/caregiver  - Collaborate with rehabilitation services on mobility goals if consulted  - Perform Range of Motion 2 times a day  - Reposition patient every 2 hours    - Dangle patient 2 times a day  - Stand patient 2 times a day  - Ambulate patient 2 times a day  - Out of bed to chair 2 times a day   - Out of bed for meals 2 times a day  - Out of bed for toileting  - Record patient progress and toleration of activity level   12/16/2022 0847 by Johnny Elizalde RN  Outcome: Progressing  12/16/2022 0847 by Johnny Elizalde RN  Outcome: Progressing     Problem: DISCHARGE PLANNING  Goal: Discharge to home or other facility with appropriate resources  Description: INTERVENTIONS:  - Identify barriers to discharge w/patient and caregiver  - Arrange for needed discharge resources and transportation as appropriate  - Identify discharge learning needs (meds, wound care, etc )  - Arrange for interpretive services to assist at discharge as needed  - Refer to Case Management Department for coordinating discharge planning if the patient needs post-hospital services based on physician/advanced practitioner order or complex needs related to functional status, cognitive ability, or social support system  12/16/2022 0847 by Johnny Elizalde RN  Outcome: Progressing  12/16/2022 0847 by Akila Liz RN  Outcome: Progressing     Problem: Knowledge Deficit  Goal: Patient/family/caregiver demonstrates understanding of disease process, treatment plan, medications, and discharge instructions  Description: Complete learning assessment and assess knowledge base  Interventions:  - Provide teaching at level of understanding  - Provide teaching via preferred learning methods  12/16/2022 0847 by Akila Liz RN  Outcome: Progressing  12/16/2022 0847 by Akila Liz RN  Outcome: Progressing     Problem: MOBILITY - ADULT  Goal: Maintain or return to baseline ADL function  Description: INTERVENTIONS:  - Educate patient/family on patient safety including physical limitations  - Instruct patient to call for assistance with activity   - Consult OT/PT to assist with strengthening/mobility   - Keep Call bell within reach  - Keep bed low and locked with side rails adjusted as appropriate  - Keep care items and personal belongings within reach  - Initiate and maintain comfort rounds  - Make Fall Risk Sign visible to staff  - Offer Toileting every 2 Hours, in advance of need  - Initiate/Maintain bed alarm  - Obtain necessary fall risk management equipment: yellow socks  - Apply yellow socks and bracelet for high fall risk patients  - Consider moving patient to room near nurses station  12/16/2022 0847 by Akila Liz RN  Outcome: Progressing  12/16/2022 0847 by Akila Liz RN  Outcome: Progressing  Goal: Maintains/Returns to pre admission functional level  Description: INTERVENTIONS:  - Perform BMAT or MOVE assessment daily    - Set and communicate daily mobility goal to care team and patient/family/caregiver  - Collaborate with rehabilitation services on mobility goals if consulted  - Perform Range of Motion 2 times a day  - Reposition patient every 2 hours    - Dangle patient 3 times a day  - Stand patient 3 times a day  - Ambulate patient 3 times a day  - Out of bed to chair 3 times a day   - Out of bed for meals 3 times a day  - Out of bed for toileting  - Record patient progress and toleration of activity level   12/16/2022 0847 by Laverne Pan RN  Outcome: Progressing  12/16/2022 0847 by Laverne Pan RN  Outcome: Progressing     Problem: Prexisting or High Potential for Compromised Skin Integrity  Goal: Skin integrity is maintained or improved  Description: INTERVENTIONS:  - Identify patients at risk for skin breakdown  - Assess and monitor skin integrity  - Assess and monitor nutrition and hydration status  - Monitor labs   - Assess for incontinence   - Turn and reposition patient  - Assist with mobility/ambulation  - Relieve pressure over bony prominences  - Avoid friction and shearing  - Provide appropriate hygiene as needed including keeping skin clean and dry  - Evaluate need for skin moisturizer/barrier cream  - Collaborate with interdisciplinary team   - Patient/family teaching  - Consider wound care consult   12/16/2022 0847 by Laverne Pan RN  Outcome: Progressing  12/16/2022 0847 by Laverne Pan RN  Outcome: Progressing     Problem: Nutrition/Hydration-ADULT  Goal: Nutrient/Hydration intake appropriate for improving, restoring or maintaining nutritional needs  Description: Monitor and assess patient's nutrition/hydration status for malnutrition  Collaborate with interdisciplinary team and initiate plan and interventions as ordered  Monitor patient's weight and dietary intake as ordered or per policy  Utilize nutrition screening tool and intervene as necessary  Determine patient's food preferences and provide high-protein, high-caloric foods as appropriate       INTERVENTIONS:  - Monitor oral intake, urinary output, labs, and treatment plans  - Assess nutrition and hydration status and recommend course of action  - Evaluate amount of meals eaten  - Assist patient with eating if necessary   - Allow adequate time for meals  - Recommend/ encourage appropriate diets, oral nutritional supplements, and vitamin/mineral supplements  - Order, calculate, and assess calorie counts as needed  - Recommend, monitor, and adjust tube feedings and TPN/PPN based on assessed needs  - Assess need for intravenous fluids  - Provide specific nutrition/hydration education as appropriate  - Include patient/family/caregiver in decisions related to nutrition  12/16/2022 0847 by Luis Alberto Mejia RN  Outcome: Progressing  12/16/2022 0847 by Luis Alberto Mejia RN  Outcome: Progressing

## 2022-12-16 NOTE — OCCUPATIONAL THERAPY NOTE
12/16/22 0907   OT Last Visit   OT Visit Date 12/16/22   Note Type   Note Type Cancelled Session   Cancel Reasons Refusal  (reported that  would be coming in with her toiletries later this AM and would assist her with a shower  She declined alternate offer (with explanation of purpose/benefits of same) of doing UE exercise > "the shower will take all my energy")     Continue with POC as able/as appropriate     REJI Singh/MARGA

## 2022-12-17 LAB
ANION GAP SERPL CALCULATED.3IONS-SCNC: 11 MMOL/L (ref 4–13)
BASOPHILS # BLD AUTO: 0.03 THOUSANDS/ÂΜL (ref 0–0.1)
BASOPHILS NFR BLD AUTO: 0 % (ref 0–1)
BUN SERPL-MCNC: 27 MG/DL (ref 5–25)
CALCIUM SERPL-MCNC: 9.7 MG/DL (ref 8.4–10.2)
CHLORIDE SERPL-SCNC: 102 MMOL/L (ref 96–108)
CO2 SERPL-SCNC: 27 MMOL/L (ref 21–32)
CREAT SERPL-MCNC: 0.72 MG/DL (ref 0.6–1.3)
EOSINOPHIL # BLD AUTO: 0.09 THOUSAND/ÂΜL (ref 0–0.61)
EOSINOPHIL NFR BLD AUTO: 1 % (ref 0–6)
ERYTHROCYTE [DISTWIDTH] IN BLOOD BY AUTOMATED COUNT: 13.3 % (ref 11.6–15.1)
GFR SERPL CREATININE-BSD FRML MDRD: 90 ML/MIN/1.73SQ M
GLUCOSE SERPL-MCNC: 106 MG/DL (ref 65–140)
HCT VFR BLD AUTO: 28.7 % (ref 34.8–46.1)
HGB BLD-MCNC: 9.1 G/DL (ref 11.5–15.4)
IMM GRANULOCYTES # BLD AUTO: 0.1 THOUSAND/UL (ref 0–0.2)
IMM GRANULOCYTES NFR BLD AUTO: 1 % (ref 0–2)
LYMPHOCYTES # BLD AUTO: 1.1 THOUSANDS/ÂΜL (ref 0.6–4.47)
LYMPHOCYTES NFR BLD AUTO: 13 % (ref 14–44)
MCH RBC QN AUTO: 30.5 PG (ref 26.8–34.3)
MCHC RBC AUTO-ENTMCNC: 31.7 G/DL (ref 31.4–37.4)
MCV RBC AUTO: 96 FL (ref 82–98)
MONOCYTES # BLD AUTO: 0.96 THOUSAND/ÂΜL (ref 0.17–1.22)
MONOCYTES NFR BLD AUTO: 11 % (ref 4–12)
NEUTROPHILS # BLD AUTO: 6.49 THOUSANDS/ÂΜL (ref 1.85–7.62)
NEUTS SEG NFR BLD AUTO: 74 % (ref 43–75)
NRBC BLD AUTO-RTO: 0 /100 WBCS
PLATELET # BLD AUTO: 887 THOUSANDS/UL (ref 149–390)
PMV BLD AUTO: 9.5 FL (ref 8.9–12.7)
POTASSIUM SERPL-SCNC: 3.9 MMOL/L (ref 3.5–5.3)
RBC # BLD AUTO: 2.98 MILLION/UL (ref 3.81–5.12)
SODIUM SERPL-SCNC: 140 MMOL/L (ref 135–147)
WBC # BLD AUTO: 8.77 THOUSAND/UL (ref 4.31–10.16)

## 2022-12-17 RX ORDER — LEVALBUTEROL INHALATION SOLUTION 0.63 MG/3ML
1.25 SOLUTION RESPIRATORY (INHALATION)
Status: DISCONTINUED | OUTPATIENT
Start: 2022-12-17 | End: 2022-12-17

## 2022-12-17 RX ORDER — SODIUM CHLORIDE FOR INHALATION 0.9 %
3 VIAL, NEBULIZER (ML) INHALATION
Status: DISCONTINUED | OUTPATIENT
Start: 2022-12-17 | End: 2022-12-17

## 2022-12-17 RX ORDER — BUDESONIDE 0.5 MG/2ML
0.5 INHALANT ORAL
Status: DISCONTINUED | OUTPATIENT
Start: 2022-12-17 | End: 2022-12-29

## 2022-12-17 RX ORDER — LEVALBUTEROL 1.25 MG/.5ML
1.25 SOLUTION, CONCENTRATE RESPIRATORY (INHALATION)
Status: DISCONTINUED | OUTPATIENT
Start: 2022-12-17 | End: 2022-12-29

## 2022-12-17 RX ORDER — LEVALBUTEROL 1.25 MG/.5ML
1.25 SOLUTION, CONCENTRATE RESPIRATORY (INHALATION)
Status: DISCONTINUED | OUTPATIENT
Start: 2022-12-17 | End: 2022-12-17

## 2022-12-17 RX ORDER — ACETAMINOPHEN 325 MG/1
975 TABLET ORAL EVERY 6 HOURS PRN
Status: DISCONTINUED | OUTPATIENT
Start: 2022-12-17 | End: 2023-01-04 | Stop reason: HOSPADM

## 2022-12-17 RX ADMIN — BUDESONIDE 0.5 MG: 0.5 INHALANT ORAL at 20:48

## 2022-12-17 RX ADMIN — LISINOPRIL 40 MG: 20 TABLET ORAL at 08:45

## 2022-12-17 RX ADMIN — FLUTICASONE FUROATE AND VILANTEROL TRIFENATATE 1 PUFF: 100; 25 POWDER RESPIRATORY (INHALATION) at 08:45

## 2022-12-17 RX ADMIN — BUDESONIDE 0.5 MG: 0.5 INHALANT ORAL at 10:35

## 2022-12-17 RX ADMIN — PANTOPRAZOLE SODIUM 40 MG: 40 INJECTION, POWDER, LYOPHILIZED, FOR SOLUTION INTRAVENOUS at 08:45

## 2022-12-17 RX ADMIN — TRAMADOL HYDROCHLORIDE 50 MG: 50 TABLET, COATED ORAL at 05:35

## 2022-12-17 RX ADMIN — AMLODIPINE BESYLATE 10 MG: 10 TABLET ORAL at 08:45

## 2022-12-17 RX ADMIN — LEVALBUTEROL HYDROCHLORIDE 1.25 MG: 1.25 SOLUTION, CONCENTRATE RESPIRATORY (INHALATION) at 20:47

## 2022-12-17 RX ADMIN — KETOROLAC TROMETHAMINE 15 MG: 30 INJECTION, SOLUTION INTRAMUSCULAR; INTRAVENOUS at 21:32

## 2022-12-17 RX ADMIN — LEVALBUTEROL 1.25 MG: 1.25 SOLUTION, CONCENTRATE RESPIRATORY (INHALATION) at 10:35

## 2022-12-17 RX ADMIN — ISODIUM CHLORIDE 3 ML: 0.03 SOLUTION RESPIRATORY (INHALATION) at 10:35

## 2022-12-17 RX ADMIN — PANTOPRAZOLE SODIUM 40 MG: 40 INJECTION, POWDER, LYOPHILIZED, FOR SOLUTION INTRAVENOUS at 21:32

## 2022-12-17 RX ADMIN — KETOROLAC TROMETHAMINE 15 MG: 30 INJECTION, SOLUTION INTRAMUSCULAR; INTRAVENOUS at 08:45

## 2022-12-17 RX ADMIN — KETOROLAC TROMETHAMINE 15 MG: 30 INJECTION, SOLUTION INTRAMUSCULAR; INTRAVENOUS at 14:16

## 2022-12-17 RX ADMIN — ENOXAPARIN SODIUM 40 MG: 100 INJECTION SUBCUTANEOUS at 08:45

## 2022-12-17 RX ADMIN — ACETAMINOPHEN 975 MG: 325 TABLET ORAL at 01:23

## 2022-12-17 RX ADMIN — ACETAMINOPHEN 975 MG: 325 TABLET ORAL at 08:45

## 2022-12-17 RX ADMIN — KETOROLAC TROMETHAMINE 15 MG: 30 INJECTION, SOLUTION INTRAMUSCULAR; INTRAVENOUS at 01:23

## 2022-12-17 RX ADMIN — TRAMADOL HYDROCHLORIDE 50 MG: 50 TABLET, COATED ORAL at 12:27

## 2022-12-17 RX ADMIN — NEBIVOLOL 5 MG: 5 TABLET ORAL at 08:45

## 2022-12-17 RX ADMIN — ACETAMINOPHEN 975 MG: 325 TABLET ORAL at 22:37

## 2022-12-17 NOTE — CASE MANAGEMENT
Case Management Discharge Planning Note    Patient name Viki Loya  Location [address] 224/-19 MRN [id number]  : 1960 Date 2022       Current Admission Date: 2022  Current Admission Diagnosis:Sigmoid diverticulitis   Patient Active Problem List    Diagnosis Date Noted   • Anemia 2022   • Encephalopathy 2022   • Acute respiratory failure with hypoxia ([address] ) 2022   • GERD (gastroesophageal reflux disease) 2022   • Hypertension 2022   • Bronchomalacia 2022   • Asthma 2022   • Septic shock ([address] ) 2022   • Sigmoid diverticulitis 2022      LOS (days): 12  Geometric Mean LOS (GMLOS) (days): 9 60  Days to GMLOS:-2 8     OBJECTIVE:  Risk of Unplanned Readmission Score: 10 36         Current admission status: Inpatient   [facility] [address] [place] [address]  Phone: [phone] Fax: [phone]    Primary Care Provider: No primary care provider on file  Primary Insurance: [facility]  Secondary Insurance: MEDICARE    DISCHARGE DETAILS:    Discharge planning discussed with[de-identified] patient and spouse at the bedside  Freedom of Choice: Yes  Comments - Freedom of Choice: recommendation is for rehab-   referrals were sent to aru facilities and snf facilities cm is nto able to find a place in her network that has open beds - they may have beds next week- pt is moving better  pt and spouse would like her home with [facility]  CM contacted family/caregiver?: Yes             Contacts  Patient Contacts: Shannan Mckeon  Relationship to Patient[de-identified] Family (spouse)  Contact Method:  In Person  Reason/Outcome: Discharge [address]         Is the patient interested in [facility] at discharge?: Yes  [facility] Savanah Mccullough requested[de-identified] [address] Name[de-identified] Other  [facility] Provider[de-identified] PCP  Home Health Services Needed[de-identified] [address] and Assessment, Wound/Ostomy Care  Homebound Criteria Met[de-identified] Requires the Assistance of Another Person for Safe Ambulation or to Leave the Home, Uses an Assist Device (i e  cane, walker, etc)  Supporting Clincal Findings[de-identified] Limited Endurance         Other Referral/Resources/Interventions Provided:  Interventions: HHC  Referral Comments: additonal referrals for [facility] have been sent and many phone calls to find a hh and cm called the cm Janelle Franco at the [facility] at 10:44 am # [phone] ext [phone] and I had to leave a message-    Would you like to participate in our [address] service program?  : No - Declined    Treatment Team Recommendation: Home with [address] [facility] (home with spouse and [facility] & outpt follow up- family)         cm [facility] stating they do not have a rehab bed, cm placed calls to all the [facility] that did not respond in [address] [place] [address], cm called Vipin at [address] [facility] and they are not able to accept, cm called [facility] [phone]  And the only over 8 hr shift care and not c, [facility] only offers hospice care, call was placed to [place] and they do not offer pt/ot, cm met with the pt and spouse and they are ok with not having pt/ot , spouse stated he is moving the bed downstairs so pt can manage on the 1st floor, cm will continue to work on a safe d/c plan, pt is not stable to leave today

## 2022-12-17 NOTE — WOUND OSTOMY CARE
Progress Note- Ostomy  Denise Crews 58 y o  female  [id number]  --01    Assessment:  Wound ostomy weekly follow up  Patient is sitting on the side of the bed, she is eating her lunch   present  Patient is able to move independently in the bed and is able to ambulate independently  Ostomy pouch was changed yesterday by nursing  Patient does have difficulty with the odor, recommended that she use the lubricating deodorant in the pouch  Dicussed with  and patient care of the ostomy pouch  No further questions from the  or patient in ostomy care  Provided a [employer] product book,  will check with his insurance to obtain a company where ostomy supplies can be ordered and purchased  Reviewed ostomy care with patient and   Will follow up with patient next week to change pouch and review care  Ostomy is functioning for soft formed stool, stoma budded and pink-red  Pouch changed by nursing on 12/15/2022     1  Bilateral heels intact  2  Right mid buttock two areas of small clusters of blisters  The blisters are serous filled with light purple skin beneath  Areas of the purple tissue are blanchable  Patient states she does have herpes breakouts from time to time on her buttocks, she does not feel that this is a herpes breakout  Unknown etiology, may have a pressure component  Skin and Ostomy Care Plan:  1  Empty colostomy pouch when 1/3-1/2 full of stool or inflated with gas  Cleanse drainage end prior to closing pouch  Change pouch every 3 days and PRN with signs of leakage  Encourage patient to observe emptying pouch  2  Ehob offloading cushion to chair when OOB  3  Elevate heels off of bed with pillow to offload  4  Apply hydraguard to b/l heels, buttocks and sacrum BID and PRN  5  Turn and reposition patient Q2 hours  6   [employer] life silicone bordered foam dressings to the right buttock wounds, edu with T, date, and change every 48 hours or PRN    Wound: Wound 12/15/22 Buttocks Right (Active)   Wound Image   12/15/22 1237   Wound Description ANGIE 12/16/22 1844   Devi-wound Assessment Clean;Dry; Intact 12/16/22 1844   Wound Length (cm) 12 cm 12/15/22 1237   Wound Width (cm) 8 cm 12/15/22 1237   Wound Surface Area (cm^2) 96 cm^2 12/15/22 1237   Drainage Amount None 12/16/22 1500   Treatments Cleansed 12/16/22 1500   Dressing Foam, Silicon (eg  Allevyn, etc) 12/16/22 1500   Dressing Changed Other (Comment) 12/16/22 1844   Patient Tolerance Tolerated well 12/16/22 1844   Dressing Status Clean;dry; Intact 12/16/22 1844   Number of days: 1       Colostomy RLQ (Active)       Stomal Appliance 2 piece 12/16/22 2001   Stoma Assessment Pink;Budded 12/16/22 2001   Stoma size (in) 1 5 in 12/16/22 2001   Stoma Shape Oval;Budded 12/16/22 2001   Peristomal Assessment Clean; Intact 12/16/22 0846   Treatment Placement checked 12/16/22 2001   Output (mL) 200 mL 12/15/22 1229   Number of days: 11     Reviewed plan of care with primary RN Jah Winn  Recommendations written as orders  Wound care team to follow weekly while admitted  Questions or concerns [address] [facility] Nurse    Corby LINTONN, RN, Areli Mace

## 2022-12-17 NOTE — CASE MANAGEMENT
Case Management Discharge Planning Note    Patient name Bruna Cottrell  Location [address] 224/-30 MRN [id number]  : 1960 Date 2022       Current Admission Date: 2022  Current Admission Diagnosis:Sigmoid diverticulitis   Patient Active Problem List    Diagnosis Date Noted   • Anemia 2022   • Encephalopathy 2022   • Acute respiratory failure with hypoxia ([address] ) 2022   • GERD (gastroesophageal reflux disease) 2022   • Hypertension 2022   • Bronchomalacia 2022   • Asthma 2022   • Septic shock ([address] ) 2022   • Sigmoid diverticulitis 2022      LOS (days): 12  Geometric Mean LOS (GMLOS) (days): 9 60  Days to GMLOS:-2 8     OBJECTIVE:  Risk of Unplanned Readmission Score: 10 36         Current admission status: Inpatient   [facility] [address] [place], [place] [id number]  Phone: [phone] Fax: [phone]    Primary Care Provider: No primary care provider on file  Primary Insurance: [facility]  Secondary Insurance: MEDICARE    DISCHARGE DETAILS:    Discharge planning discussed with[de-identified] patient and spouse at the bedside  Freedom of Choice: Yes  Comments - Freedom of Choice: recommendation is for rehab-   referrals were sent to aru facilities and snf facilities cm is nto able to find a place in her network that has open beds - they may have beds next week- pt is moving better  pt and spouse would like her home with [facility]  CM contacted family/caregiver?: Yes             Contacts  Patient Contacts: Viviane Freeman  Relationship to Patient[de-identified] Family (spouse)  Contact Method:  In Person  Reason/Outcome: Discharge [address]         Is the patient interested in [facility] at discharge?: Yes  [address] requested[de-identified] [address] Name[de-identified] Other  [facility] Provider[de-identified] PCP  Home Health Services Needed[de-identified] [address] and Assessment, Wound/Ostomy Care  Homebound Criteria Met[de-identified] Requires the Assistance of Another Person for Safe Ambulation or to Leave the Home, Uses an Assist Device (i e  cane, walker, etc)  Supporting Clincal Findings[de-identified] Limited Endurance         Other Referral/Resources/Interventions Provided:  Interventions: HHC  Referral Comments: additonal referrals for hhc have been sent and many phone calls to find a hhc and cm called the avinash Mendez at the insurance [employer] at 10:44 am # [phone] ext [phone] and I had to leave a message-    Would you like to participate in our [address] service program?  : No - Declined    Treatment Team Recommendation: Home with [facility] [place] [facility] (home with spouse and hhc & outpt follow up- family)

## 2022-12-17 NOTE — NURSING NOTE
Patient OOB to chair  Surgery in to change abdominal dressing  Patient tolerated well  Offers no other complaints at this time  Call bell and belongings within reach

## 2022-12-17 NOTE — PROGRESS NOTES
[address]  Progress Note - Shari Cole 1960, 58 y o  female MRN: [id number]  Unit/Bed#: -01 Encounter: [id number]  Primary Care Provider: No primary care provider on file  Date and time admitted to hospital: 12/4/2022  7:07 AM    * Sigmoid diverticulitis  Assessment & Plan  59 yo F hx bronchomalacia/bronchiectasis admitted with septic shock 2/2 feculent peritonitis from peforated sigmoid diverticulitis (Hinchey IV) now s/p emergent hand assisted laparoscopic sigmoid resection, primary anastomosis and diverting transverse loop colostomy  Overall doing well, pain controlled, tolerating diet, working on mobility  Stoma with pasty brown stool, LLQ incision open and clean with packing changed  Afebrile, heart rate 87, blood pressure 113/70, SPO2 92%, respirations 20  Hemoglobin 9 1, will WBC 8 77, platelets 483, creatinine 0 72, BUN 27    Assessment:  Hinchey IV diverticulitis  LLE wound infection  Plan:   Overall clinically and hemodynamically stable with ongoing efforts to mobilize with plan for eventual discharge to home with home health care when arranged  Continue home meds and supportive measures  Daily packing changes to the left lower quadrant wound  Appliance changed for the colostomy tomorrow  Follow-up on wound cultures, monitor off antibiotics  Subjective/Objective   Chief Complaint: none    Subjective: still with weakness upon ambulation, tolerating diet, ongoing stool output from colostomy    Objective: no overnight events    Blood pressure 104/55, pulse 63, temperature 98 3 °F (36 8 °C), resp  rate 16, height 5' 5" (1 651 m), weight 77 4 kg (170 lb 10 2 oz), SpO2 94 %  ,Body mass index is 28 4 kg/m²        Intake/Output Summary (Last 24 hours) at 12/17/2022 1632  Last data filed at 12/17/2022 1227  Gross per 24 hour   Intake 560 ml   Output 200 ml   Net 360 ml       Invasive Devices     Peripheral Intravenous Line  Duration           Peripheral IV 12/16/22 Right;Ventral (anterior) Forearm 1 day          Drain  Duration           Colostomy RLQ 12 days                Physical Exam  Vitals and nursing note reviewed  Constitutional:       General: She is not in acute distress  Appearance: She is well-developed  She is not diaphoretic  HENT:      Head: Normocephalic and atraumatic  Eyes:      Conjunctiva/sclera: Conjunctivae normal       Pupils: Pupils are equal, round, and reactive to light  Pulmonary:      Effort: No respiratory distress  Abdominal:      Comments: Soft, stoma with semisolid brown stool  LLQ incision clean and open with packing changed  Musculoskeletal:         General: Normal range of motion  Cervical back: Normal range of motion  Skin:     General: Skin is warm and dry  Capillary Refill: Capillary refill takes less than 2 seconds  Neurological:      Mental Status: She is alert and oriented to person, place, and time  Psychiatric:         Behavior: Behavior normal            Lab, Imaging and other studies:  I have personally reviewed pertinent lab results    , CBC:   Lab Results   Component Value Date    WBC 8 77 12/17/2022    HGB 9 1 (L) 12/17/2022    HCT 28 7 (L) 12/17/2022    MCV 96 12/17/2022     (H) 12/17/2022    MCH 30 5 12/17/2022    MCHC 31 7 12/17/2022    RDW 13 3 12/17/2022    MPV 9 5 12/17/2022    NRBC 0 12/17/2022   , CMP:   Lab Results   Component Value Date    SODIUM 140 12/17/2022    K 3 9 12/17/2022     12/17/2022    CO2 27 12/17/2022    BUN 27 (H) 12/17/2022    CREATININE 0 72 12/17/2022    CALCIUM 9 7 12/17/2022    EGFR 90 12/17/2022     VTE Pharmacologic Prophylaxis: Enoxaparin (Lovenox)  VTE Mechanical Prophylaxis: sequential compression device

## 2022-12-17 NOTE — PLAN OF CARE
Problem: INFECTION - ADULT  Goal: Absence or prevention of progression during hospitalization  Description: INTERVENTIONS:  - Assess and monitor for signs and symptoms of infection  - Monitor lab/diagnostic results  - Monitor all insertion sites, i e  indwelling lines, tubes, and drains  - Monitor endotracheal if appropriate and nasal secretions for changes in amount and color  - [place] appropriate cooling/warming therapies per order  - Administer medications as ordered  - Instruct and encourage patient and family to use good hand hygiene technique  - Identify and instruct in appropriate isolation precautions for identified infection/condition  Outcome: Progressing  Goal: Absence of fever/infection during neutropenic period  Description: INTERVENTIONS:  - Monitor WBC    Outcome: Progressing

## 2022-12-18 ENCOUNTER — APPOINTMENT (INPATIENT)
Dept: CT IMAGING | Facility: HOSPITAL | Age: 62
End: 2022-12-18

## 2022-12-18 LAB
ALBUMIN SERPL BCP-MCNC: 3.2 G/DL (ref 3.5–5)
ALP SERPL-CCNC: 107 U/L (ref 34–104)
ALT SERPL W P-5'-P-CCNC: 19 U/L (ref 7–52)
ANION GAP SERPL CALCULATED.3IONS-SCNC: 12 MMOL/L (ref 4–13)
AST SERPL W P-5'-P-CCNC: 17 U/L (ref 13–39)
BACTERIA WND AEROBE CULT: ABNORMAL
BASE EX.OXY STD BLDV CALC-SCNC: 59.5 % (ref 60–80)
BASE EXCESS BLDV CALC-SCNC: 3.3 MMOL/L
BASOPHILS # BLD AUTO: 0.04 THOUSANDS/ÂΜL (ref 0–0.1)
BASOPHILS NFR BLD AUTO: 0 % (ref 0–1)
BILIRUB SERPL-MCNC: 0.34 MG/DL (ref 0.2–1)
BUN SERPL-MCNC: 22 MG/DL (ref 5–25)
CALCIUM ALBUM COR SERPL-MCNC: 10.1 MG/DL (ref 8.3–10.1)
CALCIUM SERPL-MCNC: 9.5 MG/DL (ref 8.4–10.2)
CHLORIDE SERPL-SCNC: 103 MMOL/L (ref 96–108)
CO2 SERPL-SCNC: 24 MMOL/L (ref 21–32)
CREAT SERPL-MCNC: 0.66 MG/DL (ref 0.6–1.3)
EOSINOPHIL # BLD AUTO: 0.07 THOUSAND/ÂΜL (ref 0–0.61)
EOSINOPHIL NFR BLD AUTO: 1 % (ref 0–6)
ERYTHROCYTE [DISTWIDTH] IN BLOOD BY AUTOMATED COUNT: 13.5 % (ref 11.6–15.1)
GFR SERPL CREATININE-BSD FRML MDRD: 94 ML/MIN/1.73SQ M
GLUCOSE SERPL-MCNC: 118 MG/DL (ref 65–140)
GRAM STN SPEC: ABNORMAL
HCO3 BLDV-SCNC: 28.5 MMOL/L (ref 24–30)
HCT VFR BLD AUTO: 27.9 % (ref 34.8–46.1)
HGB BLD-MCNC: 9 G/DL (ref 11.5–15.4)
IMM GRANULOCYTES # BLD AUTO: 0.11 THOUSAND/UL (ref 0–0.2)
IMM GRANULOCYTES NFR BLD AUTO: 1 % (ref 0–2)
LACTATE SERPL-SCNC: 1.2 MMOL/L (ref 0.5–2)
LYMPHOCYTES # BLD AUTO: 0.94 THOUSANDS/ÂΜL (ref 0.6–4.47)
LYMPHOCYTES NFR BLD AUTO: 10 % (ref 14–44)
MCH RBC QN AUTO: 30.9 PG (ref 26.8–34.3)
MCHC RBC AUTO-ENTMCNC: 32.3 G/DL (ref 31.4–37.4)
MCV RBC AUTO: 96 FL (ref 82–98)
MONOCYTES # BLD AUTO: 1.06 THOUSAND/ÂΜL (ref 0.17–1.22)
MONOCYTES NFR BLD AUTO: 12 % (ref 4–12)
NEUTROPHILS # BLD AUTO: 6.81 THOUSANDS/ÂΜL (ref 1.85–7.62)
NEUTS SEG NFR BLD AUTO: 76 % (ref 43–75)
NRBC BLD AUTO-RTO: 0 /100 WBCS
O2 CT BLDV-SCNC: 8.6 ML/DL
PCO2 BLDV: 46.6 MM HG (ref 42–50)
PH BLDV: 7.41 [PH] (ref 7.3–7.4)
PLATELET # BLD AUTO: 992 THOUSANDS/UL (ref 149–390)
PMV BLD AUTO: 9.7 FL (ref 8.9–12.7)
PO2 BLDV: 31.5 MM HG (ref 35–45)
POTASSIUM SERPL-SCNC: 4.1 MMOL/L (ref 3.5–5.3)
PROCALCITONIN SERPL-MCNC: 0.18 NG/ML
PROCALCITONIN SERPL-MCNC: 0.18 NG/ML
PROT SERPL-MCNC: 6.8 G/DL (ref 6.4–8.4)
RBC # BLD AUTO: 2.91 MILLION/UL (ref 3.81–5.12)
SODIUM SERPL-SCNC: 139 MMOL/L (ref 135–147)
WBC # BLD AUTO: 9.03 THOUSAND/UL (ref 4.31–10.16)

## 2022-12-18 RX ORDER — SODIUM CHLORIDE, SODIUM LACTATE, POTASSIUM CHLORIDE, CALCIUM CHLORIDE 600; 310; 30; 20 MG/100ML; MG/100ML; MG/100ML; MG/100ML
100 INJECTION, SOLUTION INTRAVENOUS CONTINUOUS
Status: DISCONTINUED | OUTPATIENT
Start: 2022-12-18 | End: 2022-12-21

## 2022-12-18 RX ORDER — KETOROLAC TROMETHAMINE 30 MG/ML
15 INJECTION, SOLUTION INTRAMUSCULAR; INTRAVENOUS EVERY 6 HOURS PRN
Status: DISPENSED | OUTPATIENT
Start: 2022-12-18 | End: 2022-12-20

## 2022-12-18 RX ORDER — OXYCODONE HYDROCHLORIDE AND ACETAMINOPHEN 5; 325 MG/1; MG/1
1 TABLET ORAL EVERY 6 HOURS PRN
Status: DISCONTINUED | OUTPATIENT
Start: 2022-12-18 | End: 2023-01-01

## 2022-12-18 RX ADMIN — PANTOPRAZOLE SODIUM 40 MG: 40 INJECTION, POWDER, LYOPHILIZED, FOR SOLUTION INTRAVENOUS at 10:00

## 2022-12-18 RX ADMIN — IOHEXOL 100 ML: 350 INJECTION, SOLUTION INTRAVENOUS at 15:49

## 2022-12-18 RX ADMIN — SODIUM CHLORIDE, POTASSIUM CHLORIDE, SODIUM LACTATE AND CALCIUM CHLORIDE 100 ML/HR: 600; 310; 30; 20 INJECTION, SOLUTION INTRAVENOUS at 19:18

## 2022-12-18 RX ADMIN — OXYCODONE AND ACETAMINOPHEN 1 TABLET: 5; 325 TABLET ORAL at 10:08

## 2022-12-18 RX ADMIN — ENOXAPARIN SODIUM 40 MG: 100 INJECTION SUBCUTANEOUS at 10:00

## 2022-12-18 RX ADMIN — TRAMADOL HYDROCHLORIDE 50 MG: 50 TABLET, COATED ORAL at 13:51

## 2022-12-18 RX ADMIN — OXYCODONE AND ACETAMINOPHEN 1 TABLET: 5; 325 TABLET ORAL at 19:22

## 2022-12-18 RX ADMIN — KETOROLAC TROMETHAMINE 15 MG: 30 INJECTION, SOLUTION INTRAMUSCULAR; INTRAVENOUS at 07:34

## 2022-12-18 RX ADMIN — SODIUM CHLORIDE, SODIUM LACTATE, POTASSIUM CHLORIDE, AND CALCIUM CHLORIDE 1000 ML: .6; .31; .03; .02 INJECTION, SOLUTION INTRAVENOUS at 17:01

## 2022-12-18 RX ADMIN — KETOROLAC TROMETHAMINE 15 MG: 30 INJECTION, SOLUTION INTRAMUSCULAR; INTRAVENOUS at 22:52

## 2022-12-18 RX ADMIN — NEBIVOLOL 5 MG: 5 TABLET ORAL at 10:00

## 2022-12-18 RX ADMIN — KETOROLAC TROMETHAMINE 15 MG: 30 INJECTION, SOLUTION INTRAMUSCULAR; INTRAVENOUS at 01:24

## 2022-12-18 RX ADMIN — Medication 4.5 G: at 21:24

## 2022-12-18 RX ADMIN — SODIUM CHLORIDE, SODIUM LACTATE, POTASSIUM CHLORIDE, AND CALCIUM CHLORIDE 1000 ML: .6; .31; .03; .02 INJECTION, SOLUTION INTRAVENOUS at 18:07

## 2022-12-18 RX ADMIN — AMLODIPINE BESYLATE 10 MG: 10 TABLET ORAL at 10:00

## 2022-12-18 RX ADMIN — Medication 4.5 G: at 17:15

## 2022-12-18 RX ADMIN — ONDANSETRON 4 MG: 2 INJECTION INTRAMUSCULAR; INTRAVENOUS at 07:34

## 2022-12-18 RX ADMIN — PANTOPRAZOLE SODIUM 40 MG: 40 INJECTION, POWDER, LYOPHILIZED, FOR SOLUTION INTRAVENOUS at 21:24

## 2022-12-18 RX ADMIN — LISINOPRIL 40 MG: 20 TABLET ORAL at 10:00

## 2022-12-18 NOTE — NURSING NOTE
Patient resting in bed at present  Complains of pain/ nausea  Scheduled toradol IV and zofran administered  Offers no other complaints at this time  Patient encouraged to ambulate once nausea and pain subsides, pt agreeable  Call bell and belongings within reach

## 2022-12-18 NOTE — QUICK NOTE
Patient notified of the results of the CT  Patient understands that these intra-abdominal abscesses will require a combination of intravenous antibiotic therapy as well as percutaneous drainage with the assistance of interventional radiology  Short-term treatment plan outlined for the patient  All questions answered to her satisfaction  She expressed understanding and agreement with the treatment plan outlined above

## 2022-12-18 NOTE — PROGRESS NOTES
[address]  Progress Note - Stalin Hernandez 1960, 58 y o  female MRN: [id number]  Unit/Bed#: -01 Encounter: [id number]  Primary Care Provider: No primary care provider on file  Date and time admitted to hospital: 12/4/2022  7:07 AM    * Sigmoid diverticulitis  Assessment & Plan  57 yo F hx bronchomalacia/bronchiectasis admitted with septic shock 2/2 feculent peritonitis from peforated sigmoid diverticulitis (Hinchey IV) now s/p emergent hand assisted laparoscopic sigmoid resection, primary anastomosis and diverting transverse loop colostomy  Pain and nausea today  Stoma functioning, incisions C/D/I, LLQ wound open and clean  AFVSS on room air, SpO2 upper 80s low 90s  Wound culture with Staph lugdenesis  Assessment:  Hinchey IV diverticulitis  LLQ wound infection  Plan:  Increased pain/nausea today of unclear etiology  Will repeat blood work in AM  Continue to trend vitals  Percocet added for PRN pain  Monitor stoma output and LLQ wound off antibiotics  Supportive measures  Plan for appliance change and removal of remaining staples tomorrow  Addendum:  Patient reporting 10/10 pain  Re-examined bedside and found to have severe TTP in RUQ and epigastrium  Discussed with Dr Viviane Moore, will order stat CBC/CMP and obtain CTA chest, CT abdomen/pelvis today to r/o abscess/PE  Subjective/Objective   Chief Complaint: pain    Subjective: pain increased yesterday and today, ongoing intermittent nausea, but still tolerating solid foods, ongoing stool output from colostomy    Objective:     Blood pressure 122/79, pulse 76, temperature 98 1 °F (36 7 °C), resp  rate 18, height 5' 5" (1 651 m), weight 77 6 kg (171 lb 1 2 oz), SpO2 (!) 88 %  ,Body mass index is 28 47 kg/m²        Intake/Output Summary (Last 24 hours) at 12/18/2022 1047  Last data filed at 12/18/2022 1001  Gross per 24 hour   Intake 600 ml   Output 200 ml   Net 400 ml       Invasive Devices     Peripheral Intravenous Line  Duration           Peripheral IV 12/16/22 Right;Ventral (anterior) Forearm 1 day          Drain  Duration           Colostomy RLQ 13 days                Physical Exam  Vitals and nursing note reviewed  Constitutional:       General: She is not in acute distress  Appearance: She is well-developed  She is not diaphoretic  HENT:      Head: Normocephalic and atraumatic  Eyes:      Conjunctiva/sclera: Conjunctivae normal       Pupils: Pupils are equal, round, and reactive to light  Cardiovascular:      Rate and Rhythm: Normal rate  Pulmonary:      Effort: Pulmonary effort is normal  No respiratory distress  Abdominal:      Comments: Soft, mild generalized TTP, LLQ wound open and clean, stoma with formed brown stool, right sided incisions C/D/I with staples  Musculoskeletal:         General: Normal range of motion  Cervical back: Normal range of motion  Skin:     General: Skin is warm and dry  Capillary Refill: Capillary refill takes less than 2 seconds  Neurological:      Mental Status: She is alert and oriented to person, place, and time  Psychiatric:         Behavior: Behavior normal            Lab, Imaging and other studies:I have personally reviewed pertinent lab results      VTE Pharmacologic Prophylaxis: Enoxaparin (Lovenox)  VTE Mechanical Prophylaxis: sequential compression device

## 2022-12-18 NOTE — SEPSIS NOTE
Sepsis Note   Eli Cash 58 y o  female MRN: [id number]  Unit/Bed#: -01 Encounter: [id number]       qSOFA     Row Name 12/18/22 14:40:29 12/18/22 10:06:31 12/18/22 0734 12/18/22 07:15:43 12/17/22 2300    Altered mental status GCS < 15 -- -- 0 -- 0    Respiratory Rate > / =22 -- 0 -- 0 --    Systolic BP < / =015 0 0 -- 0 --    Q Sofa Score -- 0 0 0 0    Row Name 12/17/22 22:21:29 12/17/22 2048 12/17/22 15:50:21 12/17/22 0945 12/17/22 08:08:16    Altered mental status GCS < 15 -- -- -- 0 --    Respiratory Rate > / =22 0 0 0 -- 0    Systolic BP < / =298 0 -- 0 -- 0    Q Sofa Score 0 -- 0 0 0    Row Name 12/16/22 2325 12/16/22 22:10:53 12/16/22 2042 12/16/22 16:56:01       Altered mental status GCS < 15 0 -- -- --     Respiratory Rate > / =22 -- 0 0 0     Systolic BP < / =458 -- 1 -- 0     Q Sofa Score 1 1 0 0                     Sepsis protocol initiated for post-op intra-abdominal abscess  LR bolus x 2L then infusion @100mL/hr  Procalcitonin with reflex, lactic acid, VBG, blood culture x 2 now  Zosyn 4 5g Q6H scheduled  Dr Franchesca Shultz to evaluated bedside  IR consult placed, likely for tomorrow  Await official read on CT

## 2022-12-18 NOTE — NURSING NOTE
Patient rang bell, RN in to see patient  Patient in 'excruciating' pain, 10/10 on numeric pain scale from right back to epigastric area  Patient then became nauseous  Surgical PA-C aware  Percocet PO administered, PA-C in to see patient  Blood work and CT scan ordered

## 2022-12-18 NOTE — NURSING NOTE
Surgical PA-C alerted RN of sepsis criteria  RN called sepsis alert at [phone] on patient  Blood cultures, labs obtained  Fluid boluses and antibiotics initiated  Physician in to speak with patient at bedside at length, patient verbalized understanding

## 2022-12-18 NOTE — ASSESSMENT & PLAN NOTE
59 yo F hx bronchomalacia/bronchiectasis admitted with septic shock 2/2 feculent peritonitis from peforated sigmoid diverticulitis (Hinchey IV) now s/p emergent hand assisted laparoscopic sigmoid resection, primary anastomosis and diverting transverse loop colostomy  Pain and nausea today  Stoma functioning, incisions C/D/I, LLQ wound open and clean  AFVSS on room air, SpO2 upper 80s low 90s  Wound culture with Staph lugdenesis  Assessment:  Hinchey IV diverticulitis  LLQ wound infection  Plan:  Increased pain/nausea today of unclear etiology  Will repeat blood work in AM  Continue to trend vitals  Percocet added for PRN pain  Monitor stoma output and LLQ wound off antibiotics  Supportive measures  Plan for appliance change and removal of remaining staples tomorrow

## 2022-12-18 NOTE — CASE MANAGEMENT
Case Management Progress Note    Patient name Valerie Batsita  Location [address] 224/-32 MRN [id number]  : 1960 Date 2022       LOS (days): 14  Geometric Mean LOS (GMLOS) (days): 9 60  Days to GMLOS:-4 7        OBJECTIVE:        Current admission status: Inpatient  Preferred Pharmacy:   [address]  [facility] [address]  Phone: [phone] Fax: [phone]    Primary Care Provider: No primary care provider on file  Primary Insurance: [employer]  Secondary Insurance: MEDICARE    PROGRESS NOTE:  Cm received a carlos from [facility] in the [facility] , she was really helpful, she explained that [facility] is dived and the c cover only certain areas-, pt does have an insurance that not all the agencies are in network, they are unable to accept due to staffing , she did state I could call her and she would try to help me [phone] ext [phone], cm placed a call to [facility] [phone] and I had to leave a message 9:55 am cm received a call back stating they have no openings- cm called [facility] [phone] and I spoke with Bobbi Hand and they do not cover the area that the pt lives, cm placed a call to [facility] med ay 9:56 and I made a referral & cm received a call back at 12:02 pm and they are unable to accept this patient, cm will continue to follow and work on a safe d/c plan, cm has a call to the pt's cm with her insurance company to see if they would given a one time contratract with a [facility] that  Has availability

## 2022-12-19 ENCOUNTER — APPOINTMENT (INPATIENT)
Dept: CT IMAGING | Facility: HOSPITAL | Age: 62
End: 2022-12-19
Attending: RADIOLOGY

## 2022-12-19 LAB
ALBUMIN SERPL BCP-MCNC: 3.1 G/DL (ref 3.5–5)
ALP SERPL-CCNC: 105 U/L (ref 34–104)
ALT SERPL W P-5'-P-CCNC: 21 U/L (ref 7–52)
ANION GAP SERPL CALCULATED.3IONS-SCNC: 9 MMOL/L (ref 4–13)
AST SERPL W P-5'-P-CCNC: 19 U/L (ref 13–39)
BASOPHILS # BLD AUTO: 0.04 THOUSANDS/ÂΜL (ref 0–0.1)
BASOPHILS NFR BLD AUTO: 1 % (ref 0–1)
BILIRUB SERPL-MCNC: 0.37 MG/DL (ref 0.2–1)
BUN SERPL-MCNC: 18 MG/DL (ref 5–25)
CALCIUM ALBUM COR SERPL-MCNC: 10.1 MG/DL (ref 8.3–10.1)
CALCIUM SERPL-MCNC: 9.4 MG/DL (ref 8.4–10.2)
CHLORIDE SERPL-SCNC: 102 MMOL/L (ref 96–108)
CO2 SERPL-SCNC: 27 MMOL/L (ref 21–32)
CREAT SERPL-MCNC: 0.66 MG/DL (ref 0.6–1.3)
EOSINOPHIL # BLD AUTO: 0.13 THOUSAND/ÂΜL (ref 0–0.61)
EOSINOPHIL NFR BLD AUTO: 2 % (ref 0–6)
ERYTHROCYTE [DISTWIDTH] IN BLOOD BY AUTOMATED COUNT: 13.4 % (ref 11.6–15.1)
GFR SERPL CREATININE-BSD FRML MDRD: 94 ML/MIN/1.73SQ M
GLUCOSE SERPL-MCNC: 98 MG/DL (ref 65–140)
HCT VFR BLD AUTO: 29.3 % (ref 34.8–46.1)
HGB BLD-MCNC: 9.4 G/DL (ref 11.5–15.4)
IMM GRANULOCYTES # BLD AUTO: 0.13 THOUSAND/UL (ref 0–0.2)
IMM GRANULOCYTES NFR BLD AUTO: 2 % (ref 0–2)
LYMPHOCYTES # BLD AUTO: 1.21 THOUSANDS/ÂΜL (ref 0.6–4.47)
LYMPHOCYTES NFR BLD AUTO: 15 % (ref 14–44)
MCH RBC QN AUTO: 31.6 PG (ref 26.8–34.3)
MCHC RBC AUTO-ENTMCNC: 32.1 G/DL (ref 31.4–37.4)
MCV RBC AUTO: 99 FL (ref 82–98)
MONOCYTES # BLD AUTO: 1.08 THOUSAND/ÂΜL (ref 0.17–1.22)
MONOCYTES NFR BLD AUTO: 13 % (ref 4–12)
NEUTROPHILS # BLD AUTO: 5.62 THOUSANDS/ÂΜL (ref 1.85–7.62)
NEUTS SEG NFR BLD AUTO: 67 % (ref 43–75)
NRBC BLD AUTO-RTO: 0 /100 WBCS
PLATELET # BLD AUTO: 1030 THOUSANDS/UL (ref 149–390)
PMV BLD AUTO: 9.5 FL (ref 8.9–12.7)
POTASSIUM SERPL-SCNC: 4.1 MMOL/L (ref 3.5–5.3)
PROCALCITONIN SERPL-MCNC: 0.17 NG/ML
PROT SERPL-MCNC: 6.5 G/DL (ref 6.4–8.4)
RBC # BLD AUTO: 2.97 MILLION/UL (ref 3.81–5.12)
SODIUM SERPL-SCNC: 138 MMOL/L (ref 135–147)
WBC # BLD AUTO: 8.21 THOUSAND/UL (ref 4.31–10.16)

## 2022-12-19 PROCEDURE — 0D9W30Z DRAINAGE OF PERITONEUM WITH DRAINAGE DEVICE, PERCUTANEOUS APPROACH: ICD-10-PCS | Performed by: RADIOLOGY

## 2022-12-19 RX ORDER — ASPIRIN 81 MG/1
81 TABLET ORAL DAILY
Status: DISCONTINUED | OUTPATIENT
Start: 2022-12-19 | End: 2023-01-01

## 2022-12-19 RX ADMIN — TRAMADOL HYDROCHLORIDE 50 MG: 50 TABLET, COATED ORAL at 00:40

## 2022-12-19 RX ADMIN — ACETAMINOPHEN 975 MG: 325 TABLET ORAL at 19:47

## 2022-12-19 RX ADMIN — BUDESONIDE 0.5 MG: 0.5 INHALANT ORAL at 21:31

## 2022-12-19 RX ADMIN — KETOROLAC TROMETHAMINE 15 MG: 30 INJECTION, SOLUTION INTRAMUSCULAR; INTRAVENOUS at 05:06

## 2022-12-19 RX ADMIN — PANTOPRAZOLE SODIUM 40 MG: 40 INJECTION, POWDER, LYOPHILIZED, FOR SOLUTION INTRAVENOUS at 21:16

## 2022-12-19 RX ADMIN — SODIUM CHLORIDE, POTASSIUM CHLORIDE, SODIUM LACTATE AND CALCIUM CHLORIDE 100 ML/HR: 600; 310; 30; 20 INJECTION, SOLUTION INTRAVENOUS at 17:39

## 2022-12-19 RX ADMIN — Medication 4.5 G: at 17:39

## 2022-12-19 RX ADMIN — Medication 4.5 G: at 05:04

## 2022-12-19 RX ADMIN — ENOXAPARIN SODIUM 40 MG: 100 INJECTION SUBCUTANEOUS at 09:45

## 2022-12-19 RX ADMIN — OXYCODONE AND ACETAMINOPHEN 1 TABLET: 5; 325 TABLET ORAL at 23:39

## 2022-12-19 RX ADMIN — OXYCODONE AND ACETAMINOPHEN 1 TABLET: 5; 325 TABLET ORAL at 17:38

## 2022-12-19 RX ADMIN — Medication 4.5 G: at 21:16

## 2022-12-19 RX ADMIN — PANTOPRAZOLE SODIUM 40 MG: 40 INJECTION, POWDER, LYOPHILIZED, FOR SOLUTION INTRAVENOUS at 09:46

## 2022-12-19 RX ADMIN — OXYCODONE AND ACETAMINOPHEN 1 TABLET: 5; 325 TABLET ORAL at 07:14

## 2022-12-19 RX ADMIN — SODIUM CHLORIDE, POTASSIUM CHLORIDE, SODIUM LACTATE AND CALCIUM CHLORIDE 100 ML/HR: 600; 310; 30; 20 INJECTION, SOLUTION INTRAVENOUS at 05:55

## 2022-12-19 RX ADMIN — ONDANSETRON 4 MG: 2 INJECTION INTRAMUSCULAR; INTRAVENOUS at 17:41

## 2022-12-19 RX ADMIN — LEVALBUTEROL HYDROCHLORIDE 1.25 MG: 1.25 SOLUTION, CONCENTRATE RESPIRATORY (INHALATION) at 21:31

## 2022-12-19 RX ADMIN — Medication 4.5 G: at 12:06

## 2022-12-19 RX ADMIN — KETOROLAC TROMETHAMINE 15 MG: 30 INJECTION, SOLUTION INTRAMUSCULAR; INTRAVENOUS at 12:05

## 2022-12-19 NOTE — CONSULTS
Interventional Radiology  Consultation 12/19/2022     Consults  Bruna Cottrell   1960   [id number]      Assessment/Plan:  Elongated fluid collection along the right paracolic gutter  This will probably need crossing drains  Plan on having 1 drain entered from caudal, and track cephalad  Second drain entering from cephalad and tracking caudal   Is a very low volume collection continuing into the pelvis  Not sure if we will be able to engage that from a lateral puncture  I think it is good to drain easy stuff, then get a follow-up scan  Medical Problems     Problem List     * (Principal) Sigmoid diverticulitis    Acute respiratory failure with hypoxia (HCC)    GERD (gastroesophageal reflux disease)    Hypertension    Bronchomalacia    Asthma    Septic shock (HCC)    Anemia    Encephalopathy            Subjective:     Patient ID: Bruna Cottrell is a 58 y o  female  History of Present Illness  Status post surgery for diverticulitis    Now with collection on the right    Review of Systems      Past Medical History:   Diagnosis Date   • Asthma    • Bronchiectasis ([address] )    • Bronchomalacia    • GERD (gastroesophageal reflux disease)    • Hiatal hernia         Past Surgical History:   Procedure Laterality Date   • APPENDECTOMY     • CHOLECYSTECTOMY     • COLON SIGMOID RESECTION LAPAROSCOPIC N/A 12/5/2022    Procedure: RESECTION COLON SIGMOID LAPAROSCOPIC HAND-ASSISTED, diverting transverse loop colostomy;  Surgeon: Ada Zhao MD;  Location: [facility];  Service: General   • HYSTERECTOMY     • KNEE SURGERY Left         Social History     Tobacco Use   Smoking Status Never   Smokeless Tobacco Never        Social History     Substance and Sexual Activity   Alcohol Use Not Currently        Social History     Substance and Sexual Activity   Drug Use Not Currently        Allergies   Allergen Reactions   • Dilaudid [Hydromorphone] Vomiting   • Doxycycline Vomiting   • Hydrochlorothiazide Vomiting   • Sulfa Antibiotics Vomiting       Current Facility-Administered Medications   Medication Dose Route Frequency Provider Last Rate Last Admin   • acetaminophen (TYLENOL) tablet 975 mg  975 mg Oral Q6H PRN Pankaj Morales PA-C   975 mg at 12/17/22 2237   • amLODIPine (NORVASC) tablet 10 mg  10 mg Oral Daily ADWOA BatistaC   10 mg at 12/18/22 1000   • budesonide (PULMICORT) inhalation solution 0 5 mg  0 5 mg Nebulization Q12H Pankaj Morales PA-C   0 5 mg at 12/17/22 2048   • enoxaparin (LOVENOX) subcutaneous injection 40 mg  40 mg Subcutaneous Q24H [facility] BARBIE Hernandez   40 mg at 12/18/22 1000   • ketorolac (TORADOL) injection 15 mg  15 mg Intravenous Q6H PRN Jamaal Sims MD   15 mg at 12/19/22 0506   • lactated ringers infusion  100 mL/hr Intravenous Continuous Pankaj Morales PA-C 100 mL/hr at 12/19/22 0555 100 mL/hr at 12/19/22 0555   • levalbuterol (XOPENEX) inhalation solution 1 25 mg  1 25 mg Nebulization BID Juan Chávez MD   1 25 mg at 12/17/22 2047   • lidocaine (LIDODERM) 5 % patch 2 patch  2 patch Topical Daily BARBIE Jain   2 patch at 12/13/22 0840   • melatonin tablet 6 mg  6 mg Per NG Tube HS BARBIE Jain   6 mg at 12/14/22 2151   • nebivolol (BYSTOLIC) tablet 5 mg  5 mg Oral Daily ADWOA BatistaC   5 mg at 12/18/22 1000   • ondansetron (ZOFRAN) injection 4 mg  4 mg Intravenous Q6H PRN BARBIE Jain   4 mg at 12/18/22 0734   • oxyCODONE-acetaminophen (PERCOCET) 5-325 mg per tablet 1 tablet  1 tablet Oral Q6H PRN Pankaj Morales PA-C   1 tablet at 12/19/22 3821   • pantoprazole (PROTONIX) injection 40 mg  40 mg Intravenous Q12H [facility] BARBIE Hernandez   40 mg at 12/18/22 2124   • phenol (CHLORASEPTIC) 1 4 % mucosal liquid 1 spray  1 spray Mouth/Throat Q2H PRN BARBIE Jain       • piperacillin-tazobactam (ZOSYN) IVPB 4 5 g  4 5 g Intravenous Q6H Pankaj Morales PA-C 0 mL/hr at 12/18/22 6343 4 5 g at 12/19/22 0504   • traMADol (ULTRAM) tablet 50 mg  50 mg Oral Q6H PRN BARBIE Michel   50 mg at 12/19/22 0040          Objective:    Vitals:    12/18/22 1650 12/18/22 2111 12/18/22 2235 12/19/22 0543   BP: 108/70 128/65 115/61    BP Location: Left arm  Right arm    Pulse: 76 72 60    Resp: 18  18    Temp: 98 6 °F (37 °C) 99 2 °F (37 3 °C) 99 5 °F (37 5 °C)    TempSrc: Oral  Oral    SpO2: 92% 93% 90%    Weight:    80 2 kg (176 lb 12 9 oz)   Height:            Physical Exam  I spent about 5 minutes coordinating care with the clinical team     Lab Results   Component Value Date    BNP 39 12/14/2022      Lab Results   Component Value Date    WBC 8 21 12/19/2022    HGB 9 4 (L) 12/19/2022    HCT 29 3 (L) 12/19/2022    MCV 99 (H) 12/19/2022    PLT 1,030 (HH) 12/19/2022     Lab Results   Component Value Date    INR 2 19 (H) 12/05/2022    INR 0 93 12/04/2022    PROTIME 24 3 (H) 12/05/2022    PROTIME 12 5 12/04/2022     Lab Results   Component Value Date    PTT 35 12/05/2022         I have personally reviewed pertinent imaging and laboratory results  Code Status: Level 1 - Full Code  Advance Directive and Living Will:      Power of :    POLST:      IR has been consulted to evaluate the patient, determine the appropriate procedure, and whether or not a procedure can and should be performed  Thank you for allowing me to participate in the care of Mca Solders  Please don't hesitate to call, text, email, or TigerText with any questions  This text is generated with voice recognition software  There may be translation, syntax,  or grammatical errors  If you have any questions, please contact the dictating provider

## 2022-12-19 NOTE — PLAN OF CARE
Problem: Potential for Falls  Goal: Patient will remain free of falls  Description: INTERVENTIONS:  - Educate patient/family on patient safety including physical limitations  - Instruct patient to call for assistance with activity   - Consult OT/PT to assist with strengthening/mobility   - Keep Call bell within reach  - Keep bed low and locked with side rails adjusted as appropriate  - Keep care items and personal belongings within reach  - Initiate and maintain comfort rounds  - Make Fall Risk Sign visible to staff  - Offer Toileting every 2 Hours, in advance of need  - Initiate/Maintain BED alarm  - Obtain necessary fall risk management equipment: yellow socks  - Apply yellow socks and bracelet for high fall risk patients  - Consider moving patient to room near nurses station  Outcome: Progressing     Problem: PAIN - ADULT  Goal: Verbalizes/displays adequate comfort level or baseline comfort level  Description: Interventions:  - Encourage patient to monitor pain and request assistance  - Assess pain using appropriate pain scale  - Administer analgesics based on type and severity of pain and evaluate response  - Implement non-pharmacological measures as appropriate and evaluate response  - Consider cultural and social influences on pain and pain management  - Notify physician/advanced practitioner if interventions unsuccessful or patient reports new pain  Outcome: Progressing     Problem: INFECTION - ADULT  Goal: Absence or prevention of progression during hospitalization  Description: INTERVENTIONS:  - Assess and monitor for signs and symptoms of infection  - Monitor lab/diagnostic results  - Monitor all insertion sites, i e  indwelling lines, tubes, and drains  - Monitor endotracheal if appropriate and nasal secretions for changes in amount and color  - [place] appropriate cooling/warming therapies per order  - Administer medications as ordered  - Instruct and encourage patient and family to use good hand hygiene technique  - Identify and instruct in appropriate isolation precautions for identified infection/condition  Outcome: Progressing  Goal: Absence of fever/infection during neutropenic period  Description: INTERVENTIONS:  - Monitor WBC    Outcome: Progressing     Problem: SAFETY ADULT  Goal: Patient will remain free of falls  Description: INTERVENTIONS:  - Educate patient/family on patient safety including physical limitations  - Instruct patient to call for assistance with activity   - Consult OT/PT to assist with strengthening/mobility   - Keep Call bell within reach  - Keep bed low and locked with side rails adjusted as appropriate  - Keep care items and personal belongings within reach  - Initiate and maintain comfort rounds  - Make Fall Risk Sign visible to staff  - Offer Toileting every 2 Hours, in advance of need  - Initiate/Maintain BED alarm  - Obtain necessary fall risk management equipment: yellow socks  - Apply yellow socks and bracelet for high fall risk patients  - Consider moving patient to room near nurses station  Outcome: Progressing  Goal: Maintain or return to baseline ADL function  Description: INTERVENTIONS:  - Educate patient/family on patient safety including physical limitations  - Instruct patient to call for assistance with activity   - Consult OT/PT to assist with strengthening/mobility   - Keep Call bell within reach  - Keep bed low and locked with side rails adjusted as appropriate  - Keep care items and personal belongings within reach  - Initiate and maintain comfort rounds  - Make Fall Risk Sign visible to staff  - Offer Toileting every 2 Hours, in advance of need  - Initiate/Maintain bed alarm  - Obtain necessary fall risk management equipment: yellow socks  - Apply yellow socks and bracelet for high fall risk patients  - Consider moving patient to room near nurses station  Outcome: Progressing  Goal: Maintains/Returns to pre admission functional level  Description: INTERVENTIONS:  - Perform BMAT or MOVE assessment daily    - Set and communicate daily mobility goal to care team and patient/family/caregiver  - Collaborate with rehabilitation services on mobility goals if consulted  - Perform Range of Motion 3 times a day  - Reposition patient every 2 hours  - Dangle patient 3 times a day  - Stand patient 3 times a day  - Ambulate patient 3 times a day  - Out of bed to chair 3 times a day   - Out of bed for meals 3 times a day  - Out of bed for toileting  - Record patient progress and toleration of activity level   Outcome: Progressing     Problem: DISCHARGE PLANNING  Goal: Discharge to home or other facility with appropriate resources  Description: INTERVENTIONS:  - Identify barriers to discharge w/patient and caregiver  - Arrange for needed discharge resources and transportation as appropriate  - Identify discharge learning needs (meds, wound care, etc )  - Arrange for interpretive services to assist at discharge as needed  - Refer to Case Management Department for coordinating discharge planning if the patient needs post-hospital services based on physician/advanced practitioner order or complex needs related to functional status, cognitive ability, or social support system  Outcome: Progressing     Problem: Knowledge Deficit  Goal: Patient/family/caregiver demonstrates understanding of disease process, treatment plan, medications, and discharge instructions  Description: Complete learning assessment and assess knowledge base    Interventions:  - Provide teaching at level of understanding  - Provide teaching via preferred learning methods  Outcome: Progressing     Problem: MOBILITY - ADULT  Goal: Maintain or return to baseline ADL function  Description: INTERVENTIONS:  - Educate patient/family on patient safety including physical limitations  - Instruct patient to call for assistance with activity   - Consult OT/PT to assist with strengthening/mobility   - Keep Call bell within reach  - Keep bed low and locked with side rails adjusted as appropriate  - Keep care items and personal belongings within reach  - Initiate and maintain comfort rounds  - Make Fall Risk Sign visible to staff  - Offer Toileting every 2 Hours, in advance of need  - Initiate/Maintain bed alarm  - Obtain necessary fall risk management equipment: yellow socks  - Apply yellow socks and bracelet for high fall risk patients  - Consider moving patient to room near nurses station  Outcome: Progressing  Goal: Maintains/Returns to pre admission functional level  Description: INTERVENTIONS:  - Perform BMAT or MOVE assessment daily    - Set and communicate daily mobility goal to care team and patient/family/caregiver  - Collaborate with rehabilitation services on mobility goals if consulted  - Perform Range of Motion 3 times a day  - Reposition patient every 2 hours    - Dangle patient 3 times a day  - Stand patient 3 times a day  - Ambulate patient 3 times a day  - Out of bed to chair 3 times a day   - Out of bed for meals 3 times a day  - Out of bed for toileting  - Record patient progress and toleration of activity level   Outcome: Progressing     Problem: Prexisting or High Potential for Compromised Skin Integrity  Goal: Skin integrity is maintained or improved  Description: INTERVENTIONS:  - Identify patients at risk for skin breakdown  - Assess and monitor skin integrity  - Assess and monitor nutrition and hydration status  - Monitor labs   - Assess for incontinence   - Turn and reposition patient  - Assist with mobility/ambulation  - Relieve pressure over bony prominences  - Avoid friction and shearing  - Provide appropriate hygiene as needed including keeping skin clean and dry  - Evaluate need for skin moisturizer/barrier cream  - Collaborate with interdisciplinary team   - Patient/family teaching  - Consider wound care consult   Outcome: Progressing     Problem: Nutrition/Hydration-ADULT  Goal: Nutrient/Hydration intake appropriate for improving, restoring or maintaining nutritional needs  Description: Monitor and assess patient's nutrition/hydration status for malnutrition  Collaborate with interdisciplinary team and initiate plan and interventions as ordered  Monitor patient's weight and dietary intake as ordered or per policy  Utilize nutrition screening tool and intervene as necessary  Determine patient's food preferences and provide high-protein, high-caloric foods as appropriate       INTERVENTIONS:  - Monitor oral intake, urinary output, labs, and treatment plans  - Assess nutrition and hydration status and recommend course of action  - Evaluate amount of meals eaten  - Assist patient with eating if necessary   - Allow adequate time for meals  - Recommend/ encourage appropriate diets, oral nutritional supplements, and vitamin/mineral supplements  - Order, calculate, and assess calorie counts as needed  - Recommend, monitor, and adjust tube feedings and TPN/PPN based on assessed needs  - Assess need for intravenous fluids  - Provide specific nutrition/hydration education as appropriate  - Include patient/family/caregiver in decisions related to nutrition  Outcome: Progressing

## 2022-12-19 NOTE — PROGRESS NOTES
Progress Note - General Surgery   Brian Proctor 58 y o  female MRN: [id number]  Unit/Bed#: -01 Encounter: [id number]      ASSESSMENT:  57 y/o F hx bronchomalacia/bronchiectasis admitted with septic shock 2/2 feculent peritonitis from peforated sigmoid diverticulitis (Hinchey Class IV) now s/p emergent hand-assisted laparoscopic sigmoid colectomy, primary anastomosis, and creation of diverting transverse loop colostomy  Developed LLQ incision wound infection  AF, VSS  Hypoalbuminemia, 3 1- on dietary supplements  Anemia, Hgb 9 4 likely dilutional 2/2 fluids   Ostomy fxing, dark brown formed stool, output: 200cc  Wound cx of LLQ incision obtained 12/15 growing Staph lugdunesis  Blood cx NGTD  Day 2 of IV Zosyn  New severe abdominal pain over the weekend--full dx workup ordered, CT negative for PE/showed mutliple abscesses of the right abdomen/pelvis      PLAN:  · Plan to remove incision staples/change ostomy bag during rounds today  · IV abx, IVF, analgesia prn, dvt ppx  · NPO until drainage procedure  · Dietary supplements  · IR consult   · Encourage OOB ambulation/IS use      SUBJECTIVE:  Still with abdominal pain, somewhat improved, has mild relief with pain medication  Located RUQ/epigastric region  No N/V  Has been NPO  Having ostomy fx  No issues urinating  OOB ambulating  OBJECTIVE:   Vitals:  Blood pressure 115/61, pulse 60, temperature 99 5 °F (37 5 °C), temperature source Oral, resp  rate 18, height 5' 5" (1 651 m), weight 80 2 kg (176 lb 12 9 oz), SpO2 90 %  ,Body mass index is 29 42 kg/m²      I/Os:    Intake/Output Summary (Last 24 hours) at 12/19/2022 1102  Last data filed at 12/18/2022 1730  Gross per 24 hour   Intake 120 ml   Output --   Net 120 ml       Lines/Drains:  Invasive Devices     Peripheral Intravenous Line  Duration           Peripheral IV 12/16/22 Right;Ventral (anterior) Forearm 2 days    Peripheral IV 12/18/22 Left Antecubital <1 day          Drain  Duration Colostomy RLQ 14 days                Physical Exam  Vitals reviewed  Constitutional:       General: She is not in acute distress  Cardiovascular:      Rate and Rhythm: Normal rate and regular rhythm  Pulmonary:      Effort: Pulmonary effort is normal       Breath sounds: No wheezing, rhonchi or rales  Abdominal:      General: Bowel sounds are normal  There is distension (mild)  Palpations: Abdomen is soft  Tenderness: There is abdominal tenderness (generalized, worse R abdomen)  There is guarding (voluntary)  There is no rebound  Musculoskeletal:         General: No tenderness  Right lower leg: No edema  Left lower leg: No edema  Skin:     General: Skin is warm and dry  Neurological:      General: No focal deficit present  Mental Status: She is alert  Diagnostics:  I have personally reviewed pertinent lab results  XR chest portable ICU    Result Date: 12/7/2022  Impression: Possible mild congestive changes and small effusions  Workstation performed: [id number]     XR chest portable ICU    Result Date: 12/7/2022  Impression: ET tube at the annamarie  Small right effusion suggested  Workstation performed: [id number]     XR chest portable ICU    Result Date: 12/6/2022  Impression: 1  Endotracheal tube positioning appropriate, 2 7 cm above the annamarie 2  Shallow depth of inspiration with crowding of bronchovascular markings or atelectasis 3  Nasogastric tube side port at level of left hemidiaphragm, consider advancement to assure the side port is within the stomach  The examination demonstrates a significant  finding and was documented as such in [facility] for liaison and referring practitioner notification  Workstation performed: [id number]     XR chest portable ICU    Result Date: 12/5/2022  Impression: No consolidation or overt pulmonary edema   Workstation performed: [id number]     CT abdomen pelvis with contrast    Result Date: 12/4/2022  Impression: Acute sigmoid diverticulitis involving a large sigmoid diverticulum  There is a small amount of pneumoperitoneum suggesting small perforation  No abscess identified  The study was marked in [facility] for immediate notification   Workstation performed: [id number]     Recent Results (from the past 36 hour(s))   CBC and differential    Collection Time: 12/18/22 12:39 PM   Result Value Ref Range    WBC 9 03 4 31 - 10 16 Thousand/uL    RBC 2 91 (L) 3 81 - 5 12 Million/uL    Hemoglobin 9 0 (L) 11 5 - 15 4 g/dL    Hematocrit 27 9 (L) 34 8 - 46 1 %    MCV 96 82 - 98 fL    MCH 30 9 26 8 - 34 3 pg    MCHC 32 3 31 4 - 37 4 g/dL    RDW 13 5 11 6 - 15 1 %    MPV 9 7 8 9 - 12 7 fL    Platelets 091 (H) 596 - 390 Thousands/uL    nRBC 0 /100 WBCs    Neutrophils Relative 76 (H) 43 - 75 %    Immat GRANS % 1 0 - 2 %    Lymphocytes Relative 10 (L) 14 - 44 %    Monocytes Relative 12 4 - 12 %    Eosinophils Relative 1 0 - 6 %    Basophils Relative 0 0 - 1 %    Neutrophils Absolute 6 81 1 85 - 7 62 Thousands/µL    Immature Grans Absolute 0 11 0 00 - 0 20 Thousand/uL    Lymphocytes Absolute 0 94 0 60 - 4 47 Thousands/µL    Monocytes Absolute 1 06 0 17 - 1 22 Thousand/µL    Eosinophils Absolute 0 07 0 00 - 0 61 Thousand/µL    Basophils Absolute 0 04 0 00 - 0 10 Thousands/µL   Comprehensive metabolic panel    Collection Time: 12/18/22 12:39 PM   Result Value Ref Range    Sodium 139 135 - 147 mmol/L    Potassium 4 1 3 5 - 5 3 mmol/L    Chloride 103 96 - 108 mmol/L    CO2 24 21 - 32 mmol/L    ANION GAP 12 4 - 13 mmol/L    BUN 22 5 - 25 mg/dL    Creatinine 0 66 0 60 - 1 30 mg/dL    Glucose 118 65 - 140 mg/dL    Calcium 9 5 8 4 - 10 2 mg/dL    Corrected Calcium 10 1 8 3 - 10 1 mg/dL    AST 17 13 - 39 U/L    ALT 19 7 - 52 U/L    Alkaline Phosphatase 107 (H) 34 - 104 U/L    Total Protein 6 8 6 4 - 8 4 g/dL    Albumin 3 2 (L) 3 5 - 5 0 g/dL    Total Bilirubin 0 34 0 20 - 1 00 mg/dL    eGFR 94 ml/min/1 73sq m   Procalcitonin    Collection Time: 12/18/22 12:39 PM Result Value Ref Range    Procalcitonin 0 18 <=0 25 ng/ml   Procalcitonin    Collection Time: 12/18/22  4:44 PM   Result Value Ref Range    Procalcitonin 0 18 <=0 25 ng/ml   Lactic acid, plasma    Collection Time: 12/18/22  4:44 PM   Result Value Ref Range    LACTIC ACID 1 2 0 5 - 2 0 mmol/L   Blood gas, venous    Collection Time: 12/18/22  4:44 PM   Result Value Ref Range    pH, Donovan 7 405 (H) 7 300 - 7 400    pCO2, Donovan 46 6 42 0 - 50 0 mm Hg    pO2, Donovan 31 5 (L) 35 0 - 45 0 mm Hg    HCO3, Donovan 28 5 24 - 30 mmol/L    Base Excess, Donovan 3 3 mmol/L    O2 Content, Donovan 8 6 ml/dL    O2 HGB, VENOUS 59 5 (L) 60 0 - 80 0 %   Blood culture    Collection Time: 12/18/22  4:46 PM    Specimen: Arm, Right; Blood   Result Value Ref Range    Blood Culture Received in Microbiology Lab  Culture in Progress  Blood culture    Collection Time: 12/18/22  5:00 PM    Specimen: Arm, Right; Blood   Result Value Ref Range    Blood Culture Received in Microbiology Lab  Culture in Progress      CBC and differential    Collection Time: 12/19/22  5:14 AM   Result Value Ref Range    WBC 8 21 4 31 - 10 16 Thousand/uL    RBC 2 97 (L) 3 81 - 5 12 Million/uL    Hemoglobin 9 4 (L) 11 5 - 15 4 g/dL    Hematocrit 29 3 (L) 34 8 - 46 1 %    MCV 99 (H) 82 - 98 fL    MCH 31 6 26 8 - 34 3 pg    MCHC 32 1 31 4 - 37 4 g/dL    RDW 13 4 11 6 - 15 1 %    MPV 9 5 8 9 - 12 7 fL    Platelets 1,725 (HH) 149 - 390 Thousands/uL    nRBC 0 /100 WBCs    Neutrophils Relative 67 43 - 75 %    Immat GRANS % 2 0 - 2 %    Lymphocytes Relative 15 14 - 44 %    Monocytes Relative 13 (H) 4 - 12 %    Eosinophils Relative 2 0 - 6 %    Basophils Relative 1 0 - 1 %    Neutrophils Absolute 5 62 1 85 - 7 62 Thousands/µL    Immature Grans Absolute 0 13 0 00 - 0 20 Thousand/uL    Lymphocytes Absolute 1 21 0 60 - 4 47 Thousands/µL    Monocytes Absolute 1 08 0 17 - 1 22 Thousand/µL    Eosinophils Absolute 0 13 0 00 - 0 61 Thousand/µL    Basophils Absolute 0 04 0 00 - 0 10 Thousands/µL Comprehensive metabolic panel    Collection Time: 12/19/22  5:14 AM   Result Value Ref Range    Sodium 138 135 - 147 mmol/L    Potassium 4 1 3 5 - 5 3 mmol/L    Chloride 102 96 - 108 mmol/L    CO2 27 21 - 32 mmol/L    ANION GAP 9 4 - 13 mmol/L    BUN 18 5 - 25 mg/dL    Creatinine 0 66 0 60 - 1 30 mg/dL    Glucose 98 65 - 140 mg/dL    Calcium 9 4 8 4 - 10 2 mg/dL    Corrected Calcium 10 1 8 3 - 10 1 mg/dL    AST 19 13 - 39 U/L    ALT 21 7 - 52 U/L    Alkaline Phosphatase 105 (H) 34 - 104 U/L    Total Protein 6 5 6 4 - 8 4 g/dL    Albumin 3 1 (L) 3 5 - 5 0 g/dL    Total Bilirubin 0 37 0 20 - 1 00 mg/dL    eGFR 94 ml/min/1 73sq m   Procalcitonin, Next Day AM Collection    Collection Time: 12/19/22  5:14 AM   Result Value Ref Range    Procalcitonin 0 17 <=0 25 ng/ml       Current Medications:  Scheduled Meds:  Current Facility-Administered Medications   Medication Dose Route Frequency Provider Last Rate   • acetaminophen  975 mg Oral Q6H PRN Pankaj DAVID Morales     • amLODIPine  10 mg Oral Daily Jocelyn Millan PA-C     • budesonide  0 5 mg Nebulization Q12H Pankaj Morales PA-C     • enoxaparin  40 mg Subcutaneous Q24H [address] BARBIE Hernandez     • ketorolac  15 mg Intravenous Q6H PRN Willian Daniel MD     • lactated ringers  100 mL/hr Intravenous Continuous Pankaj ADWOA MoralesC 100 mL/hr (12/19/22 0555)   • levalbuterol  1 25 mg Nebulization BID Viki Narayan MD     • lidocaine  2 patch Topical Daily BARBIE Eckert     • melatonin  6 mg Per NG Tube HS BARBIE Eckert     • nebivolol  5 mg Oral Daily Jocelyn Millan PA-C     • ondansetron  4 mg Intravenous Q6H PRN BARBIE Eckert     • oxyCODONE-acetaminophen  1 tablet Oral Q6H PRN Pankaj Morales PA-C     • pantoprazole  40 mg Intravenous Q12H [address] BARBIE Hernandez     • phenol  1 spray Mouth/Throat Q2H PRN BARBIE Eckert     • piperacillin-tazobactam  4 5 g Intravenous Q6H Pankaj Morales PA-C 0 g (12/18/22 3835)   • traMADol  50 mg Oral Q6H PRN BARBIE Rivas       Continuous Infusions:lactated ringers, 100 mL/hr, Last Rate: 100 mL/hr (12/19/22 9685)      PRN Meds:  •  acetaminophen  •  ketorolac  •  ondansetron  •  oxyCODONE-acetaminophen  •  phenol  •  traMADol      DAVID Ledezma  12/19/2022

## 2022-12-20 ENCOUNTER — APPOINTMENT (INPATIENT)
Dept: CT IMAGING | Facility: HOSPITAL | Age: 62
End: 2022-12-20
Attending: RADIOLOGY

## 2022-12-20 ENCOUNTER — ANESTHESIA (INPATIENT)
Dept: CT IMAGING | Facility: HOSPITAL | Age: 62
End: 2022-12-20

## 2022-12-20 ENCOUNTER — ANESTHESIA EVENT (INPATIENT)
Dept: CT IMAGING | Facility: HOSPITAL | Age: 62
End: 2022-12-20

## 2022-12-20 LAB
ANION GAP SERPL CALCULATED.3IONS-SCNC: 6 MMOL/L (ref 4–13)
BASOPHILS # BLD AUTO: 0.03 THOUSANDS/ÂΜL (ref 0–0.1)
BASOPHILS NFR BLD AUTO: 0 % (ref 0–1)
BUN SERPL-MCNC: 14 MG/DL (ref 5–25)
CALCIUM SERPL-MCNC: 8.5 MG/DL (ref 8.4–10.2)
CHLORIDE SERPL-SCNC: 101 MMOL/L (ref 96–108)
CO2 SERPL-SCNC: 28 MMOL/L (ref 21–32)
CREAT SERPL-MCNC: 0.73 MG/DL (ref 0.6–1.3)
EOSINOPHIL # BLD AUTO: 0.1 THOUSAND/ÂΜL (ref 0–0.61)
EOSINOPHIL NFR BLD AUTO: 1 % (ref 0–6)
ERYTHROCYTE [DISTWIDTH] IN BLOOD BY AUTOMATED COUNT: 13.2 % (ref 11.6–15.1)
GFR SERPL CREATININE-BSD FRML MDRD: 88 ML/MIN/1.73SQ M
GLUCOSE SERPL-MCNC: 103 MG/DL (ref 65–140)
HCT VFR BLD AUTO: 24.4 % (ref 34.8–46.1)
HGB BLD-MCNC: 7.6 G/DL (ref 11.5–15.4)
IMM GRANULOCYTES # BLD AUTO: 0.16 THOUSAND/UL (ref 0–0.2)
IMM GRANULOCYTES NFR BLD AUTO: 2 % (ref 0–2)
LYMPHOCYTES # BLD AUTO: 0.99 THOUSANDS/ÂΜL (ref 0.6–4.47)
LYMPHOCYTES NFR BLD AUTO: 12 % (ref 14–44)
MCH RBC QN AUTO: 30.4 PG (ref 26.8–34.3)
MCHC RBC AUTO-ENTMCNC: 31.1 G/DL (ref 31.4–37.4)
MCV RBC AUTO: 98 FL (ref 82–98)
MONOCYTES # BLD AUTO: 1.08 THOUSAND/ÂΜL (ref 0.17–1.22)
MONOCYTES NFR BLD AUTO: 13 % (ref 4–12)
NEUTROPHILS # BLD AUTO: 6.07 THOUSANDS/ÂΜL (ref 1.85–7.62)
NEUTS SEG NFR BLD AUTO: 72 % (ref 43–75)
NRBC BLD AUTO-RTO: 0 /100 WBCS
PLATELET # BLD AUTO: 872 THOUSANDS/UL (ref 149–390)
PMV BLD AUTO: 9.6 FL (ref 8.9–12.7)
POTASSIUM SERPL-SCNC: 3.7 MMOL/L (ref 3.5–5.3)
RBC # BLD AUTO: 2.5 MILLION/UL (ref 3.81–5.12)
SODIUM SERPL-SCNC: 135 MMOL/L (ref 135–147)
WBC # BLD AUTO: 8.43 THOUSAND/UL (ref 4.31–10.16)

## 2022-12-20 RX ORDER — KETAMINE HCL IN NACL, ISO-OSM 100MG/10ML
SYRINGE (ML) INJECTION AS NEEDED
Status: DISCONTINUED | OUTPATIENT
Start: 2022-12-20 | End: 2022-12-20

## 2022-12-20 RX ORDER — GLYCOPYRROLATE 0.2 MG/ML
INJECTION INTRAMUSCULAR; INTRAVENOUS AS NEEDED
Status: DISCONTINUED | OUTPATIENT
Start: 2022-12-20 | End: 2022-12-20

## 2022-12-20 RX ORDER — SODIUM CHLORIDE 9 MG/ML
10 INJECTION INTRAVENOUS DAILY
Qty: 300 ML | Refills: 3 | Status: SHIPPED | OUTPATIENT
Start: 2022-12-20 | End: 2023-01-04

## 2022-12-20 RX ORDER — FENTANYL CITRATE 50 UG/ML
INJECTION, SOLUTION INTRAMUSCULAR; INTRAVENOUS AS NEEDED
Status: DISCONTINUED | OUTPATIENT
Start: 2022-12-20 | End: 2022-12-20

## 2022-12-20 RX ORDER — MIDAZOLAM HYDROCHLORIDE 2 MG/2ML
INJECTION, SOLUTION INTRAMUSCULAR; INTRAVENOUS AS NEEDED
Status: DISCONTINUED | OUTPATIENT
Start: 2022-12-20 | End: 2022-12-20

## 2022-12-20 RX ORDER — LIDOCAINE HYDROCHLORIDE 10 MG/ML
INJECTION, SOLUTION EPIDURAL; INFILTRATION; INTRACAUDAL; PERINEURAL AS NEEDED
Status: COMPLETED | OUTPATIENT
Start: 2022-12-20 | End: 2022-12-20

## 2022-12-20 RX ORDER — PROPOFOL 10 MG/ML
INJECTION, EMULSION INTRAVENOUS CONTINUOUS PRN
Status: DISCONTINUED | OUTPATIENT
Start: 2022-12-20 | End: 2022-12-20

## 2022-12-20 RX ADMIN — AMLODIPINE BESYLATE 10 MG: 10 TABLET ORAL at 08:48

## 2022-12-20 RX ADMIN — FENTANYL CITRATE 50 MCG: 50 INJECTION INTRAMUSCULAR; INTRAVENOUS at 13:27

## 2022-12-20 RX ADMIN — Medication 10 MG: at 13:36

## 2022-12-20 RX ADMIN — PANTOPRAZOLE SODIUM 40 MG: 40 INJECTION, POWDER, LYOPHILIZED, FOR SOLUTION INTRAVENOUS at 08:48

## 2022-12-20 RX ADMIN — PANTOPRAZOLE SODIUM 40 MG: 40 INJECTION, POWDER, LYOPHILIZED, FOR SOLUTION INTRAVENOUS at 22:00

## 2022-12-20 RX ADMIN — OXYCODONE AND ACETAMINOPHEN 1 TABLET: 5; 325 TABLET ORAL at 10:35

## 2022-12-20 RX ADMIN — OXYCODONE AND ACETAMINOPHEN 1 TABLET: 5; 325 TABLET ORAL at 17:14

## 2022-12-20 RX ADMIN — Medication 4.5 G: at 22:03

## 2022-12-20 RX ADMIN — GLYCOPYRROLATE 0.2 MG: 0.2 INJECTION, SOLUTION INTRAMUSCULAR; INTRAVENOUS at 13:13

## 2022-12-20 RX ADMIN — Medication 20 MG: at 13:27

## 2022-12-20 RX ADMIN — LIDOCAINE HYDROCHLORIDE 10 ML: 10 INJECTION, SOLUTION EPIDURAL; INFILTRATION; INTRACAUDAL; PERINEURAL at 13:41

## 2022-12-20 RX ADMIN — NEBIVOLOL 5 MG: 5 TABLET ORAL at 08:48

## 2022-12-20 RX ADMIN — ONDANSETRON 4 MG: 2 INJECTION INTRAMUSCULAR; INTRAVENOUS at 03:04

## 2022-12-20 RX ADMIN — SODIUM CHLORIDE, POTASSIUM CHLORIDE, SODIUM LACTATE AND CALCIUM CHLORIDE 100 ML/HR: 600; 310; 30; 20 INJECTION, SOLUTION INTRAVENOUS at 04:06

## 2022-12-20 RX ADMIN — Medication 4.5 G: at 05:00

## 2022-12-20 RX ADMIN — ONDANSETRON 4 MG: 2 INJECTION INTRAMUSCULAR; INTRAVENOUS at 10:29

## 2022-12-20 RX ADMIN — MIDAZOLAM HYDROCHLORIDE 2 MG: 1 INJECTION, SOLUTION INTRAMUSCULAR; INTRAVENOUS at 13:24

## 2022-12-20 RX ADMIN — Medication 4.5 G: at 12:00

## 2022-12-20 RX ADMIN — ACETAMINOPHEN 975 MG: 325 TABLET ORAL at 19:49

## 2022-12-20 RX ADMIN — PROPOFOL 40 MCG/KG/MIN: 10 INJECTION, EMULSION INTRAVENOUS at 13:28

## 2022-12-20 RX ADMIN — FENTANYL CITRATE 50 MCG: 50 INJECTION INTRAMUSCULAR; INTRAVENOUS at 13:39

## 2022-12-20 RX ADMIN — ASPIRIN 81 MG: 81 TABLET, COATED ORAL at 08:48

## 2022-12-20 RX ADMIN — ACETAMINOPHEN 975 MG: 325 TABLET ORAL at 07:25

## 2022-12-20 RX ADMIN — Medication 4.5 G: at 17:14

## 2022-12-20 NOTE — CASE MANAGEMENT
Case Management Discharge Planning Note    Patient name Inge Topete  Location [address] 224/-37 MRN [id number]  : 1960 Date 2022       Current Admission Date: 2022  Current Admission Diagnosis:Sigmoid diverticulitis   Patient Active Problem List    Diagnosis Date Noted   • Anemia 2022   • Encephalopathy 2022   • Acute respiratory failure with hypoxia ([address] ) 2022   • GERD (gastroesophageal reflux disease) 2022   • Hypertension 2022   • Bronchomalacia 2022   • Asthma 2022   • Septic shock ([address] ) 2022   • Sigmoid diverticulitis 2022      LOS (days): 15  Geometric Mean LOS (GMLOS) (days): 9 60  Days to GMLOS:-5 8     OBJECTIVE:  Risk of Unplanned Readmission Score: 10 64         Current admission status: Inpatient   Preferred Pharmacy:   [facility] [address] [place], [place] [address]  Phone: [phone] Fax: [phone]    Primary Care Provider: No primary care provider on file      Primary Insurance: [place] [employer]  Secondary Insurance: MEDICARE    DISCHARGE DETAILS:    Discharge planning discussed with[de-identified] patient  Freedom of Choice: Yes  Comments - Freedom of Choice: pt would like to go home with home care- cm has been have difficulty finding a [facility] that serivces her area in [place] that takes her  insurance- cm did call the cm at [employer] and I spoke with Shikha gonzáles 11:51 am [phone] ext [phone] I made her aware of justo difficulty and she made me aware of some hhc to try                     [facility]         Is the patient interested in [address] at discharge?: Yes         Other Referral/Resources/Interventions Provided:  Interventions: [address]  Referral Comments: cm placed 13 calls to different hhc  and I was unable to obtain an excepting hhc, cm will continue to work on a safe d/c plan  IR consult for intra abdominal abscess  pt not stable for d/c    Would you like to participate in our [address] service program?  : No - Declined    Treatment Team Recommendation: Home (home with spouse & hhc - spouse)      cm placed calls to several [facility] trying to find a hhc that has availability and accept her insurance:  Cm called   [address]- [phone]  They are closed for 2 days-, I was told to call on Wednesday to see if they are able to help-  Mitchel Ng is in network but they do not service her area- Extended family [phone] aree not able to accept-  [facility] -[phone]  Only come for hourly shifts -  [facility] was a choice given by the insurance company [phone] and I was told that their hhc has been closed for for years and they only offer hospice care-  [facility] was another choice- [phone] and they only pediatric care- [facility] was called [phone] and they do not service the area that the  Pt lives  - cm sent a referral to [address] and they are unable to accept they do not accept her insurance- [facility] was called -[phone]  And I was told that they can only offer home health aides and not skilled [address] [place] [address] was called and they are non-medical and are self pay, -[facility] was called [phone] and I was told they are not able to accept new patients, cm will continue to work on a safe d/c plan

## 2022-12-20 NOTE — BRIEF OP NOTE (RAD/CATH)
INTERVENTIONAL RADIOLOGY PROCEDURE NOTE    Date: 12/20/2022    Procedure:   Procedure Summary     Date: 12/20/22 Room / Location: [facility]    Anesthesia Start: 1092 Anesthesia Stop:     Procedure: IR DRAINAGE TUBE PLACEMENT x2 Diagnosis: (abscess)    Scheduled Providers:  Responsible Provider: Jose Paige MD    Anesthesia Type: IV sedation with anesthesia ASA Status: 3          Preoperative diagnosis:   1  Diverticulitis    2  Acute respiratory failure with hypoxia (HCC)    3  Sigmoid diverticulitis    4  Postoperative intra-abdominal abscess         Postoperative diagnosis: Same  Surgeon: Arun Chávez MD     Assistant: None  No qualified resident was available  Blood loss: 0    Specimens: pus for culture (aerobic, anaerobic)    Findings:   TWO 10 [place] drains placed in the right lower quadrant/right upper quadrant  abscess cavity    The more vertically oriented drain sneaks up to the perihepatic space  The more horizontally oriented drain terminates near the colon    200cc pus removed        Complications: None immediate      Anesthesia: MAC sedation

## 2022-12-20 NOTE — NURSING NOTE
AWAKE AND ALERT ANS ORIENTED X4  R/A STATUS AND 02 SAT 91%  HR 68/MIN  LUNGS ARE CLEAR AND NO SOB  ABDOMEN SOFT/TENDER  RT COLOSTOMY MAINTAINED  LEFT WOUND DRESSING DRY AND INTACT  PAIN LEVEL 5/10  NO DISTRESS  IVF CONTINUE  CALL BELL NEAR  OOB RECLINER

## 2022-12-20 NOTE — ANESTHESIA PREPROCEDURE EVALUATION
Procedure:  IR DRAINAGE TUBE PLACEMENT    Relevant Problems   CARDIO   (+) Hypertension      GI/HEPATIC   (+) GERD (gastroesophageal reflux disease)      HEMATOLOGY   (+) Anemia      PULMONARY   (+) Acute respiratory failure with hypoxia (HCC)   (+) Asthma     Underwent laparosocpic hand-assisted sigmoid resection and diverting transverse loop colostomy 12/5/22 for perforated diverticulitis  Now with multiple intra-abdominal fluid collections  Declined IR drainage under local anesthetic yesterday  Per Surgery H&P: "Patient has bronchiectasis and bronchomalacia diagnosed in 2016, follows with pulmonary specialist at [facility]  Has had 2 laser treatments on her airways  Has not been down to see them since 2018/2019  Says things have been under control from that perspective "     12/18/22 CTA Chest:  PULMONARY ARTERIAL TREE:  No central, lobar or segmental pulmonary embolism  Evaluation of the subsegmental pulmonary arteries, especially in the lower lobes is limited by motion artifact  BRONCHOPULMONARY:  Clear central airways  Enhancing airspace consolidations or opacities in the lower lobes most likely represent atelectasis  There is a 4 mm right upper lobe nodule (series 4 image 33)  PLEURA:  No pleural effusions  No pneumothorax  Physical Exam    Airway    Mallampati score: II  TM Distance: >3 FB  Neck ROM: full     Dental   No notable dental hx     Cardiovascular      Pulmonary      Other Findings        Anesthesia Plan  ASA Score- 3     Anesthesia Type- IV sedation with anesthesia with ASA Monitors  Additional Monitors:   Airway Plan:     Comment: I discussed the risks and benefits of IV sedation anesthesia including the possibility of the need to convert to general anesthesia and the potential risk of awareness  Plan Factors-Exercise tolerance (METS): >4 METS  Exercise comment: Able to climb flight of stairs when well       Chart reviewed  EKG reviewed  Existing labs reviewed   Patient summary reviewed  Patient is not a current smoker  Patient did not smoke on day of surgery  Induction- intravenous  Postoperative Plan-     Informed Consent- Anesthetic plan and risks discussed with patient

## 2022-12-20 NOTE — PLAN OF CARE
Problem: Potential for Falls  Goal: Patient will remain free of falls  Description: INTERVENTIONS:  - Educate patient/family on patient safety including physical limitations  - Instruct patient to call for assistance with activity   - Consult OT/PT to assist with strengthening/mobility   - Keep Call bell within reach  - Keep bed low and locked with side rails adjusted as appropriate  - Keep care items and personal belongings within reach  - Initiate and maintain comfort rounds  - Make Fall Risk Sign visible to staff  - Offer Toileting every 2 Hours, in advance of need  - Initiate/Maintain BED alarm  - Obtain necessary fall risk management equipment: yellow socks  - Apply yellow socks and bracelet for high fall risk patients  - Consider moving patient to room near nurses station  Outcome: Progressing     Problem: PAIN - ADULT  Goal: Verbalizes/displays adequate comfort level or baseline comfort level  Description: Interventions:  - Encourage patient to monitor pain and request assistance  - Assess pain using appropriate pain scale  - Administer analgesics based on type and severity of pain and evaluate response  - Implement non-pharmacological measures as appropriate and evaluate response  - Consider cultural and social influences on pain and pain management  - Notify physician/advanced practitioner if interventions unsuccessful or patient reports new pain  Outcome: Progressing     Problem: INFECTION - ADULT  Goal: Absence or prevention of progression during hospitalization  Description: INTERVENTIONS:  - Assess and monitor for signs and symptoms of infection  - Monitor lab/diagnostic results  - Monitor all insertion sites, i e  indwelling lines, tubes, and drains  - Monitor endotracheal if appropriate and nasal secretions for changes in amount and color  - [place] appropriate cooling/warming therapies per order  - Administer medications as ordered  - Instruct and encourage patient and family to use good hand hygiene technique  - Identify and instruct in appropriate isolation precautions for identified infection/condition  Outcome: Progressing  Goal: Absence of fever/infection during neutropenic period  Description: INTERVENTIONS:  - Monitor WBC    Outcome: Progressing     Problem: SAFETY ADULT  Goal: Patient will remain free of falls  Description: INTERVENTIONS:  - Educate patient/family on patient safety including physical limitations  - Instruct patient to call for assistance with activity   - Consult OT/PT to assist with strengthening/mobility   - Keep Call bell within reach  - Keep bed low and locked with side rails adjusted as appropriate  - Keep care items and personal belongings within reach  - Initiate and maintain comfort rounds  - Make Fall Risk Sign visible to staff  - Offer Toileting every 2 Hours, in advance of need  - Initiate/Maintain BED alarm  - Obtain necessary fall risk management equipment: yellow socks  - Apply yellow socks and bracelet for high fall risk patients  - Consider moving patient to room near nurses station  Outcome: Progressing  Goal: Maintain or return to baseline ADL function  Description: INTERVENTIONS:  - Educate patient/family on patient safety including physical limitations  - Instruct patient to call for assistance with activity   - Consult OT/PT to assist with strengthening/mobility   - Keep Call bell within reach  - Keep bed low and locked with side rails adjusted as appropriate  - Keep care items and personal belongings within reach  - Initiate and maintain comfort rounds  - Make Fall Risk Sign visible to staff  - Offer Toileting every 2 Hours, in advance of need  - Initiate/Maintain bed alarm  - Obtain necessary fall risk management equipment: yellow socks  - Apply yellow socks and bracelet for high fall risk patients  - Consider moving patient to room near nurses station  Outcome: Progressing  Goal: Maintains/Returns to pre admission functional level  Description: INTERVENTIONS:  - Perform BMAT or MOVE assessment daily    - Set and communicate daily mobility goal to care team and patient/family/caregiver  - Collaborate with rehabilitation services on mobility goals if consulted  - Out of bed for toileting  - Record patient progress and toleration of activity level   Outcome: Progressing     Problem: DISCHARGE PLANNING  Goal: Discharge to home or other facility with appropriate resources  Description: INTERVENTIONS:  - Identify barriers to discharge w/patient and caregiver  - Arrange for needed discharge resources and transportation as appropriate  - Identify discharge learning needs (meds, wound care, etc )  - Arrange for interpretive services to assist at discharge as needed  - Refer to Case Management Department for coordinating discharge planning if the patient needs post-hospital services based on physician/advanced practitioner order or complex needs related to functional status, cognitive ability, or social support system  Outcome: Progressing     Problem: Knowledge Deficit  Goal: Patient/family/caregiver demonstrates understanding of disease process, treatment plan, medications, and discharge instructions  Description: Complete learning assessment and assess knowledge base    Interventions:  - Provide teaching at level of understanding  - Provide teaching via preferred learning methods  Outcome: Progressing     Problem: MOBILITY - ADULT  Goal: Maintain or return to baseline ADL function  Description: INTERVENTIONS:  - Educate patient/family on patient safety including physical limitations  - Instruct patient to call for assistance with activity   - Consult OT/PT to assist with strengthening/mobility   - Keep Call bell within reach  - Keep bed low and locked with side rails adjusted as appropriate  - Keep care items and personal belongings within reach  - Initiate and maintain comfort rounds  - Make Fall Risk Sign visible to staff  - Offer Toileting every 2 Hours, in advance of need  - Initiate/Maintain bed alarm  - Obtain necessary fall risk management equipment: yellow socks  - Apply yellow socks and bracelet for high fall risk patients  - Consider moving patient to room near nurses station  Outcome: Progressing  Goal: Maintains/Returns to pre admission functional level  Description: INTERVENTIONS:  - Perform BMAT or MOVE assessment daily    - Set and communicate daily mobility goal to care team and patient/family/caregiver  - Collaborate with rehabilitation services on mobility goals if consulted  - Out of bed for toileting  - Record patient progress and toleration of activity level   Outcome: Progressing     Problem: Prexisting or High Potential for Compromised Skin Integrity  Goal: Skin integrity is maintained or improved  Description: INTERVENTIONS:  - Identify patients at risk for skin breakdown  - Assess and monitor skin integrity  - Assess and monitor nutrition and hydration status  - Monitor labs   - Assess for incontinence   - Turn and reposition patient  - Assist with mobility/ambulation  - Relieve pressure over bony prominences  - Avoid friction and shearing  - Provide appropriate hygiene as needed including keeping skin clean and dry  - Evaluate need for skin moisturizer/barrier cream  - Collaborate with interdisciplinary team   - Patient/family teaching  - Consider wound care consult   Outcome: Progressing     Problem: Nutrition/Hydration-ADULT  Goal: Nutrient/Hydration intake appropriate for improving, restoring or maintaining nutritional needs  Description: Monitor and assess patient's nutrition/hydration status for malnutrition  Collaborate with interdisciplinary team and initiate plan and interventions as ordered  Monitor patient's weight and dietary intake as ordered or per policy  Utilize nutrition screening tool and intervene as necessary  Determine patient's food preferences and provide high-protein, high-caloric foods as appropriate  INTERVENTIONS:  - Monitor oral intake, urinary output, labs, and treatment plans  - Assess nutrition and hydration status and recommend course of action  - Evaluate amount of meals eaten  - Assist patient with eating if necessary   - Allow adequate time for meals  - Recommend/ encourage appropriate diets, oral nutritional supplements, and vitamin/mineral supplements  - Order, calculate, and assess calorie counts as needed  - Recommend, monitor, and adjust tube feedings and TPN/PPN based on assessed needs  - Assess need for intravenous fluids  - Provide specific nutrition/hydration education as appropriate  - Include patient/family/caregiver in decisions related to nutrition  Outcome: Progressing

## 2022-12-20 NOTE — WOUND OSTOMY CARE
Progress Note- Ostomy  Sammy Olguin 58 y o  female  [id number]  --01    Assessment:  Met with patient to review ostomy care  Reviewed nutrition and hydration and the importance  Patient was attentive to instruction  Ostomy pouch to be changed today  Reviewed emptying pouch and cleansing the end prior to rolling back and securing  Surgical team changed the pouch today with dressing change       Colostomy RLQ (Active)   Stomal Appliance 2 piece; Changed 12/19/22 1901   Stoma Assessment Pink;Budded 12/19/22 1901   Stoma size (in) 1 5 in 12/16/22 2001   Stoma Shape Oval;Budded 12/18/22 1001   Peristomal Assessment Clean; Intact 12/18/22 1001   Treatment Placement checked 12/19/22 1901   Output (mL) 200 mL 12/18/22 1001   Number of days: [address] Marla PRITCHARD BSN, RN, [facility]

## 2022-12-20 NOTE — ANESTHESIA POSTPROCEDURE EVALUATION
Post-Op Assessment Note    CV Status:  Stable  Pain Score: 0    Pain management: adequate     Mental Status:  Alert and awake   Hydration Status:  Euvolemic   PONV Controlled:  Controlled   Airway Patency:  Patent      Post Op Vitals Reviewed: Yes      Staff: CRNA, Anesthesiologist         No notable events documented      /56 (12/20/22 1430)    Temp 98 5 °F (36 9 °C) (12/20/22 1424)    Pulse 67 (12/20/22 1430)   Resp 17 (12/20/22 1430)    SpO2 91 % (12/20/22 1430)

## 2022-12-20 NOTE — PROGRESS NOTES
Progress Note - General Surgery   Areli Mendez 58 y o  female MRN: [id number]  Unit/Bed#: -01 Encounter: [id number]      ASSESSMENT:  57 y/o F hx bronchomalacia/bronchiectasis admitted with septic shock 2/2 feculent peritonitis from peforated sigmoid diverticulitis ([place] Class IV) now s/p emergent hand-assisted laparoscopic sigmoid colectomy, primary anastomosis, and creation of diverting transverse loop colostomy  Developed LLQ incision wound infection  Febrile this AM tmax 100 6F other VSS, placed on 3L at night for low SpO2, currently normal lung effort on room air   WBC 8 43  Hgb 7 6 (drop from 9 4, no signs of bleeding)  Cr 0 73   No UO or stool OP recorded  Ostomy pink, small amt stool in bag  Wound cx of LLQ incision obtained 12/15 growing Staph lugdunesis  Blood cx NGTD  Day 3 Zosyn  LLQ incision re-packed/dressed      PLAN:  Scheduled for 1pm today for IR insertion of abdominal percutaneous drainage under anesthesia   IV abx, IVF, analgesia prn, dvt ppx  Dietary supplements  Regular diet after procedure   Trend fever curve/WBC  Trend Hgb, consider transfusion if drops <7  Encourage OOB ambulation/IS use      SUBJECTIVE:  Abdominal pain still significant but somewhat improved from yesterday  Having ostomy fx  No issues voiding  Tolerated regular diet last night  Denies CP, SOB  Admits to subjective F/C this morning  Slept well until 10 am        OBJECTIVE:   Vitals:  Blood pressure 139/68, pulse 97, temperature (!) 100 6 °F (38 1 °C), resp  rate 20, height 5' 5" (1 651 m), weight 81 4 kg (179 lb 7 3 oz), SpO2 94 %  ,Body mass index is 29 86 kg/m²      I/Os:    Intake/Output Summary (Last 24 hours) at 12/20/2022 0938  Last data filed at 12/20/2022 0501  Gross per 24 hour   Intake 1120 ml   Output --   Net 1120 ml       Lines/Drains:  Invasive Devices     Peripheral Intravenous Line  Duration           Peripheral IV 12/16/22 Right;Ventral (anterior) Forearm 3 days    Peripheral IV 12/18/22 Left Antecubital 1 day          Drain  Duration           Colostomy RLQ 15 days                Physical Exam  Constitutional:       General: She is not in acute distress  HENT:      Head: Normocephalic and atraumatic  Cardiovascular:      Rate and Rhythm: Normal rate and regular rhythm  Heart sounds: No murmur heard  Pulmonary:      Effort: Pulmonary effort is normal       Breath sounds: No wheezing, rhonchi or rales  Abdominal:      General: There is distension  Palpations: Abdomen is soft  Tenderness: There is abdominal tenderness (generalized)  There is guarding (voluntary)  Comments: Hypoactive bowel sounds  Ostomy pink fx small amt stool in bag  LLQ incision with pink granulation tissue in wound bed, no significant drainage upon removal of packing, no devitalized tissue  Incisions with steris C/D/I   Neurological:      Mental Status: She is alert  Diagnostics:  I have personally reviewed pertinent lab results  XR chest portable ICU    Result Date: 12/7/2022  Impression: Possible mild congestive changes and small effusions  Workstation performed: [id number]     XR chest portable ICU    Result Date: 12/7/2022  Impression: ET tube at the annamarie  Small right effusion suggested  Workstation performed: [id number]     XR chest portable ICU    Result Date: 12/6/2022  Impression: 1  Endotracheal tube positioning appropriate, 2 7 cm above the annamarie 2  Shallow depth of inspiration with crowding of bronchovascular markings or atelectasis 3  Nasogastric tube side port at level of left hemidiaphragm, consider advancement to assure the side port is within the stomach  The examination demonstrates a significant  finding and was documented as such in [facility] for liaison and referring practitioner notification  Workstation performed: [id number]     XR chest portable ICU    Result Date: 12/5/2022  Impression: No consolidation or overt pulmonary edema   Workstation performed: [id number]     CT abdomen pelvis with contrast    Result Date: 12/4/2022  Impression: Acute sigmoid diverticulitis involving a large sigmoid diverticulum  There is a small amount of pneumoperitoneum suggesting small perforation  No abscess identified  The study was marked in [facility] for immediate notification   Workstation performed: [id number]     Recent Results (from the past 36 hour(s))   CBC and differential    Collection Time: 12/19/22  5:14 AM   Result Value Ref Range    WBC 8 21 4 31 - 10 16 Thousand/uL    RBC 2 97 (L) 3 81 - 5 12 Million/uL    Hemoglobin 9 4 (L) 11 5 - 15 4 g/dL    Hematocrit 29 3 (L) 34 8 - 46 1 %    MCV 99 (H) 82 - 98 fL    MCH 31 6 26 8 - 34 3 pg    MCHC 32 1 31 4 - 37 4 g/dL    RDW 13 4 11 6 - 15 1 %    MPV 9 5 8 9 - 12 7 fL    Platelets 2,875 (HH) 149 - 390 Thousands/uL    nRBC 0 /100 WBCs    Neutrophils Relative 67 43 - 75 %    Immat GRANS % 2 0 - 2 %    Lymphocytes Relative 15 14 - 44 %    Monocytes Relative 13 (H) 4 - 12 %    Eosinophils Relative 2 0 - 6 %    Basophils Relative 1 0 - 1 %    Neutrophils Absolute 5 62 1 85 - 7 62 Thousands/µL    Immature Grans Absolute 0 13 0 00 - 0 20 Thousand/uL    Lymphocytes Absolute 1 21 0 60 - 4 47 Thousands/µL    Monocytes Absolute 1 08 0 17 - 1 22 Thousand/µL    Eosinophils Absolute 0 13 0 00 - 0 61 Thousand/µL    Basophils Absolute 0 04 0 00 - 0 10 Thousands/µL   Comprehensive metabolic panel    Collection Time: 12/19/22  5:14 AM   Result Value Ref Range    Sodium 138 135 - 147 mmol/L    Potassium 4 1 3 5 - 5 3 mmol/L    Chloride 102 96 - 108 mmol/L    CO2 27 21 - 32 mmol/L    ANION GAP 9 4 - 13 mmol/L    BUN 18 5 - 25 mg/dL    Creatinine 0 66 0 60 - 1 30 mg/dL    Glucose 98 65 - 140 mg/dL    Calcium 9 4 8 4 - 10 2 mg/dL    Corrected Calcium 10 1 8 3 - 10 1 mg/dL    AST 19 13 - 39 U/L    ALT 21 7 - 52 U/L    Alkaline Phosphatase 105 (H) 34 - 104 U/L    Total Protein 6 5 6 4 - 8 4 g/dL    Albumin 3 1 (L) 3 5 - 5 0 g/dL    Total Bilirubin 0 37 0 20 - 1 00 mg/dL eGFR 94 ml/min/1 73sq m   Procalcitonin, Next Day AM Collection    Collection Time: 12/19/22  5:14 AM   Result Value Ref Range    Procalcitonin 0 17 <=0 25 ng/ml   CBC and differential    Collection Time: 12/20/22  4:40 AM   Result Value Ref Range    WBC 8 43 4 31 - 10 16 Thousand/uL    RBC 2 50 (L) 3 81 - 5 12 Million/uL    Hemoglobin 7 6 (L) 11 5 - 15 4 g/dL    Hematocrit 24 4 (L) 34 8 - 46 1 %    MCV 98 82 - 98 fL    MCH 30 4 26 8 - 34 3 pg    MCHC 31 1 (L) 31 4 - 37 4 g/dL    RDW 13 2 11 6 - 15 1 %    MPV 9 6 8 9 - 12 7 fL    Platelets 006 (H) 387 - 390 Thousands/uL    nRBC 0 /100 WBCs    Neutrophils Relative 72 43 - 75 %    Immat GRANS % 2 0 - 2 %    Lymphocytes Relative 12 (L) 14 - 44 %    Monocytes Relative 13 (H) 4 - 12 %    Eosinophils Relative 1 0 - 6 %    Basophils Relative 0 0 - 1 %    Neutrophils Absolute 6 07 1 85 - 7 62 Thousands/µL    Immature Grans Absolute 0 16 0 00 - 0 20 Thousand/uL    Lymphocytes Absolute 0 99 0 60 - 4 47 Thousands/µL    Monocytes Absolute 1 08 0 17 - 1 22 Thousand/µL    Eosinophils Absolute 0 10 0 00 - 0 61 Thousand/µL    Basophils Absolute 0 03 0 00 - 0 10 Thousands/µL   Basic metabolic panel    Collection Time: 12/20/22  4:40 AM   Result Value Ref Range    Sodium 135 135 - 147 mmol/L    Potassium 3 7 3 5 - 5 3 mmol/L    Chloride 101 96 - 108 mmol/L    CO2 28 21 - 32 mmol/L    ANION GAP 6 4 - 13 mmol/L    BUN 14 5 - 25 mg/dL    Creatinine 0 73 0 60 - 1 30 mg/dL    Glucose 103 65 - 140 mg/dL    Calcium 8 5 8 4 - 10 2 mg/dL    eGFR 88 ml/min/1 73sq m       Current Medications:  Scheduled Meds:  Current Facility-Administered Medications   Medication Dose Route Frequency Provider Last Rate   • acetaminophen  975 mg Oral Q6H PRN Pankaj Morales PA-C     • amLODIPine  10 mg Oral Daily Jocelyn Millan PA-C     • aspirin  81 mg Oral Daily Jolly Clinton PA-C     • budesonide  0 5 mg Nebulization Q12H Pankaj Morales PA-C     • enoxaparin  40 mg Subcutaneous Q24H [facility] BARBIE Michel     • ketorolac  15 mg Intravenous Q6H PRN Elana Oconnell MD     • lactated ringers  100 mL/hr Intravenous Continuous SELMA Mascorro-C 100 mL/hr (12/20/22 0406)   • levalbuterol  1 25 mg Nebulization BID Flores Christian MD     • lidocaine  2 patch Topical Daily JAY MichelNP     • melatonin  6 mg Per NG Tube HS BARBIE Michel     • nebivolol  5 mg Oral Daily Jocelyn Millan PA-C     • ondansetron  4 mg Intravenous Q6H PRN JAY MichelNP     • oxyCODONE-acetaminophen  1 tablet Oral Q6H PRN Pankaj Morales PA-C     • pantoprazole  40 mg Intravenous Q12H [facility] BARBIE Hernandez     • phenol  1 spray Mouth/Throat Q2H PRN BARBIE Michel     • piperacillin-tazobactam  4 5 g Intravenous Q6H ADWOA MascorroC 0 g (12/18/22 1745)   • traMADol  50 mg Oral Q6H PRN BARBIE Michel       Continuous Infusions:lactated ringers, 100 mL/hr, Last Rate: 100 mL/hr (12/20/22 0406)      PRN Meds:  •  acetaminophen  •  ketorolac  •  ondansetron  •  oxyCODONE-acetaminophen  •  phenol  •  traMADol      DAVID Ledezma  12/20/2022

## 2022-12-20 NOTE — CASE MANAGEMENT
Case Management Discharge Planning Note    Patient name Pattie Sahu  Location [address] 224/-[id number] MRN [id number]  : 1960 Date 2022       Current Admission Date: 2022  Current Admission Diagnosis:Sigmoid diverticulitis   Patient Active Problem List    Diagnosis Date Noted   • Anemia 2022   • Encephalopathy 2022   • Acute respiratory failure with hypoxia ([address] ) 2022   • GERD (gastroesophageal reflux disease) 2022   • Hypertension 2022   • Bronchomalacia 2022   • Asthma 2022   • Septic shock ([address] ) 2022   • Sigmoid diverticulitis 2022      LOS (days): 16  Geometric Mean LOS (GMLOS) (days): 9 60  Days to GMLOS:-6 8     OBJECTIVE:  Risk of Unplanned Readmission Score: 11 4         Current admission status: Inpatient   Preferred Pharmacy:   [facility] [address] [place] [address]  Phone: [phone] Fax: [phone]    Primary Care Provider: No primary care provider on file      Primary Insurance: [employer]  Secondary Insurance: MEDICARE    DISCHARGE DETAILS:    Discharge planning discussed with[de-identified] patient  Freedom of Choice: Yes  Comments - Freedom of Choice: pt would like to go home with home health care- avinash is working viri Mendez cm at [employer] for find a [facility] agency that takes her insurance & serivces her area & have availabilty                               Other Referral/Resources/Interventions Provided:  Referral Comments: cm sent additonal referrals and placed calls to agencies that have not responded in [address] [place] [address] Team Recommendation: Home with  [employer] (home with spouse & [facility] & outpt follow up)      cm sent a fax to [employer] and cm received a call stating they are not able to service the area that she lives, cm placed a call to [facility] and I was told they are non-meidcal home care, avinash called [place] [phone]  And I was told they do not service the area, cm called [facility] [phone] and they only offer shift nursing, [facility] was called and they are not able to accept, avinash judy call [facility] they stated to me to call Wednesday to see if they are able to accept new pt's , cm will continue to work on a safe d/c plan, pt had IR place in 2 drains today

## 2022-12-21 ENCOUNTER — APPOINTMENT (INPATIENT)
Dept: RADIOLOGY | Facility: HOSPITAL | Age: 62
End: 2022-12-21

## 2022-12-21 LAB
BASOPHILS # BLD AUTO: 0.03 THOUSANDS/ÂΜL (ref 0–0.1)
BASOPHILS NFR BLD AUTO: 0 % (ref 0–1)
EOSINOPHIL # BLD AUTO: 0.08 THOUSAND/ÂΜL (ref 0–0.61)
EOSINOPHIL NFR BLD AUTO: 1 % (ref 0–6)
ERYTHROCYTE [DISTWIDTH] IN BLOOD BY AUTOMATED COUNT: 13.3 % (ref 11.6–15.1)
HCT VFR BLD AUTO: 25.6 % (ref 34.8–46.1)
HGB BLD-MCNC: 8.1 G/DL (ref 11.5–15.4)
IMM GRANULOCYTES # BLD AUTO: 0.19 THOUSAND/UL (ref 0–0.2)
IMM GRANULOCYTES NFR BLD AUTO: 2 % (ref 0–2)
LYMPHOCYTES # BLD AUTO: 1.01 THOUSANDS/ÂΜL (ref 0.6–4.47)
LYMPHOCYTES NFR BLD AUTO: 11 % (ref 14–44)
MCH RBC QN AUTO: 30.6 PG (ref 26.8–34.3)
MCHC RBC AUTO-ENTMCNC: 31.6 G/DL (ref 31.4–37.4)
MCV RBC AUTO: 97 FL (ref 82–98)
MONOCYTES # BLD AUTO: 1.12 THOUSAND/ÂΜL (ref 0.17–1.22)
MONOCYTES NFR BLD AUTO: 12 % (ref 4–12)
NEUTROPHILS # BLD AUTO: 7.23 THOUSANDS/ÂΜL (ref 1.85–7.62)
NEUTS SEG NFR BLD AUTO: 74 % (ref 43–75)
NRBC BLD AUTO-RTO: 0 /100 WBCS
PLATELET # BLD AUTO: 813 THOUSANDS/UL (ref 149–390)
PMV BLD AUTO: 9.3 FL (ref 8.9–12.7)
RBC # BLD AUTO: 2.65 MILLION/UL (ref 3.81–5.12)
WBC # BLD AUTO: 9.66 THOUSAND/UL (ref 4.31–10.16)

## 2022-12-21 RX ORDER — ECHINACEA PURPUREA EXTRACT 125 MG
1 TABLET ORAL
Status: DISCONTINUED | OUTPATIENT
Start: 2022-12-21 | End: 2023-01-04 | Stop reason: HOSPADM

## 2022-12-21 RX ADMIN — OXYCODONE AND ACETAMINOPHEN 1 TABLET: 5; 325 TABLET ORAL at 03:38

## 2022-12-21 RX ADMIN — ENOXAPARIN SODIUM 40 MG: 100 INJECTION SUBCUTANEOUS at 08:57

## 2022-12-21 RX ADMIN — LEVALBUTEROL HYDROCHLORIDE 1.25 MG: 1.25 SOLUTION, CONCENTRATE RESPIRATORY (INHALATION) at 07:30

## 2022-12-21 RX ADMIN — AMLODIPINE BESYLATE 10 MG: 10 TABLET ORAL at 08:57

## 2022-12-21 RX ADMIN — ASPIRIN 81 MG: 81 TABLET, COATED ORAL at 08:57

## 2022-12-21 RX ADMIN — Medication 4.5 G: at 16:18

## 2022-12-21 RX ADMIN — Medication 4.5 G: at 03:44

## 2022-12-21 RX ADMIN — OXYCODONE AND ACETAMINOPHEN 1 TABLET: 5; 325 TABLET ORAL at 16:17

## 2022-12-21 RX ADMIN — Medication 4.5 G: at 10:41

## 2022-12-21 RX ADMIN — BUDESONIDE 0.5 MG: 0.5 INHALANT ORAL at 19:45

## 2022-12-21 RX ADMIN — LEVALBUTEROL HYDROCHLORIDE 1.25 MG: 1.25 SOLUTION, CONCENTRATE RESPIRATORY (INHALATION) at 19:45

## 2022-12-21 RX ADMIN — BUDESONIDE 0.5 MG: 0.5 INHALANT ORAL at 07:30

## 2022-12-21 RX ADMIN — NEBIVOLOL 5 MG: 5 TABLET ORAL at 08:57

## 2022-12-21 RX ADMIN — ONDANSETRON 4 MG: 2 INJECTION INTRAMUSCULAR; INTRAVENOUS at 13:45

## 2022-12-21 RX ADMIN — SODIUM CHLORIDE, POTASSIUM CHLORIDE, SODIUM LACTATE AND CALCIUM CHLORIDE 100 ML/HR: 600; 310; 30; 20 INJECTION, SOLUTION INTRAVENOUS at 10:41

## 2022-12-21 RX ADMIN — PANTOPRAZOLE SODIUM 40 MG: 40 INJECTION, POWDER, LYOPHILIZED, FOR SOLUTION INTRAVENOUS at 08:57

## 2022-12-21 RX ADMIN — PANTOPRAZOLE SODIUM 40 MG: 40 INJECTION, POWDER, LYOPHILIZED, FOR SOLUTION INTRAVENOUS at 21:16

## 2022-12-21 RX ADMIN — Medication 4.5 G: at 21:17

## 2022-12-21 RX ADMIN — OXYCODONE AND ACETAMINOPHEN 1 TABLET: 5; 325 TABLET ORAL at 09:59

## 2022-12-21 RX ADMIN — TRAMADOL HYDROCHLORIDE 50 MG: 50 TABLET, COATED ORAL at 21:16

## 2022-12-21 NOTE — CASE MANAGEMENT
Case Management Discharge Planning Note    Patient name Lemuel Marie  Location [address] 224/-96 MRN [id number]  : 1960 Date 2022       Current Admission Date: 2022  Current Admission Diagnosis:Sigmoid diverticulitis   Patient Active Problem List    Diagnosis Date Noted   • Anemia 2022   • Encephalopathy 2022   • Acute respiratory failure with hypoxia ([address] ) 2022   • GERD (gastroesophageal reflux disease) 2022   • Hypertension 2022   • Bronchomalacia 2022   • Asthma 2022   • Septic shock ([address] ) 2022   • Sigmoid diverticulitis 2022      LOS (days): 17  Geometric Mean LOS (GMLOS) (days): 9 60  Days to GMLOS:-7 8     OBJECTIVE:  Risk of Unplanned Readmission Score: 15 55         Current admission status: Inpatient   Preferred Pharmacy:   [facility] [address] [place] [id number]  Phone: [phone] Fax: [phone]    Primary Care Provider: No primary care provider on file  Primary Insurance: [employer]  Secondary Insurance: MEDICARE    DISCHARGE DETAILS:    Discharge planning discussed with[de-identified] patient and spouse  Freedom of Choice: Yes  Comments - Freedom of Choice: pt would like to go home with hhc - cm is having trouble trying to find hhc in her area of [facility] that accepts her insurance and have openings- more referrals and phone calls have been made- cm called her pcp for names of hhc, cm called the [facility] wound center, cm called the insurance cm  CM contacted family/caregiver?: Yes             Contacts  Patient Contacts: Marlon Coleman  Relationship to Patient[de-identified] Family (spouse)  Contact Method:  In Person  Reason/Outcome: Discharge [address]         Is the patient interested in [address] at discharge?: Yes         Other Referral/Resources/Interventions Provided:  Interventions: [address]  Referral Comments: additonal referrals and phone calls have been placed    Would you like to participate in our [address] service program?  : No - Declined    Treatment Team Recommendation: Home with [facility] (home with spouse and hhc & outp follow up- spouse)         cm placed a call to the insurance cm Laxmi Santos # [phone]  Ext# [phone]  Cm called  : [facility] at 8:09am & 13:24pm # [phone] and I had to leave message and I was told I would receive a call back- [address] was called at 8:12 am  LM and have had a call back - [address]  [facility]  [phone] called at 8:25 am and they are not in the service area- [facility] # [phone] LM and no call back- [facility]  was called at *:27 am  [phone] and they do not have any nursing availability- [facility] called 8:30 am #[phone] LM no call back-  [address] was called 9:11 am #[phone] and they are not in network, [facility] called @9:04  #[phone] and they are not able to accept, -[address]   @9am # [phone] LM and no call back,  Cm was given a number to  Call from one oth the hhc that were not able to accept - I was instructed to call Carla and call Susan Lala # [phone] and I was told that [facility]  Dr Mohit Her  # [phone]  I explained the to the rn that I am having difficulty finding a hhc- she stated the hch are short staffed- she gave me names of some hhc and I made her aware I called them and they either did not have staff or they were not in network with her insurance, pt has medicare A but not Medicare B- she did give me the wound centers # [phone] and I spoke with Gita Azevedo and I made her aware of our need and she stated they do offer that service and if they did they would not be able to accept for 3 weeks, referrals was sne to [facility] faxed information # [phone] and they are outo of network     Cm will continue to work on a safe d/c plan , pt and  were made aware, pt was reminded that she does need to learn how to do her own wound care & colostomy care and th at the [facility] nurse would only be coming for a few visits

## 2022-12-21 NOTE — OCCUPATIONAL THERAPY NOTE
12/21/22 0825   Note Type   Note Type Cancelled Session   Cancel Reasons Refusal     Attempted OT treatment this morning at 0825  Patient adamantly refused therapy stating "You're not getting anything out of me today " Will continue with OT POC as able    Lizz Matthews

## 2022-12-21 NOTE — PLAN OF CARE
Problem: Potential for Falls  Goal: Patient will remain free of falls  Description: INTERVENTIONS:  - Educate patient/family on patient safety including physical limitations  - Instruct patient to call for assistance with activity   - Consult OT/PT to assist with strengthening/mobility   - Keep Call bell within reach  - Keep bed low and locked with side rails adjusted as appropriate  - Keep care items and personal belongings within reach  - Initiate and maintain comfort rounds  - Make Fall Risk Sign visible to staff  - Offer Toileting every 2 Hours, in advance of need  - Initiate/Maintain BED alarm  - Obtain necessary fall risk management equipment: yellow socks  - Apply yellow socks and bracelet for high fall risk patients  - Consider moving patient to room near nurses station  Outcome: Progressing     Problem: PAIN - ADULT  Goal: Verbalizes/displays adequate comfort level or baseline comfort level  Description: Interventions:  - Encourage patient to monitor pain and request assistance  - Assess pain using appropriate pain scale  - Administer analgesics based on type and severity of pain and evaluate response  - Implement non-pharmacological measures as appropriate and evaluate response  - Consider cultural and social influences on pain and pain management  - Notify physician/advanced practitioner if interventions unsuccessful or patient reports new pain  Outcome: Progressing     Problem: INFECTION - ADULT  Goal: Absence or prevention of progression during hospitalization  Description: INTERVENTIONS:  - Assess and monitor for signs and symptoms of infection  - Monitor lab/diagnostic results  - Monitor all insertion sites, i e  indwelling lines, tubes, and drains  - Monitor endotracheal if appropriate and nasal secretions for changes in amount and color  - [place] appropriate cooling/warming therapies per order  - Administer medications as ordered  - Instruct and encourage patient and family to use good hand hygiene technique  - Identify and instruct in appropriate isolation precautions for identified infection/condition  Outcome: Progressing     Problem: SAFETY ADULT  Goal: Patient will remain free of falls  Description: INTERVENTIONS:  - Educate patient/family on patient safety including physical limitations  - Instruct patient to call for assistance with activity   - Consult OT/PT to assist with strengthening/mobility   - Keep Call bell within reach  - Keep bed low and locked with side rails adjusted as appropriate  - Keep care items and personal belongings within reach  - Initiate and maintain comfort rounds  - Make Fall Risk Sign visible to staff  - Offer Toileting every 2 Hours, in advance of need  - Initiate/Maintain BED alarm  - Obtain necessary fall risk management equipment: yellow socks  - Apply yellow socks and bracelet for high fall risk patients  - Consider moving patient to room near nurses station  Outcome: Progressing  Goal: Maintain or return to baseline ADL function  Description: INTERVENTIONS:  - Educate patient/family on patient safety including physical limitations  - Instruct patient to call for assistance with activity   - Consult OT/PT to assist with strengthening/mobility   - Keep Call bell within reach  - Keep bed low and locked with side rails adjusted as appropriate  - Keep care items and personal belongings within reach  - Initiate and maintain comfort rounds  - Make Fall Risk Sign visible to staff  - Offer Toileting every 2 Hours, in advance of need  - Initiate/Maintain bed alarm  - Obtain necessary fall risk management equipment: yellow socks  - Apply yellow socks and bracelet for high fall risk patients  - Consider moving patient to room near nurses station  Outcome: Progressing  Goal: Maintains/Returns to pre admission functional level  Description: INTERVENTIONS:  - Perform BMAT or MOVE assessment daily    - Set and communicate daily mobility goal to care team and patient/family/caregiver  - Collaborate with rehabilitation services on mobility goals if consulted  - Out of bed for toileting  - Record patient progress and toleration of activity level   Outcome: Progressing     Problem: DISCHARGE PLANNING  Goal: Discharge to home or other facility with appropriate resources  Description: INTERVENTIONS:  - Identify barriers to discharge w/patient and caregiver  - Arrange for needed discharge resources and transportation as appropriate  - Identify discharge learning needs (meds, wound care, etc )  - Arrange for interpretive services to assist at discharge as needed  - Refer to Case Management Department for coordinating discharge planning if the patient needs post-hospital services based on physician/advanced practitioner order or complex needs related to functional status, cognitive ability, or social support system  Outcome: Progressing     Problem: Knowledge Deficit  Goal: Patient/family/caregiver demonstrates understanding of disease process, treatment plan, medications, and discharge instructions  Description: Complete learning assessment and assess knowledge base    Interventions:  - Provide teaching at level of understanding  - Provide teaching via preferred learning methods  Outcome: Progressing     Problem: MOBILITY - ADULT  Goal: Maintain or return to baseline ADL function  Description: INTERVENTIONS:  - Educate patient/family on patient safety including physical limitations  - Instruct patient to call for assistance with activity   - Consult OT/PT to assist with strengthening/mobility   - Keep Call bell within reach  - Keep bed low and locked with side rails adjusted as appropriate  - Keep care items and personal belongings within reach  - Initiate and maintain comfort rounds  - Make Fall Risk Sign visible to staff  - Offer Toileting every 2 Hours, in advance of need  - Initiate/Maintain bed alarm  - Obtain necessary fall risk management equipment: yellow socks  - Apply yellow socks and bracelet for high fall risk patients  - Consider moving patient to room near nurses station  Outcome: Progressing  Goal: Maintains/Returns to pre admission functional level  Description: INTERVENTIONS:  - Perform BMAT or MOVE assessment daily    - Set and communicate daily mobility goal to care team and patient/family/caregiver  - Collaborate with rehabilitation services on mobility goals if consulted  - Out of bed for toileting  - Record patient progress and toleration of activity level   Outcome: Progressing     Problem: Prexisting or High Potential for Compromised Skin Integrity  Goal: Skin integrity is maintained or improved  Description: INTERVENTIONS:  - Identify patients at risk for skin breakdown  - Assess and monitor skin integrity  - Assess and monitor nutrition and hydration status  - Monitor labs   - Assess for incontinence   - Turn and reposition patient  - Assist with mobility/ambulation  - Relieve pressure over bony prominences  - Avoid friction and shearing  - Provide appropriate hygiene as needed including keeping skin clean and dry  - Evaluate need for skin moisturizer/barrier cream  - Collaborate with interdisciplinary team   - Patient/family teaching  - Consider wound care consult   Outcome: Progressing     Problem: Nutrition/Hydration-ADULT  Goal: Nutrient/Hydration intake appropriate for improving, restoring or maintaining nutritional needs  Description: Monitor and assess patient's nutrition/hydration status for malnutrition  Collaborate with interdisciplinary team and initiate plan and interventions as ordered  Monitor patient's weight and dietary intake as ordered or per policy  Utilize nutrition screening tool and intervene as necessary  Determine patient's food preferences and provide high-protein, high-caloric foods as appropriate       INTERVENTIONS:  - Monitor oral intake, urinary output, labs, and treatment plans  - Assess nutrition and hydration status and recommend course of action  - Evaluate amount of meals eaten  - Assist patient with eating if necessary   - Allow adequate time for meals  - Recommend/ encourage appropriate diets, oral nutritional supplements, and vitamin/mineral supplements  - Order, calculate, and assess calorie counts as needed  - Recommend, monitor, and adjust tube feedings and TPN/PPN based on assessed needs  - Assess need for intravenous fluids  - Provide specific nutrition/hydration education as appropriate  - Include patient/family/caregiver in decisions related to nutrition  Outcome: Progressing

## 2022-12-21 NOTE — PROGRESS NOTES
Progress Note - General Surgery   Lemuel Marie 58 y o  female MRN: [id number]  Unit/Bed#: -01 Encounter: [id number]    Assessment:  58year old female with past medical history significant for asthma and tracheobronchomalacia presenting with septic shock 2ry to feculent peritonitis from complicated sigmoid diverticulitis   S/p laparoscopic hand-assisted sigmoid resection with primary anastomosis and transverse loop colostomy  Postoperative wound infection of LLQ hand port site  S/p IR drain placement for intra abdominal abscess with approximately 200cc pus drained  -Tmax 100 9, low O2 sats now with 3L supplemental O2 via NC  -UO 500cc   -AGUSTO x 2 90cc, seropurulent   -Stool 200 cc, brown liquid stool   -WBC 9 66  -Hgb 8 1   -BC x 2 12/18/2022 with NG at 48 hours   -cultures obtained 12/20 by IR     Plan:  Surgically stable  For Zosyn  Follow-up cultures from IR procedure 12/20  Surgical soft diet with nutritional supplements as tolerated  DC IVF  Antiemetics and analgesics as needed  Wean O2 as able  Strict I/O  Daily wound care to left lower quadrant HandPort site incision and drain dressings  IR drain care flushes per orders  Evaluated at bedside with attending  CM for dispo planning    Subjective/Objective   Subjective: Patient not feeling well after ID procedure yesterday afternoon  States that she feels like she has been "hit by a truck "  Reports band of abdominal pain  Objective:   Blood pressure 113/68, pulse 79, temperature 99 5 °F (37 5 °C), resp  rate 20, height 5' 5" (1 651 m), weight 82 1 kg (181 lb), SpO2 91 %  ,Body mass index is 30 12 kg/m²        Intake/Output Summary (Last 24 hours) at 12/21/2022 0859  Last data filed at 12/21/2022 0752  Gross per 24 hour   Intake 475 ml   Output 790 ml   Net -315 ml       Invasive Devices     Peripheral Intravenous Line  Duration           Peripheral IV 12/16/22 Right;Ventral (anterior) Forearm 4 days          Drain  Duration           Colostomy RLQ 16 days Abscess Drain RUQ <1 day    Abscess Drain RUQ <1 day              Physical Exam  Vitals and nursing note reviewed  Constitutional:       Appearance: Normal appearance  She is obese  Interventions: Nasal cannula in place  HENT:      Head: Normocephalic and atraumatic  Cardiovascular:      Rate and Rhythm: Normal rate and regular rhythm  Pulses: Normal pulses  Heart sounds: Normal heart sounds  Pulmonary:      Effort: Pulmonary effort is normal       Breath sounds: Normal breath sounds  Abdominal:      General: The ostomy site is clean  Bowel sounds are normal  There is no distension  Palpations: Abdomen is soft  Tenderness: There is abdominal tenderness (periwound/incision)  There is no guarding or rebound  Comments: Transverse loop colostomy functioning with brown liquid stool, stoma pink/viable; left lower quadrant HandPort incision with packing removed and replaced, no signs of active drainage or surrounding erythema; right sided abdominal IR drains with no significant surrounding erythema, seropurulent output in AGUSTO bulbs   Musculoskeletal:         General: Normal range of motion  Right lower leg: No edema  Left lower leg: No edema  Skin:     General: Skin is warm and dry  Neurological:      General: No focal deficit present  Mental Status: She is alert and oriented to person, place, and time  Psychiatric:         Mood and Affect: Mood normal          Behavior: Behavior normal          Thought Content: Thought content normal        Lab, Imaging and other studies:  I have personally reviewed pertinent lab results      CBC:   Lab Results   Component Value Date    WBC 9 66 12/21/2022    HGB 8 1 (L) 12/21/2022    HCT 25 6 (L) 12/21/2022    MCV 97 12/21/2022     (H) 12/21/2022    MCH 30 6 12/21/2022    MCHC 31 6 12/21/2022    RDW 13 3 12/21/2022    MPV 9 3 12/21/2022    NRBC 0 12/21/2022     VTE Pharmacologic Prophylaxis: Enoxaparin (Lovenox)  VTE Mechanical Prophylaxis: sequential compression device    Felicity Toussaint PA-C

## 2022-12-22 LAB
ANION GAP SERPL CALCULATED.3IONS-SCNC: 10 MMOL/L (ref 4–13)
BACTERIA SPEC BFLD CULT: ABNORMAL
BASOPHILS # BLD AUTO: 0.02 THOUSANDS/ÂΜL (ref 0–0.1)
BASOPHILS NFR BLD AUTO: 0 % (ref 0–1)
BUN SERPL-MCNC: 7 MG/DL (ref 5–25)
CALCIUM SERPL-MCNC: 8.6 MG/DL (ref 8.4–10.2)
CHLORIDE SERPL-SCNC: 101 MMOL/L (ref 96–108)
CO2 SERPL-SCNC: 26 MMOL/L (ref 21–32)
CREAT SERPL-MCNC: 0.66 MG/DL (ref 0.6–1.3)
EOSINOPHIL # BLD AUTO: 0.06 THOUSAND/ÂΜL (ref 0–0.61)
EOSINOPHIL NFR BLD AUTO: 1 % (ref 0–6)
ERYTHROCYTE [DISTWIDTH] IN BLOOD BY AUTOMATED COUNT: 13.2 % (ref 11.6–15.1)
GFR SERPL CREATININE-BSD FRML MDRD: 94 ML/MIN/1.73SQ M
GLUCOSE SERPL-MCNC: 108 MG/DL (ref 65–140)
GRAM STN SPEC: ABNORMAL
HCT VFR BLD AUTO: 26.4 % (ref 34.8–46.1)
HGB BLD-MCNC: 8.3 G/DL (ref 11.5–15.4)
IMM GRANULOCYTES # BLD AUTO: 0.23 THOUSAND/UL (ref 0–0.2)
IMM GRANULOCYTES NFR BLD AUTO: 2 % (ref 0–2)
LYMPHOCYTES # BLD AUTO: 0.83 THOUSANDS/ÂΜL (ref 0.6–4.47)
LYMPHOCYTES NFR BLD AUTO: 7 % (ref 14–44)
MAGNESIUM SERPL-MCNC: 1.7 MG/DL (ref 1.9–2.7)
MCH RBC QN AUTO: 30 PG (ref 26.8–34.3)
MCHC RBC AUTO-ENTMCNC: 31.4 G/DL (ref 31.4–37.4)
MCV RBC AUTO: 95 FL (ref 82–98)
MONOCYTES # BLD AUTO: 1.11 THOUSAND/ÂΜL (ref 0.17–1.22)
MONOCYTES NFR BLD AUTO: 9 % (ref 4–12)
NEUTROPHILS # BLD AUTO: 9.53 THOUSANDS/ÂΜL (ref 1.85–7.62)
NEUTS SEG NFR BLD AUTO: 81 % (ref 43–75)
NRBC BLD AUTO-RTO: 0 /100 WBCS
PLATELET # BLD AUTO: 822 THOUSANDS/UL (ref 149–390)
PMV BLD AUTO: 9.3 FL (ref 8.9–12.7)
POTASSIUM SERPL-SCNC: 3.5 MMOL/L (ref 3.5–5.3)
RBC # BLD AUTO: 2.77 MILLION/UL (ref 3.81–5.12)
SODIUM SERPL-SCNC: 137 MMOL/L (ref 135–147)
WBC # BLD AUTO: 11.78 THOUSAND/UL (ref 4.31–10.16)

## 2022-12-22 RX ORDER — ONDANSETRON 2 MG/ML
4 INJECTION INTRAMUSCULAR; INTRAVENOUS EVERY 4 HOURS PRN
Status: DISCONTINUED | OUTPATIENT
Start: 2022-12-22 | End: 2023-01-04 | Stop reason: HOSPADM

## 2022-12-22 RX ORDER — MAGNESIUM SULFATE HEPTAHYDRATE 40 MG/ML
2 INJECTION, SOLUTION INTRAVENOUS ONCE
Status: COMPLETED | OUTPATIENT
Start: 2022-12-22 | End: 2022-12-22

## 2022-12-22 RX ADMIN — LEVALBUTEROL HYDROCHLORIDE 1.25 MG: 1.25 SOLUTION, CONCENTRATE RESPIRATORY (INHALATION) at 21:36

## 2022-12-22 RX ADMIN — Medication 4.5 G: at 16:37

## 2022-12-22 RX ADMIN — AMLODIPINE BESYLATE 10 MG: 10 TABLET ORAL at 09:05

## 2022-12-22 RX ADMIN — ASPIRIN 81 MG: 81 TABLET, COATED ORAL at 09:05

## 2022-12-22 RX ADMIN — MAGNESIUM SULFATE HEPTAHYDRATE 2 G: 40 INJECTION, SOLUTION INTRAVENOUS at 11:19

## 2022-12-22 RX ADMIN — ACETAMINOPHEN 975 MG: 325 TABLET ORAL at 15:05

## 2022-12-22 RX ADMIN — ACETAMINOPHEN 975 MG: 325 TABLET ORAL at 02:22

## 2022-12-22 RX ADMIN — LEVALBUTEROL HYDROCHLORIDE 1.25 MG: 1.25 SOLUTION, CONCENTRATE RESPIRATORY (INHALATION) at 07:46

## 2022-12-22 RX ADMIN — BUDESONIDE 0.5 MG: 0.5 INHALANT ORAL at 07:46

## 2022-12-22 RX ADMIN — Medication 4.5 G: at 10:22

## 2022-12-22 RX ADMIN — NEBIVOLOL 5 MG: 5 TABLET ORAL at 09:05

## 2022-12-22 RX ADMIN — ONDANSETRON 4 MG: 2 INJECTION INTRAMUSCULAR; INTRAVENOUS at 11:53

## 2022-12-22 RX ADMIN — PANTOPRAZOLE SODIUM 40 MG: 40 INJECTION, POWDER, LYOPHILIZED, FOR SOLUTION INTRAVENOUS at 09:05

## 2022-12-22 RX ADMIN — ONDANSETRON 4 MG: 2 INJECTION INTRAMUSCULAR; INTRAVENOUS at 02:27

## 2022-12-22 RX ADMIN — OXYCODONE AND ACETAMINOPHEN 1 TABLET: 5; 325 TABLET ORAL at 02:22

## 2022-12-22 RX ADMIN — TRAMADOL HYDROCHLORIDE 50 MG: 50 TABLET, COATED ORAL at 18:00

## 2022-12-22 RX ADMIN — Medication 4.5 G: at 21:40

## 2022-12-22 RX ADMIN — PANTOPRAZOLE SODIUM 40 MG: 40 INJECTION, POWDER, LYOPHILIZED, FOR SOLUTION INTRAVENOUS at 21:40

## 2022-12-22 RX ADMIN — BUDESONIDE 0.5 MG: 0.5 INHALANT ORAL at 21:36

## 2022-12-22 RX ADMIN — ENOXAPARIN SODIUM 40 MG: 100 INJECTION SUBCUTANEOUS at 09:05

## 2022-12-22 RX ADMIN — TRAMADOL HYDROCHLORIDE 50 MG: 50 TABLET, COATED ORAL at 09:05

## 2022-12-22 RX ADMIN — Medication 4.5 G: at 04:10

## 2022-12-22 NOTE — NURSING NOTE
Pt oob in her chair in no aucte distress watching tv, surgery was in to eval the pt, dsg changes done by the surgery team, no other changes in the pts assess at this time

## 2022-12-22 NOTE — PLAN OF CARE
Problem: Potential for Falls  Goal: Patient will remain free of falls  Description: INTERVENTIONS:  - Educate patient/family on patient safety including physical limitations  - Instruct patient to call for assistance with activity   - Consult OT/PT to assist with strengthening/mobility   - Keep Call bell within reach  - Keep bed low and locked with side rails adjusted as appropriate  - Keep care items and personal belongings within reach  - Initiate and maintain comfort rounds  - Make Fall Risk Sign visible to staff  - Offer Toileting every 2 Hours, in advance of need  - Initiate/Maintain BED alarm  - Obtain necessary fall risk management equipment: yellow socks  - Apply yellow socks and bracelet for high fall risk patients  - Consider moving patient to room near nurses station  Outcome: Progressing     Problem: PAIN - ADULT  Goal: Verbalizes/displays adequate comfort level or baseline comfort level  Description: Interventions:  - Encourage patient to monitor pain and request assistance  - Assess pain using appropriate pain scale  - Administer analgesics based on type and severity of pain and evaluate response  - Implement non-pharmacological measures as appropriate and evaluate response  - Consider cultural and social influences on pain and pain management  - Notify physician/advanced practitioner if interventions unsuccessful or patient reports new pain  Outcome: Progressing     Problem: INFECTION - ADULT  Goal: Absence or prevention of progression during hospitalization  Description: INTERVENTIONS:  - Assess and monitor for signs and symptoms of infection  - Monitor lab/diagnostic results  - Monitor all insertion sites, i e  indwelling lines, tubes, and drains  - Monitor endotracheal if appropriate and nasal secretions for changes in amount and color  - Str[place] appropriate cooling/warming therapies per order  - Administer medications as ordered  - Instruct and encourage patient and family to use good hand hygiene technique  - Identify and instruct in appropriate isolation precautions for identified infection/condition  Outcome: Progressing     Problem: SAFETY ADULT  Goal: Patient will remain free of falls  Description: INTERVENTIONS:  - Educate patient/family on patient safety including physical limitations  - Instruct patient to call for assistance with activity   - Consult OT/PT to assist with strengthening/mobility   - Keep Call bell within reach  - Keep bed low and locked with side rails adjusted as appropriate  - Keep care items and personal belongings within reach  - Initiate and maintain comfort rounds  - Make Fall Risk Sign visible to staff  - Offer Toileting every 2 Hours, in advance of need  - Initiate/Maintain BED alarm  - Obtain necessary fall risk management equipment: yellow socks  - Apply yellow socks and bracelet for high fall risk patients  - Consider moving patient to room near nurses station  Outcome: Progressing  Goal: Maintain or return to baseline ADL function  Description: INTERVENTIONS:  - Educate patient/family on patient safety including physical limitations  - Instruct patient to call for assistance with activity   - Consult OT/PT to assist with strengthening/mobility   - Keep Call bell within reach  - Keep bed low and locked with side rails adjusted as appropriate  - Keep care items and personal belongings within reach  - Initiate and maintain comfort rounds  - Make Fall Risk Sign visible to staff  - Offer Toileting every 2 Hours, in advance of need  - Initiate/Maintain bed alarm  - Obtain necessary fall risk management equipment: yellow socks  - Apply yellow socks and bracelet for high fall risk patients  - Consider moving patient to room near nurses station  Outcome: Progressing  Goal: Maintains/Returns to pre admission functional level  Description: INTERVENTIONS:  - Perform BMAT or MOVE assessment daily    - Set and communicate daily mobility goal to care team and patient/family/caregiver  - Collaborate with rehabilitation services on mobility goals if consulted  - Perform Range of Motion 3 times a day  - Reposition patient every 2 hours  - Dangle patient 3 times a day  - Stand patient 3 times a day  - Ambulate patient 3 times a day  - Out of bed to chair 3 times a day   - Out of bed for meals 3 times a day  - Out of bed for toileting  - Record patient progress and toleration of activity level   Outcome: Progressing     Problem: DISCHARGE PLANNING  Goal: Discharge to home or other facility with appropriate resources  Description: INTERVENTIONS:  - Identify barriers to discharge w/patient and caregiver  - Arrange for needed discharge resources and transportation as appropriate  - Identify discharge learning needs (meds, wound care, etc )  - Arrange for interpretive services to assist at discharge as needed  - Refer to Case Management Department for coordinating discharge planning if the patient needs post-hospital services based on physician/advanced practitioner order or complex needs related to functional status, cognitive ability, or social support system  Outcome: Progressing     Problem: Knowledge Deficit  Goal: Patient/family/caregiver demonstrates understanding of disease process, treatment plan, medications, and discharge instructions  Description: Complete learning assessment and assess knowledge base    Interventions:  - Provide teaching at level of understanding  - Provide teaching via preferred learning methods  Outcome: Progressing     Problem: MOBILITY - ADULT  Goal: Maintain or return to baseline ADL function  Description: INTERVENTIONS:  - Educate patient/family on patient safety including physical limitations  - Instruct patient to call for assistance with activity   - Consult OT/PT to assist with strengthening/mobility   - Keep Call bell within reach  - Keep bed low and locked with side rails adjusted as appropriate  - Keep care items and personal belongings within reach  - Initiate and maintain comfort rounds  - Make Fall Risk Sign visible to staff  - Offer Toileting every 2 Hours, in advance of need  - Initiate/Maintain bed alarm  - Obtain necessary fall risk management equipment: yellow socks  - Apply yellow socks and bracelet for high fall risk patients  - Consider moving patient to room near nurses station  Outcome: Progressing  Goal: Maintains/Returns to pre admission functional level  Description: INTERVENTIONS:  - Perform BMAT or MOVE assessment daily    - Set and communicate daily mobility goal to care team and patient/family/caregiver  - Collaborate with rehabilitation services on mobility goals if consulted  - Perform Range of Motion 3 times a day  - Reposition patient every 2 hours    - Dangle patient 3 times a day  - Stand patient 3 times a day  - Ambulate patient 3 times a day  - Out of bed to chair 3 times a day   - Out of bed for meals 3 times a day  - Out of bed for toileting  - Record patient progress and toleration of activity level   Outcome: Progressing     Problem: Prexisting or High Potential for Compromised Skin Integrity  Goal: Skin integrity is maintained or improved  Description: INTERVENTIONS:  - Identify patients at risk for skin breakdown  - Assess and monitor skin integrity  - Assess and monitor nutrition and hydration status  - Monitor labs   - Assess for incontinence   - Turn and reposition patient  - Assist with mobility/ambulation  - Relieve pressure over bony prominences  - Avoid friction and shearing  - Provide appropriate hygiene as needed including keeping skin clean and dry  - Evaluate need for skin moisturizer/barrier cream  - Collaborate with interdisciplinary team   - Patient/family teaching  - Consider wound care consult   Outcome: Progressing     Problem: Nutrition/Hydration-ADULT  Goal: Nutrient/Hydration intake appropriate for improving, restoring or maintaining nutritional needs  Description: Monitor and assess patient's nutrition/hydration status for malnutrition  Collaborate with interdisciplinary team and initiate plan and interventions as ordered  Monitor patient's weight and dietary intake as ordered or per policy  Utilize nutrition screening tool and intervene as necessary  Determine patient's food preferences and provide high-protein, high-caloric foods as appropriate       INTERVENTIONS:  - Monitor oral intake, urinary output, labs, and treatment plans  - Assess nutrition and hydration status and recommend course of action  - Evaluate amount of meals eaten  - Assist patient with eating if necessary   - Allow adequate time for meals  - Recommend/ encourage appropriate diets, oral nutritional supplements, and vitamin/mineral supplements  - Order, calculate, and assess calorie counts as needed  - Recommend, monitor, and adjust tube feedings and TPN/PPN based on assessed needs  - Assess need for intravenous fluids  - Provide specific nutrition/hydration education as appropriate  - Include patient/family/caregiver in decisions related to nutrition  Outcome: Progressing

## 2022-12-22 NOTE — OCCUPATIONAL THERAPY NOTE
12/22/22 1010   Note Type   Note Type Cancelled Session   Cancel Reasons Refusal     Attempted OT treatment today at 1010  Patient refused therapy with report of having had a fever through the night and not feeling well today  Will continue with POC as carly Barboza

## 2022-12-22 NOTE — PROGRESS NOTES
Progress Note - General Surgery   Ramila Orellana 58 y o  female MRN: [id number]  Unit/Bed#: -01 Encounter: [id number]      ASSESSMENT:  57 y/o F hx bronchomalacia/bronchiectasis admitted with septic shock 2/2 feculent peritonitis from peforated sigmoid diverticulitis (Deonna Cronin)  POD#17 s/p emergent hand-assisted laparoscopic sigmoid colectomy, primary anastomosis, and creation of diverting transverse loop colostomy  LLQ wound infection  Intra-abdominal abscesses s/p IR percutaneous drainage   Febrile this AM, tmax 102F  Intermittent drop SpO2, stable on 3L NC  WBC increased 11 78 from 9 66  UO: 800  AGUSTO drain x2 R abdomen: 40cc cranial #2 with brown purulent fluid, caudal #1 with serous fluid and small amt clot in tubing  Ostomy pink, fx, output: 300cc      PLAN:  IV abx, IVF, analgesia prn, dvt ppx  Dietary supplements  Regular diet as tolerated   Trend fever curve/WBC  Encourage OOB ambulation/IS use      SUBJECTIVE:  Spiked 102 fever last night, nurse woke her up she then had an episode of nausea and vomited once  N/V resolved today  Abdominal pain similar generalized, worst RUQ  No subjective F/C  Not out of chair much, not motivated to use IS  Denies calf pain, CP, palpations, SOB, cough  No urinary sx  OBJECTIVE:   Vitals:  Blood pressure 140/79, pulse 76, temperature 98 3 °F (36 8 °C), resp  rate 18, height 5' 5" (1 651 m), weight 81 8 kg (180 lb 5 4 oz), SpO2 94 %  ,Body mass index is 30 01 kg/m²  I/Os:    Intake/Output Summary (Last 24 hours) at 12/22/2022 1343  Last data filed at 12/22/2022 1022  Gross per 24 hour   Intake 245 ml   Output 815 ml   Net -570 ml       Lines/Drains:  Invasive Devices     Peripheral Intravenous Line  Duration           Peripheral IV 12/21/22 Dorsal (posterior); Left Hand <1 day          Drain  Duration           Colostomy RLQ 17 days    Abscess Drain RUQ 1 day    Abscess Drain RUQ 1 day                Physical Exam  Constitutional:       General: She is not in acute distress  HENT:      Head: Normocephalic and atraumatic  Cardiovascular:      Rate and Rhythm: Normal rate and regular rhythm  Heart sounds: No murmur heard  Pulmonary:      Effort: Pulmonary effort is normal       Breath sounds: No wheezing, rhonchi or rales  Abdominal:      General: There is no distension  Palpations: Abdomen is soft  Tenderness: There is abdominal tenderness (generalized)  There is no guarding or rebound  Comments: Hypoactive bowel sounds  LLQ wound with pink granulation tissue, small amt yellow slough, no drainage  Ostomy viable, fx, gas and stool in bag  Drains intact, AGUSTO drain x2 R upper abdomen  Caudal: serous fluid/ clot in tubing  Cranial: brown purulent fluid   Musculoskeletal:         General: No tenderness  Cervical back: Neck supple  Right lower leg: No edema  Left lower leg: No edema  Skin:     General: Skin is warm and dry  Neurological:      General: No focal deficit present  Mental Status: She is alert  Diagnostics:  I have personally reviewed pertinent lab results  XR chest portable ICU    Result Date: 12/7/2022  Impression: Possible mild congestive changes and small effusions  Workstation performed: [id number]     XR chest portable ICU    Result Date: 12/7/2022  Impression: ET tube at the annamarie  Small right effusion suggested  Workstation performed: [id number]     XR chest portable ICU    Result Date: 12/6/2022  Impression: 1  Endotracheal tube positioning appropriate, 2 7 cm above the annamarie 2  Shallow depth of inspiration with crowding of bronchovascular markings or atelectasis 3  Nasogastric tube side port at level of left hemidiaphragm, consider advancement to assure the side port is within the stomach  The examination demonstrates a significant  finding and was documented as such in [facility] for liaison and referring practitioner notification   Workstation performed: [id number]     XR chest portable ICU    Result Date: 12/5/2022  Impression: No consolidation or overt pulmonary edema  Workstation performed: [id number]     CT abdomen pelvis with contrast    Result Date: 12/4/2022  Impression: Acute sigmoid diverticulitis involving a large sigmoid diverticulum  There is a small amount of pneumoperitoneum suggesting small perforation  No abscess identified  The study was marked in [facility] for immediate notification   Workstation performed: [id number]     Recent Results (from the past 36 hour(s))   CBC and differential    Collection Time: 12/21/22  6:02 AM   Result Value Ref Range    WBC 9 66 4 31 - 10 16 Thousand/uL    RBC 2 65 (L) 3 81 - 5 12 Million/uL    Hemoglobin 8 1 (L) 11 5 - 15 4 g/dL    Hematocrit 25 6 (L) 34 8 - 46 1 %    MCV 97 82 - 98 fL    MCH 30 6 26 8 - 34 3 pg    MCHC 31 6 31 4 - 37 4 g/dL    RDW 13 3 11 6 - 15 1 %    MPV 9 3 8 9 - 12 7 fL    Platelets 371 (H) 640 - 390 Thousands/uL    nRBC 0 /100 WBCs    Neutrophils Relative 74 43 - 75 %    Immat GRANS % 2 0 - 2 %    Lymphocytes Relative 11 (L) 14 - 44 %    Monocytes Relative 12 4 - 12 %    Eosinophils Relative 1 0 - 6 %    Basophils Relative 0 0 - 1 %    Neutrophils Absolute 7 23 1 85 - 7 62 Thousands/µL    Immature Grans Absolute 0 19 0 00 - 0 20 Thousand/uL    Lymphocytes Absolute 1 01 0 60 - 4 47 Thousands/µL    Monocytes Absolute 1 12 0 17 - 1 22 Thousand/µL    Eosinophils Absolute 0 08 0 00 - 0 61 Thousand/µL    Basophils Absolute 0 03 0 00 - 0 10 Thousands/µL   CBC and differential    Collection Time: 12/22/22  4:43 AM   Result Value Ref Range    WBC 11 78 (H) 4 31 - 10 16 Thousand/uL    RBC 2 77 (L) 3 81 - 5 12 Million/uL    Hemoglobin 8 3 (L) 11 5 - 15 4 g/dL    Hematocrit 26 4 (L) 34 8 - 46 1 %    MCV 95 82 - 98 fL    MCH 30 0 26 8 - 34 3 pg    MCHC 31 4 31 4 - 37 4 g/dL    RDW 13 2 11 6 - 15 1 %    MPV 9 3 8 9 - 12 7 fL    Platelets 852 (H) 535 - 390 Thousands/uL    nRBC 0 /100 WBCs    Neutrophils Relative 81 (H) 43 - 75 %    Immat GRANS % 2 0 - 2 %    Lymphocytes Relative 7 (L) 14 - 44 %    Monocytes Relative 9 4 - 12 %    Eosinophils Relative 1 0 - 6 %    Basophils Relative 0 0 - 1 %    Neutrophils Absolute 9 53 (H) 1 85 - 7 62 Thousands/µL    Immature Grans Absolute 0 23 (H) 0 00 - 0 20 Thousand/uL    Lymphocytes Absolute 0 83 0 60 - 4 47 Thousands/µL    Monocytes Absolute 1 11 0 17 - 1 22 Thousand/µL    Eosinophils Absolute 0 06 0 00 - 0 61 Thousand/µL    Basophils Absolute 0 02 0 00 - 0 10 Thousands/µL   Basic metabolic panel    Collection Time: 12/22/22  4:43 AM   Result Value Ref Range    Sodium 137 135 - 147 mmol/L    Potassium 3 5 3 5 - 5 3 mmol/L    Chloride 101 96 - 108 mmol/L    CO2 26 21 - 32 mmol/L    ANION GAP 10 4 - 13 mmol/L    BUN 7 5 - 25 mg/dL    Creatinine 0 66 0 60 - 1 30 mg/dL    Glucose 108 65 - 140 mg/dL    Calcium 8 6 8 4 - 10 2 mg/dL    eGFR 94 ml/min/1 73sq m   Magnesium    Collection Time: 12/22/22  4:43 AM   Result Value Ref Range    Magnesium 1 7 (L) 1 9 - 2 7 mg/dL       Current Medications:  Scheduled Meds:  Current Facility-Administered Medications   Medication Dose Route Frequency Provider Last Rate   • acetaminophen  975 mg Oral Q6H PRN Pankaj Morales PA-C     • amLODIPine  10 mg Oral Daily Jocelyn Millan PA-C     • aspirin  81 mg Oral Daily Felicity Toussaint PA-C     • budesonide  0 5 mg Nebulization Q12H Pankaj Morales PA-C     • enoxaparin  40 mg Subcutaneous Q24H [facility] BARBIE Hernandez     • levalbuterol  1 25 mg Nebulization BID Juan M Connolly MD     • lidocaine  2 patch Topical Daily BARBIE Peck     • melatonin  6 mg Per NG Tube HS BARBIE Peck     • nebivolol  5 mg Oral Daily Jocelyn Millan PA-C     • ondansetron  4 mg Intravenous Q6H PRN BARBIE Peck     • oxyCODONE-acetaminophen  1 tablet Oral Q6H PRN Pankaj Morales PA-C     • pantoprazole  40 mg Intravenous Q12H [facility] BARBIE Hernandez     • phenol  1 spray Mouth/Throat Q2H PRN BARBIE Barahona     • piperacillin-tazobactam  4 5 g Intravenous Q6H Pankaj Morales PA-C 4 5 g (12/21/22 2117)   • sodium chloride  1 spray Each Nare Q1H PRN Esha Jefferson PA-C     • traMADol  50 mg Oral Q6H PRN BARBIE Barahona       Continuous Infusions:   PRN Meds:  •  acetaminophen  •  ondansetron  •  oxyCODONE-acetaminophen  •  phenol  •  sodium chloride  •  traMADol      DAVID Ledezma  12/22/2022

## 2022-12-22 NOTE — NUTRITION
12/22/22 1428   Current PO Intake   Current Diet Order Surgical soft   Nutrition Supplements Ensure Compact  (TID)   Estimated calorie intake compared to estimated need Nutrient needs not met  Recommendations/Interventions   Summary 200 mL ostomy output on 12/21  PO intake varies 50-75% per I/O's documentation  Pt reports she does not have much of an appetite  She eats what she can at meal times  RD encouraged pt to sip on Ensure Compact when her appetite is decreased  Pt understanding and reports she talking with her RN about idea of drinking Ensure Compact when she does not feel inclined to eat  RD to continue monitoring PO intakes and adjusting nutrition supplements based on pt needs  Recommend transitioning diet to Lo Fiber/Lo residue as appropriate  Interventions/Recommendations Continue current diet order;Monitor I & O's;Supplement continue   Education Assessment   Education Education not indicated at this time   Patient Nutrition Goals   Goal Adequate hydration; Adequate intake;Meet PO needs

## 2022-12-22 NOTE — CASE MANAGEMENT
Case Management Discharge Planning Note    Patient name Candice Peguero  Location [address] 224/-20 MRN [id number]  : 1960 Date 2022       Current Admission Date: 2022  Current Admission Diagnosis:Sigmoid diverticulitis   Patient Active Problem List    Diagnosis Date Noted   • Anemia 2022   • Encephalopathy 2022   • Acute respiratory failure with hypoxia ([address] ) 2022   • GERD (gastroesophageal reflux disease) 2022   • Hypertension 2022   • Bronchomalacia 2022   • Asthma 2022   • Septic shock ([address] ) 2022   • Sigmoid diverticulitis 2022      LOS (days): 18  Geometric Mean LOS (GMLOS) (days): 9 60  Days to GMLOS:-8 8     OBJECTIVE:  Risk of Unplanned Readmission Score: 15 78         Current admission status: Inpatient   Preferred Pharmacy:   [facility] [address] [place] [address]  Phone: [phone] Fax: [phone]    Primary Care Provider: No primary care provider on file  Primary Insurance: [employer]  Secondary Insurance: MEDICARE    DISCHARGE DETAILS:    Discharge planning discussed with[de-identified] patietnt  Freedom of Choice: Yes  Comments - Freedom of Choice: pt jeffery sadler- pt needs hhc- cm has been working with the pt's pcp ariana and insurance cm for weeks trying to find a hhc for this pt                                 Other Referral/Resources/Interventions Provided:  Interventions: HHC, Other (Specify)  Referral Comments: additional referrals and phones calls have been placed to find a hhc for this pt ansley Candelario cm [facility]    Would you like to participate in our [address] service program?  : No - Declined    Treatment Team Recommendation: Home with  [facility] (home with spouse and hhc and outpt follow up- spouse)      avinash placed a call to [facility] [phone] at 8:28 am and they are not able to accept- 8:30 am  [facility] was called [phone] lm and they did return my call  And they are non-medical care- called [facility] at 8:38 am and LM-  [address] at 8:33 am [facility] [phone] LM- called Candis Jernigan [facility] at 8:41 am [phone] LM- cm doyle [facility] again [phone] at 8:59  Cm faxed information to # [phone] and cm received a call stating they are  Not able to take any referrals at this time-  [facility] Menon was called [phone] and cm was asked to fax information to [phone] cm has not received an answer-   [facility] was called at 14"34pm [phone]  Information was faxed  To # [phone] cm has not received an answer - cm did call Joelle Joyner the cm at the insurance company at 8:43 am to # [phone] ext [phone] to make her aware of the difficulty I am having to find a hhc and the referrals she gave me we not hhc agencies- she did given some additional agencies to make referrals to-  Cm did receive a call fro [facility] and they are not able to accept- [facility] was called 10:30 am [phone] and they only over shift care-  Cm will continue to work on a safe d/c plan- pt not stable for d/c continues to have elevated temps, cm judy continue to follow

## 2022-12-23 LAB
ANION GAP SERPL CALCULATED.3IONS-SCNC: 9 MMOL/L (ref 4–13)
BACTERIA SPEC ANAEROBE CULT: NORMAL
BASOPHILS # BLD AUTO: 0.02 THOUSANDS/ÂΜL (ref 0–0.1)
BASOPHILS NFR BLD AUTO: 0 % (ref 0–1)
BUN SERPL-MCNC: 9 MG/DL (ref 5–25)
CALCIUM SERPL-MCNC: 8.4 MG/DL (ref 8.4–10.2)
CHLORIDE SERPL-SCNC: 101 MMOL/L (ref 96–108)
CO2 SERPL-SCNC: 27 MMOL/L (ref 21–32)
CREAT SERPL-MCNC: 0.62 MG/DL (ref 0.6–1.3)
EOSINOPHIL # BLD AUTO: 0.04 THOUSAND/ÂΜL (ref 0–0.61)
EOSINOPHIL NFR BLD AUTO: 0 % (ref 0–6)
ERYTHROCYTE [DISTWIDTH] IN BLOOD BY AUTOMATED COUNT: 13.4 % (ref 11.6–15.1)
GFR SERPL CREATININE-BSD FRML MDRD: 96 ML/MIN/1.73SQ M
GLUCOSE SERPL-MCNC: 119 MG/DL (ref 65–140)
HCT VFR BLD AUTO: 26.2 % (ref 34.8–46.1)
HGB BLD-MCNC: 8.5 G/DL (ref 11.5–15.4)
IMM GRANULOCYTES # BLD AUTO: 0.28 THOUSAND/UL (ref 0–0.2)
IMM GRANULOCYTES NFR BLD AUTO: 2 % (ref 0–2)
LYMPHOCYTES # BLD AUTO: 0.97 THOUSANDS/ÂΜL (ref 0.6–4.47)
LYMPHOCYTES NFR BLD AUTO: 7 % (ref 14–44)
MAGNESIUM SERPL-MCNC: 2.1 MG/DL (ref 1.9–2.7)
MCH RBC QN AUTO: 31 PG (ref 26.8–34.3)
MCHC RBC AUTO-ENTMCNC: 32.4 G/DL (ref 31.4–37.4)
MCV RBC AUTO: 96 FL (ref 82–98)
MONOCYTES # BLD AUTO: 1.17 THOUSAND/ÂΜL (ref 0.17–1.22)
MONOCYTES NFR BLD AUTO: 8 % (ref 4–12)
NEUTROPHILS # BLD AUTO: 11.8 THOUSANDS/ÂΜL (ref 1.85–7.62)
NEUTS SEG NFR BLD AUTO: 83 % (ref 43–75)
NRBC BLD AUTO-RTO: 0 /100 WBCS
PLATELET # BLD AUTO: 734 THOUSANDS/UL (ref 149–390)
PMV BLD AUTO: 9.1 FL (ref 8.9–12.7)
POTASSIUM SERPL-SCNC: 3.6 MMOL/L (ref 3.5–5.3)
RBC # BLD AUTO: 2.74 MILLION/UL (ref 3.81–5.12)
SODIUM SERPL-SCNC: 137 MMOL/L (ref 135–147)
WBC # BLD AUTO: 14.28 THOUSAND/UL (ref 4.31–10.16)

## 2022-12-23 RX ORDER — PANTOPRAZOLE SODIUM 40 MG/1
40 TABLET, DELAYED RELEASE ORAL
Status: DISCONTINUED | OUTPATIENT
Start: 2022-12-23 | End: 2022-12-27

## 2022-12-23 RX ORDER — METOCLOPRAMIDE HYDROCHLORIDE 5 MG/ML
10 INJECTION INTRAMUSCULAR; INTRAVENOUS EVERY 6 HOURS PRN
Status: DISCONTINUED | OUTPATIENT
Start: 2022-12-23 | End: 2022-12-23

## 2022-12-23 RX ADMIN — TRAMADOL HYDROCHLORIDE 50 MG: 50 TABLET, COATED ORAL at 09:25

## 2022-12-23 RX ADMIN — ACETAMINOPHEN 975 MG: 325 TABLET ORAL at 09:25

## 2022-12-23 RX ADMIN — ENOXAPARIN SODIUM 40 MG: 100 INJECTION SUBCUTANEOUS at 09:26

## 2022-12-23 RX ADMIN — LEVALBUTEROL HYDROCHLORIDE 1.25 MG: 1.25 SOLUTION, CONCENTRATE RESPIRATORY (INHALATION) at 07:25

## 2022-12-23 RX ADMIN — AMLODIPINE BESYLATE 10 MG: 10 TABLET ORAL at 09:23

## 2022-12-23 RX ADMIN — ONDANSETRON 4 MG: 2 INJECTION INTRAMUSCULAR; INTRAVENOUS at 09:26

## 2022-12-23 RX ADMIN — NEBIVOLOL 5 MG: 5 TABLET ORAL at 09:23

## 2022-12-23 RX ADMIN — Medication 4.5 G: at 22:17

## 2022-12-23 RX ADMIN — BUDESONIDE 0.5 MG: 0.5 INHALANT ORAL at 07:25

## 2022-12-23 RX ADMIN — Medication 4.5 G: at 15:58

## 2022-12-23 RX ADMIN — ACETAMINOPHEN 975 MG: 325 TABLET ORAL at 22:18

## 2022-12-23 RX ADMIN — LEVALBUTEROL HYDROCHLORIDE 1.25 MG: 1.25 SOLUTION, CONCENTRATE RESPIRATORY (INHALATION) at 20:01

## 2022-12-23 RX ADMIN — PANTOPRAZOLE SODIUM 40 MG: 40 INJECTION, POWDER, LYOPHILIZED, FOR SOLUTION INTRAVENOUS at 09:22

## 2022-12-23 RX ADMIN — METOCLOPRAMIDE 10 MG: 5 INJECTION, SOLUTION INTRAMUSCULAR; INTRAVENOUS at 13:23

## 2022-12-23 RX ADMIN — Medication 4.5 G: at 10:18

## 2022-12-23 RX ADMIN — Medication 4.5 G: at 04:08

## 2022-12-23 RX ADMIN — BUDESONIDE 0.5 MG: 0.5 INHALANT ORAL at 20:01

## 2022-12-23 NOTE — PROGRESS NOTES
Progress Note - General Surgery   Pattie Sahu 58 y o  female MRN: [id number]  Unit/Bed#: -01 Encounter: [id number]    Assessment:  65yo F POD#1 s/p emergent hand-assisted laparoscopic sigmoid colectomy, primary anastomosis, and creation of diverting transverse loop colostomy  LLQ wound infection  Intra-abdominal abscesses s/p IR aspiration and drainage  - Febrile overnight at 101 8, all other VSS, O2 90% on 2L O2 via NC  - Increasing leukocytosis, WBC 14 2 (11 7)  - hemoglobin stable   - elytes WNL  - UOP 500cc  - Drain #1 : 10 cc ss  Drain #2 20cc purulent   - Stool 250     Plan:  - Cont diet as tolerated, encourage intake of nutritional supps  - add Reglan for 2nd line antiemetic   - trend fever curve and AM labs   - encouraged OOB/ambulation/IS  - DVT ppx  - cont IV abx   - PRN analgesia/antiemetics   - will need to follow up with IR for drain check/repositioning/management     Subjective/Objective   Subjective: No acute events overnight  Patient complains of recurrent episodes of Nausea, small volume emesis  Decreased appetite  Denies significant abdominal pain  Still having fevers, no chills  Not out to chair much, not using IS  Objective:   Blood pressure 127/70, pulse 74, temperature 99 4 °F (37 4 °C), resp  rate 20, height 5' 5" (1 651 m), weight 81 6 kg (179 lb 14 3 oz), SpO2 90 %  ,Body mass index is 29 94 kg/m²  Intake/Output Summary (Last 24 hours) at 12/23/2022 1405  Last data filed at 12/23/2022 1300  Gross per 24 hour   Intake 425 ml   Output 755 ml   Net -330 ml       Invasive Devices     Peripheral Intravenous Line  Duration           Peripheral IV 12/21/22 Dorsal (posterior); Left Hand 1 day          Drain  Duration           Colostomy RLQ 18 days    Abscess Drain RUQ 3 days    Abscess Drain RUQ 3 days                Physical Exam  Vitals reviewed  Constitutional:       General: She is not in acute distress  Cardiovascular:      Rate and Rhythm: Normal rate and regular rhythm  Pulmonary:      Breath sounds: Normal breath sounds  No wheezing, rhonchi or rales  Abdominal:      General: Bowel sounds are normal  There is no distension  Palpations: Abdomen is soft  Tenderness: There is no abdominal tenderness  There is no guarding or rebound  Comments: Loop colostomy pink, well perfused, stool and gas in bag; LLQ incisional wound packed, ss drainage on gauze packing ; drain sites well appearing without erythema; Drain #1 10 cc ss, Drain #2 20cc purulent    Musculoskeletal:      Right lower leg: No edema  Left lower leg: No edema  Neurological:      General: No focal deficit present  Mental Status: She is alert and oriented to person, place, and time  Lab, Imaging and other studies:  I have personally reviewed pertinent lab results    , CBC:   Lab Results   Component Value Date    WBC 14 28 (H) 12/23/2022    HGB 8 5 (L) 12/23/2022    HCT 26 2 (L) 12/23/2022    MCV 96 12/23/2022     (H) 12/23/2022    MCH 31 0 12/23/2022    MCHC 32 4 12/23/2022    RDW 13 4 12/23/2022    MPV 9 1 12/23/2022    NRBC 0 12/23/2022   , CMP:   Lab Results   Component Value Date    SODIUM 137 12/23/2022    K 3 6 12/23/2022     12/23/2022    CO2 27 12/23/2022    BUN 9 12/23/2022    CREATININE 0 62 12/23/2022    CALCIUM 8 4 12/23/2022    EGFR 96 12/23/2022     VTE Pharmacologic Prophylaxis: Enoxaparin (Lovenox)  VTE Mechanical Prophylaxis: sequential compression device      Melania Pack  12/23/22

## 2022-12-23 NOTE — PLAN OF CARE
Problem: Potential for Falls  Goal: Patient will remain free of falls  Description: INTERVENTIONS:  - Educate patient/family on patient safety including physical limitations  - Instruct patient to call for assistance with activity   - Consult OT/PT to assist with strengthening/mobility   - Keep Call bell within reach  - Keep bed low and locked with side rails adjusted as appropriate  - Keep care items and personal belongings within reach  - Initiate and maintain comfort rounds  - Make Fall Risk Sign visible to staff  - Offer Toileting every 2 Hours, in advance of need  - Initiate/Maintain BED alarm  - Obtain necessary fall risk management equipment: yellow socks  - Apply yellow socks and bracelet for high fall risk patients  - Consider moving patient to room near nurses station  Outcome: Progressing     Problem: PAIN - ADULT  Goal: Verbalizes/displays adequate comfort level or baseline comfort level  Description: Interventions:  - Encourage patient to monitor pain and request assistance  - Assess pain using appropriate pain scale  - Administer analgesics based on type and severity of pain and evaluate response  - Implement non-pharmacological measures as appropriate and evaluate response  - Consider cultural and social influences on pain and pain management  - Notify physician/advanced practitioner if interventions unsuccessful or patient reports new pain  Outcome: Progressing     Problem: INFECTION - ADULT  Goal: Absence or prevention of progression during hospitalization  Description: INTERVENTIONS:  - Assess and monitor for signs and symptoms of infection  - Monitor lab/diagnostic results  - Monitor all insertion sites, i e  indwelling lines, tubes, and drains  - Monitor endotracheal if appropriate and nasal secretions for changes in amount and color  - [place] appropriate cooling/warming therapies per order  - Administer medications as ordered  - Instruct and encourage patient and family to use good hand hygiene technique  - Identify and instruct in appropriate isolation precautions for identified infection/condition  Outcome: Progressing     Problem: SAFETY ADULT  Goal: Patient will remain free of falls  Description: INTERVENTIONS:  - Educate patient/family on patient safety including physical limitations  - Instruct patient to call for assistance with activity   - Consult OT/PT to assist with strengthening/mobility   - Keep Call bell within reach  - Keep bed low and locked with side rails adjusted as appropriate  - Keep care items and personal belongings within reach  - Initiate and maintain comfort rounds  - Make Fall Risk Sign visible to staff  - Offer Toileting every 2 Hours, in advance of need  - Initiate/Maintain BED alarm  - Obtain necessary fall risk management equipment: yellow socks  - Apply yellow socks and bracelet for high fall risk patients  - Consider moving patient to room near nurses station  Outcome: Progressing  Goal: Maintain or return to baseline ADL function  Description: INTERVENTIONS:  - Educate patient/family on patient safety including physical limitations  - Instruct patient to call for assistance with activity   - Consult OT/PT to assist with strengthening/mobility   - Keep Call bell within reach  - Keep bed low and locked with side rails adjusted as appropriate  - Keep care items and personal belongings within reach  - Initiate and maintain comfort rounds  - Make Fall Risk Sign visible to staff  - Offer Toileting every 2 Hours, in advance of need  - Initiate/Maintain bed alarm  - Obtain necessary fall risk management equipment: yellow socks  - Apply yellow socks and bracelet for high fall risk patients  - Consider moving patient to room near nurses station  Outcome: Progressing  Goal: Maintains/Returns to pre admission functional level  Description: INTERVENTIONS:  - Perform BMAT or MOVE assessment daily    - Set and communicate daily mobility goal to care team and patient/family/caregiver  - Collaborate with rehabilitation services on mobility goals if consulted  - Perform Range of Motion 3 times a day  - Reposition patient every 2 hours  - Dangle patient 3 times a day  - Stand patient 3 times a day  - Ambulate patient 3 times a day  - Out of bed to chair 3 times a day   - Out of bed for meals 3 times a day  - Out of bed for toileting  - Record patient progress and toleration of activity level   Outcome: Progressing     Problem: DISCHARGE PLANNING  Goal: Discharge to home or other facility with appropriate resources  Description: INTERVENTIONS:  - Identify barriers to discharge w/patient and caregiver  - Arrange for needed discharge resources and transportation as appropriate  - Identify discharge learning needs (meds, wound care, etc )  - Arrange for interpretive services to assist at discharge as needed  - Refer to Case Management Department for coordinating discharge planning if the patient needs post-hospital services based on physician/advanced practitioner order or complex needs related to functional status, cognitive ability, or social support system  Outcome: Progressing     Problem: Knowledge Deficit  Goal: Patient/family/caregiver demonstrates understanding of disease process, treatment plan, medications, and discharge instructions  Description: Complete learning assessment and assess knowledge base    Interventions:  - Provide teaching at level of understanding  - Provide teaching via preferred learning methods  Outcome: Progressing     Problem: MOBILITY - ADULT  Goal: Maintain or return to baseline ADL function  Description: INTERVENTIONS:  - Educate patient/family on patient safety including physical limitations  - Instruct patient to call for assistance with activity   - Consult OT/PT to assist with strengthening/mobility   - Keep Call bell within reach  - Keep bed low and locked with side rails adjusted as appropriate  - Keep care items and personal belongings within reach  - Initiate and maintain comfort rounds  - Make Fall Risk Sign visible to staff  - Offer Toileting every 2 Hours, in advance of need  - Initiate/Maintain bed alarm  - Obtain necessary fall risk management equipment: yellow socks  - Apply yellow socks and bracelet for high fall risk patients  - Consider moving patient to room near nurses station  Outcome: Progressing  Goal: Maintains/Returns to pre admission functional level  Description: INTERVENTIONS:  - Perform BMAT or MOVE assessment daily    - Set and communicate daily mobility goal to care team and patient/family/caregiver  - Collaborate with rehabilitation services on mobility goals if consulted  - Perform Range of Motion 3 times a day  - Reposition patient every 2 hours    - Dangle patient 3 times a day  - Stand patient 3 times a day  - Ambulate patient 3 times a day  - Out of bed to chair 3 times a day   - Out of bed for meals 3 times a day  - Out of bed for toileting  - Record patient progress and toleration of activity level   Outcome: Progressing     Problem: Prexisting or High Potential for Compromised Skin Integrity  Goal: Skin integrity is maintained or improved  Description: INTERVENTIONS:  - Identify patients at risk for skin breakdown  - Assess and monitor skin integrity  - Assess and monitor nutrition and hydration status  - Monitor labs   - Assess for incontinence   - Turn and reposition patient  - Assist with mobility/ambulation  - Relieve pressure over bony prominences  - Avoid friction and shearing  - Provide appropriate hygiene as needed including keeping skin clean and dry  - Evaluate need for skin moisturizer/barrier cream  - Collaborate with interdisciplinary team   - Patient/family teaching  - Consider wound care consult   Outcome: Progressing     Problem: Nutrition/Hydration-ADULT  Goal: Nutrient/Hydration intake appropriate for improving, restoring or maintaining nutritional needs  Description: Monitor and assess patient's nutrition/hydration status for malnutrition  Collaborate with interdisciplinary team and initiate plan and interventions as ordered  Monitor patient's weight and dietary intake as ordered or per policy  Utilize nutrition screening tool and intervene as necessary  Determine patient's food preferences and provide high-protein, high-caloric foods as appropriate       INTERVENTIONS:  - Monitor oral intake, urinary output, labs, and treatment plans  - Assess nutrition and hydration status and recommend course of action  - Evaluate amount of meals eaten  - Assist patient with eating if necessary   - Allow adequate time for meals  - Recommend/ encourage appropriate diets, oral nutritional supplements, and vitamin/mineral supplements  - Order, calculate, and assess calorie counts as needed  - Recommend, monitor, and adjust tube feedings and TPN/PPN based on assessed needs  - Assess need for intravenous fluids  - Provide specific nutrition/hydration education as appropriate  - Include patient/family/caregiver in decisions related to nutrition  Outcome: Progressing

## 2022-12-23 NOTE — NURSING NOTE
Sleeping in bed  resp easy and 02 maintained 3 liters n/c  02 sat 92%  hernan drain 1 &2 , emptied for 10 ml each, of tan return  Compressed each to bulb suction  No changes to abdomen

## 2022-12-23 NOTE — CASE MANAGEMENT
Case Management Discharge Planning Note    Patient name Praveen Lora  Location [address] 224/-89 MRN [id number]  : 1960 Date 2022       Current Admission Date: 2022  Current Admission Diagnosis:Sigmoid diverticulitis   Patient Active Problem List    Diagnosis Date Noted   • Anemia 2022   • Encephalopathy 2022   • Acute respiratory failure with hypoxia ([address] ) 2022   • GERD (gastroesophageal reflux disease) 2022   • Hypertension 2022   • Bronchomalacia 2022   • Asthma 2022   • Septic shock ([address] ) 2022   • Sigmoid diverticulitis 2022      LOS (days): 19  Geometric Mean LOS (GMLOS) (days): 9 60  Days to GMLOS:-9 7     OBJECTIVE:  Risk of Unplanned Readmission Score: 16 01         Current admission status: Inpatient   Preferred Pharmacy:   [facility] [address] [place], [place] [address] [address] [place] [id number]  Phone: [phone] Fax: [phone]    Primary Care Provider: Marty Floyd    Primary Insurance: [employer]  Secondary Insurance: MEDICARE    DISCHARGE DETAILS:    Discharge planning discussed with[de-identified] patient     Comments - Freedom of Choice: pt has declined rehad-  pt needs hhc- cm has been working on finding a hhc for weeks and there is no hhc in [facility] that is able to accept                               Other Referral/Resources/Interventions Provided:  Interventions: [facility] AT [facility]  Referral Comments: cm placed a calls to [address] [place] [address] and called to follow up on faxed referrals    Would you like to participate in our [address] service program?  : No - Declined    Treatment Team Recommendation: Home with  [employer] (home with spouse hhc vs teeaching at pcp office-spouse)      [address]  #  and I had to  Olman PRITCHARD Giron  home health # [phone] is non-medical --Extended Family [phone]   ----[facility] was called [phone]  Cm sent a referrals yesterday and I was told that they could not accept-   Cm placed a call to [facility] and they are in network , referral was sent and they are not able to accept-  Cm placed a call to [facility] [address] [phone] and I was told they are not able to accept any referrals at this time , jc was instructed to call back on Wednesday to see if they are able to help, jc did cx the pcp appointment for Monday, cm made a new appointment for Friday, Shakila at the [address] [place] [address] office is aware of the struggle I have been having to find a [facility] agency that will accept this patient, she states there is a shortage there- she is aware when the pt comes she will need teaching on colostomy casre & wound care, jc did send an order to [address] on 12/22/2022  Jc will continue to work on a safe d/c plan    Dr Melba Marley  Phone $ [phone]  Fax# [phone] to send d/c information

## 2022-12-23 NOTE — NURSING NOTE
Pt states ever since she had IV dose of Reglan at 1323 she felt mildly short of breath  Pt O2 saturation low to mid 90s on 2L O2 NC  Lungs sound clear, diminished bilaterally  VSS  Dr Maximus Vasquez aware  Reglan order d/c

## 2022-12-23 NOTE — NURSING NOTE
Awake and alert and oriented  Pleasant  02 sat 92% on 3 litersn/c  Hr regular  lungs clear no sob  Rt colostomy maintained /patent liq brown watery stool  hernan drain x2 maintained  # 2 scant purulent drainage  None in #1  No complaints pain  In recliner per request  Call bell near  iv redressed

## 2022-12-24 ENCOUNTER — APPOINTMENT (INPATIENT)
Dept: CT IMAGING | Facility: HOSPITAL | Age: 62
End: 2022-12-24

## 2022-12-24 LAB
ANION GAP SERPL CALCULATED.3IONS-SCNC: 10 MMOL/L (ref 4–13)
BACTERIA BLD CULT: NORMAL
BACTERIA BLD CULT: NORMAL
BACTERIA UR QL AUTO: ABNORMAL /HPF
BASOPHILS # BLD AUTO: 0.04 THOUSANDS/ÂΜL (ref 0–0.1)
BASOPHILS NFR BLD AUTO: 0 % (ref 0–1)
BILIRUB UR QL STRIP: NEGATIVE
BUN SERPL-MCNC: 9 MG/DL (ref 5–25)
CALCIUM SERPL-MCNC: 8.4 MG/DL (ref 8.4–10.2)
CHLORIDE SERPL-SCNC: 99 MMOL/L (ref 96–108)
CLARITY UR: CLEAR
CO2 SERPL-SCNC: 28 MMOL/L (ref 21–32)
COLOR UR: YELLOW
CREAT SERPL-MCNC: 0.65 MG/DL (ref 0.6–1.3)
EOSINOPHIL # BLD AUTO: 0.06 THOUSAND/ÂΜL (ref 0–0.61)
EOSINOPHIL NFR BLD AUTO: 0 % (ref 0–6)
ERYTHROCYTE [DISTWIDTH] IN BLOOD BY AUTOMATED COUNT: 13.4 % (ref 11.6–15.1)
GFR SERPL CREATININE-BSD FRML MDRD: 95 ML/MIN/1.73SQ M
GLUCOSE SERPL-MCNC: 105 MG/DL (ref 65–140)
GLUCOSE UR STRIP-MCNC: NEGATIVE MG/DL
HCT VFR BLD AUTO: 26.5 % (ref 34.8–46.1)
HGB BLD-MCNC: 8.5 G/DL (ref 11.5–15.4)
HGB UR QL STRIP.AUTO: NEGATIVE
IMM GRANULOCYTES # BLD AUTO: 0.31 THOUSAND/UL (ref 0–0.2)
IMM GRANULOCYTES NFR BLD AUTO: 2 % (ref 0–2)
KETONES UR STRIP-MCNC: ABNORMAL MG/DL
LACTATE SERPL-SCNC: 0.7 MMOL/L (ref 0.5–2)
LEUKOCYTE ESTERASE UR QL STRIP: NEGATIVE
LYMPHOCYTES # BLD AUTO: 1.06 THOUSANDS/ÂΜL (ref 0.6–4.47)
LYMPHOCYTES NFR BLD AUTO: 8 % (ref 14–44)
MAGNESIUM SERPL-MCNC: 2.2 MG/DL (ref 1.9–2.7)
MCH RBC QN AUTO: 30.6 PG (ref 26.8–34.3)
MCHC RBC AUTO-ENTMCNC: 32.1 G/DL (ref 31.4–37.4)
MCV RBC AUTO: 95 FL (ref 82–98)
MONOCYTES # BLD AUTO: 1.16 THOUSAND/ÂΜL (ref 0.17–1.22)
MONOCYTES NFR BLD AUTO: 8 % (ref 4–12)
NEUTROPHILS # BLD AUTO: 11.58 THOUSANDS/ÂΜL (ref 1.85–7.62)
NEUTS SEG NFR BLD AUTO: 82 % (ref 43–75)
NITRITE UR QL STRIP: NEGATIVE
NON-SQ EPI CELLS URNS QL MICRO: ABNORMAL /HPF
NRBC BLD AUTO-RTO: 0 /100 WBCS
PH UR STRIP.AUTO: 7 [PH]
PLATELET # BLD AUTO: 666 THOUSANDS/UL (ref 149–390)
PMV BLD AUTO: 9 FL (ref 8.9–12.7)
POTASSIUM SERPL-SCNC: 4 MMOL/L (ref 3.5–5.3)
PROCALCITONIN SERPL-MCNC: 0.24 NG/ML
PROT UR STRIP-MCNC: ABNORMAL MG/DL
RBC # BLD AUTO: 2.78 MILLION/UL (ref 3.81–5.12)
RBC #/AREA URNS AUTO: ABNORMAL /HPF
SODIUM SERPL-SCNC: 137 MMOL/L (ref 135–147)
SP GR UR STRIP.AUTO: 1.02
UROBILINOGEN UR QL STRIP.AUTO: 0.2 E.U./DL
WBC # BLD AUTO: 14.21 THOUSAND/UL (ref 4.31–10.16)
WBC #/AREA URNS AUTO: ABNORMAL /HPF

## 2022-12-24 RX ORDER — PROMETHAZINE HYDROCHLORIDE 25 MG/ML
25 INJECTION, SOLUTION INTRAMUSCULAR; INTRAVENOUS EVERY 6 HOURS PRN
Status: DISCONTINUED | OUTPATIENT
Start: 2022-12-24 | End: 2023-01-01

## 2022-12-24 RX ORDER — SODIUM CHLORIDE, SODIUM GLUCONATE, SODIUM ACETATE, POTASSIUM CHLORIDE, MAGNESIUM CHLORIDE, SODIUM PHOSPHATE, DIBASIC, AND POTASSIUM PHOSPHATE .53; .5; .37; .037; .03; .012; .00082 G/100ML; G/100ML; G/100ML; G/100ML; G/100ML; G/100ML; G/100ML
75 INJECTION, SOLUTION INTRAVENOUS CONTINUOUS
Status: DISCONTINUED | OUTPATIENT
Start: 2022-12-24 | End: 2022-12-25

## 2022-12-24 RX ADMIN — Medication 4.5 G: at 23:59

## 2022-12-24 RX ADMIN — ACETAMINOPHEN 975 MG: 325 TABLET ORAL at 16:28

## 2022-12-24 RX ADMIN — AMLODIPINE BESYLATE 10 MG: 10 TABLET ORAL at 14:00

## 2022-12-24 RX ADMIN — ONDANSETRON 4 MG: 2 INJECTION INTRAMUSCULAR; INTRAVENOUS at 21:27

## 2022-12-24 RX ADMIN — SODIUM CHLORIDE, SODIUM GLUCONATE, SODIUM ACETATE, POTASSIUM CHLORIDE, MAGNESIUM CHLORIDE, SODIUM PHOSPHATE, DIBASIC, AND POTASSIUM PHOSPHATE 125 ML/HR: .53; .5; .37; .037; .03; .012; .00082 INJECTION, SOLUTION INTRAVENOUS at 14:06

## 2022-12-24 RX ADMIN — IOHEXOL 100 ML: 350 INJECTION, SOLUTION INTRAVENOUS at 13:32

## 2022-12-24 RX ADMIN — ACETAMINOPHEN 975 MG: 325 TABLET ORAL at 07:39

## 2022-12-24 RX ADMIN — NEBIVOLOL 5 MG: 5 TABLET ORAL at 14:00

## 2022-12-24 RX ADMIN — ONDANSETRON 4 MG: 2 INJECTION INTRAMUSCULAR; INTRAVENOUS at 14:00

## 2022-12-24 RX ADMIN — Medication 4.5 G: at 12:32

## 2022-12-24 RX ADMIN — Medication 4.5 G: at 04:44

## 2022-12-24 RX ADMIN — ONDANSETRON 4 MG: 2 INJECTION INTRAMUSCULAR; INTRAVENOUS at 07:26

## 2022-12-24 RX ADMIN — Medication 4.5 G: at 17:50

## 2022-12-24 RX ADMIN — PANTOPRAZOLE SODIUM 40 MG: 40 TABLET, DELAYED RELEASE ORAL at 06:47

## 2022-12-24 RX ADMIN — ENOXAPARIN SODIUM 40 MG: 100 INJECTION SUBCUTANEOUS at 13:59

## 2022-12-24 NOTE — PLAN OF CARE
Problem: Potential for Falls  Goal: Patient will remain free of falls  Description: INTERVENTIONS:  - Educate patient/family on patient safety including physical limitations  - Instruct patient to call for assistance with activity   - Consult OT/PT to assist with strengthening/mobility   - Keep Call bell within reach  - Keep bed low and locked with side rails adjusted as appropriate  - Keep care items and personal belongings within reach  - Initiate and maintain comfort rounds  - Make Fall Risk Sign visible to staff  - Offer Toileting every 2 Hours, in advance of need  - Initiate/Maintain BED alarm  - Obtain necessary fall risk management equipment: yellow socks  - Apply yellow socks and bracelet for high fall risk patients  - Consider moving patient to room near nurses station  Outcome: Progressing

## 2022-12-24 NOTE — WOUND OSTOMY CARE
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  She has been nauseous and spiking fevers, she is pale and disinterested in ostomy care at this time  She preferred not to stand for assessment of buttocks  1  Ostomy functioning for thick liquid stool  Pouch removed using push-pull method, patient states the pressure to her abdomen causes pain  Stoma is pink, slightly budded, slight fold noted to the proximal edge  Mucocutaneous junction intact  Cleansed stoma and peristomal skin with warm water and washcloth  3M No Sting applied to the peristomal skin, 2 piece moldable ostomy pouch placed and warmth held to assist with adherence of adhesive for 2 minutes  After application bowel sweat noted in the pouch  2  AGUSTO drains to the right abdomen to bulb suction  Milky drainage   3  Dressings to the abdomen dry and intact    Skin and Ostomy Care Plan:  1  Empty colostomy pouch when 1/3-1/2 full of stool or inflated with gas  Cleanse drainage end prior to closing pouch  Change pouch every 3 days and PRN with signs of leakage  Encourage patient to observe emptying pouch  2  Ehob offloading cushion to chair when OOB  3  Elevate heels off of bed with pillow to offload  4  Apply hydraguard to b/l heels, buttocks and sacrum BID and PRN  5  Turn and reposition patient Q2 hours  6   Allevyn life silicone bordered foam to the sacrum-upper buttocks, peel back daily for skin assessment and change every 3 days and PRN    Ostomy:  Wound 12/05/22 Abdomen N/A (Active)   Wound Image   12/23/22 1139   Wound Description ANGIE 12/23/22 0800   Devi-wound Assessment ANGIE 12/22/22 0905   Drainage Amount Small 12/20/22 1949   Drainage Description Serosanguineous 12/20/22 1949   Treatments Cleansed;Irrigation with NSS;Site care 12/16/22 1844   Dressing Dry dressing 12/22/22 2000   Wound packed? Yes 12/16/22 1844   Packing- # removed 1 12/16/22 1844   Packing- # inserted 1 12/16/22 1844   Dressing Changed Changed by provider 12/20/22 1949   Patient Tolerance Tolerated well 12/17/22 0945   Dressing Status Clean;Dry; Intact 12/23/22 0800   Number of days: 18       Wound 12/15/22 Buttocks Right (Active)   Wound Image   12/15/22 1237   Wound Description Dry; Intact 12/23/22 0800   Devi-wound Assessment Intact 12/23/22 0800   Wound Length (cm) 12 cm 12/15/22 1237   Wound Width (cm) 8 cm 12/15/22 1237   Wound Surface Area (cm^2) 96 cm^2 12/15/22 1237   Drainage Amount None 12/23/22 0800   Treatments Cleansed;Site care 12/22/22 0905   Dressing Open to air 12/23/22 0800   Dressing Changed Other (Comment) 12/16/22 1844   Patient Tolerance Tolerated well 12/22/22 0905   Dressing Status Clean;Dry; Intact 12/22/22 2000   Number of days: 8     Abscess Drain RUQ (Active)   Site Description Unable to view 12/23/22 0945   Dressing Status Clean;Dry; Intact 12/23/22 0945   Drainage Appearance Thin;Tan 12/23/22 1655   Status Other (Comment) 12/23/22 0945   Output (mL) 5 mL 12/23/22 1655   Number of days: 3       Abscess Drain RUQ (Active)   Site Description Unable to view 12/23/22 0945   Dressing Status Clean;Dry; Intact 12/23/22 0945   Drainage Appearance Tan;Purulent; Thick 12/23/22 1655   Status Other (Comment) 12/23/22 0945   Output (mL) 15 mL 12/23/22 1655   Number of days: 3       Colostomy RLQ (Active)   Stomal Appliance 2 piece; Changed 12/23/22 1125   Stoma Assessment Budded;[place] 12/23/22 1125   Stoma size (in) 1 25 12/23/22 1125   Stoma Shape Oval 12/23/22 1125   Peristomal Assessment Clean; Intact;Mucocutaneous intact 12/23/22 1125   Treatment Bag change 12/23/22 1130   Output (mL) 100 mL 12/23/22 1125   Number of days: 18     Reviewed plan of care with primary RN Page  Recommendations written as orders  Wound care team to follow weekly while admitted  Questions or concerns Osmar Pritchett BSN, RN, [facility]

## 2022-12-24 NOTE — PLAN OF CARE
Problem: Potential for Falls  Goal: Patient will remain free of falls  Description: INTERVENTIONS:  - Educate patient/family on patient safety including physical limitations  - Instruct patient to call for assistance with activity   - Consult OT/PT to assist with strengthening/mobility   - Keep Call bell within reach  - Keep bed low and locked with side rails adjusted as appropriate  - Keep care items and personal belongings within reach  - Initiate and maintain comfort rounds  - Make Fall Risk Sign visible to staff  - Offer Toileting every 2 Hours, in advance of need  - Initiate/Maintain BED alarm  - Obtain necessary fall risk management equipment: yellow socks  - Apply yellow socks and bracelet for high fall risk patients  - Consider moving patient to room near nurses station  Outcome: Progressing     Problem: PAIN - ADULT  Goal: Verbalizes/displays adequate comfort level or baseline comfort level  Description: Interventions:  - Encourage patient to monitor pain and request assistance  - Assess pain using appropriate pain scale  - Administer analgesics based on type and severity of pain and evaluate response  - Implement non-pharmacological measures as appropriate and evaluate response  - Consider cultural and social influences on pain and pain management  - Notify physician/advanced practitioner if interventions unsuccessful or patient reports new pain  Outcome: Progressing     Problem: INFECTION - ADULT  Goal: Absence or prevention of progression during hospitalization  Description: INTERVENTIONS:  - Assess and monitor for signs and symptoms of infection  - Monitor lab/diagnostic results  - Monitor all insertion sites, i e  indwelling lines, tubes, and drains  - Monitor endotracheal if appropriate and nasal secretions for changes in amount and color  - Sawyer appropriate cooling/warming therapies per order  - Administer medications as ordered  - Instruct and encourage patient and family to use good hand hygiene technique  - Identify and instruct in appropriate isolation precautions for identified infection/condition  Outcome: Progressing     Problem: SAFETY ADULT  Goal: Patient will remain free of falls  Description: INTERVENTIONS:  - Educate patient/family on patient safety including physical limitations  - Instruct patient to call for assistance with activity   - Consult OT/PT to assist with strengthening/mobility   - Keep Call bell within reach  - Keep bed low and locked with side rails adjusted as appropriate  - Keep care items and personal belongings within reach  - Initiate and maintain comfort rounds  - Make Fall Risk Sign visible to staff  - Offer Toileting every 2 Hours, in advance of need  - Initiate/Maintain BED alarm  - Obtain necessary fall risk management equipment: yellow socks  - Apply yellow socks and bracelet for high fall risk patients  - Consider moving patient to room near nurses station  Outcome: Progressing  Goal: Maintain or return to baseline ADL function  Description: INTERVENTIONS:  - Educate patient/family on patient safety including physical limitations  - Instruct patient to call for assistance with activity   - Consult OT/PT to assist with strengthening/mobility   - Keep Call bell within reach  - Keep bed low and locked with side rails adjusted as appropriate  - Keep care items and personal belongings within reach  - Initiate and maintain comfort rounds  - Make Fall Risk Sign visible to staff  - Offer Toileting every 2 Hours, in advance of need  - Initiate/Maintain bed alarm  - Obtain necessary fall risk management equipment: yellow socks  - Apply yellow socks and bracelet for high fall risk patients  - Consider moving patient to room near nurses station  Outcome: Progressing  Goal: Maintains/Returns to pre admission functional level  Description: INTERVENTIONS:  - Perform BMAT or MOVE assessment daily    - Set and communicate daily mobility goal to care team and patient/family/caregiver  - Collaborate with rehabilitation services on mobility goals if consulted  - Perform Range of Motion 3 times a day  - Reposition patient every 2 hours  - Dangle patient 3 times a day  - Stand patient 3 times a day  - Ambulate patient 3 times a day  - Out of bed to chair 3 times a day   - Out of bed for meals 3 times a day  - Out of bed for toileting  - Record patient progress and toleration of activity level   Outcome: Progressing     Problem: DISCHARGE PLANNING  Goal: Discharge to home or other facility with appropriate resources  Description: INTERVENTIONS:  - Identify barriers to discharge w/patient and caregiver  - Arrange for needed discharge resources and transportation as appropriate  - Identify discharge learning needs (meds, wound care, etc )  - Arrange for interpretive services to assist at discharge as needed  - Refer to Case Management Department for coordinating discharge planning if the patient needs post-hospital services based on physician/advanced practitioner order or complex needs related to functional status, cognitive ability, or social support system  Outcome: Progressing     Problem: Knowledge Deficit  Goal: Patient/family/caregiver demonstrates understanding of disease process, treatment plan, medications, and discharge instructions  Description: Complete learning assessment and assess knowledge base    Interventions:  - Provide teaching at level of understanding  - Provide teaching via preferred learning methods  Outcome: Progressing     Problem: MOBILITY - ADULT  Goal: Maintain or return to baseline ADL function  Description: INTERVENTIONS:  - Educate patient/family on patient safety including physical limitations  - Instruct patient to call for assistance with activity   - Consult OT/PT to assist with strengthening/mobility   - Keep Call bell within reach  - Keep bed low and locked with side rails adjusted as appropriate  - Keep care items and personal belongings within reach  - Initiate and maintain comfort rounds  - Make Fall Risk Sign visible to staff  - Offer Toileting every 2 Hours, in advance of need  - Initiate/Maintain bed alarm  - Obtain necessary fall risk management equipment: yellow socks  - Apply yellow socks and bracelet for high fall risk patients  - Consider moving patient to room near nurses station  Outcome: Progressing  Goal: Maintains/Returns to pre admission functional level  Description: INTERVENTIONS:  - Perform BMAT or MOVE assessment daily    - Set and communicate daily mobility goal to care team and patient/family/caregiver  - Collaborate with rehabilitation services on mobility goals if consulted  - Perform Range of Motion 3 times a day  - Reposition patient every 2 hours    - Dangle patient 3 times a day  - Stand patient 3 times a day  - Ambulate patient 3 times a day  - Out of bed to chair 3 times a day   - Out of bed for meals 3 times a day  - Out of bed for toileting  - Record patient progress and toleration of activity level   Outcome: Progressing     Problem: Prexisting or High Potential for Compromised Skin Integrity  Goal: Skin integrity is maintained or improved  Description: INTERVENTIONS:  - Identify patients at risk for skin breakdown  - Assess and monitor skin integrity  - Assess and monitor nutrition and hydration status  - Monitor labs   - Assess for incontinence   - Turn and reposition patient  - Assist with mobility/ambulation  - Relieve pressure over bony prominences  - Avoid friction and shearing  - Provide appropriate hygiene as needed including keeping skin clean and dry  - Evaluate need for skin moisturizer/barrier cream  - Collaborate with interdisciplinary team   - Patient/family teaching  - Consider wound care consult   Outcome: Progressing     Problem: Nutrition/Hydration-ADULT  Goal: Nutrient/Hydration intake appropriate for improving, restoring or maintaining nutritional needs  Description: Monitor and assess patient's nutrition/hydration status for malnutrition  Collaborate with interdisciplinary team and initiate plan and interventions as ordered  Monitor patient's weight and dietary intake as ordered or per policy  Utilize nutrition screening tool and intervene as necessary  Determine patient's food preferences and provide high-protein, high-caloric foods as appropriate       INTERVENTIONS:  - Monitor oral intake, urinary output, labs, and treatment plans  - Assess nutrition and hydration status and recommend course of action  - Evaluate amount of meals eaten  - Assist patient with eating if necessary   - Allow adequate time for meals  - Recommend/ encourage appropriate diets, oral nutritional supplements, and vitamin/mineral supplements  - Order, calculate, and assess calorie counts as needed  - Recommend, monitor, and adjust tube feedings and TPN/PPN based on assessed needs  - Assess need for intravenous fluids  - Provide specific nutrition/hydration education as appropriate  - Include patient/family/caregiver in decisions related to nutrition  Outcome: Progressing

## 2022-12-24 NOTE — WOUND OSTOMY CARE
Progress Note - Wound   Denise Crews 58 y o  female MRN: [id number]  Unit/Bed#: [facility]-01 Encounter: [id number]    Assessment:   Wound-ostomy follow up  Patient OOB in the chair  She is alert and oriented, she is quiet today  Patient states the pouch drainage end leaked on 12/21 in the morning and was changed at that time  Spoke with patient, will change pouch tomorrow afternoon to include teaching at that time  Findings  1  Ostomy pouch intact    Skin and Ostomy Care Plan:   1  Empty colostomy pouch when 1/3-1/2 full of stool or inflated with gas  Cleanse drainage end prior to closing pouch  Change pouch every 3 days and PRN with signs of leakage  Encourage patient to observe emptying pouch  2  Ehob offloading cushion to chair when OOB  3  Elevate heels off of bed with pillow to offload  4  Apply hydraguard to b/l heels, buttocks and sacrum BID and PRN  5  Turn and reposition patient Q2 hours  6  Allevyn life silicone bordered dressing to the sacrum-upper buttocks, peel back daily for skin assessment, change every 3 days    Colostomy  Wound 12/05/22 Abdomen N/A (Active)   Wound Image   12/23/22 1139   Wound Description ANGIE 12/23/22 0800   Devi-wound Assessment ANGIE 12/22/22 0905   Drainage Amount Small 12/20/22 1949   Drainage Description Serosanguineous 12/20/22 1949   Treatments Cleansed;Irrigation with NSS;Site care 12/16/22 1844   Dressing Dry dressing 12/22/22 2000   Wound packed? Yes 12/16/22 1844   Packing- # removed 1 12/16/22 1844   Packing- # inserted 1 12/16/22 1844   Dressing Changed Changed by provider 12/20/22 1949   Patient Tolerance Tolerated well 12/17/22 0945   Dressing Status Clean;Dry; Intact 12/23/22 0800       Wound 12/15/22 Buttocks Right (Active)   Wound Image   12/15/22 1237   Wound Description Dry; Intact 12/23/22 0800   Devi-wound Assessment Intact 12/23/22 0800   Wound Length (cm) 12 cm 12/15/22 1237   Wound Width (cm) 8 cm 12/15/22 1237   Wound Surface Area (cm^2) 96 cm^2 12/15/22 1237   Drainage Amount None 12/23/22 0800   Treatments Cleansed;Site care 12/22/22 0905   Dressing Open to air 12/23/22 0800   Dressing Changed Other (Comment) 12/16/22 1844   Patient Tolerance Tolerated well 12/22/22 0905   Dressing Status Clean;Dry; Intact 12/22/22 2000     Reviewed plan of care with primary RN   Recommendations written as orders  Wound care team to follow weekly while admitted  Questions or concerns 1234 [facility] Nurse    Frankey Height BSN, RN, [facility]

## 2022-12-24 NOTE — PROGRESS NOTES
Progress Note - General Surgery   Genia Weemsr 58 y o  female MRN: [id number]  Unit/Bed#: -01 Encounter: [id number]    Assessment:    Patient spiked temperature this morning of 103  Complains of nausea  Postop day #19 status post hand-assisted sigmoid colon resection with washout of proximal transverse loop colostomy  Post procedure day #3 status post IR drainage of pericolonic abscess  Leukocytosis persists 14 21 (14 28)  Hemoglobin 8 5  Surgical drain output 20 mL serous sanguinous  Ostomy output 150 mL so far today brown liquid stool    Previous blood cultures x2 obtained 12/18/2022 showed no growth after 5 days  Body fluid culture in 22,022 showed few colonies of E  coli    Plan:  Discussed in person with Dr Bert Opitz  Stat CT chest abdomen pelvis with IV contrast  Urinalysis now  Blood cultures x2 ordered  Procal and lactic acid level now  Start IV fluids for hydration  Left lower abdominal wound packed, irrigated, wet-to-dry dressing placed  Continue IV Zosyn 4 5 g every 6 hours  Continue Reglan  Add Phenergan IV for nausea  Continue IV Zofran as needed nausea  Tylenol for fever  Monitor vital signs including fever curve  Monitor hemoglobin  Right now we will make n p o  sips with clear until CT results known  CBC, CMP, procalcitonin level, and magnesium in a m  Patient will need tube check by IR to check drain placement and resolution of pericolonic abscess next week    Subjective/Objective   Chief Complaint: Patient spiked a temperature this morning  Feels nauseated  Subjective: Early this morning she had a temperature of 103  Has no appetite  No vomiting but nauseated present  She is now 18 days postop after undergoing laparoscopic sigmoid colon resection with washout and diverting loop transverse colostomy  Patient developed pericolonic abscess and was drained 4 days ago  Now with fever and persistent leukocytosis    She remains on IV Zosyn for E  coli cultured from abscesses, 2 percutaneous drains decreasing purulent output  Scheduled Meds:  Current Facility-Administered Medications   Medication Dose Route Frequency Provider Last Rate   • acetaminophen  975 mg Oral Q6H PRN Pankaj Morales PA-C     • amLODIPine  10 mg Oral Daily Jocelyn Nicolas PA-C     • aspirin  81 mg Oral Daily Laura Singh PA-C     • budesonide  0 5 mg Nebulization Q12H Pankaj Morales PA-C     • enoxaparin  40 mg Subcutaneous Q24H [address] BARBIE Foy     • levalbuterol  1 25 mg Nebulization BID Verónica Mtz MD     • lidocaine  2 patch Topical Daily BARBIE Foy     • melatonin  6 mg Per NG Tube HS BARBIE Foy     • nebivolol  5 mg Oral Daily Jocelyn Millan PA-C     • ondansetron  4 mg Intravenous Q4H PRN Jannet Figueroa PA-C     • oxyCODONE-acetaminophen  1 tablet Oral Q6H PRN Pankaj Morales PA-C     • pantoprazole  40 mg Oral Early Morning Verónica Mtz MD     • phenol  1 spray Mouth/Throat Q2H PRN BARBIE Foy     • piperacillin-tazobactam  4 5 g Intravenous Q6H Pankaj Morales PA-C 0 g (12/23/22 1628)   • sodium chloride  1 spray Each Nare Q1H PRN Esha Jefferson PA-C     • traMADol  50 mg Oral Q6H PRN BARBIE Hernandez       Continuous Infusions:   PRN Meds: •  acetaminophen  •  ondansetron  •  oxyCODONE-acetaminophen  •  phenol  •  sodium chloride  •  traMADol      Objective:     Blood pressure 139/77, pulse 89, temperature 100 1 °F (37 8 °C), resp  rate 20, height 5' 5" (1 651 m), weight 81 5 kg (179 lb 10 8 oz), SpO2 91 %  ,Body mass index is 29 9 kg/m²  I/O       12/22 0701 12/23 0700 12/23 0701 12/24 0700 12/24 0701 12/25 0700    P  O  125 0 0    I V  (mL/kg) 20 (0 2)      IV Piggyback 300      Total Intake(mL/kg) 445 (5 5) 0 (0) 0 (0)    Urine (mL/kg/hr) 500 (0 3) 300 (0 2)     Emesis/NG output  0     Drains 30 20     Stool 400 325 150    Total Output 930 645 150    Net -[phone] -[phone] Unmeasured Urine Occurrence  1 x     Unmeasured Emesis Occurrence  1 x             Invasive Devices     Peripheral Intravenous Line  Duration           Peripheral IV 12/21/22 Dorsal (posterior); Left Hand 2 days          Drain  Duration           Colostomy RLQ 19 days    Abscess Drain RUQ 3 days    Abscess Drain RUQ 3 days                Physical Exam:     Patient is warm to touch  Appears uncomfortable but not in any distress  Washcloth across her forehead  Her  is present in the room with her  Skin pale  ENT or mucosa pink and moist   Heart regular rate and rhythm no tachycardia at present  Lungs clear to auscultation  No rales or rhonchi  Good inspiratory effort  Abdomen positive bowel sounds are heard  Reversal of colostomy with liquid brown stool, stoma is pink, no parastomal hernia or tenderness to palpation  About 10 mL of sanguinous drainage in #1, maybe 10 to 15 mL lightly purulent drainage #2  Lower abdominal wound appears clean, pink wound base, minimal exudate, wound edges appear pink, tenderness  Irrigated out with 20 mL normal saline, repacked with wet-to-dry dressing covered with sterile 4 x 4's and tape  Extremities without calf tenderness, no peripheral edema bilaterally  Lower extremities warm and well-perfused  Logical exam CNS grossly intact  Mental status appropriate  No tremor noted  Lab, Imaging and other studies:  I have personally reviewed pertinent lab results    , CBC:   Lab Results   Component Value Date    WBC 14 21 (H) 12/24/2022    HGB 8 5 (L) 12/24/2022    HCT 26 5 (L) 12/24/2022    MCV 95 12/24/2022     (H) 12/24/2022    MCH 30 6 12/24/2022    MCHC 32 1 12/24/2022    RDW 13 4 12/24/2022    MPV 9 0 12/24/2022    NRBC 0 12/24/2022   , CMP:   Lab Results   Component Value Date    SODIUM 137 12/24/2022    K 4 0 12/24/2022    CL 99 12/24/2022    CO2 28 12/24/2022    BUN 9 12/24/2022    CREATININE 0 65 12/24/2022    CALCIUM 8 4 12/24/2022    EGFR 95 12/24/2022   , Coagulation: No results found for: PT, INR, APTT, Urinalysis: No results found for: COLORU, CLARITYU, SPECGRAV, PHUR, LEUKOCYTESUR, NITRITE, PROTEINUA, GLUCOSEU, KETONESU, BILIRUBINUR, BLOODU  VTE Pharmacologic Prophylaxis: Enoxaparin (Lovenox)  VTE Mechanical Prophylaxis: sequential compression device     Lourdes Morrow PA-C

## 2022-12-25 PROBLEM — R50.9 FEVER: Status: ACTIVE | Noted: 2022-12-25

## 2022-12-25 LAB
ALBUMIN SERPL BCP-MCNC: 2.5 G/DL (ref 3.5–5)
ALP SERPL-CCNC: 98 U/L (ref 34–104)
ALT SERPL W P-5'-P-CCNC: 18 U/L (ref 7–52)
ANION GAP SERPL CALCULATED.3IONS-SCNC: 11 MMOL/L (ref 4–13)
AST SERPL W P-5'-P-CCNC: 22 U/L (ref 13–39)
BASOPHILS # BLD MANUAL: 0 THOUSAND/UL (ref 0–0.1)
BASOPHILS NFR MAR MANUAL: 0 % (ref 0–1)
BILIRUB SERPL-MCNC: 0.31 MG/DL (ref 0.2–1)
BNP SERPL-MCNC: 173 PG/ML (ref 0–100)
BUN SERPL-MCNC: 9 MG/DL (ref 5–25)
CALCIUM ALBUM COR SERPL-MCNC: 9.5 MG/DL (ref 8.3–10.1)
CALCIUM SERPL-MCNC: 8.3 MG/DL (ref 8.4–10.2)
CHLORIDE SERPL-SCNC: 100 MMOL/L (ref 96–108)
CO2 SERPL-SCNC: 25 MMOL/L (ref 21–32)
CREAT SERPL-MCNC: 0.64 MG/DL (ref 0.6–1.3)
EOSINOPHIL # BLD MANUAL: 0.3 THOUSAND/UL (ref 0–0.4)
EOSINOPHIL NFR BLD MANUAL: 2 % (ref 0–6)
ERYTHROCYTE [DISTWIDTH] IN BLOOD BY AUTOMATED COUNT: 13.3 % (ref 11.6–15.1)
FLUAV RNA RESP QL NAA+PROBE: NEGATIVE
FLUBV RNA RESP QL NAA+PROBE: NEGATIVE
GFR SERPL CREATININE-BSD FRML MDRD: 95 ML/MIN/1.73SQ M
GLUCOSE SERPL-MCNC: 91 MG/DL (ref 65–140)
HCT VFR BLD AUTO: 25.8 % (ref 34.8–46.1)
HGB BLD-MCNC: 8.1 G/DL (ref 11.5–15.4)
LYMPHOCYTES # BLD AUTO: 1.37 THOUSAND/UL (ref 0.6–4.47)
LYMPHOCYTES # BLD AUTO: 9 % (ref 14–44)
MAGNESIUM SERPL-MCNC: 2.3 MG/DL (ref 1.9–2.7)
MCH RBC QN AUTO: 29.8 PG (ref 26.8–34.3)
MCHC RBC AUTO-ENTMCNC: 31.4 G/DL (ref 31.4–37.4)
MCV RBC AUTO: 95 FL (ref 82–98)
MONOCYTES # BLD AUTO: 0.91 THOUSAND/UL (ref 0–1.22)
MONOCYTES NFR BLD: 6 % (ref 4–12)
NEUTROPHILS # BLD MANUAL: 12.65 THOUSAND/UL (ref 1.85–7.62)
NEUTS BAND NFR BLD MANUAL: 3 % (ref 0–8)
NEUTS SEG NFR BLD AUTO: 80 % (ref 43–75)
PLATELET # BLD AUTO: 583 THOUSANDS/UL (ref 149–390)
PLATELET BLD QL SMEAR: ABNORMAL
PMV BLD AUTO: 8.9 FL (ref 8.9–12.7)
POTASSIUM SERPL-SCNC: 3.9 MMOL/L (ref 3.5–5.3)
PROCALCITONIN SERPL-MCNC: 0.28 NG/ML
PROT SERPL-MCNC: 5.6 G/DL (ref 6.4–8.4)
RBC # BLD AUTO: 2.72 MILLION/UL (ref 3.81–5.12)
RBC MORPH BLD: NORMAL
RSV RNA RESP QL NAA+PROBE: NEGATIVE
SARS-COV-2 RNA RESP QL NAA+PROBE: NEGATIVE
SODIUM SERPL-SCNC: 136 MMOL/L (ref 135–147)
WBC # BLD AUTO: 15.24 THOUSAND/UL (ref 4.31–10.16)

## 2022-12-25 RX ORDER — METRONIDAZOLE 500 MG/100ML
500 INJECTION, SOLUTION INTRAVENOUS EVERY 8 HOURS
Status: DISCONTINUED | OUTPATIENT
Start: 2022-12-25 | End: 2023-01-03

## 2022-12-25 RX ORDER — DEXTROSE, SODIUM CHLORIDE, AND POTASSIUM CHLORIDE 5; .45; .15 G/100ML; G/100ML; G/100ML
75 INJECTION INTRAVENOUS CONTINUOUS
Status: DISCONTINUED | OUTPATIENT
Start: 2022-12-25 | End: 2022-12-28

## 2022-12-25 RX ORDER — CEFTRIAXONE 1 G/50ML
1000 INJECTION, SOLUTION INTRAVENOUS EVERY 24 HOURS
Status: DISCONTINUED | OUTPATIENT
Start: 2022-12-25 | End: 2023-01-03

## 2022-12-25 RX ORDER — VANCOMYCIN HYDROCHLORIDE 1 G/200ML
12.5 INJECTION, SOLUTION INTRAVENOUS EVERY 12 HOURS
Status: DISCONTINUED | OUTPATIENT
Start: 2022-12-25 | End: 2022-12-26

## 2022-12-25 RX ADMIN — PANTOPRAZOLE SODIUM 40 MG: 40 TABLET, DELAYED RELEASE ORAL at 05:02

## 2022-12-25 RX ADMIN — METRONIDAZOLE 500 MG: 500 INJECTION, SOLUTION INTRAVENOUS at 22:02

## 2022-12-25 RX ADMIN — ENOXAPARIN SODIUM 40 MG: 100 INJECTION SUBCUTANEOUS at 10:24

## 2022-12-25 RX ADMIN — ONDANSETRON 4 MG: 2 INJECTION INTRAMUSCULAR; INTRAVENOUS at 05:56

## 2022-12-25 RX ADMIN — NEBIVOLOL 5 MG: 5 TABLET ORAL at 10:24

## 2022-12-25 RX ADMIN — AMLODIPINE BESYLATE 10 MG: 10 TABLET ORAL at 10:24

## 2022-12-25 RX ADMIN — Medication 4.5 G: at 05:02

## 2022-12-25 RX ADMIN — Medication 4.5 G: at 10:31

## 2022-12-25 RX ADMIN — VANCOMYCIN HYDROCHLORIDE 2000 MG: 1 INJECTION, POWDER, LYOPHILIZED, FOR SOLUTION INTRAVENOUS at 12:09

## 2022-12-25 RX ADMIN — DEXTROSE, SODIUM CHLORIDE, AND POTASSIUM CHLORIDE 75 ML/HR: 5; .45; .15 INJECTION INTRAVENOUS at 13:16

## 2022-12-25 RX ADMIN — SODIUM CHLORIDE, SODIUM GLUCONATE, SODIUM ACETATE, POTASSIUM CHLORIDE, MAGNESIUM CHLORIDE, SODIUM PHOSPHATE, DIBASIC, AND POTASSIUM PHOSPHATE 75 ML/HR: .53; .5; .37; .037; .03; .012; .00082 INJECTION, SOLUTION INTRAVENOUS at 01:35

## 2022-12-25 RX ADMIN — PROMETHAZINE HYDROCHLORIDE 25 MG: 25 INJECTION INTRAMUSCULAR; INTRAVENOUS at 13:09

## 2022-12-25 RX ADMIN — CEFTRIAXONE 1000 MG: 1 INJECTION, SOLUTION INTRAVENOUS at 15:22

## 2022-12-25 RX ADMIN — METRONIDAZOLE 500 MG: 500 INJECTION, SOLUTION INTRAVENOUS at 14:31

## 2022-12-25 RX ADMIN — VANCOMYCIN HYDROCHLORIDE 1000 MG: 1 INJECTION, SOLUTION INTRAVENOUS at 20:36

## 2022-12-25 RX ADMIN — ONDANSETRON 4 MG: 2 INJECTION INTRAMUSCULAR; INTRAVENOUS at 10:21

## 2022-12-25 NOTE — PROGRESS NOTES
Progress Note - General Surgery   Cecily Al 58 y o  female MRN: [id number]  Unit/Bed#: -01 Encounter: [id number]    Assessment:  Sepsis, likely secondary to lower respiratory tract infection with bilateral pleural effusions and compressive atelectasis, probable bilateral pneumonia  Spiked temperature of 103 yesterday morning, remains afebrile since  Most recent temperature this morning 98 4  SPO2 on room air 90%  Patient with raspy productive cough  Status post hand-assisted sigmoid resection, colorectal anastomosis and transverse loop colostomy now 20 days ago  Postoperative course complicated by intra-abdominal abscess formation status post IR drainage 4 days ago  Right lower quadrant IR drains, only about 5 mL of serosanguineous fluid from #1, #2 shows scant amount of lightly serosanguineous drainage noted in tubing but bulb essentially empty  CT CAP with contrast done yesterday afternoon showed bilateral pleural effusions with compressive atelectasis finding since 12/18, lower lobe pneumonia cannot be excluded  Leukocytosis this morning continues to rise 15 24 (14 21)  Patient spiked temperatures yesterday 103, remains afebrile today     CT showed  decrease in size of right-sided intra-abdominal abscess and resolution of perihepatic collections since 12/18/2022, following percutaneous placement of two drains and a 2 5 x 5 x 4 cm thin-walled pelvic collection, unchanged  Urinalysis appears clean  Blood cultures x2 obtained yesterday  Bilateral lower extremity venous duplex study ordered yesterday pending to rule out DVT    WBC 15 24  Hemoglobin 8 1 (8 5)  Electrolytes all normal, creatinine 0 64  Procalcitonin level elevated today 0 28  Magnesium 2 3    Plan:  Discussed via TT messaging with Dr Manan Talbot morning regarding CT findings    Discussed with Dr Erika Tarango, medicine to evaluate  Medicine ordered complete work-up clinic sputum cultures, Legionella, MRSA and COVID/flu/RSV studies  Continue present IV Zosyn, IV vancomycin added by medicine  Repeat CBC, BMP, procalcitonin level in a m   N p o , sips of clears as tolerated for right now  Supplemental nasal cannula oxygen  IV fluids for hydration, Plasma-Lyte 75 mL/h  Analgesics and antiemetics as ordered  IV Zofran with the addition of Phenergan for nausea  Await blood cultures x 2  Monitor vital signs including fever curve  Patient   Dr Ramos Spencer reported to this provider via TT messaging that he discussed findings of CT CAP today with radiologist about position of the IR drains yesterday, right now no plan for IR tube check  Subjective/Objective      Chief Complaint: Patient with raspy cough, no further fevers reported  Subjective: She is now 20 days postoperative  Rising leukocytosis over the past couple days and development of a raspy cough the past 2 days  Patient has been afebrile now since yesterday morning, she spiked a temperature twice with the highest being 103 early yesterday morning prompted work-up for sepsis that included CT of the chest abdomen pelvis, blood cultures, urinalysis all performed  Was concerned over decreased IR tube drainage output the past 2 days  CT study was not performed till yesterday afternoon because she needed peripheral IV access for contrast   Report from the CT CAP showed decreased size of the right abdominal fluid collections and resolution of the perihepatic fluid collection  She now has development of bilateral pleural effusions with compressive atelectasis, bilateral lower lobe pneumonia could not be excluded  This would be the likely cause for the patient's development of sepsis  The patient has been on sips of clears, she really has no appetite  Still some nausea  She has complaints of a productive cough now  She has been using incentive spirometry not as much since yesterday because of the nausea    Her  is present with her now this morning, he drove in from their home in [place]  Discussed results of CT findings with Dr Ioana Alanis via TT messaging this morning, will have medicine eluate the patient for early development of bilateral pneumonia  Scheduled Meds:  Current Facility-Administered Medications   Medication Dose Route Frequency Provider Last Rate   • acetaminophen  975 mg Oral Q6H PRN Pankaj Morales PA-C     • amLODIPine  10 mg Oral Daily Jocelyn Kruse PA-C     • aspirin  81 mg Oral Daily Isela Wagner PA-C     • budesonide  0 5 mg Nebulization Q12H Pankaj Morales PA-C     • enoxaparin  40 mg Subcutaneous Q24H [address] BARBIE Jain     • levalbuterol  1 25 mg Nebulization BID Juan Chávez MD     • lidocaine  2 patch Topical Daily BARBIE Jain     • melatonin  6 mg Per NG Tube HS BARBIE Jain     • multi-electrolyte  75 mL/hr Intravenous Continuous Corine Sands,  75 mL/hr (12/25/22 0135)   • nebivolol  5 mg Oral Daily Jocelyn Millan PA-C     • ondansetron  4 mg Intravenous Q4H PRN Lucas Maciel PA-C     • oxyCODONE-acetaminophen  1 tablet Oral Q6H PRN Pankaj Mroales PA-C     • pantoprazole  40 mg Oral Early Morning Juan Chávez MD     • phenol  1 spray Mouth/Throat Q2H PRN BARBIE Jain     • piperacillin-tazobactam  4 5 g Intravenous Q6H Pankaj Morales PA-C 4 5 g (12/25/22 0502)   • promethazine  25 mg Intramuscular Q6H PRN Alma Wang PA-C     • sodium chloride  1 spray Each Nare Q1H PRN Esha Jefferson PA-C     • traMADol  50 mg Oral Q6H PRN BARBIE Jain       Continuous Infusions:multi-electrolyte, 75 mL/hr, Last Rate: 75 mL/hr (12/25/22 0135)      PRN Meds: •  acetaminophen  •  ondansetron  •  oxyCODONE-acetaminophen  •  phenol  •  promethazine  •  sodium chloride  •  traMADol      Objective:     Blood pressure 117/61, pulse 74, temperature 98 4 °F (36 9 °C), resp  rate 18, height 5' 5" (1 651 m), weight 81 7 kg (180 lb 1 9 oz), SpO2 90 %  ,Body mass index is 29 97 kg/m²  Intake/Output Summary (Last 24 hours) at 12/25/2022 0830  Last data filed at 12/25/2022 0135  Gross per 24 hour   Intake 960 ml   Output 603 ml   Net 357 ml       Invasive Devices     Peripheral Intravenous Line  Duration           Peripheral IV 12/21/22 Dorsal (posterior); Left Hand 3 days    Peripheral IV 12/24/22 Distal;Dorsal (posterior); Right Forearm <1 day          Drain  Duration           Colostomy RLQ 20 days    Abscess Drain RUQ 4 days    Abscess Drain RUQ 4 days                Physical Exam: {    Ill-appearing  Noted to have slightly productive cough  Her  is present here at this time  Skin warm and dry to touch, febrile to touch  Pale in appearance  ENT oral mucosa is moist   Heart regular rate and rhythm  No murmurs heard  No tachycardia  Lungs diminished bibasilar breath sounds noted  Back no flank tenderness to percussion  Abdomen not distended  BS hypoactive, distant  Surgical drains right lower abdomen as noted with output minimal recorded above  Ostomy with liquid brown stool  Surgical incision wet to dry dry packing removed  Wound was irrigated with normal saline, pink wound base without drainage, normal tenderness  Edges are clean  Packed with saline wet-to-dry dressing covered with sterile 4 x 4's and tape  Extremities no calf tenderness, no peripheral edema  Neurological exam CNS intact, mental status normal     Lab, Imaging and other studies:  I have personally reviewed pertinent lab results    , CBC:   Lab Results   Component Value Date    WBC 15 24 (H) 12/25/2022    HGB 8 1 (L) 12/25/2022    HCT 25 8 (L) 12/25/2022    MCV 95 12/25/2022     (H) 12/25/2022    MCH 29 8 12/25/2022    MCHC 31 4 12/25/2022    RDW 13 3 12/25/2022    MPV 8 9 12/25/2022   , CMP:   Lab Results   Component Value Date    SODIUM 136 12/25/2022    K 3 9 12/25/2022     12/25/2022    CO2 25 12/25/2022    BUN 9 12/25/2022    CREATININE 0 64 12/25/2022 CALCIUM 8 3 (L) 12/25/2022    AST 22 12/25/2022    ALT 18 12/25/2022    ALKPHOS 98 12/25/2022    EGFR 95 12/25/2022   , Coagulation: No results found for: PT, INR, APTT, Urinalysis:   Lab Results   Component Value Date    COLORU Yellow 12/24/2022    CLARITYU Clear 12/24/2022    SPECGRAV 1 020 12/24/2022    PHUR 7 0 12/24/2022    LEUKOCYTESUR Negative 12/24/2022    NITRITE Negative 12/24/2022    GLUCOSEU Negative 12/24/2022    KETONESU Trace (A) 12/24/2022    BILIRUBINUR Negative 12/24/2022    BLOODU Negative 12/24/2022     Contains abnormal data Procalcitonin  Order: [id number]   Status: Final result      Visible to patient: No (inaccessible in [facility])      Next appt: None      0 Result Notes  Component Ref Range & Units 12/25/22 0501 12/24/22 1639 12/19/22 0514 12/18/22 1644 12/18/22 1239 12/12/22 0509 12/11/22 0542   Procalcitonin <=0 25 ng/ml 0 28 High   0 24 CM  0 17 CM             Magnesium  Order: [id number]   Status: Final result      Visible to patient: No (inaccessible in [facility])      Next appt: None      0 Result Notes  Component Ref Range & Units 12/25/22 0501 12/24/22 0449 12/23/22 0544 12/22/22 0443 12/12/22 0509 12/11/22 0542 12/10/22 0507   Magnesium 1 9 - 2 7 mg/dL 2 3  2 2  2 1  1 7 Low   2 0  2 0  2 0               Specimen Collected: 12/25/22 05:01 Last Resulted: 12/25/22 05:53           Lactic acid, plasma  Order: [id number]   Status: Final result      Visible to patient: No (inaccessible in [facility])      Next appt: None      0 Result Notes  Component Ref Range & Units 12/24/22 1353 12/18/22 1644 12/5/22 2119   LACTIC ACID 0 5 - 2 0 mmol/L 0 7  1 2  1 9            Narrative    Result may be elevated if tourniquet was used during collection  Specimen Collected: 12/24/22 13:53 Last Resulted: 12/24/22 14:19               CT CHEST, ABDOMEN AND PELVIS WITH IV CONTRAST 12/24/2022     INDICATION:   Sepsis  Spiking temp today 103, leukocytosis     26-year-old female status post laparoscopic sigmoid resection for diverticulitis with diverting loop transverse colostomy on 12/5/2022  Percutaneous placement of two drains into a right-sided abdominal fluid   collection on 12/20/2022      COMPARISON:  Preoperative CT of the abdomen and pelvis from December 4, 2022  CTA of the chest/CT of the abdomen and pelvis from December 18, 2022      TECHNIQUE: CT examination of the chest, abdomen and pelvis was performed  Axial, sagittal, and coronal 2D reformatted images were created from the source data and submitted for interpretation      Radiation dose length product (DLP) for this visit:  712 34 mGy-cm   This examination, like all CT scans performed in the [facility], was performed utilizing techniques to minimize radiation dose exposure, including the use of iterative   reconstruction and automated exposure control      IV Contrast:  100 mL of iohexol (OMNIPAQUE)  Enteric Contrast: Enteric contrast was administered      FINDINGS:     CHEST     LUNGS:  Consolidation and atelectasis in the posterior portions of both lower lobes  Subsegmental atelectasis in the left upper lobe  Additional smaller areas of subsegmental atelectasis in the lingula and right middle lobe      PLEURA:  Bilateral small to moderate pleural effusions, developing since 12/18/2022      HEART/GREAT VESSELS: Heart is unremarkable for patient's age  No thoracic aortic aneurysm      MEDIASTINUM AND GRICEL: No lymphadenopathy or mass  Esophagus unremarkable  Trachea and main stem bronchi normal      CHEST WALL AND LOWER NECK:  Unremarkable      ABDOMEN     LIVER/BILIARY TREE:  Diffusely decreased attenuation, indicating fatty change  No masses  Bile ducts normal in caliber      GALLBLADDER:  Postcholecystectomy      SPLEEN:  Unremarkable      PANCREAS:  Unremarkable      ADRENAL GLANDS:  Unremarkable      KIDNEYS/URETERS:  Unremarkable  No hydronephrosis      STOMACH AND BOWEL:  Stomach unremarkable  Resolution of small bowel distention since the CT from 12/18/2022  Status post sigmoid resection, colorectal anastomosis and diverting transverse loop colostomy  Residual opaque material in the transverse   colon again demonstrated      APPENDIX:  No findings to suggest appendicitis      ABDOMINOPELVIC CAVITY:      No lymphadenopathy      Percutaneous drain placed into a collection in the right side of the abdominal cavity since the CT from 12/18/2022 with interval decrease in size of the collection,, which now measures approximately 1 5 x 5 x 8 5 cm, tracking cephalad adjacent to the   ascending colon      Second percutaneous drain extends into the subhepatic space with resolution of perihepatic collections since 12/18/2022      2 5 x 5 x 4 cm thin-walled fluid collection in the pelvis, to the right of the colorectal anastomosis, little changed since 12/18/2022      Several smaller thin-walled fluid collections, appearance more consistent with loculated ascites than abscess, tracking along loops of small bowel      Generalized mesenteric edema, with little change since 12/18/2022  No generalized pneumoperitoneum            VESSELS:  Unremarkable for patient's age      PELVIS     REPRODUCTIVE ORGANS:  Post hysterectomy      URINARY BLADDER:  Unremarkable      ABDOMINAL WALL/INGUINAL REGIONS:  Transverse colostomy  Left lower quadrant abdominal wall defect again demonstrated      OSSEOUS STRUCTURES:  No acute fracture or destructive osseous lesion      IMPRESSION:     1   Status post sigmoid resection, colorectal anastomosis and transverse loop colostomy      2   Decrease in size of right-sided intra-abdominal abscess and resolution of perihepatic collections since 12/18/2022, following percutaneous placement of two drains      3   2 5 x 5 x 4 cm thin-walled pelvic collection, unchanged      4    No new collection in the abdomen or pelvis      5   Bilateral pleural effusions with compressive atelectasis in both lower lobes, developing since 12/18/2022  Coexisting lower lobe pneumonia cannot be excluded      6   Hepatic steatosis      VTE Pharmacologic Prophylaxis: Enoxaparin (Lovenox)  VTE Mechanical Prophylaxis: sequential compression device     Valentino Harold, PA-C

## 2022-12-25 NOTE — ASSESSMENT & PLAN NOTE
· Patient with new fever over the last 48 hours with worsening leukocytosis  · CT scan of the chest/abdomen/pelvis performed on 12/24/2022, results reviewed  · Possibility of developing pneumonia with bilateral effusions  abdominal collections appear to be improving, no new collections noted  · Will check flu/COVID/RSV  · Add vancomycin    Patient has been on Zosyn for abdominal infection  · Check MRSA screen, sputum culture, strep pneumo, urine Legionella

## 2022-12-25 NOTE — ASSESSMENT & PLAN NOTE
Appears to be post operative  No signs of acute blood loss  Will continue to monitor    Check iron panel

## 2022-12-25 NOTE — CONSULTS
Pulmonary Consultation   Verona Perez 58 y o  female MRN: [id number]   [address] [id number] Encounter: [id number]      Reason for consultation:   Pleural effusions  Requesting physician:   Suzanne Yi MD      Impressions:   • Bilateral pleural effusions  These are most likely due to the intra-abdominal process  • Bilateral basilar atelectasis  This is multifactorial   No evidence of pneumonia  • Perforated diverticulitis status post bowel resection and diversion colostomy  • Intra-abdominal abscesses status postdrainage  • Acute hypoxemic respiratory failure  Recommendations:  • No indication of thoracentesis  • Titrate oxygen to maintain O2 saturation above 88%  • Encourage incentive spirometry  • Out of bed to a chair and increase activities as tolerated  • We will sign off  Please call with questions  Discussed with Dr Martin Marshall  History of Present Illness   HPI:  Verona Perez is a 58 y o  female who was admitted on December 4 because of abdominal pain  The patient was found to have diverticulitis with perforation  She had bowel resection surgery with diversion colostomy  This was complicated by abdominal abscesses and she had drainage placed by IR  The patient was doing better and over the last 2 days she developed fever  She has CT of the chest abdomen pelvis which showed bilateral pleural effusions  For this reason pulmonary consult is requested  The patient tells me that she has tracheomalacia and she was following up with [address] [place] [address]  She has been doing fairly well  She describes recurrent pneumonias in the past with oral antibiotics and steroids prescribed  She has not cough  She is bringing up brownish sputum  Review of systems:  12 point review of systems was completed and was otherwise negative except as listed in HPI        Historical Information   Past Medical History:   Diagnosis Date   • Asthma    • Bronchiectasis ([facility] [address] )    • Bronchomalacia    • GERD (gastroesophageal reflux disease)    • Hiatal hernia      Past Surgical History:   Procedure Laterality Date   • APPENDECTOMY     • CHOLECYSTECTOMY     • COLON SIGMOID RESECTION LAPAROSCOPIC N/A 12/5/2022    Procedure: RESECTION COLON SIGMOID LAPAROSCOPIC HAND-ASSISTED, diverting transverse loop colostomy;  Surgeon: Andres Mcneil MD;  Location: [facility];  Service: General   • HYSTERECTOMY     • IR DRAINAGE TUBE PLACEMENT  12/20/2022   • KNEE SURGERY Left      History reviewed  No pertinent family history  Family History:  No family history of chronic lung disease  Social History:  The patient is  and lives with her   She is a lifelong non-smoker  No occupational exposure        Meds/Allergies   Current Facility-Administered Medications   Medication Dose Route Frequency   • acetaminophen (TYLENOL) tablet 975 mg  975 mg Oral Q6H PRN   • amLODIPine (NORVASC) tablet 10 mg  10 mg Oral Daily   • aspirin (ECOTRIN LOW STRENGTH) EC tablet 81 mg  81 mg Oral Daily   • budesonide (PULMICORT) inhalation solution 0 5 mg  0 5 mg Nebulization Q12H   • cefTRIAXone (ROCEPHIN) IVPB (premix in dextrose) 1,000 mg 50 mL  1,000 mg Intravenous Q24H   • dextrose 5 % and sodium chloride 0 45 % with KCl 20 mEq/L infusion  75 mL/hr Intravenous Continuous   • enoxaparin (LOVENOX) subcutaneous injection 40 mg  40 mg Subcutaneous Q24H EAN   • levalbuterol (XOPENEX) inhalation solution 1 25 mg  1 25 mg Nebulization BID   • lidocaine (LIDODERM) 5 % patch 2 patch  2 patch Topical Daily   • melatonin tablet 6 mg  6 mg Per NG Tube HS   • metroNIDAZOLE (FLAGYL) IVPB (premix) 500 mg 100 mL  500 mg Intravenous Q8H   • nebivolol (BYSTOLIC) tablet 5 mg  5 mg Oral Daily   • ondansetron (ZOFRAN) injection 4 mg  4 mg Intravenous Q4H PRN   • oxyCODONE-acetaminophen (PERCOCET) 5-325 mg per tablet 1 tablet  1 tablet Oral Q6H PRN   • pantoprazole (PROTONIX) EC tablet 40 mg  40 mg Oral Early Morning   • phenol (CHLORASEPTIC) 1 4 % mucosal liquid 1 spray  1 spray Mouth/Throat Q2H PRN   • promethazine (PHENERGAN) injection 25 mg  25 mg Intramuscular Q6H PRN   • sodium chloride (OCEAN) 0 65 % nasal spray 1 spray  1 spray Each Nare Q1H PRN   • traMADol (ULTRAM) tablet 50 mg  50 mg Oral Q6H PRN   • vancomycin (VANCOCIN) IVPB (premix in dextrose) 1,000 mg 200 mL  12 5 mg/kg Intravenous Q12H     Medications Prior to Admission   Medication   • amLODIPine (NORVASC) 10 mg tablet   • lisinopril (ZESTRIL) 40 mg tablet   • nebivolol (BYSTOLIC) 5 mg tablet   • omeprazole (PriLOSEC) 40 MG capsule     Allergies   Allergen Reactions   • Dilaudid [Hydromorphone] Vomiting   • Doxycycline Vomiting   • Hydrochlorothiazide Vomiting   • Sulfa Antibiotics Vomiting       Vitals: Blood pressure 117/61, pulse 74, temperature 98 4 °F (36 9 °C), resp  rate 18, height 5' 5" (1 651 m), weight 81 7 kg (180 lb 1 9 oz), SpO2 90 %  ,      Intake/Output Summary (Last 24 hours) at 12/25/2022 1427  Last data filed at 12/25/2022 1409  Gross per 24 hour   Intake 2326 25 ml   Output 803 ml   Net 1523 25 ml       Physical exam:        Head/eyes:    Normocephalic, without obvious abnormality, atraumatic,         PERRL, extraocular muscles intact, no scleral icterus    Nose:   Nares normal, septum midline, mucosa normal, no drainage    or sinus tenderness   Throat:   Moist mucous membranes, no thrush   Neck:   Supple, trachea midline, no adenopathy; no carotid    bruit or JVD   Lungs:    Decreased breath sounds posteriorly  Heart:    Regular rate and rhythm, S1 and S2 normal, no murmur, rub   or gallop   Abdomen:     Sof  Feels sore  Colostomy bag in place      no masses, no organomegaly   Extremities:   Extremities normal, atraumatic, no cyanosis or edema   Skin:   Warm, dry, turgor normal, no rashes or lesions   Neurologic:   CNII-XII intact, normal strength, non-focal             Labs:   CBC:   Lab Results   Component Value Date    WBC 15 24 (H) 12/25/2022    HGB 8 1 (L) 12/25/2022 HCT 25 8 (L) 12/25/2022    MCV 95 12/25/2022     (H) 12/25/2022    MCH 29 8 12/25/2022    MCHC 31 4 12/25/2022    RDW 13 3 12/25/2022    MPV 8 9 12/25/2022   , CMP:   Lab Results   Component Value Date    SODIUM 136 12/25/2022    K 3 9 12/25/2022     12/25/2022    CO2 25 12/25/2022    BUN 9 12/25/2022    CREATININE 0 64 12/25/2022    CALCIUM 8 3 (L) 12/25/2022    AST 22 12/25/2022    ALT 18 12/25/2022    ALKPHOS 98 12/25/2022    EGFR 95 12/25/2022       Imaging and other studies: I have personally reviewed pertinent films in PACS CT of the chest is reviewed on PACS system  The patient has small bilateral pleural effusions with bilateral posterior segment atelectasis of the lower lobes          Sheila Dimas MD

## 2022-12-25 NOTE — PLAN OF CARE
Problem: Potential for Falls  Goal: Patient will remain free of falls  Description: INTERVENTIONS:  - Educate patient/family on patient safety including physical limitations  - Instruct patient to call for assistance with activity   - Consult OT/PT to assist with strengthening/mobility   - Keep Call bell within reach  - Keep bed low and locked with side rails adjusted as appropriate  - Keep care items and personal belongings within reach  - Initiate and maintain comfort rounds  - Make Fall Risk Sign visible to staff  - Offer Toileting every 2 Hours, in advance of need  - Initiate/Maintain BED alarm  - Obtain necessary fall risk management equipment: yellow socks  - Apply yellow socks and bracelet for high fall risk patients  - Consider moving patient to room near nurses station  Outcome: Progressing     Problem: PAIN - ADULT  Goal: Verbalizes/displays adequate comfort level or baseline comfort level  Description: Interventions:  - Encourage patient to monitor pain and request assistance  - Assess pain using appropriate pain scale  - Administer analgesics based on type and severity of pain and evaluate response  - Implement non-pharmacological measures as appropriate and evaluate response  - Consider cultural and social influences on pain and pain management  - Notify physician/advanced practitioner if interventions unsuccessful or patient reports new pain  Outcome: Progressing     Problem: INFECTION - ADULT  Goal: Absence or prevention of progression during hospitalization  Description: INTERVENTIONS:  - Assess and monitor for signs and symptoms of infection  - Monitor lab/diagnostic results  - Monitor all insertion sites, i e  indwelling lines, tubes, and drains  - Monitor endotracheal if appropriate and nasal secretions for changes in amount and color  - Letts appropriate cooling/warming therapies per order  - Administer medications as ordered  - Instruct and encourage patient and family to use good hand hygiene technique  - Identify and instruct in appropriate isolation precautions for identified infection/condition  Outcome: Progressing     Problem: SAFETY ADULT  Goal: Patient will remain free of falls  Description: INTERVENTIONS:  - Educate patient/family on patient safety including physical limitations  - Instruct patient to call for assistance with activity   - Consult OT/PT to assist with strengthening/mobility   - Keep Call bell within reach  - Keep bed low and locked with side rails adjusted as appropriate  - Keep care items and personal belongings within reach  - Initiate and maintain comfort rounds  - Make Fall Risk Sign visible to staff  - Offer Toileting every 2 Hours, in advance of need  - Initiate/Maintain BED alarm  - Obtain necessary fall risk management equipment: yellow socks  - Apply yellow socks and bracelet for high fall risk patients  - Consider moving patient to room near nurses station  Outcome: Progressing  Goal: Maintain or return to baseline ADL function  Description: INTERVENTIONS:  - Educate patient/family on patient safety including physical limitations  - Instruct patient to call for assistance with activity   - Consult OT/PT to assist with strengthening/mobility   - Keep Call bell within reach  - Keep bed low and locked with side rails adjusted as appropriate  - Keep care items and personal belongings within reach  - Initiate and maintain comfort rounds  - Make Fall Risk Sign visible to staff  - Offer Toileting every 2 Hours, in advance of need  - Initiate/Maintain bed alarm  - Obtain necessary fall risk management equipment: yellow socks  - Apply yellow socks and bracelet for high fall risk patients  - Consider moving patient to room near nurses station  Outcome: Progressing  Goal: Maintains/Returns to pre admission functional level  Description: INTERVENTIONS:  - Perform BMAT or MOVE assessment daily    - Set and communicate daily mobility goal to care team and patient/family/caregiver  - Collaborate with rehabilitation services on mobility goals if consulted  - Perform Range of Motion 3 times a day  - Reposition patient every 2 hours  - Dangle patient 3 times a day  - Stand patient 3 times a day  - Ambulate patient 3 times a day  - Out of bed to chair 3 times a day   - Out of bed for meals 3 times a day  - Out of bed for toileting  - Record patient progress and toleration of activity level   Outcome: Progressing     Problem: DISCHARGE PLANNING  Goal: Discharge to home or other facility with appropriate resources  Description: INTERVENTIONS:  - Identify barriers to discharge w/patient and caregiver  - Arrange for needed discharge resources and transportation as appropriate  - Identify discharge learning needs (meds, wound care, etc )  - Arrange for interpretive services to assist at discharge as needed  - Refer to Case Management Department for coordinating discharge planning if the patient needs post-hospital services based on physician/advanced practitioner order or complex needs related to functional status, cognitive ability, or social support system  Outcome: Progressing     Problem: Knowledge Deficit  Goal: Patient/family/caregiver demonstrates understanding of disease process, treatment plan, medications, and discharge instructions  Description: Complete learning assessment and assess knowledge base    Interventions:  - Provide teaching at level of understanding  - Provide teaching via preferred learning methods  Outcome: Progressing     Problem: MOBILITY - ADULT  Goal: Maintain or return to baseline ADL function  Description: INTERVENTIONS:  - Educate patient/family on patient safety including physical limitations  - Instruct patient to call for assistance with activity   - Consult OT/PT to assist with strengthening/mobility   - Keep Call bell within reach  - Keep bed low and locked with side rails adjusted as appropriate  - Keep care items and personal belongings within reach  - Initiate and maintain comfort rounds  - Make Fall Risk Sign visible to staff  - Offer Toileting every 2 Hours, in advance of need  - Initiate/Maintain bed alarm  - Obtain necessary fall risk management equipment: yellow socks  - Apply yellow socks and bracelet for high fall risk patients  - Consider moving patient to room near nurses station  Outcome: Progressing  Goal: Maintains/Returns to pre admission functional level  Description: INTERVENTIONS:  - Perform BMAT or MOVE assessment daily    - Set and communicate daily mobility goal to care team and patient/family/caregiver  - Collaborate with rehabilitation services on mobility goals if consulted  - Perform Range of Motion 3 times a day  - Reposition patient every 2 hours    - Dangle patient 3 times a day  - Stand patient 3 times a day  - Ambulate patient 3 times a day  - Out of bed to chair 3 times a day   - Out of bed for meals 3 times a day  - Out of bed for toileting  - Record patient progress and toleration of activity level   Outcome: Progressing     Problem: Prexisting or High Potential for Compromised Skin Integrity  Goal: Skin integrity is maintained or improved  Description: INTERVENTIONS:  - Identify patients at risk for skin breakdown  - Assess and monitor skin integrity  - Assess and monitor nutrition and hydration status  - Monitor labs   - Assess for incontinence   - Turn and reposition patient  - Assist with mobility/ambulation  - Relieve pressure over bony prominences  - Avoid friction and shearing  - Provide appropriate hygiene as needed including keeping skin clean and dry  - Evaluate need for skin moisturizer/barrier cream  - Collaborate with interdisciplinary team   - Patient/family teaching  - Consider wound care consult   Outcome: Progressing     Problem: Nutrition/Hydration-ADULT  Goal: Nutrient/Hydration intake appropriate for improving, restoring or maintaining nutritional needs  Description: Monitor and assess patient's nutrition/hydration status for malnutrition  Collaborate with interdisciplinary team and initiate plan and interventions as ordered  Monitor patient's weight and dietary intake as ordered or per policy  Utilize nutrition screening tool and intervene as necessary  Determine patient's food preferences and provide high-protein, high-caloric foods as appropriate       INTERVENTIONS:  - Monitor oral intake, urinary output, labs, and treatment plans  - Assess nutrition and hydration status and recommend course of action  - Evaluate amount of meals eaten  - Assist patient with eating if necessary   - Allow adequate time for meals  - Recommend/ encourage appropriate diets, oral nutritional supplements, and vitamin/mineral supplements  - Order, calculate, and assess calorie counts as needed  - Recommend, monitor, and adjust tube feedings and TPN/PPN based on assessed needs  - Assess need for intravenous fluids  - Provide specific nutrition/hydration education as appropriate  - Include patient/family/caregiver in decisions related to nutrition  Outcome: Progressing

## 2022-12-25 NOTE — PROGRESS NOTES
Nasrin U  66   Progress Note - Inell Escort 1960, 58 y o  female MRN: [id number]  Unit/Bed#: -01 Encounter: [id number]  Primary Care Provider: Fahad Jiménez   Date and time admitted to hospital: 12/4/2022  7:07 AM    Fever  Assessment & Plan  · Patient with new fever over the last 48 hours with worsening leukocytosis  · CT scan of the chest/abdomen/pelvis performed on 12/24/2022, results reviewed  · Possibility of developing pneumonia with bilateral effusions  abdominal collections appear to be improving, no new collections noted  · Will check flu/COVID/RSV  · Add vancomycin  Patient has been on Zosyn for abdominal infection  · Check MRSA screen, sputum culture, strep pneumo, urine Legionella    Acute respiratory failure with hypoxia (HCC)  Assessment & Plan  · Likely multifactorial secondary to atelectasis, possible pneumonia, and bilateral effusions  · CT chest with bilateral effusions  · Check BNP  · We will continue IV antibiotics for suspected pneumonia  · Encourage incentive spirometry  · Continue oxygen and titrate as tolerated  · Will request pulmonology consultation to evaluate for possible thoracentesis    Anemia  Assessment & Plan  Appears to be post operative  No signs of acute blood loss  Will continue to monitor  Check iron panel    Hypertension  Assessment & Plan  -Continue home amlodipine and nebivolol  -Blood pressure has been stable   -lisinopril held for now    * Sigmoid diverticulitis  Assessment & Plan  Continue management per primary team      VTE Prophylaxis:  Enoxaparin (Lovenox)    Patient Centered Rounds: I have performed bedside rounds with nursing staff today      Discussions with Specialists or Other Care Team Provider: yes  Education and Discussions with Family / Patient: Updated patient regarding plan of care    Current Length of Stay: 21 day(s)    Current Patient Status: Inpatient   Certification Statement: The patient will continue to require additional inpatient hospital stay due to sigmoid diverticulitis    Discharge Plan: per primary team    Code Status: Level 1 - Full Code    Subjective:   Patient has been spiking fevers over the last 48 hours  Complaining of cough productive of greenish sputum  Denies any pain complaints  Currently on clear liquid diet    Objective:     Vitals:   Temp (24hrs), Av 8 °F (37 1 °C), Min:98 4 °F (36 9 °C), Max:99 2 °F (37 3 °C)    Temp:  [98 4 °F (36 9 °C)-99 2 °F (37 3 °C)] 98 4 °F (36 9 °C)  HR:  [74-82] 74  Resp:  [18-21] 18  BP: (117-140)/(61-77) 117/61  SpO2:  [89 %-92 %] 90 %  Body mass index is 29 97 kg/m²  Input and Output Summary (last 24 hours): Intake/Output Summary (Last 24 hours) at 2022 0852  Last data filed at 2022 0135  Gross per 24 hour   Intake 960 ml   Output 603 ml   Net 357 ml       Physical Exam:   Physical Exam  Constitutional:       General: She is not in acute distress  HENT:      Head: Normocephalic and atraumatic  Nose: Nose normal       Mouth/Throat:      Mouth: Mucous membranes are dry  Eyes:      Extraocular Movements: Extraocular movements intact  Conjunctiva/sclera: Conjunctivae normal    Cardiovascular:      Rate and Rhythm: Normal rate and regular rhythm  Pulmonary:      Effort: Pulmonary effort is normal  No respiratory distress  Breath sounds: Rhonchi present  Abdominal:      Palpations: Abdomen is soft  Tenderness: There is no abdominal tenderness  Comments: Colostomy noted  Abdominal drain in place with no significant drainage   Musculoskeletal:         General: No tenderness  Normal range of motion  Cervical back: Normal range of motion and neck supple  Comments: Generalized weakness   Skin:     General: Skin is warm and dry  Neurological:      General: No focal deficit present  Mental Status: She is alert  Mental status is at baseline  Cranial Nerves: No cranial nerve deficit     Psychiatric: Mood and Affect: Mood normal          Behavior: Behavior normal          Additional Data:     Labs:    Results from last 7 days   Lab Units 12/25/22  0501 12/24/22  0449   WBC Thousand/uL 15 24* 14 21*   HEMOGLOBIN g/dL 8 1* 8 5*   HEMATOCRIT % 25 8* 26 5*   PLATELETS Thousands/uL 583* 666*   NEUTROS PCT %  --  82*   LYMPHS PCT %  --  8*   LYMPHO PCT % 9*  --    MONOS PCT %  --  8   MONO PCT % 6  --    EOS PCT % 2 0     Results from last 7 days   Lab Units 12/25/22  0501   SODIUM mmol/L 136   POTASSIUM mmol/L 3 9   CHLORIDE mmol/L 100   CO2 mmol/L 25   BUN mg/dL 9   CREATININE mg/dL 0 64   CALCIUM mg/dL 8 3*   ALK PHOS U/L 98   ALT U/L 18   AST U/L 22                   * I Have Reviewed All Lab Data Listed Above  * Additional Pertinent Lab Tests Reviewed: Dustin 66 Admission  Reviewed    Imaging:  Imaging Reports Reviewed Today Include: No new imaging    Recent Cultures (last 7 days):     Results from last 7 days   Lab Units 12/24/22  1122 12/20/22  1355 12/18/22  1700 12/18/22  1646   BLOOD CULTURE  Received in Microbiology Lab  Culture in Progress  Received in Microbiology Lab  Culture in Progress  --  No Growth After 5 Days  No Growth After 5 Days     GRAM STAIN RESULT   --  No Polys or Bacteria seen  --   --    BODY FLUID CULTURE, STERILE   --  Few Colonies of Escherichia coli*  --   --        Last 24 Hours Medication List:   Current Facility-Administered Medications   Medication Dose Route Frequency Provider Last Rate   • acetaminophen  975 mg Oral Q6H PRN Pankaj Morales PA-C     • amLODIPine  10 mg Oral Daily Jocelyn Millan PA-C     • aspirin  81 mg Oral Daily Soumya Van PA-C     • budesonide  0 5 mg Nebulization Q12H Pankaj Morales PA-C     • enoxaparin  40 mg Subcutaneous Q24H [facility] BARBIE Hernandez     • levalbuterol  1 25 mg Nebulization BID Tone Ackerman MD     • lidocaine  2 patch Topical Daily BARBIE Grady     • melatonin  6 mg Per NG Tube HS BARBIE Ruiz     • multi-electrolyte  75 mL/hr Intravenous Continuous Wandalee Sunita, DO 75 mL/hr (12/25/22 0135)   • nebivolol  5 mg Oral Daily Jocelyn Millan PA-C     • ondansetron  4 mg Intravenous Q4H PRN Brittany Lr PA-C     • oxyCODONE-acetaminophen  1 tablet Oral Q6H PRN Pankaj Morales PA-C     • pantoprazole  40 mg Oral Early Morning Patsey Boas, MD     • phenol  1 spray Mouth/Throat Q2H PRN BARBIE Ruiz     • piperacillin-tazobactam  4 5 g Intravenous Q6H Pankaj Morales PA-C 4 5 g (12/25/22 0502)   • promethazine  25 mg Intramuscular Q6H PRN Ilsa Smith PA-C     • sodium chloride  1 spray Each Nare Q1H PRN Esha Jefferson PA-C     • traMADol  50 mg Oral Q6H PRN BARBIE Ruiz     • vancomycin  25 mg/kg Intravenous Once Michela Fisher MD          Today, Patient Was Seen By: Michela Fisher MD    ** Please Note: Dictation voice to text software may have been used in the creation of this document   **

## 2022-12-25 NOTE — ASSESSMENT & PLAN NOTE
· Likely multifactorial secondary to atelectasis, possible pneumonia, and bilateral effusions  · CT chest with bilateral effusions  · Check BNP  · We will continue IV antibiotics for suspected pneumonia  · Encourage incentive spirometry  · Continue oxygen and titrate as tolerated  · Will request pulmonology consultation to evaluate for possible thoracentesis

## 2022-12-25 NOTE — PROGRESS NOTES
Pattie Sahu is a 58 y o  female who is currently ordered Vancomycin IV with management by the Pharmacy Consult service  Relevant clinical data and objective / subjective history reviewed  Vancomycin Assessment:  Indication and Goal AUC/Trough: Pneumonia (goal -600, trough >10), -600, trough >10  Clinical Status: stable  Micro:   12/15 wound culture - staph lugdunesis   body fluid culture - Ecoli   BC - Pending    Renal Function:  SCr: 0 64 mg/dL  CrCl: 96 3 mL/min  Renal replacement: Not on dialysis  Days of Therapy: 1  Current Dose: 2 g one time load  Vancomycin Plan:  New Dosing q12  Estimated AUC: 441 mcg*hr/mL  Estimated Trough: 13 5 mcg/mL  Next Level: 6am   Renal Function Monitoring: Daily BMP and UOP  Pharmacy will continue to follow closely for s/sx of nephrotoxicity, infusion reactions and appropriateness of therapy  BMP and CBC will be ordered per protocol  We will continue to follow the patient’s culture results and clinical progress daily      Mo Yañez, Pharmacist Soft, non-tender, no hepatosplenomegaly, normal bowel sounds

## 2022-12-26 ENCOUNTER — APPOINTMENT (INPATIENT)
Dept: RADIOLOGY | Facility: HOSPITAL | Age: 62
End: 2022-12-26

## 2022-12-26 LAB
ERYTHROCYTE [DISTWIDTH] IN BLOOD BY AUTOMATED COUNT: 13.3 % (ref 11.6–15.1)
FERRITIN SERPL-MCNC: 1099 NG/ML (ref 8–388)
HCT VFR BLD AUTO: 26.8 % (ref 34.8–46.1)
HGB BLD-MCNC: 8.6 G/DL (ref 11.5–15.4)
IRON SATN MFR SERPL: 11 % (ref 15–50)
IRON SERPL-MCNC: 13 UG/DL (ref 50–170)
L PNEUMO1 AG UR QL IA.RAPID: NEGATIVE
MCH RBC QN AUTO: 30.3 PG (ref 26.8–34.3)
MCHC RBC AUTO-ENTMCNC: 32.1 G/DL (ref 31.4–37.4)
MCV RBC AUTO: 94 FL (ref 82–98)
MRSA NOSE QL CULT: NORMAL
PLATELET # BLD AUTO: 590 THOUSANDS/UL (ref 149–390)
PMV BLD AUTO: 8.9 FL (ref 8.9–12.7)
PROCALCITONIN SERPL-MCNC: 0.32 NG/ML
RBC # BLD AUTO: 2.84 MILLION/UL (ref 3.81–5.12)
S PNEUM AG UR QL: NEGATIVE
TIBC SERPL-MCNC: 118 UG/DL (ref 250–450)
WBC # BLD AUTO: 14.44 THOUSAND/UL (ref 4.31–10.16)

## 2022-12-26 RX ADMIN — PANTOPRAZOLE SODIUM 40 MG: 40 TABLET, DELAYED RELEASE ORAL at 05:48

## 2022-12-26 RX ADMIN — ONDANSETRON 4 MG: 2 INJECTION INTRAMUSCULAR; INTRAVENOUS at 10:41

## 2022-12-26 RX ADMIN — ONDANSETRON 4 MG: 2 INJECTION INTRAMUSCULAR; INTRAVENOUS at 15:40

## 2022-12-26 RX ADMIN — VANCOMYCIN HYDROCHLORIDE 1000 MG: 1 INJECTION, SOLUTION INTRAVENOUS at 10:45

## 2022-12-26 RX ADMIN — METRONIDAZOLE 500 MG: 500 INJECTION, SOLUTION INTRAVENOUS at 05:48

## 2022-12-26 RX ADMIN — ONDANSETRON 4 MG: 2 INJECTION INTRAMUSCULAR; INTRAVENOUS at 01:47

## 2022-12-26 RX ADMIN — DEXTROSE, SODIUM CHLORIDE, AND POTASSIUM CHLORIDE 75 ML/HR: 5; .45; .15 INJECTION INTRAVENOUS at 20:05

## 2022-12-26 RX ADMIN — AMLODIPINE BESYLATE 10 MG: 10 TABLET ORAL at 10:41

## 2022-12-26 RX ADMIN — ENOXAPARIN SODIUM 40 MG: 100 INJECTION SUBCUTANEOUS at 10:41

## 2022-12-26 RX ADMIN — ONDANSETRON 4 MG: 2 INJECTION INTRAMUSCULAR; INTRAVENOUS at 19:53

## 2022-12-26 RX ADMIN — BUDESONIDE 0.5 MG: 0.5 INHALANT ORAL at 07:32

## 2022-12-26 RX ADMIN — DEXTROSE, SODIUM CHLORIDE, AND POTASSIUM CHLORIDE 75 ML/HR: 5; .45; .15 INJECTION INTRAVENOUS at 06:37

## 2022-12-26 RX ADMIN — NEBIVOLOL 5 MG: 5 TABLET ORAL at 10:40

## 2022-12-26 RX ADMIN — METRONIDAZOLE 500 MG: 500 INJECTION, SOLUTION INTRAVENOUS at 14:39

## 2022-12-26 RX ADMIN — CEFTRIAXONE 1000 MG: 1 INJECTION, SOLUTION INTRAVENOUS at 14:38

## 2022-12-26 RX ADMIN — METRONIDAZOLE 500 MG: 500 INJECTION, SOLUTION INTRAVENOUS at 21:10

## 2022-12-26 RX ADMIN — LEVALBUTEROL HYDROCHLORIDE 1.25 MG: 1.25 SOLUTION, CONCENTRATE RESPIRATORY (INHALATION) at 07:32

## 2022-12-26 NOTE — PROGRESS NOTES
Progress Note - General Surgery   Lorri Dugan 58 y o  female MRN: [id number]  Unit/Bed#: -01 Encounter: [id number]    Assessment:  71-year-old female who is postop day 21 status post hand-assisted laparoscopic sigmoid resection, washout, with diverting loop colostomy, complicated by intra-abdominal fluid collection/abscess status post percutaneous drainage  Patient is just recently been afebrile  Her last fever of 103 was December 24  She has been afebrile since that time  Leukocytosis remains stable at around 14  CT scan showing drains in appropriate position and this was confirmed by IR review  Small bilateral pleural effusions with questionable underlying pneumonia  Vancomycin added, Zosyn stopped and changed to Rocephin and Flagyl  Pulmonary work-up thus far has been negative  Pulmonology reviewed and saw patient and does not feel that there is any need for any thoracentesis  Plan:    Patient likely just a smoldering inflammatory/septic state  Leukocytosis remains lateral   She is currently on antibiotics  Drains in appropriate position  Infectious disease will evaluate patient today  Nothing new from pulmonology standpoint  Patient once again encouraged to get out of bed to chair  She does plan on getting to the chair today  PT OT is on board  Patient was initially placed n p o  with sips due to nausea and vomiting which is likely secondary to the fevers and overall septic state  Continues to vomit intermittently and but is wanting to try some crackers to see if that will settle her stomach  She will be advanced to clear liquids toast and crackers  I will get a KUB to rule out any underlying ileus although ostomy appears to be functional with stool in the bag  Subjective/Objective   Chief Complaint: Nausea vomiting    Subjective: Patient disclosed nausea vomiting last evening  No inciting event  Ostomy is working  Pain controlled    Patient did not go that yesterday  Objective:     Blood pressure 118/82, pulse 83, temperature 98 6 °F (37 °C), resp  rate 18, height 5' 5" (1 651 m), weight 79 9 kg (176 lb 2 4 oz), SpO2 93 %  ,Body mass index is 29 31 kg/m²  Intake/Output Summary (Last 24 hours) at 12/26/2022 1119  Last data filed at 12/26/2022 0601  Gross per 24 hour   Intake 1516 25 ml   Output 603 ml   Net 913 25 ml       Invasive Devices     Peripheral Intravenous Line  Duration           Peripheral IV 12/26/22 Dorsal (posterior); Right Wrist <1 day    Peripheral IV 12/26/22 Right;Ventral (anterior) Forearm <1 day          Drain  Duration           Colostomy RLQ 21 days    Abscess Drain RUQ 5 days    Abscess Drain RUQ 5 days                Physical Exam:   General: No acute distress resting comfortably  Pulmonary: Decreased breath sounds at the bases bilaterally  No obvious wheezes  No use accessory muscles  Productive cough noted  GI: Abdomen is soft, obese, appropriately tender, transverse colostomy with air and stool in the bag  Left lower quadrant incision/wound granulating nicely  Wet-to-dry dressing changed  Lab, Imaging and other studies:  I have personally reviewed pertinent lab results    , CBC:   Lab Results   Component Value Date    WBC 14 44 (H) 12/26/2022    HGB 8 6 (L) 12/26/2022    HCT 26 8 (L) 12/26/2022    MCV 94 12/26/2022     (H) 12/26/2022    MCH 30 3 12/26/2022    MCHC 32 1 12/26/2022    RDW 13 3 12/26/2022    MPV 8 9 12/26/2022   , CMP: No results found for: SODIUM, K, CL, CO2, ANIONGAP, BUN, CREATININE, GLUCOSE, CALCIUM, AST, ALT, ALKPHOS, PROT, BILITOT, EGFR, Coagulation: No results found for: PT, INR, APTT, Urinalysis: No results found for: COLORU, CLARITYU, SPECGRAV, PHUR, LEUKOCYTESUR, NITRITE, PROTEINUA, GLUCOSEU, KETONESU, BILIRUBINUR, BLOODU, Amylase: No results found for: AMYLASE, Lipase: No results found for: LIPASE  VTE Pharmacologic Prophylaxis: Enoxaparin (Lovenox)  VTE Mechanical Prophylaxis: sequential compression device

## 2022-12-26 NOTE — PROGRESS NOTES
[address]  Progress Note - Samym Olguin 1960, 58 y o  female MRN: [id number]  Unit/Bed#: -01 Encounter: [id number]  Primary Care Provider: Eric Childers   Date and time admitted to hospital: 12/4/2022  7:07 AM    Fever  Assessment & Plan  · Fevers appear to be improving  · CT scan of the chest/abdomen/pelvis performed on 12/24/2022, results reviewed  · Reviewed with pulmonology and infectious disease, likely not related to pneumonia  Fevers most likely secondary to intra-abdominal infection  · flu/COVID/RSV negative  · Patient was briefly on vancomycin which has been discontinued  Zosyn has been transitioned to ceftriaxone/Flagyl  · Strep pneumo/urine Legionella negative  Will follow-up sputum culture and MRSA screen    Acute respiratory failure with hypoxia (HCC)  Assessment & Plan  · Likely multifactorial secondary to atelectasis, possible pneumonia, and bilateral effusions  · CT chest with bilateral effusions  · BNP was slightly elevated  · Encourage incentive spirometry  · Continue oxygen and titrate as tolerated  · Pulmonology input appreciated  No role for thoracentesis at this time  Anemia  Assessment & Plan  Appears to be post operative  No signs of acute blood loss  Will continue to monitor  Check iron panel    Hypertension  Assessment & Plan  -Continue home amlodipine and nebivolol  -Blood pressure has been stable   -lisinopril held for now    GERD (gastroesophageal reflux disease)  Assessment & Plan  - Continue PPI    * Sigmoid diverticulitis  Assessment & Plan  Continue management per primary team      VTE Prophylaxis:  Enoxaparin (Lovenox)    Patient Centered Rounds: I have performed bedside rounds with nursing staff today      Discussions with Specialists or Other Care Team Provider: yes  Education and Discussions with Family / Patient: Spoke with patient's  at bedside regarding plan of care    Current Length of Stay: 22 day(s)    Current Patient Status: Inpatient   Certification Statement: The patient will continue to require additional inpatient hospital stay due to diverticulitis    Discharge Plan: per primary team    Code Status: Level 1 - Full Code    Subjective:   Fevers are improving  Abdominal pain controlled with pain medications  Objective:     Vitals:   Temp (24hrs), Av 3 °F (37 4 °C), Min:98 6 °F (37 °C), Max:99 8 °F (37 7 °C)    Temp:  [98 6 °F (37 °C)-99 8 °F (37 7 °C)] 98 6 °F (37 °C)  HR:  [79-83] 83  Resp:  [18] 18  BP: (112-133)/(62-82) 118/82  SpO2:  [90 %-93 %] 93 %  Body mass index is 29 31 kg/m²  Input and Output Summary (last 24 hours): Intake/Output Summary (Last 24 hours) at 2022 1425  Last data filed at 2022 0601  Gross per 24 hour   Intake 150 ml   Output 603 ml   Net -453 ml       Physical Exam:   Physical Exam  Constitutional:       General: She is not in acute distress  Appearance: She is ill-appearing  HENT:      Head: Normocephalic and atraumatic  Nose: Nose normal       Mouth/Throat:      Mouth: Mucous membranes are dry  Eyes:      Extraocular Movements: Extraocular movements intact  Conjunctiva/sclera: Conjunctivae normal    Cardiovascular:      Rate and Rhythm: Normal rate and regular rhythm  Pulmonary:      Effort: Pulmonary effort is normal  No respiratory distress  Abdominal:      Palpations: Abdomen is soft  Tenderness: There is no abdominal tenderness  Comments: Colostomy noted  Abdominal drain in place   Musculoskeletal:         General: Normal range of motion  Cervical back: Normal range of motion and neck supple  Comments: Generalized weakness   Skin:     General: Skin is warm and dry  Neurological:      General: No focal deficit present  Mental Status: She is alert  Mental status is at baseline  Cranial Nerves: No cranial nerve deficit     Psychiatric:         Mood and Affect: Mood normal          Behavior: Behavior normal  Additional Data:     Labs:    Results from last 7 days   Lab Units 12/26/22  0431 12/25/22  0501 12/24/22  0449   WBC Thousand/uL 14 44* 15 24* 14 21*   HEMOGLOBIN g/dL 8 6* 8 1* 8 5*   HEMATOCRIT % 26 8* 25 8* 26 5*   PLATELETS Thousands/uL 590* 583* 666*   NEUTROS PCT %  --   --  82*   LYMPHS PCT %  --   --  8*   LYMPHO PCT %  --  9*  --    MONOS PCT %  --   --  8   MONO PCT %  --  6  --    EOS PCT %  --  2 0     Results from last 7 days   Lab Units 12/25/22  0501   SODIUM mmol/L 136   POTASSIUM mmol/L 3 9   CHLORIDE mmol/L 100   CO2 mmol/L 25   BUN mg/dL 9   CREATININE mg/dL 0 64   CALCIUM mg/dL 8 3*   ALK PHOS U/L 98   ALT U/L 18   AST U/L 22                   * I Have Reviewed All Lab Data Listed Above  * Additional Pertinent Lab Tests Reviewed: [address] 66 Admission  Reviewed    Imaging:  Imaging Reports Reviewed Today Include: No new imaging    Recent Cultures (last 7 days):     Results from last 7 days   Lab Units 12/26/22  0109 12/25/22  1433 12/24/22  1122 12/20/22  1355   BLOOD CULTURE   --   --  No Growth at 24 hrs    No Growth at 24 hrs   --    GRAM STAIN RESULT   --  1+ Epithelial cells per low power field  Rare Polys  No organisms seen  --  No Polys or Bacteria seen   BODY FLUID CULTURE, STERILE   --   --   --  Few Colonies of Escherichia coli*   LEGIONELLA URINARY ANTIGEN  Negative  --   --   --        Last 24 Hours Medication List:   Current Facility-Administered Medications   Medication Dose Route Frequency Provider Last Rate   • acetaminophen  975 mg Oral Q6H PRN Pankaj Morales PA-C     • amLODIPine  10 mg Oral Daily Jocelyn Millan PA-C     • aspirin  81 mg Oral Daily Irma Nevarez PA-C     • budesonide  0 5 mg Nebulization Q12H Pankaj Morales PA-C     • cefTRIAXone  1,000 mg Intravenous Q24H Tobe Kayser, MD Stopped (12/25/22 4232)   • dextrose 5 % and sodium chloride 0 45 % with KCl 20 mEq/L  75 mL/hr Intravenous Continuous Rox Gentle DO Kaley 75 mL/hr (12/26/22 1278)   • enoxaparin  40 mg Subcutaneous Q24H [facility] BARBIE Hernandez     • levalbuterol  1 25 mg Nebulization BID Nicolás Salgado MD     • lidocaine  2 patch Topical Daily BARBIE Barahona     • melatonin  6 mg Per NG Tube HS BARBIE Barahona     • metroNIDAZOLE  500 mg Intravenous Q8H Griselda Chancellor,  mg (12/26/22 0565)   • nebivolol  5 mg Oral Daily Jocelyn Millan PA-C     • ondansetron  4 mg Intravenous Q4H PRN Anurag Bernard PA-C     • oxyCODONE-acetaminophen  1 tablet Oral Q6H PRN Pankaj Morales PA-C     • pantoprazole  40 mg Oral Early Morning Nicolás Salgado MD     • phenol  1 spray Mouth/Throat Q2H PRN BARBIE Barahona     • promethazine  25 mg Intramuscular Q6H PRN Suhail Emerson PA-C     • sodium chloride  1 spray Each Nare Q1H PRN Esha Jefferson PA-C     • traMADol  50 mg Oral Q6H PRN BARBIE Barahona          Today, Patient Was Seen By: Griselda Chancellor, MD    ** Please Note: Dictation voice to text software may have been used in the creation of this document   **

## 2022-12-26 NOTE — ASSESSMENT & PLAN NOTE
· Fevers appear to be improving  · CT scan of the chest/abdomen/pelvis performed on 12/24/2022, results reviewed  · Reviewed with pulmonology and infectious disease, likely not related to pneumonia  Fevers most likely secondary to intra-abdominal infection  · flu/COVID/RSV negative  · Patient was briefly on vancomycin which has been discontinued  Zosyn has been transitioned to ceftriaxone/Flagyl  · Strep pneumo/urine Legionella negative    Will follow-up sputum culture and MRSA screen

## 2022-12-26 NOTE — PLAN OF CARE
Problem: Potential for Falls  Goal: Patient will remain free of falls  Description: INTERVENTIONS:  - Educate patient/family on patient safety including physical limitations  - Instruct patient to call for assistance with activity   - Consult OT/PT to assist with strengthening/mobility   - Keep Call bell within reach  - Keep bed low and locked with side rails adjusted as appropriate  - Keep care items and personal belongings within reach  - Initiate and maintain comfort rounds  - Make Fall Risk Sign visible to staff  - Offer Toileting every 2 Hours, in advance of need  - Initiate/Maintain BED alarm  - Obtain necessary fall risk management equipment: yellow socks  - Apply yellow socks and bracelet for high fall risk patients  - Consider moving patient to room near nurses station  Outcome: Progressing     Problem: PAIN - ADULT  Goal: Verbalizes/displays adequate comfort level or baseline comfort level  Description: Interventions:  - Encourage patient to monitor pain and request assistance  - Assess pain using appropriate pain scale  - Administer analgesics based on type and severity of pain and evaluate response  - Implement non-pharmacological measures as appropriate and evaluate response  - Consider cultural and social influences on pain and pain management  - Notify physician/advanced practitioner if interventions unsuccessful or patient reports new pain  Outcome: Progressing     Problem: INFECTION - ADULT  Goal: Absence or prevention of progression during hospitalization  Description: INTERVENTIONS:  - Assess and monitor for signs and symptoms of infection  - Monitor lab/diagnostic results  - Monitor all insertion sites, i e  indwelling lines, tubes, and drains  - Monitor endotracheal if appropriate and nasal secretions for changes in amount and color  - [place] appropriate cooling/warming therapies per order  - Administer medications as ordered  - Instruct and encourage patient and family to use good hand hygiene technique  - Identify and instruct in appropriate isolation precautions for identified infection/condition  Outcome: Progressing     Problem: SAFETY ADULT  Goal: Patient will remain free of falls  Description: INTERVENTIONS:  - Educate patient/family on patient safety including physical limitations  - Instruct patient to call for assistance with activity   - Consult OT/PT to assist with strengthening/mobility   - Keep Call bell within reach  - Keep bed low and locked with side rails adjusted as appropriate  - Keep care items and personal belongings within reach  - Initiate and maintain comfort rounds  - Make Fall Risk Sign visible to staff  - Offer Toileting every 2 Hours, in advance of need  - Initiate/Maintain BED alarm  - Obtain necessary fall risk management equipment: yellow socks  - Apply yellow socks and bracelet for high fall risk patients  - Consider moving patient to room near nurses station  Outcome: Progressing  Goal: Maintain or return to baseline ADL function  Description: INTERVENTIONS:  - Educate patient/family on patient safety including physical limitations  - Instruct patient to call for assistance with activity   - Consult OT/PT to assist with strengthening/mobility   - Keep Call bell within reach  - Keep bed low and locked with side rails adjusted as appropriate  - Keep care items and personal belongings within reach  - Initiate and maintain comfort rounds  - Make Fall Risk Sign visible to staff  - Offer Toileting every 2 Hours, in advance of need  - Initiate/Maintain bed alarm  - Obtain necessary fall risk management equipment: yellow socks  - Apply yellow socks and bracelet for high fall risk patients  - Consider moving patient to room near nurses station  Outcome: Progressing  Goal: Maintains/Returns to pre admission functional level  Description: INTERVENTIONS:  - Perform BMAT or MOVE assessment daily    - Set and communicate daily mobility goal to care team and patient/family/caregiver  - Collaborate with rehabilitation services on mobility goals if consulted  - Perform Range of Motion 3 times a day  - Reposition patient every 2 hours  - Dangle patient 3 times a day  - Stand patient 3 times a day  - Ambulate patient 3 times a day  - Out of bed to chair 3 times a day   - Out of bed for meals 3 times a day  - Out of bed for toileting  - Record patient progress and toleration of activity level   Outcome: Progressing     Problem: DISCHARGE PLANNING  Goal: Discharge to home or other facility with appropriate resources  Description: INTERVENTIONS:  - Identify barriers to discharge w/patient and caregiver  - Arrange for needed discharge resources and transportation as appropriate  - Identify discharge learning needs (meds, wound care, etc )  - Arrange for interpretive services to assist at discharge as needed  - Refer to Case Management Department for coordinating discharge planning if the patient needs post-hospital services based on physician/advanced practitioner order or complex needs related to functional status, cognitive ability, or social support system  Outcome: Progressing     Problem: Knowledge Deficit  Goal: Patient/family/caregiver demonstrates understanding of disease process, treatment plan, medications, and discharge instructions  Description: Complete learning assessment and assess knowledge base    Interventions:  - Provide teaching at level of understanding  - Provide teaching via preferred learning methods  Outcome: Progressing     Problem: MOBILITY - ADULT  Goal: Maintain or return to baseline ADL function  Description: INTERVENTIONS:  - Educate patient/family on patient safety including physical limitations  - Instruct patient to call for assistance with activity   - Consult OT/PT to assist with strengthening/mobility   - Keep Call bell within reach  - Keep bed low and locked with side rails adjusted as appropriate  - Keep care items and personal belongings within reach  - Initiate and maintain comfort rounds  - Make Fall Risk Sign visible to staff  - Offer Toileting every 2 Hours, in advance of need  - Initiate/Maintain bed alarm  - Obtain necessary fall risk management equipment: yellow socks  - Apply yellow socks and bracelet for high fall risk patients  - Consider moving patient to room near nurses station  Outcome: Progressing  Goal: Maintains/Returns to pre admission functional level  Description: INTERVENTIONS:  - Perform BMAT or MOVE assessment daily    - Set and communicate daily mobility goal to care team and patient/family/caregiver  - Collaborate with rehabilitation services on mobility goals if consulted  - Perform Range of Motion 3 times a day  - Reposition patient every 2 hours    - Dangle patient 3 times a day  - Stand patient 3 times a day  - Ambulate patient 3 times a day  - Out of bed to chair 3 times a day   - Out of bed for meals 3 times a day  - Out of bed for toileting  - Record patient progress and toleration of activity level   Outcome: Progressing     Problem: Prexisting or High Potential for Compromised Skin Integrity  Goal: Skin integrity is maintained or improved  Description: INTERVENTIONS:  - Identify patients at risk for skin breakdown  - Assess and monitor skin integrity  - Assess and monitor nutrition and hydration status  - Monitor labs   - Assess for incontinence   - Turn and reposition patient  - Assist with mobility/ambulation  - Relieve pressure over bony prominences  - Avoid friction and shearing  - Provide appropriate hygiene as needed including keeping skin clean and dry  - Evaluate need for skin moisturizer/barrier cream  - Collaborate with interdisciplinary team   - Patient/family teaching  - Consider wound care consult   Outcome: Progressing     Problem: Nutrition/Hydration-ADULT  Goal: Nutrient/Hydration intake appropriate for improving, restoring or maintaining nutritional needs  Description: Monitor and assess patient's nutrition/hydration status for malnutrition  Collaborate with interdisciplinary team and initiate plan and interventions as ordered  Monitor patient's weight and dietary intake as ordered or per policy  Utilize nutrition screening tool and intervene as necessary  Determine patient's food preferences and provide high-protein, high-caloric foods as appropriate       INTERVENTIONS:  - Monitor oral intake, urinary output, labs, and treatment plans  - Assess nutrition and hydration status and recommend course of action  - Evaluate amount of meals eaten  - Assist patient with eating if necessary   - Allow adequate time for meals  - Recommend/ encourage appropriate diets, oral nutritional supplements, and vitamin/mineral supplements  - Order, calculate, and assess calorie counts as needed  - Recommend, monitor, and adjust tube feedings and TPN/PPN based on assessed needs  - Assess need for intravenous fluids  - Provide specific nutrition/hydration education as appropriate  - Include patient/family/caregiver in decisions related to nutrition  Outcome: Progressing

## 2022-12-26 NOTE — CONSULTS
Consultation - Infectious Disease   Areli Mendez 58 y o  female MRN: [id number]  Unit/Bed#: -01 Encounter: [id number]      IMPRESSION & RECOMMENDATIONS:   Impression/Recommendations:  1  Recurrent sepsis  Appears to be secondary to intra-abdominal source, as below  Negative serial blood cultures  Low clinical suspicion for pneumonia  Seems to be improving with downtrending fever curve and WBC count  Hemodynamics are stable  -Antibiotic plan as below  -Follow temperatures and hemodynamics  -Recheck CBC in a m     2  Intra-abdominal abscesses  In the setting of #3  Status post IR drain placement x2 on 12/20 yielding pus  Culture grew E  Coli  Repeat CT shows decreasing size of abscesses  There is still an undrained pelvic collection, which remains unchanged which may be postop seroma     -Continue ceftriaxone/Flagyl  -Hold vancomycin  -Drain management  -Serial wound exams  -Surgery/IR follow-up    3  Sigmoid diverticulitis with perforation  Status post sigmoid resection with transverse loop colostomy 12/5  Complicated by intra-abdominal abscesses, as above     -Antibiotic plan as above  -Surgery follow-up ongoing    4  Bilateral pleural effusions, with associated compressive atelectasis  Likely due to intra-abdominal process  Pulmonology input noted  Agree that clinical suspicion for pneumonia remains low     -Follow respiratory symptoms/O2 requirements  -Incentive spirometry, PT      Antibiotics:  Antibiotic D9  Vancomycin/ceftriaxone/Flagyl    I discussed above plan with surgery service  Thank you for this consultation  We will follow along with you        HISTORY OF PRESENT ILLNESS:  Reason for Consult: Intra-abdominal abscesses    HPI: Areli Mendez is a 58 y o  female with asthma, tracheobronchomalacia hypertension who initially presented on 12/4 due to first episode of sigmoid diverticulitis with small amount of pneumoperitoneum ultimately requiring laparoscopic hand-assisted sigmoid resection with transverse loop colostomy on 12/5  Intraoperative findings were consistent with perforated feculent diverticulitis with large perforation at the mid sigmoid colon  Culture from OR on 12/5 grew E  coli, Enterobacter  Patient received course of postoperative Zosyn  Postoperatively, patient had persistent drainage from abdominal wound which was sent for culture on 12/15 which grew staph lugdunensis  On 12/18, CT showed multiple abscesses in right hemiabdomen/pelvis several of which are subcapsular to the liver and others in close approximation to ascending colon and loops of small bowel  On 12/20, IR placed 2 drains yielding pus  Patient was restarted on Zosyn  Culture from 12/20 grew E  coli  Follow-up CT from 12/24 showed decrease in size of multiple collections, in addition to bilateral pleural effusions with compressive atelectasis  Patient is currently on vancomycin, ceftriaxone and Flagyl since yesterday  Her last high fever was 103 on 12/24  She was also evaluated by pulmonology yesterday with low suspicion for pneumonia  REVIEW OF SYSTEMS:  A complete system-based review of systems is otherwise negative      PAST MEDICAL HISTORY:  Past Medical History:   Diagnosis Date   • Asthma    • Bronchiectasis ([address] )    • Bronchomalacia    • GERD (gastroesophageal reflux disease)    • Hiatal hernia      Past Surgical History:   Procedure Laterality Date   • APPENDECTOMY     • CHOLECYSTECTOMY     • COLON SIGMOID RESECTION LAPAROSCOPIC N/A 12/5/2022    Procedure: RESECTION COLON SIGMOID LAPAROSCOPIC HAND-ASSISTED, diverting transverse loop colostomy;  Surgeon: Shanthi Ahmadi MD;  Location: [facility];  Service: General   • HYSTERECTOMY     • IR DRAINAGE TUBE PLACEMENT  12/20/2022   • KNEE SURGERY Left        FAMILY HISTORY:  Non-contributory    SOCIAL HISTORY:  Social History     Substance and Sexual Activity   Alcohol Use Not Currently     Social History     Substance and Sexual Activity Drug Use Not Currently     Social History     Tobacco Use   Smoking Status Never   Smokeless Tobacco Never       ALLERGIES:  Allergies   Allergen Reactions   • Dilaudid [Hydromorphone] Vomiting   • Doxycycline Vomiting   • Hydrochlorothiazide Vomiting   • Sulfa Antibiotics Vomiting       MEDICATIONS:  All current active medications have been reviewed  PHYSICAL EXAM:  Vitals:  Temp:  [98 6 °F (37 °C)-99 8 °F (37 7 °C)] 98 6 °F (37 °C)  HR:  [79-83] 83  Resp:  [18] 18  BP: (112-133)/(62-82) 118/82  SpO2:  [90 %-93 %] 93 %  Temp (24hrs), Av 3 °F (37 4 °C), Min:98 6 °F (37 °C), Max:99 8 °F (37 7 °C)  Current: Temperature: 98 6 °F (37 °C)     Physical Exam:  General:  Well-nourished, well-developed, in no acute distress  Eyes:  Conjunctive clear with no hemorrhages or effusions  Oropharynx:  No ulcers, no lesions  Neck:  Supple, no lymphadenopathy  Lungs: Nonlabored respirations  Abdomen:  Soft, no rebound or guarding, ostomy with liquid brown stool, left lower quadrant wound with no surrounding erythema or purulence  Right lower quadrant drains in place x2 with purulent output  Extremities:  No peripheral cyanosis, clubbing, or edema  Skin:  No rashes, no ulcers  Neurological:  Moves all four extremities spontaneously    LABS, IMAGING, & OTHER STUDIES:  Lab Results:  I have personally reviewed pertinent labs    Results from last 7 days   Lab Units 22  0501 22  0449 22  0544   POTASSIUM mmol/L 3 9 4 0 3 6   CHLORIDE mmol/L 100 99 101   CO2 mmol/L 25 28 27   BUN mg/dL 9 9 9   CREATININE mg/dL 0 64 0 65 0 62   EGFR ml/min/1 73sq m 95 95 96   CALCIUM mg/dL 8 3* 8 4 8 4   AST U/L 22  --   --    ALT U/L 18  --   --    ALK PHOS U/L 98  --   --      Results from last 7 days   Lab Units 22  0431 22  0501 22  0449   WBC Thousand/uL 14 44* 15 24* 14 21*   HEMOGLOBIN g/dL 8 6* 8 1* 8 5*   PLATELETS Thousands/uL 590* 583* 666*     Results from last 7 days   Lab Units 22  0102 12/25/22  1433 12/24/22  1122 12/20/22  1355   BLOOD CULTURE   --   --  No Growth at 24 hrs  No Growth at 24 hrs   --    GRAM STAIN RESULT   --  1+ Epithelial cells per low power field  Rare Polys  No organisms seen  --  No Polys or Bacteria seen   BODY FLUID CULTURE, STERILE   --   --   --  Few Colonies of Escherichia coli*   LEGIONELLA URINARY ANTIGEN  Negative  --   --   --          Imaging Studies:   I have personally reviewed pertinent imaging study reports and images in PACS  CT chest/abdomen/pelvis from 12/18 showed status post interval sigmoidectomy with colorectal anastomosis and diverting right lower quadrant loop transverse colostomy  Multiple abscesses in right hemiabdomen/pelvis several of which are subcapsular to the liver and others in close approximation to the ascending colon and loops of small bowel  Repeat CT chest/abdomen/pelvis from 12/24 showed decrease in size of right-sided intra-abdominal abscess and resolution of perihepatic collections following percutaneous placement of 2 drains  2 5 x 5 x 4 cm thin-walled pelvic collection is unchanged  No new collections  Bilateral pleural effusions with compressive atelectasis  EKG, Pathology, and Other Studies:   I have personally reviewed pertinent reports

## 2022-12-26 NOTE — PROGRESS NOTES
Cecily Al is a 58 y o  female who is currently ordered Vancomycin IV with management by the Pharmacy Consult service  Relevant clinical data and objective / subjective history reviewed  Vancomycin Assessment:  Indication and Goal AUC/Trough: Pneumonia (goal -600, trough >10), -600, trough >10  Clinical Status: stable  Micro:   12/15 wound culture - staph lugdunesis   body fluid culture - Ecoli   BC - Pending  MRSA pending    Renal Function:  SCr: 0 64 mg/dL  CrCl: 9531 mL/min  Renal replacement: Not on dialysis  Days of Therapy: 2  Current Dose: 1000mg IV Q12H  Vancomycin Plan:  New Dosinmg IV Q12H  Estimated AUC: 459 mcg*hr/mL  Estimated Trough: 14 mcg/mL  Next Level:  with AM labs  Renal Function Monitoring: Daily BMP and UOP  Pharmacy will continue to follow closely for s/sx of nephrotoxicity, infusion reactions and appropriateness of therapy  BMP and CBC will be ordered per protocol  We will continue to follow the patient’s culture results and clinical progress daily      Arpan Abraham, Pharmacist

## 2022-12-26 NOTE — CONSULTS
The patient’s Vancomycin therapy has been completed / discontinued  Thank you for this consult; Pharmacy will sign-off now      Td Renteria PharmD

## 2022-12-26 NOTE — OCCUPATIONAL THERAPY NOTE
12/26/22 1050   OT Last Visit   OT Visit Date 12/26/22   Note Type   Note Type Cancelled Session   Cancel Reasons Refusal  (post explanation of OT services, pt  stated "no, not now" )     REJI Bhat/MARGA  Nurse Leanor Mcburney was present/aware

## 2022-12-26 NOTE — ASSESSMENT & PLAN NOTE
· Likely multifactorial secondary to atelectasis, possible pneumonia, and bilateral effusions  · CT chest with bilateral effusions  · BNP was slightly elevated  · Encourage incentive spirometry  · Continue oxygen and titrate as tolerated  · Pulmonology input appreciated  No role for thoracentesis at this time

## 2022-12-27 ENCOUNTER — APPOINTMENT (INPATIENT)
Dept: NON INVASIVE DIAGNOSTICS | Facility: HOSPITAL | Age: 62
End: 2022-12-27

## 2022-12-27 ENCOUNTER — APPOINTMENT (INPATIENT)
Dept: RADIOLOGY | Facility: HOSPITAL | Age: 62
End: 2022-12-27

## 2022-12-27 LAB
ALBUMIN SERPL BCP-MCNC: 2.4 G/DL (ref 3.5–5)
ALP SERPL-CCNC: 93 U/L (ref 34–104)
ALT SERPL W P-5'-P-CCNC: 16 U/L (ref 7–52)
ANION GAP SERPL CALCULATED.3IONS-SCNC: 9 MMOL/L (ref 4–13)
AST SERPL W P-5'-P-CCNC: 23 U/L (ref 13–39)
BILIRUB SERPL-MCNC: 0.33 MG/DL (ref 0.2–1)
BUN SERPL-MCNC: 6 MG/DL (ref 5–25)
CALCIUM ALBUM COR SERPL-MCNC: 9.5 MG/DL (ref 8.3–10.1)
CALCIUM SERPL-MCNC: 8.2 MG/DL (ref 8.4–10.2)
CHLORIDE SERPL-SCNC: 102 MMOL/L (ref 96–108)
CO2 SERPL-SCNC: 25 MMOL/L (ref 21–32)
CREAT SERPL-MCNC: 0.69 MG/DL (ref 0.6–1.3)
ERYTHROCYTE [DISTWIDTH] IN BLOOD BY AUTOMATED COUNT: 13.4 % (ref 11.6–15.1)
GFR SERPL CREATININE-BSD FRML MDRD: 93 ML/MIN/1.73SQ M
GLUCOSE SERPL-MCNC: 126 MG/DL (ref 65–140)
HCT VFR BLD AUTO: 26.7 % (ref 34.8–46.1)
HGB BLD-MCNC: 8.6 G/DL (ref 11.5–15.4)
MCH RBC QN AUTO: 29.9 PG (ref 26.8–34.3)
MCHC RBC AUTO-ENTMCNC: 32.2 G/DL (ref 31.4–37.4)
MCV RBC AUTO: 93 FL (ref 82–98)
PLATELET # BLD AUTO: 522 THOUSANDS/UL (ref 149–390)
PMV BLD AUTO: 8.7 FL (ref 8.9–12.7)
POTASSIUM SERPL-SCNC: 3.7 MMOL/L (ref 3.5–5.3)
PROT SERPL-MCNC: 5.4 G/DL (ref 6.4–8.4)
RBC # BLD AUTO: 2.88 MILLION/UL (ref 3.81–5.12)
SODIUM SERPL-SCNC: 136 MMOL/L (ref 135–147)
WBC # BLD AUTO: 13.22 THOUSAND/UL (ref 4.31–10.16)

## 2022-12-27 RX ORDER — PANTOPRAZOLE SODIUM 40 MG/1
40 TABLET, DELAYED RELEASE ORAL
Status: DISCONTINUED | OUTPATIENT
Start: 2022-12-27 | End: 2023-01-04 | Stop reason: HOSPADM

## 2022-12-27 RX ORDER — CALCIUM CARBONATE 200(500)MG
500 TABLET,CHEWABLE ORAL 3 TIMES DAILY PRN
Status: DISCONTINUED | OUTPATIENT
Start: 2022-12-27 | End: 2023-01-04 | Stop reason: HOSPADM

## 2022-12-27 RX ADMIN — METRONIDAZOLE 500 MG: 500 INJECTION, SOLUTION INTRAVENOUS at 05:08

## 2022-12-27 RX ADMIN — LEVALBUTEROL HYDROCHLORIDE 1.25 MG: 1.25 SOLUTION, CONCENTRATE RESPIRATORY (INHALATION) at 20:25

## 2022-12-27 RX ADMIN — ONDANSETRON 4 MG: 2 INJECTION INTRAMUSCULAR; INTRAVENOUS at 05:08

## 2022-12-27 RX ADMIN — TRAMADOL HYDROCHLORIDE 50 MG: 50 TABLET, COATED ORAL at 05:08

## 2022-12-27 RX ADMIN — NEBIVOLOL 5 MG: 5 TABLET ORAL at 09:03

## 2022-12-27 RX ADMIN — ONDANSETRON 4 MG: 2 INJECTION INTRAMUSCULAR; INTRAVENOUS at 16:51

## 2022-12-27 RX ADMIN — BUDESONIDE 0.5 MG: 0.5 INHALANT ORAL at 20:25

## 2022-12-27 RX ADMIN — CEFTRIAXONE 1000 MG: 1 INJECTION, SOLUTION INTRAVENOUS at 13:15

## 2022-12-27 RX ADMIN — PANTOPRAZOLE SODIUM 40 MG: 40 TABLET, DELAYED RELEASE ORAL at 05:08

## 2022-12-27 RX ADMIN — METRONIDAZOLE 500 MG: 500 INJECTION, SOLUTION INTRAVENOUS at 14:35

## 2022-12-27 RX ADMIN — ENOXAPARIN SODIUM 40 MG: 100 INJECTION SUBCUTANEOUS at 09:03

## 2022-12-27 RX ADMIN — AMLODIPINE BESYLATE 10 MG: 10 TABLET ORAL at 09:02

## 2022-12-27 RX ADMIN — ONDANSETRON 4 MG: 2 INJECTION INTRAMUSCULAR; INTRAVENOUS at 09:11

## 2022-12-27 RX ADMIN — METRONIDAZOLE 500 MG: 500 INJECTION, SOLUTION INTRAVENOUS at 22:20

## 2022-12-27 RX ADMIN — DEXTROSE, SODIUM CHLORIDE, AND POTASSIUM CHLORIDE 75 ML/HR: 5; .45; .15 INJECTION INTRAVENOUS at 10:50

## 2022-12-27 RX ADMIN — TRAMADOL HYDROCHLORIDE 50 MG: 50 TABLET, COATED ORAL at 22:27

## 2022-12-27 RX ADMIN — PANTOPRAZOLE SODIUM 40 MG: 40 TABLET, DELAYED RELEASE ORAL at 16:44

## 2022-12-27 NOTE — NURSING NOTE
Pt oob in her chair in no acute distress, surgery in to see pt and change all dsg, pt tolerated well

## 2022-12-27 NOTE — PLAN OF CARE
Problem: Potential for Falls  Goal: Patient will remain free of falls  Description: INTERVENTIONS:  - Educate patient/family on patient safety including physical limitations  - Instruct patient to call for assistance with activity   - Consult OT/PT to assist with strengthening/mobility   - Keep Call bell within reach  - Keep bed low and locked with side rails adjusted as appropriate  - Keep care items and personal belongings within reach  - Initiate and maintain comfort rounds  - Make Fall Risk Sign visible to staff  - Offer Toileting every 2 Hours, in advance of need  - Initiate/Maintain BED alarm  - Obtain necessary fall risk management equipment: yellow socks  - Apply yellow socks and bracelet for high fall risk patients  - Consider moving patient to room near nurses station  Outcome: Progressing     Problem: PAIN - ADULT  Goal: Verbalizes/displays adequate comfort level or baseline comfort level  Description: Interventions:  - Encourage patient to monitor pain and request assistance  - Assess pain using appropriate pain scale  - Administer analgesics based on type and severity of pain and evaluate response  - Implement non-pharmacological measures as appropriate and evaluate response  - Consider cultural and social influences on pain and pain management  - Notify physician/advanced practitioner if interventions unsuccessful or patient reports new pain  Outcome: Progressing     Problem: INFECTION - ADULT  Goal: Absence or prevention of progression during hospitalization  Description: INTERVENTIONS:  - Assess and monitor for signs and symptoms of infection  - Monitor lab/diagnostic results  - Monitor all insertion sites, i e  indwelling lines, tubes, and drains  - Monitor endotracheal if appropriate and nasal secretions for changes in amount and color  - [place] appropriate cooling/warming therapies per order  - Administer medications as ordered  - Instruct and encourage patient and family to use good hand hygiene technique  - Identify and instruct in appropriate isolation precautions for identified infection/condition  Outcome: Progressing     Problem: SAFETY ADULT  Goal: Patient will remain free of falls  Description: INTERVENTIONS:  - Educate patient/family on patient safety including physical limitations  - Instruct patient to call for assistance with activity   - Consult OT/PT to assist with strengthening/mobility   - Keep Call bell within reach  - Keep bed low and locked with side rails adjusted as appropriate  - Keep care items and personal belongings within reach  - Initiate and maintain comfort rounds  - Make Fall Risk Sign visible to staff  - Offer Toileting every 2 Hours, in advance of need  - Initiate/Maintain BED alarm  - Obtain necessary fall risk management equipment: yellow socks  - Apply yellow socks and bracelet for high fall risk patients  - Consider moving patient to room near nurses station  Outcome: Progressing  Goal: Maintain or return to baseline ADL function  Description: INTERVENTIONS:  - Educate patient/family on patient safety including physical limitations  - Instruct patient to call for assistance with activity   - Consult OT/PT to assist with strengthening/mobility   - Keep Call bell within reach  - Keep bed low and locked with side rails adjusted as appropriate  - Keep care items and personal belongings within reach  - Initiate and maintain comfort rounds  - Make Fall Risk Sign visible to staff  - Offer Toileting every 2 Hours, in advance of need  - Initiate/Maintain bed alarm  - Obtain necessary fall risk management equipment: yellow socks  - Apply yellow socks and bracelet for high fall risk patients  - Consider moving patient to room near nurses station  Outcome: Progressing  Goal: Maintains/Returns to pre admission functional level  Description: INTERVENTIONS:  - Perform BMAT or MOVE assessment daily    - Set and communicate daily mobility goal to care team and patient/family/caregiver  - Collaborate with rehabilitation services on mobility goals if consulted  - Perform Range of Motion 3 times a day  - Reposition patient every 2 hours  - Dangle patient 3 times a day  - Stand patient 3 times a day  - Ambulate patient 3 times a day  - Out of bed to chair 3 times a day   - Out of bed for meals 3 times a day  - Out of bed for toileting  - Record patient progress and toleration of activity level   Outcome: Progressing     Problem: DISCHARGE PLANNING  Goal: Discharge to home or other facility with appropriate resources  Description: INTERVENTIONS:  - Identify barriers to discharge w/patient and caregiver  - Arrange for needed discharge resources and transportation as appropriate  - Identify discharge learning needs (meds, wound care, etc )  - Arrange for interpretive services to assist at discharge as needed  - Refer to Case Management Department for coordinating discharge planning if the patient needs post-hospital services based on physician/advanced practitioner order or complex needs related to functional status, cognitive ability, or social support system  Outcome: Progressing     Problem: Knowledge Deficit  Goal: Patient/family/caregiver demonstrates understanding of disease process, treatment plan, medications, and discharge instructions  Description: Complete learning assessment and assess knowledge base    Interventions:  - Provide teaching at level of understanding  - Provide teaching via preferred learning methods  Outcome: Progressing     Problem: MOBILITY - ADULT  Goal: Maintain or return to baseline ADL function  Description: INTERVENTIONS:  - Educate patient/family on patient safety including physical limitations  - Instruct patient to call for assistance with activity   - Consult OT/PT to assist with strengthening/mobility   - Keep Call bell within reach  - Keep bed low and locked with side rails adjusted as appropriate  - Keep care items and personal belongings within reach  - Initiate and maintain comfort rounds  - Make Fall Risk Sign visible to staff  - Offer Toileting every 2 Hours, in advance of need  - Initiate/Maintain bed alarm  - Obtain necessary fall risk management equipment: yellow socks  - Apply yellow socks and bracelet for high fall risk patients  - Consider moving patient to room near nurses station  Outcome: Progressing  Goal: Maintains/Returns to pre admission functional level  Description: INTERVENTIONS:  - Perform BMAT or MOVE assessment daily    - Set and communicate daily mobility goal to care team and patient/family/caregiver  - Collaborate with rehabilitation services on mobility goals if consulted  - Perform Range of Motion 3 times a day  - Reposition patient every 2 hours    - Dangle patient 3 times a day  - Stand patient 3 times a day  - Ambulate patient 3 times a day  - Out of bed to chair 3 times a day   - Out of bed for meals 3 times a day  - Out of bed for toileting  - Record patient progress and toleration of activity level   Outcome: Progressing     Problem: Prexisting or High Potential for Compromised Skin Integrity  Goal: Skin integrity is maintained or improved  Description: INTERVENTIONS:  - Identify patients at risk for skin breakdown  - Assess and monitor skin integrity  - Assess and monitor nutrition and hydration status  - Monitor labs   - Assess for incontinence   - Turn and reposition patient  - Assist with mobility/ambulation  - Relieve pressure over bony prominences  - Avoid friction and shearing  - Provide appropriate hygiene as needed including keeping skin clean and dry  - Evaluate need for skin moisturizer/barrier cream  - Collaborate with interdisciplinary team   - Patient/family teaching  - Consider wound care consult   Outcome: Progressing     Problem: Nutrition/Hydration-ADULT  Goal: Nutrient/Hydration intake appropriate for improving, restoring or maintaining nutritional needs  Description: Monitor and assess patient's nutrition/hydration status for malnutrition  Collaborate with interdisciplinary team and initiate plan and interventions as ordered  Monitor patient's weight and dietary intake as ordered or per policy  Utilize nutrition screening tool and intervene as necessary  Determine patient's food preferences and provide high-protein, high-caloric foods as appropriate       INTERVENTIONS:  - Monitor oral intake, urinary output, labs, and treatment plans  - Assess nutrition and hydration status and recommend course of action  - Evaluate amount of meals eaten  - Assist patient with eating if necessary   - Allow adequate time for meals  - Recommend/ encourage appropriate diets, oral nutritional supplements, and vitamin/mineral supplements  - Order, calculate, and assess calorie counts as needed  - Recommend, monitor, and adjust tube feedings and TPN/PPN based on assessed needs  - Assess need for intravenous fluids  - Provide specific nutrition/hydration education as appropriate  - Include patient/family/caregiver in decisions related to nutrition  Outcome: Progressing

## 2022-12-27 NOTE — PROGRESS NOTES
Progress Note - General Surgery   Silvia Solders 58 y o  female MRN: [id number]  Unit/Bed#: [facility] Encounter: [id number]    Assessment:  65yo F POD#22 s/p emergent lap hand assisted sigmoid resection, primary anastomosis, and creation of diverting loop colostomy complicated by intra-abdominal fluid collections/abscess s/p perc drainage by IR   LLQ wound infection   - afebrile x 24 hours, VSS on 3L O2 via NC  - persistent leukocytosis, WBC 13 2 (14 4, 15)  - elytes WNL  - UOP unrecorded 2x  - Drains: #1 15cc ss, #2 35cc thin/tan  - Colostomy 325     Plan:  - pt not taking much PO intake, will discuss nutritional aspect of pts care with Dr Avni Mena   currently on CLD without much appetite and ongoing nausea   - Dressings changed at bedside this AM  - increase PPI to 40 bid, add tums PRN   - Pulm consulted over the weekend for findings of b/l pleural effusions and , no new recs at this time  - ID recommending cont ceftriaxone/flagyl, hold vanco   - IS 10x/hr while awake , deep breathing   - DVT ppx   - encouraged work with PT/OT, OOB, ambulation     Subjective/Objective   Subjective: Patient states she had a "rough weekend" with ongoing nausea and vomiting  Reports this is improved this morning, but not resolved  Also reports LUQ abdominal pain which began a few days ago and has not subsided  Poor appetite, not taking much nutrition in orally  Has rarely been OOB  Refusing ASA  Objective:   Blood pressure 117/71, pulse 77, temperature 99 1 °F (37 3 °C), resp  rate 18, height 5' 5" (1 651 m), weight 80 2 kg (176 lb 12 9 oz), SpO2 (!) 87 %  ,Body mass index is 29 42 kg/m²  Intake/Output Summary (Last 24 hours) at 12/27/2022 0930  Last data filed at 12/27/2022 0601  Gross per 24 hour   Intake 3075 ml   Output 375 ml   Net 2700 ml       Invasive Devices     Peripheral Intravenous Line  Duration           Peripheral IV 12/26/22 Dorsal (posterior); Right Wrist 1 day    Peripheral IV 12/26/22 Right;Ventral (anterior) Forearm 1 day          Drain  Duration           Colostomy RLQ 22 days    Abscess Drain RUQ 6 days    Abscess Drain RUQ 6 days                Physical Exam  Vitals and nursing note reviewed  Constitutional:       General: She is not in acute distress  Appearance: She is ill-appearing  HENT:      Mouth/Throat:      Mouth: Mucous membranes are dry  Cardiovascular:      Rate and Rhythm: Normal rate and regular rhythm  Pulmonary:      Breath sounds: No wheezing, rhonchi or rales  Abdominal:      General: Bowel sounds are normal  There is no distension  Palpations: Abdomen is soft  Tenderness: There is abdominal tenderness (LUQ)  There is no guarding or rebound  Comments: Loop colostomy present, appears retracted, but functional with gas and stool in bag  LLQ wound appears clean, ss drainage on 4x4, periwound skin unremarkable   IR drain x2 in place with suture, surrounding skin unremarkable    Neurological:      Mental Status: She is alert  Lab, Imaging and other studies:  I have personally reviewed pertinent lab results  , CBC:   Lab Results   Component Value Date    WBC 13 22 (H) 12/27/2022    HGB 8 6 (L) 12/27/2022    HCT 26 7 (L) 12/27/2022    MCV 93 12/27/2022     (H) 12/27/2022    MCH 29 9 12/27/2022    MCHC 32 2 12/27/2022    RDW 13 4 12/27/2022    MPV 8 7 (L) 12/27/2022   , CMP:   Lab Results   Component Value Date    SODIUM 136 12/27/2022    K 3 7 12/27/2022     12/27/2022    CO2 25 12/27/2022    BUN 6 12/27/2022    CREATININE 0 69 12/27/2022    CALCIUM 8 2 (L) 12/27/2022    AST 23 12/27/2022    ALT 16 12/27/2022    ALKPHOS 93 12/27/2022    EGFR 93 12/27/2022     VTE Pharmacologic Prophylaxis: Enoxaparin (Lovenox)  VTE Mechanical Prophylaxis: sequential compression device    Verita Deal  12/27/22  **Please Note: This note may have been constructed using a voice recognition system  **

## 2022-12-27 NOTE — PHYSICAL THERAPY NOTE
PHYSICAL THERAPY NOTE      Patient Name: Jesica WESTON Date: 2022      Patient's POC and STGs noted to  22  Per discussion with PTA and chart review, patient continues to warrant skilled PT services  At this time, POC and STG expiration date extended to 23  Please refer to PT evaluation and PT treatment notes for findings and pt progress with therapy services       Lucero Angeles, PT, DPT

## 2022-12-27 NOTE — OCCUPATIONAL THERAPY NOTE
12/27/22 1245   OT Last Visit   OT Visit Date 12/27/22   Note Type   Note Type Treatment   Pain Assessment   Pain Assessment Tool 0-10   Pain Score 5   Pain Location/Orientation Location: Abdomen   Pain Onset/Description Onset: Ongoing   Patient's Stated Pain Goal No pain   Hospital Pain Intervention(s) Medication (See MAR); Repositioned; Ambulation/increased activity; Emotional support   Restrictions/Precautions   Weight Bearing Precautions Per Order No   Other Precautions Chair Alarm; Bed Alarm;Multiple lines;Telemetry;O2;Fall Risk;Pain  (3L O2, colostomy bag)   ADL   Toileting Assistance  4  Minimal Assistance   Toileting Deficit Setup;Verbal cueing;Supervison/safety; Increased time to complete; Bedside commode   Toileting Comments Min A for txfer; completes bladder hygiene with set up   Transfers   Sit to Stand 4  Minimal assistance   Additional items Assist x 1; Armrests; Increased time required;Verbal cues   Stand to Sit 4  Minimal assistance   Additional items Assist x 1; Armrests; Increased time required;Verbal cues   Stand pivot 4  Minimal assistance   Additional items Assist x 1; Increased time required;Verbal cues   Toilet transfer 4  Minimal assistance   Additional items Assist x 1; Armrests; Increased time required;Commode;Verbal cues   Functional Mobility   Functional Mobility 4  Minimal assistance   Additional Comments Pt ambulates short distance from recliner to commode with Min A x 1  Additional items Rolling walker   Toilet Transfers   Toilet Transfer From Other (Comment)  (Recliner)   Toilet Transfer Type To and from   Toilet Transfer to Standard bedside commode   Toilet Transfer Technique Ambulating   Toilet Transfers Minimal assistance   Therapeutic Excerise-Strength   UE Strength Yes   Right Upper Extremity- Strength   R Shoulder Flexion; Extension  (horiz  add, scapular pro/ret)   R Elbow Elbow flexion;Elbow extension   R Wrist   (wrist rotation)   R Weight/Reps/Sets 1# weight x 15 reps   Left Upper Extremity-Strength   L Weights/Reps/Sets TE same as  R UE above   Cognition   Overall Cognitive Status WFL   Arousal/Participation Alert; Responsive; Cooperative   Attention Attends with cues to redirect   Orientation Level Oriented X4   Memory Within functional limits   Following Commands Follows all commands and directions without difficulty   Comments Pt agreeable to OT treatment  Activity Tolerance   Activity Tolerance Patient limited by fatigue;Patient limited by pain   Assessment   Assessment Patient participated in Skilled OT session this date with interventions consisting of functional transfer training, ADL re training with the use of correct body mechnaics, Energy Conservation techniques and therapeutic exercise to: increase functional use of BUEs, increase BUE muscle strength    Patient agreeable to OT treatment session, upon arrival patient was found seated OOB to Recliner, alert, responsive  and in no apparent distress  Patient transfers sit to stand from recliner and ambulates short distance to/from commode with Min A x 1  Patient then able to perform bladder hygiene with set up and supervision  Once returned to recliner, patient performs UB TE using 1# weight as detailed in flow sheet  Session ended with patient seated OOB to recliner, all  Needs met, and call bell within reach  In comparison to previous session, patient with improvements in UB strength, endurance, and functional transfers  Patient requiring verbal cues for safety, verbal cues for correct technique, verbal cues for pacing thru activity steps and frequent rest periods  Patient performance  demonstrated good carryover of learned techniques and strategies to facilitate safety during functional tasks  Patient continues to be functioning below baseline level, occupational performance remains limited secondary to factors listed above and increased risk for falls and injury     From OT standpoint, recommendation at time of d/c would be Short Term Rehab  Patient to benefit from continued Occupational Therapy treatment while in the hospital to address deficits as defined above and maximize level of functional independence with ADLs and functional mobility in order to return to OF  Plan   Treatment Interventions ADL retraining;Functional transfer training;UE strengthening/ROM; Energy conservation   Goal Expiration Date 01/06/23   OT Treatment Day 3   OT Frequency 3-5x/wk   Recommendation   OT Discharge Recommendation Post acute rehabilitation services   AM-PAC Daily Activity Inpatient   Lower Body Dressing 1   Bathing 2   Toileting 1   Upper Body Dressing 3   Grooming 3   Eating 3   Daily Activity Raw Score 13   Daily Activity Standardized Score (Calc for Raw Score >=11) 32 03   AM-PAC Applied Cognition Inpatient   Following a Speech/Presentation 4   Understanding Ordinary Conversation 4   Taking Medications 3   Remembering Where Things Are Placed or Put Away 4   Remembering List of 4-5 Errands 4   Taking Care of Complicated Tasks 4   Applied Cognition Raw Score 23   Applied Cognition Standardized Score 53 08     Maritza Melendez

## 2022-12-27 NOTE — PLAN OF CARE
Problem: OCCUPATIONAL THERAPY ADULT  Goal: Performs self-care activities at highest level of function for planned discharge setting  See evaluation for individualized goals  Description: Treatment Interventions: ADL retraining, Functional transfer training, UE strengthening/ROM, Endurance training, Patient/family training, Equipment evaluation/education, Energy conservation, Activityengagement, Compensatory technique education          See flowsheet documentation for full assessment, interventions and recommendations  Outcome: Progressing  Note: Limitation: Decreased ADL status, Decreased UE strength, Decreased Safe judgement during ADL, Decreased endurance, Decreased self-care trans, Decreased high-level ADLs  Prognosis: Good  Assessment: Patient participated in Skilled OT session this date with interventions consisting of functional transfer training, ADL re training with the use of correct body mechnaics, Energy Conservation techniques and therapeutic exercise to: increase functional use of BUEs, increase BUE muscle strength    Patient agreeable to OT treatment session, upon arrival patient was found seated OOB to Recliner, alert, responsive  and in no apparent distress  Patient transfers sit to stand from recliner and ambulates short distance to/from commode with Min A x 1  Patient then able to perform bladder hygiene with set up and supervision  Once returned to recliner, patient performs UB TE using 1# weight as detailed in flow sheet  Session ended with patient seated OOB to recliner, all  Needs met, and call bell within reach  In comparison to previous session, patient with improvements in UB strength, endurance, and functional transfers  Patient requiring verbal cues for safety, verbal cues for correct technique, verbal cues for pacing thru activity steps and frequent rest periods   Patient performance  demonstrated good carryover of learned techniques and strategies to facilitate safety during functional tasks  Patient continues to be functioning below baseline level, occupational performance remains limited secondary to factors listed above and increased risk for falls and injury  From OT standpoint, recommendation at time of d/c would be Short Term Rehab  Patient to benefit from continued Occupational Therapy treatment while in the hospital to address deficits as defined above and maximize level of functional independence with ADLs and functional mobility in order to return to PLOF       OT Discharge Recommendation: Post acute rehabilitation services

## 2022-12-27 NOTE — PLAN OF CARE
Problem: Potential for Falls  Goal: Patient will remain free of falls  Description: INTERVENTIONS:  - Educate patient/family on patient safety including physical limitations  - Instruct patient to call for assistance with activity   - Consult OT/PT to assist with strengthening/mobility   - Keep Call bell within reach  - Keep bed low and locked with side rails adjusted as appropriate  - Keep care items and personal belongings within reach  - Initiate and maintain comfort rounds  - Make Fall Risk Sign visible to staff  - Offer Toileting every 2 Hours, in advance of need  - Initiate/Maintain BED alarm  - Obtain necessary fall risk management equipment: yellow socks  - Apply yellow socks and bracelet for high fall risk patients  - Consider moving patient to room near nurses station  Outcome: Progressing     Problem: PAIN - ADULT  Goal: Verbalizes/displays adequate comfort level or baseline comfort level  Description: Interventions:  - Encourage patient to monitor pain and request assistance  - Assess pain using appropriate pain scale  - Administer analgesics based on type and severity of pain and evaluate response  - Implement non-pharmacological measures as appropriate and evaluate response  - Consider cultural and social influences on pain and pain management  - Notify physician/advanced practitioner if interventions unsuccessful or patient reports new pain  Outcome: Progressing     Problem: INFECTION - ADULT  Goal: Absence or prevention of progression during hospitalization  Description: INTERVENTIONS:  - Assess and monitor for signs and symptoms of infection  - Monitor lab/diagnostic results  - Monitor all insertion sites, i e  indwelling lines, tubes, and drains  - Monitor endotracheal if appropriate and nasal secretions for changes in amount and color  - [place] appropriate cooling/warming therapies per order  - Administer medications as ordered  - Instruct and encourage patient and family to use good hand hygiene technique  - Identify and instruct in appropriate isolation precautions for identified infection/condition  Outcome: Progressing     Problem: SAFETY ADULT  Goal: Patient will remain free of falls  Description: INTERVENTIONS:  - Educate patient/family on patient safety including physical limitations  - Instruct patient to call for assistance with activity   - Consult OT/PT to assist with strengthening/mobility   - Keep Call bell within reach  - Keep bed low and locked with side rails adjusted as appropriate  - Keep care items and personal belongings within reach  - Initiate and maintain comfort rounds  - Make Fall Risk Sign visible to staff  - Offer Toileting every 2 Hours, in advance of need  - Initiate/Maintain BED alarm  - Obtain necessary fall risk management equipment: yellow socks  - Apply yellow socks and bracelet for high fall risk patients  - Consider moving patient to room near nurses station  Outcome: Progressing  Goal: Maintain or return to baseline ADL function  Description: INTERVENTIONS:  - Educate patient/family on patient safety including physical limitations  - Instruct patient to call for assistance with activity   - Consult OT/PT to assist with strengthening/mobility   - Keep Call bell within reach  - Keep bed low and locked with side rails adjusted as appropriate  - Keep care items and personal belongings within reach  - Initiate and maintain comfort rounds  - Make Fall Risk Sign visible to staff  - Offer Toileting every 2 Hours, in advance of need  - Initiate/Maintain bed alarm  - Obtain necessary fall risk management equipment: yellow socks  - Apply yellow socks and bracelet for high fall risk patients  - Consider moving patient to room near nurses station  Outcome: Progressing  Goal: Maintains/Returns to pre admission functional level  Description: INTERVENTIONS:  - Perform BMAT or MOVE assessment daily    - Set and communicate daily mobility goal to care team and patient/family/caregiver  - Collaborate with rehabilitation services on mobility goals if consulted  - Perform Range of Motion 3 times a day  - Reposition patient every 2 hours  - Dangle patient 3 times a day  - Stand patient 3 times a day  - Ambulate patient 3 times a day  - Out of bed to chair 3 times a day   - Out of bed for meals 3 times a day  - Out of bed for toileting  - Record patient progress and toleration of activity level   Outcome: Progressing     Problem: DISCHARGE PLANNING  Goal: Discharge to home or other facility with appropriate resources  Description: INTERVENTIONS:  - Identify barriers to discharge w/patient and caregiver  - Arrange for needed discharge resources and transportation as appropriate  - Identify discharge learning needs (meds, wound care, etc )  - Arrange for interpretive services to assist at discharge as needed  - Refer to Case Management Department for coordinating discharge planning if the patient needs post-hospital services based on physician/advanced practitioner order or complex needs related to functional status, cognitive ability, or social support system  Outcome: Progressing     Problem: Knowledge Deficit  Goal: Patient/family/caregiver demonstrates understanding of disease process, treatment plan, medications, and discharge instructions  Description: Complete learning assessment and assess knowledge base    Interventions:  - Provide teaching at level of understanding  - Provide teaching via preferred learning methods  Outcome: Progressing     Problem: MOBILITY - ADULT  Goal: Maintain or return to baseline ADL function  Description: INTERVENTIONS:  - Educate patient/family on patient safety including physical limitations  - Instruct patient to call for assistance with activity   - Consult OT/PT to assist with strengthening/mobility   - Keep Call bell within reach  - Keep bed low and locked with side rails adjusted as appropriate  - Keep care items and personal belongings within reach  - Initiate and maintain comfort rounds  - Make Fall Risk Sign visible to staff  - Offer Toileting every 2 Hours, in advance of need  - Initiate/Maintain bed alarm  - Obtain necessary fall risk management equipment: yellow socks  - Apply yellow socks and bracelet for high fall risk patients  - Consider moving patient to room near nurses station  Outcome: Progressing  Goal: Maintains/Returns to pre admission functional level  Description: INTERVENTIONS:  - Perform BMAT or MOVE assessment daily    - Set and communicate daily mobility goal to care team and patient/family/caregiver  - Collaborate with rehabilitation services on mobility goals if consulted  - Perform Range of Motion 3 times a day  - Reposition patient every 2 hours    - Dangle patient 3 times a day  - Stand patient 3 times a day  - Ambulate patient 3 times a day  - Out of bed to chair 3 times a day   - Out of bed for meals 3 times a day  - Out of bed for toileting  - Record patient progress and toleration of activity level   Outcome: Progressing     Problem: Prexisting or High Potential for Compromised Skin Integrity  Goal: Skin integrity is maintained or improved  Description: INTERVENTIONS:  - Identify patients at risk for skin breakdown  - Assess and monitor skin integrity  - Assess and monitor nutrition and hydration status  - Monitor labs   - Assess for incontinence   - Turn and reposition patient  - Assist with mobility/ambulation  - Relieve pressure over bony prominences  - Avoid friction and shearing  - Provide appropriate hygiene as needed including keeping skin clean and dry  - Evaluate need for skin moisturizer/barrier cream  - Collaborate with interdisciplinary team   - Patient/family teaching  - Consider wound care consult   Outcome: Progressing     Problem: Nutrition/Hydration-ADULT  Goal: Nutrient/Hydration intake appropriate for improving, restoring or maintaining nutritional needs  Description: Monitor and assess patient's nutrition/hydration status for malnutrition  Collaborate with interdisciplinary team and initiate plan and interventions as ordered  Monitor patient's weight and dietary intake as ordered or per policy  Utilize nutrition screening tool and intervene as necessary  Determine patient's food preferences and provide high-protein, high-caloric foods as appropriate       INTERVENTIONS:  - Monitor oral intake, urinary output, labs, and treatment plans  - Assess nutrition and hydration status and recommend course of action  - Evaluate amount of meals eaten  - Assist patient with eating if necessary   - Allow adequate time for meals  - Recommend/ encourage appropriate diets, oral nutritional supplements, and vitamin/mineral supplements  - Order, calculate, and assess calorie counts as needed  - Recommend, monitor, and adjust tube feedings and TPN/PPN based on assessed needs  - Assess need for intravenous fluids  - Provide specific nutrition/hydration education as appropriate  - Include patient/family/caregiver in decisions related to nutrition  Outcome: Progressing

## 2022-12-27 NOTE — WOUND OSTOMY CARE
Progress Note- Ostomy  Viki Vincenzo 58 y o  female  [id number]  --01    Assessment:  Attempted to see patient for pouch change today twice  Patient was nauseaous and vomiting when arrived in room  When returned to check pouch patient was sleeping and  requested that I not wake her for pouch change  Per primary RN, pouch is intact at this time, it was not assessed by ostomy nurse  Discussed with patient's  and RN that pouch can be changed tomorrow  Patient's outlook on her own recovery is worrisome, she is withdrawn and sad  Ostomy Care Plan: Will see patient on Wednesday for follow up education with  and patient if she feels up to it  Wound 12/05/22 Abdomen N/A (Active)   Wound Image   12/23/22 1139   Wound Description ANGIE 12/26/22 0800   Devi-wound Assessment ANGIE 12/25/22 1945   Drainage Amount Small 12/25/22 1945   Drainage Description Serosanguineous 12/25/22 1945   Treatments Cleansed;Irrigation with NSS;Site care 12/16/22 1844   Dressing Dry dressing 12/25/22 1945   Wound packed? Yes 12/25/22 1945   Packing- # removed 1 12/25/22 1945   Packing- # inserted 1 12/25/22 1945   Dressing Changed Changed by provider 12/20/22 1949   Patient Tolerance Tolerated well 12/17/22 0945   Dressing Status Clean;Dry; Intact 12/26/22 0800   Number of days: 21       Wound 12/15/22 Buttocks Right (Active)   Wound Image   12/15/22 1237   Wound Description Dry; Intact 12/26/22 0800   Devi-wound Assessment Intact 12/26/22 0800   Wound Length (cm) 12 cm 12/15/22 1237   Wound Width (cm) 8 cm 12/15/22 1237   Wound Surface Area (cm^2) 96 cm^2 12/15/22 1237   Drainage Amount None 12/26/22 0800   Treatments Cleansed;Site care 12/22/22 0905   Dressing Open to air 12/26/22 0800   Dressing Changed Other (Comment) 12/16/22 1844   Patient Tolerance Tolerated well 12/22/22 0905   Dressing Status Clean;Dry; Intact 12/25/22 1945   Number of days: 11         Abscess Drain RUQ (Active)   Site Description Unable to view 12/26/22 0800   Dressing Status Clean;Dry; Intact 12/26/22 0800   Drainage Appearance Pink tinged; Thin 12/25/22 1842   Status Other (Comment) 12/26/22 0800   Output (mL) 3 mL 12/25/22 1842   Number of days: 6       Abscess Drain RUQ (Active)   Site Description Unable to view 12/26/22 0800   Dressing Status Clean;Dry; Intact 12/26/22 0800   Drainage Appearance Thick; [place] 12/25/22 1842   Status Other (Comment) 12/26/22 0800   Output (mL) 25 mL 12/25/22 1842   Number of days: 6       Colostomy RLQ (Active)   Stomal Appliance Clean;Dry; Intact 12/26/22 0800   Stoma Assessment Budded;Pink 12/24/22 0745   Stoma size (in) 1 75 in 12/23/22 1125   Stoma Shape Oval 12/24/22 0745   Peristomal Assessment ANGIE 12/26/22 0800   Treatment Placement checked 12/26/22 0800   Output (mL) 175 mL 12/25/22 1842   Number of days: 21         Reviewed plan of care with primary JOANA Abel  Recommendations written as orders  Wound care team to follow weekly while admitted  Questions or concerns [address] Nurse    Zoey LINTONN, RN, [employer]

## 2022-12-27 NOTE — OCCUPATIONAL THERAPY NOTE
Occupational Therapy Note    Patient Name: Valerie RICHARDSON Date: 2022      Per chart review, patient's POC and LTGs noted to  22  Per discussion with MENDOZA and overall chart review, patient continues to warrant skilled OT services  At this time, POC and LTG expiration date extended to 2023  Please refer to OT evaluation and OT treatment notes for findings/progress made with therapy services      Abhishek Russell MS, OTR/L

## 2022-12-27 NOTE — PROGRESS NOTES
Progress Note - Infectious Disease   Prudencio James 58 y o  female MRN: [id number]  Unit/Bed#: -01 Encounter: [id number]      IMPRESSION & RECOMMENDATIONS:   Impression/Recommendations:  1  Recurrent sepsis  Appears to be secondary to intra-abdominal source, as below  Negative serial blood cultures  Low clinical suspicion for pneumonia  Seems to be improving with downtrending fever curve and WBC count  Hemodynamics are stable      -Antibiotic plan as below  -Follow temperatures and hemodynamics  -Recheck CBC in a m      2  Intra-abdominal abscesses  In the setting of #3  Status post IR drain placement x2 on  yielding pus  Culture grew E  Coli  Repeat CT shows decreasing size of abscesses  There is still an undrained pelvic collection, which remains unchanged which may be postop seroma      -Continue ceftriaxone/Flagyl  -Drain management  -Serial wound exams  -Surgery/IR follow-up     3  Sigmoid diverticulitis with perforation  Status post sigmoid resection with transverse loop colostomy   Complicated by intra-abdominal abscesses, as above      -Antibiotic plan as above  -Surgery follow-up ongoing     4  Bilateral pleural effusions, with associated compressive atelectasis  Likely due to intra-abdominal process  Pulmonology input noted  Agree that clinical suspicion for pneumonia remains low      -Follow respiratory symptoms/O2 requirements  -Incentive spirometry, PT     Antibiotics:  Antibiotic D10  Ceftriaxone/Flagyl  Post drain placement D7    Discussed with patient and  at bedside  Subjective:  Still feels weak with some nausea  Pain is slowly getting better  Ongoing poor appetite  No further fevers      Objective:  Vitals:  Temp:  [97 8 °F (36 6 °C)-99 8 °F (37 7 °C)] 99 1 °F (37 3 °C)  HR:  [77-86] 77  Resp:  [18] 18  BP: (117-124)/(71-75) 117/71  SpO2:  [87 %-95 %] 87 %  Temp (24hrs), Av 9 °F (37 2 °C), Min:97 8 °F (36 6 °C), Max:99 8 °F (37 7 °C)  Current: Temperature: 99 1 °F (37 3 °C)    Physical Exam:   General:  No acute distress, nontoxic  HEENT: Atraumatic normocephalic  Neck: Trachea midline  Psychiatric:  Awake and alert  Pulmonary:  Normal respiratory excursion without accessory muscle use  Abdomen:  Soft, nontender  Ostomy with liquid brown stool, left lower quadrant wound with no surrounding erythema or purulence  Right lower quadrant drains x2 with purulent output  Extremities:  No edema  Skin:  No rashes  Neuro: Moves all extremities spontaneously    Lab Results:  I have personally reviewed pertinent labs  Results from last 7 days   Lab Units 12/27/22  0521 12/25/22  0501 12/24/22  0449   POTASSIUM mmol/L 3 7 3 9 4 0   CHLORIDE mmol/L 102 100 99   CO2 mmol/L 25 25 28   BUN mg/dL 6 9 9   CREATININE mg/dL 0 69 0 64 0 65   EGFR ml/min/1 73sq m 93 95 95   CALCIUM mg/dL 8 2* 8 3* 8 4   AST U/L 23 22  --    ALT U/L 16 18  --    ALK PHOS U/L 93 98  --      Results from last 7 days   Lab Units 12/27/22  0521 12/26/22  0431 12/25/22  0501   WBC Thousand/uL 13 22* 14 44* 15 24*   HEMOGLOBIN g/dL 8 6* 8 6* 8 1*   PLATELETS Thousands/uL 522* 590* 583*     Results from last 7 days   Lab Units 12/26/22  0109 12/25/22  1433 12/25/22  1037 12/24/22  1122 12/20/22  1355   BLOOD CULTURE   --   --   --  No Growth at 48 hrs  No Growth at 48 hrs  --    SPUTUM CULTURE   --  1+ Growth of  --   --   --    GRAM STAIN RESULT   --  1+ Epithelial cells per low power field  Rare Polys  No organisms seen  --   --  No Polys or Bacteria seen   BODY FLUID CULTURE, STERILE   --   --   --   --  Few Colonies of Escherichia coli*   MRSA CULTURE ONLY   --   --  No Methicillin Resistant Staphlyococcus aureus (MRSA) isolated  --   --    LEGIONELLA URINARY ANTIGEN  Negative  --   --   --   --        Imaging Studies:   I have personally reviewed pertinent imaging study reports and images in PACS  EKG, Pathology, and Other Studies:   I have personally reviewed pertinent reports

## 2022-12-27 NOTE — PLAN OF CARE
Problem: PHYSICAL THERAPY ADULT  Goal: Performs mobility at highest level of function for planned discharge setting  See evaluation for individualized goals  Description: Treatment/Interventions: Functional transfer training, LE strengthening/ROM, Elevations, Therapeutic exercise, Endurance training, Patient/family training, Equipment eval/education, Bed mobility, Gait training, Compensatory technique education, OT, Spoke to nursing          See flowsheet documentation for full assessment, interventions and recommendations  Outcome: Progressing  Note: Prognosis: Fair  Problem List: Decreased strength, Decreased endurance, Impaired balance, Decreased mobility, Decreased safety awareness, Decreased skin integrity, Pain  Assessment: Pt seen for PT treatment session this date with interventions consisting of gait training w/ emphasis on improving pt's ability to ambulate level surfaces x 3 ftx2 with min A provided by therapist with RW, Therapeutic exercise consisting of: AROM 15 reps B LE in sitting position and therapeutic activity consisting of training: bed mobility, supine<>sit transfers, sit<>stand transfers, stand pivot transfers towards B direction and commode transfer  Pt agreeable to PT treatment session upon arrival, pt found seated OOB in recliner, in no apparent distress and responsive  In comparison to previous session, pt with improvements in distance ambulated  Post session: pt returned back to recliner, chair alarm engaged, all needs in reach and RN notified of session findings/recommendations  Continue to recommend post acute rehabilitation services at time of d/c in order to maximize pt's functional independence and safety w/ mobility  Pt continues to be functioning below baseline level, and remains limited 2* factors listed above and including inability to ambulate functional household distances   PT will continue to see pt during current hospitalization in order to address the deficits listed above and provide interventions consistent w/ POC in effort to achieve STGs  Barriers to Discharge: Inaccessible home environment, Decreased caregiver support     PT Discharge Recommendation: Post acute rehabilitation services    See flowsheet documentation for full assessment

## 2022-12-27 NOTE — PHYSICAL THERAPY NOTE
PHYSICAL THERAPY TREATMENT NOTE  NAME:  Renee Avery  DATE: 12/27/22    Length Of Stay: 23  Performed at least 2 patient identifiers during session: Name and ID bracelet    TREATMENT:      12/27/22 1309   PT Last Visit   PT Visit Date 12/27/22   Note Type   Note Type Treatment   Pain Assessment   Pain Assessment Tool 0-10   Pain Score 5   Pain Location/Orientation Location: Abdomen   Pain Onset/Description Onset: Ongoing   Patient's Stated Pain Goal No pain   Hospital Pain Intervention(s) Medication (See MAR); Repositioned; Ambulation/increased activity; Emotional support   Multiple Pain Sites No   Restrictions/Precautions   Weight Bearing Precautions Per Order No   Other Precautions Chair Alarm; Bed Alarm;Multiple lines;Telemetry;O2;Fall Risk  (3L O2, colostomy bag)   General   Chart Reviewed Yes   Cognition   Overall Cognitive Status WFL   Arousal/Participation Alert; Responsive; Cooperative   Attention Attends with cues to redirect   Orientation Level Oriented X4   Memory Within functional limits   Following Commands Follows all commands and directions without difficulty   Comments pt agreeable to PT session   Bed Mobility   Additional Comments pt seated OOB in recliner upon arrival and to end session   Transfers   Sit to Stand 4  Minimal assistance   Additional items Assist x 1; Armrests; Increased time required;Verbal cues   Stand to Sit 4  Minimal assistance   Additional items Assist x 1; Armrests; Increased time required;Verbal cues   Stand pivot 4  Minimal assistance   Additional items Assist x 1; Increased time required;Verbal cues   Toilet transfer 4  Minimal assistance   Additional items Assist x 1; Armrests; Increased time required;Verbal cues; Commode   Additional Comments verbal cues required for proper hand placement and safety   Ambulation/Elevation   Gait pattern Improper Weight shift; Wide DEBBIE; Decreased foot clearance; Foward flexed; Short stride; Excessively slow   Gait Assistance 4  Minimal assist   Additional items Assist x 1;Verbal cues; Tactile cues   Assistive Device Rolling walker   Distance 3 ftx2   Balance   Static Sitting Fair +   Dynamic Sitting Fair   Static Standing Fair -   Dynamic Standing Fair -   Ambulatory Poor +   Endurance Deficit   Endurance Deficit Yes   Activity Tolerance   Activity Tolerance Patient limited by fatigue   Nurse Made Aware yes   Exercises   Heelslides Sitting;15 reps;AROM; Bilateral   Glute Sets Sitting;15 reps;AROM; Bilateral   Hip Abduction Sitting;15 reps;AROM; Bilateral   Hip Adduction Sitting;15 reps;AROM; Bilateral   Knee AROM Long Arc Quad Sitting;15 reps;AROM; Bilateral   Ankle Pumps Sitting;15 reps;AROM; Bilateral   Marching Sitting;15 reps;AROM; Bilateral   Assessment   Prognosis Fair   Problem List Decreased strength;Decreased endurance; Impaired balance;Decreased mobility; Decreased safety awareness;Decreased skin integrity;Pain   Assessment Pt seen for PT treatment session this date with interventions consisting of gait training w/ emphasis on improving pt's ability to ambulate level surfaces x 3 ftx2 with min A provided by therapist with RW, Therapeutic exercise consisting of: AROM 15 reps B LE in sitting position and therapeutic activity consisting of training: bed mobility, supine<>sit transfers, sit<>stand transfers, stand pivot transfers towards B direction and commode transfer  Pt agreeable to PT treatment session upon arrival, pt found seated OOB in recliner, in no apparent distress and responsive  In comparison to previous session, pt with improvements in distance ambulated  Post session: pt returned back to recliner, chair alarm engaged, all needs in reach and RN notified of session findings/recommendations  Continue to recommend post acute rehabilitation services at time of d/c in order to maximize pt's functional independence and safety w/ mobility   Pt continues to be functioning below baseline level, and remains limited 2* factors listed above and including inability to ambulate functional household distances  PT will continue to see pt during current hospitalization in order to address the deficits listed above and provide interventions consistent w/ POC in effort to achieve STGs  Barriers to Discharge Inaccessible home environment;Decreased caregiver support   Goals   STG Expiration Date 01/06/23   Plan   Treatment/Interventions Functional transfer training;LE strengthening/ROM; Therapeutic exercise; Endurance training;Bed mobility;Gait training;Spoke to nursing   Progress Slow progress, decreased activity tolerance   PT Frequency 3-5x/wk   Recommendation   PT Discharge Recommendation Post acute rehabilitation services   AM-PAC Basic Mobility Inpatient   Turning in Bed Without Bedrails 3   Lying on Back to Sitting on Edge of Flat Bed 3   Moving Bed to Chair 3   Standing Up From Chair 3   Walk in Room 2   Climb 3-5 Stairs 1   Basic Mobility Inpatient Raw Score 15   Basic Mobility Standardized Score 36 97   Highest Level Of Mobility   -HLM Goal 4: Move to chair/commode   JH-HLM Achieved 4: Move to chair/commode   Education   Education Provided Mobility training;Assistive device   Patient Demonstrates acceptance/verbal understanding;Reinforcement needed   End of Consult   Patient Position at End of Consult Bedside chair;Bed/Chair alarm activated; All needs within reach       The patient's AM-PAC Basic Mobility Inpatient Short Form Raw Score is 15  A Raw score of less than or equal to 16 suggests the patient may benefit from discharge to post-acute rehabilitation services  Please also refer to the recommendation of the Physical Therapist for safe discharge planning        Javon Masters PTA,LISSY

## 2022-12-28 LAB
ANION GAP SERPL CALCULATED.3IONS-SCNC: 7 MMOL/L (ref 4–13)
BACTERIA SPT RESP CULT: NORMAL
BASOPHILS # BLD MANUAL: 0 THOUSAND/UL (ref 0–0.1)
BASOPHILS NFR MAR MANUAL: 0 % (ref 0–1)
BUN SERPL-MCNC: 7 MG/DL (ref 5–25)
CALCIUM SERPL-MCNC: 8.1 MG/DL (ref 8.4–10.2)
CHLORIDE SERPL-SCNC: 103 MMOL/L (ref 96–108)
CO2 SERPL-SCNC: 26 MMOL/L (ref 21–32)
CREAT SERPL-MCNC: 0.59 MG/DL (ref 0.6–1.3)
EOSINOPHIL # BLD MANUAL: 0.26 THOUSAND/UL (ref 0–0.4)
EOSINOPHIL NFR BLD MANUAL: 2 % (ref 0–6)
ERYTHROCYTE [DISTWIDTH] IN BLOOD BY AUTOMATED COUNT: 13.5 % (ref 11.6–15.1)
GFR SERPL CREATININE-BSD FRML MDRD: 98 ML/MIN/1.73SQ M
GLUCOSE SERPL-MCNC: 114 MG/DL (ref 65–140)
GRAM STN SPEC: NORMAL
HCT VFR BLD AUTO: 26.9 % (ref 34.8–46.1)
HGB BLD-MCNC: 8.4 G/DL (ref 11.5–15.4)
LYMPHOCYTES # BLD AUTO: 0.91 THOUSAND/UL (ref 0.6–4.47)
LYMPHOCYTES # BLD AUTO: 7 % (ref 14–44)
MCH RBC QN AUTO: 29.7 PG (ref 26.8–34.3)
MCHC RBC AUTO-ENTMCNC: 31.2 G/DL (ref 31.4–37.4)
MCV RBC AUTO: 95 FL (ref 82–98)
MONOCYTES # BLD AUTO: 0.39 THOUSAND/UL (ref 0–1.22)
MONOCYTES NFR BLD: 3 % (ref 4–12)
MYELOCYTES NFR BLD MANUAL: 1 % (ref 0–1)
NEUTROPHILS # BLD MANUAL: 11.37 THOUSAND/UL (ref 1.85–7.62)
NEUTS SEG NFR BLD AUTO: 87 % (ref 43–75)
PLATELET # BLD AUTO: 498 THOUSANDS/UL (ref 149–390)
PLATELET BLD QL SMEAR: ABNORMAL
PMV BLD AUTO: 9.2 FL (ref 8.9–12.7)
POTASSIUM SERPL-SCNC: 3.8 MMOL/L (ref 3.5–5.3)
RBC # BLD AUTO: 2.83 MILLION/UL (ref 3.81–5.12)
RBC MORPH BLD: NORMAL
SODIUM SERPL-SCNC: 136 MMOL/L (ref 135–147)
WBC # BLD AUTO: 13.07 THOUSAND/UL (ref 4.31–10.16)

## 2022-12-28 RX ORDER — DEXTROSE, SODIUM CHLORIDE, AND POTASSIUM CHLORIDE 5; .45; .15 G/100ML; G/100ML; G/100ML
50 INJECTION INTRAVENOUS CONTINUOUS
Status: DISCONTINUED | OUTPATIENT
Start: 2022-12-28 | End: 2023-01-03

## 2022-12-28 RX ADMIN — NEBIVOLOL 5 MG: 5 TABLET ORAL at 08:14

## 2022-12-28 RX ADMIN — AMLODIPINE BESYLATE 10 MG: 10 TABLET ORAL at 08:14

## 2022-12-28 RX ADMIN — PANTOPRAZOLE SODIUM 40 MG: 40 TABLET, DELAYED RELEASE ORAL at 07:00

## 2022-12-28 RX ADMIN — TRAMADOL HYDROCHLORIDE 50 MG: 50 TABLET, COATED ORAL at 05:12

## 2022-12-28 RX ADMIN — PANTOPRAZOLE SODIUM 40 MG: 40 TABLET, DELAYED RELEASE ORAL at 15:48

## 2022-12-28 RX ADMIN — DEXTROSE, SODIUM CHLORIDE, AND POTASSIUM CHLORIDE 50 ML/HR: 5; .45; .15 INJECTION INTRAVENOUS at 16:36

## 2022-12-28 RX ADMIN — METRONIDAZOLE 500 MG: 500 INJECTION, SOLUTION INTRAVENOUS at 21:49

## 2022-12-28 RX ADMIN — ONDANSETRON 4 MG: 2 INJECTION INTRAMUSCULAR; INTRAVENOUS at 08:14

## 2022-12-28 RX ADMIN — DEXTROSE, SODIUM CHLORIDE, AND POTASSIUM CHLORIDE 75 ML/HR: 5; .45; .15 INJECTION INTRAVENOUS at 01:39

## 2022-12-28 RX ADMIN — ONDANSETRON 4 MG: 2 INJECTION INTRAMUSCULAR; INTRAVENOUS at 15:51

## 2022-12-28 RX ADMIN — METRONIDAZOLE 500 MG: 500 INJECTION, SOLUTION INTRAVENOUS at 05:14

## 2022-12-28 RX ADMIN — CEFTRIAXONE 1000 MG: 1 INJECTION, SOLUTION INTRAVENOUS at 12:19

## 2022-12-28 RX ADMIN — ENOXAPARIN SODIUM 40 MG: 100 INJECTION SUBCUTANEOUS at 08:14

## 2022-12-28 RX ADMIN — DEXTROSE, SODIUM CHLORIDE, AND POTASSIUM CHLORIDE 75 ML/HR: 5; .45; .15 INJECTION INTRAVENOUS at 15:48

## 2022-12-28 RX ADMIN — METRONIDAZOLE 500 MG: 500 INJECTION, SOLUTION INTRAVENOUS at 14:06

## 2022-12-28 NOTE — CASE MANAGEMENT
Case Management Discharge Planning Note    Patient name Inell Escort  Location [address] [id number] MRN [id number]  : 1960 Date 2022       Current Admission Date: 2022  Current Admission Diagnosis:Sigmoid diverticulitis   Patient Active Problem List    Diagnosis Date Noted   • Fever 2022   • Pleural effusion    • Postoperative intra-abdominal abscess    • Anemia 2022   • Encephalopathy 2022   • Acute respiratory failure with hypoxia ([facility] [address] ) 2022   • GERD (gastroesophageal reflux disease) 2022   • Hypertension 2022   • Bronchomalacia 2022   • Asthma 2022   • Septic shock ([facility] [address] ) 2022   • Sigmoid diverticulitis 2022      LOS (days): 24  Geometric Mean LOS (GMLOS) (days): 9 60  Days to GMLOS:-14 6     OBJECTIVE:  Risk of Unplanned Readmission Score: 20 48         Current admission status: Inpatient   Preferred Pharmacy:   [facility] [address] [address]  [address]  [address] [place] [address]  Phone: [phone] Fax: [phone]    Primary Care Provider: Fahad Jiménez    Primary Insurance: [employer]  Secondary Insurance: MEDICARE    DISCHARGE DETAILS:    Discharge planning discussed with[de-identified] Patient, spouse and dtr  Freedom of Choice: Yes  Comments - Freedom of Choice: Therapy recs discussed with patient and family  All agreeable to Acute rehab  Would like [facility]   Referral made via [facility] [place] contacted family/caregiver?: Yes  Were Treatment Team discharge recommendations reviewed with patient/caregiver?: Yes  Did patient/caregiver verbalize understanding of patient care needs?: Yes       Contacts  Patient Contacts: Trang Lange  Relationship to Patient[de-identified] Family  Contact Method:  In Person  Reason/Outcome: Discharge Planning, Emergency [address]         [facility] No    Other Referral/Resources/Interventions Provided:  Interventions: Acute Rehab    Treatment Team Recommendation: Acute Rehab  Discharge Destination Plan[de-identified] Acute Rehab        Additional Comments: Met with patient spouse and dtr at bedside to discuss rehab  Discussed therapy recommendations for rehab  All questions answered  Explained differances in ARC vs STR  Would like [facility]   Referrals made to [facility] via [facility] and [facility] can accept  Bed reserved  Discussed discharge with surgery in medical rounds tentative DC 24-48 hrs will not need IVAB at discharge  CM department following

## 2022-12-28 NOTE — OCCUPATIONAL THERAPY NOTE
12/28/22 1135   OT Last Visit   OT Visit Date 12/28/22   Note Type   Note Type Treatment   Pain Assessment   Pain Assessment Tool 0-10   Pain Score 4   Pain Location/Orientation Location: Abdomen   Pain Onset/Description Onset: Ongoing;Frequency: Constant/Continuous   Patient's Stated Pain Goal No pain   Hospital Pain Intervention(s) Medication (See MAR); Repositioned; Ambulation/increased activity   Restrictions/Precautions   Weight Bearing Precautions Per Order No   Other Precautions Chair Alarm; Bed Alarm;Multiple lines;Telemetry;O2;Fall Risk   ADL   Where Assessed Chair   UB Bathing Assistance 5  Supervision/Setup   UB Bathing Deficit Setup;Verbal cueing;Supervision/safety; Increased time to complete   LB Bathing Assistance 4  Minimal Assistance   LB Bathing Deficit Setup;Verbal cueing;Supervision/safety; Increased time to complete   UB Dressing Assistance 4  Minimal Assistance   UB Dressing Deficit Setup;Verbal cueing;Supervision/safety; Increased time to complete; Thread RUE; Thread LUE; Fasteners   UB Dressing Comments Min A to doff/don hospital gown   LB Dressing Deficit Don/doff R sock; Don/doff L sock   LB Dressing Comments Doffs/dons socks independently  Toileting Assistance    (CGA)   Toileting Deficit Setup;Verbal cueing;Supervison/safety; Increased time to complete; Bedside commode   Toileting Comments CGA for txfer   Transfers   Sit to Stand   (CGA)   Additional items Assist x 1; Armrests; Increased time required;Verbal cues   Stand to Sit   (CGA)   Additional items Armrests;Assist x 1; Increased time required;Verbal cues   Stand pivot   (CGA)   Additional items Assist x 1; Increased time required;Verbal cues   Functional Mobility   Functional Mobility   (CGA)   Additional Comments Pt ambulates short distance to/from commode with CGA x 1     Additional items Rolling walker   Toilet Transfers   Toilet Transfer From Other (Comment)  (recliner)   Toilet Transfer Type To and from   Toilet Transfer to Standard bedside commode   Toilet Transfer Technique Ambulating   Toilet Transfers Contact guard   Therapeutic Excerise-Strength   UE Strength Yes   Right Upper Extremity- Strength   R Shoulder Flexion; Extension;Horizontal ABduction  (horiz  add, scapular pro/ret)   R Elbow Elbow flexion;Elbow extension   R Wrist   (wrist rotation)   R Weight/Reps/Sets 1# weight x 15 reps   Left Upper Extremity-Strength   L Weights/Reps/Sets TE same as R UE above   Cognition   Overall Cognitive Status WFL   Arousal/Participation Alert; Responsive; Cooperative   Attention Attends with cues to redirect   Orientation Level Oriented X4   Memory Within functional limits   Following Commands Follows all commands and directions without difficulty   Comments Pt agreeable to OT treatment  Activity Tolerance   Activity Tolerance Patient limited by fatigue;Patient limited by pain   Assessment   Assessment Patient participated in Skilled OT session this date with interventions consisting of functional transfer training, ADL re training with the use of correct body mechnaics, Energy Conservation techniques and therapeutic exercise to: increase functional use of BUEs, increase BUE muscle strength    Patient agreeable to OT treatment session, upon arrival patient was found seated OOB to Recliner, alert, responsive  and in no apparent distress  Re-attempted OT treatment and patient agreeable to same  Patient demonstrates ability to complete self care ADLs as detailed in flow sheet  Patient then transfers sit to stand and ambulates to/from commode with CGA x 1  Patient is able to perform bladder hygiene (I)ly with set up  Once returned to recliner, patient then performs UB TE using 1# weight as detailed in flow sheet  Patient reports abdominal pain 4/10  Session ended with arrival of lunch and patient seated OOB to recliner, all needs met, and call bell within reach   In comparison to previous session, patient with improvements in self care ADL performance, UB strength, functional transfers, and endurance  Patient requiring verbal cues for correct technique, verbal cues for pacing thru activity steps, frequent rest periods and ocassional safety reminders  Patient performance  demonstrated good carryover of learned techniques and strategies to facilitate safety during functional tasks  Patient continues to be functioning below baseline level, occupational performance remains limited secondary to factors listed above and increased risk for falls and injury  From OT standpoint, recommendation at time of d/c would be Short Term Rehab  Patient to benefit from continued Occupational Therapy treatment while in the hospital to address deficits as defined above and maximize level of functional independence with ADLs and functional mobility in order to return to [facility]  Plan   Treatment Interventions ADL retraining;Functional transfer training;UE strengthening/ROM; Energy conservation   Goal Expiration Date 01/06/23   OT Treatment Day 4   OT Frequency 3-5x/wk   Recommendation   OT Discharge Recommendation Post acute rehabilitation services   AM-PAC Daily Activity Inpatient   Lower Body Dressing 3   Bathing 3   Toileting 3   Upper Body Dressing 3   Grooming 4   Eating 4   Daily Activity Raw Score 20   Daily Activity Standardized Score (Calc for Raw Score >=11) 42 03   AM-PAC Applied Cognition Inpatient   Following a Speech/Presentation 4   Understanding Ordinary Conversation 4   Taking Medications 3   Remembering Where Things Are Placed or Put Away 4   Remembering List of 4-5 Errands 4   Taking Care of Complicated Tasks 4   Applied Cognition Raw Score 23   Applied Cognition Standardized Score 53 08     Marycarmen Melendez

## 2022-12-28 NOTE — OCCUPATIONAL THERAPY NOTE
12/28/22 1121   Note Type   Note Type Cancelled Session   Cancel Reasons Refusal     Attempted OT treatment today at 1121  Patient refused at this time d/t having a room full of visitors  Will continue with POC as able    Erin Kay

## 2022-12-28 NOTE — PLAN OF CARE
Problem: Potential for Falls  Goal: Patient will remain free of falls  Description: INTERVENTIONS:  - Educate patient/family on patient safety including physical limitations  - Instruct patient to call for assistance with activity   - Consult OT/PT to assist with strengthening/mobility   - Keep Call bell within reach  - Keep bed low and locked with side rails adjusted as appropriate  - Keep care items and personal belongings within reach  - Initiate and maintain comfort rounds  - Make Fall Risk Sign visible to staff  - Offer Toileting every 2 Hours, in advance of need  - Initiate/Maintain BED alarm  - Obtain necessary fall risk management equipment: yellow socks  - Apply yellow socks and bracelet for high fall risk patients  - Consider moving patient to room near nurses station  Outcome: Progressing     Problem: PAIN - ADULT  Goal: Verbalizes/displays adequate comfort level or baseline comfort level  Description: Interventions:  - Encourage patient to monitor pain and request assistance  - Assess pain using appropriate pain scale  - Administer analgesics based on type and severity of pain and evaluate response  - Implement non-pharmacological measures as appropriate and evaluate response  - Consider cultural and social influences on pain and pain management  - Notify physician/advanced practitioner if interventions unsuccessful or patient reports new pain  Outcome: Progressing     Problem: INFECTION - ADULT  Goal: Absence or prevention of progression during hospitalization  Description: INTERVENTIONS:  - Assess and monitor for signs and symptoms of infection  - Monitor lab/diagnostic results  - Monitor all insertion sites, i e  indwelling lines, tubes, and drains  - Monitor endotracheal if appropriate and nasal secretions for changes in amount and color  - [place] appropriate cooling/warming therapies per order  - Administer medications as ordered  - Instruct and encourage patient and family to use good hand hygiene technique  - Identify and instruct in appropriate isolation precautions for identified infection/condition  Outcome: Progressing     Problem: SAFETY ADULT  Goal: Patient will remain free of falls  Description: INTERVENTIONS:  - Educate patient/family on patient safety including physical limitations  - Instruct patient to call for assistance with activity   - Consult OT/PT to assist with strengthening/mobility   - Keep Call bell within reach  - Keep bed low and locked with side rails adjusted as appropriate  - Keep care items and personal belongings within reach  - Initiate and maintain comfort rounds  - Make Fall Risk Sign visible to staff  - Offer Toileting every 2 Hours, in advance of need  - Initiate/Maintain BED alarm  - Obtain necessary fall risk management equipment: yellow socks  - Apply yellow socks and bracelet for high fall risk patients  - Consider moving patient to room near nurses station  Outcome: Progressing  Goal: Maintain or return to baseline ADL function  Description: INTERVENTIONS:  - Educate patient/family on patient safety including physical limitations  - Instruct patient to call for assistance with activity   - Consult OT/PT to assist with strengthening/mobility   - Keep Call bell within reach  - Keep bed low and locked with side rails adjusted as appropriate  - Keep care items and personal belongings within reach  - Initiate and maintain comfort rounds  - Make Fall Risk Sign visible to staff  - Offer Toileting every 2 Hours, in advance of need  - Initiate/Maintain bed alarm  - Obtain necessary fall risk management equipment: yellow socks  - Apply yellow socks and bracelet for high fall risk patients  - Consider moving patient to room near nurses station  Outcome: Progressing  Goal: Maintains/Returns to pre admission functional level  Description: INTERVENTIONS:  - Perform BMAT or MOVE assessment daily    - Set and communicate daily mobility goal to care team and patient/family/caregiver  - Collaborate with rehabilitation services on mobility goals if consulted  - Perform Range of Motion 3 times a day  - Reposition patient every 2 hours  - Dangle patient 3 times a day  - Stand patient 3 times a day  - Ambulate patient 3 times a day  - Out of bed to chair 3 times a day   - Out of bed for meals 3 times a day  - Out of bed for toileting  - Record patient progress and toleration of activity level   Outcome: Progressing     Problem: Knowledge Deficit  Goal: Patient/family/caregiver demonstrates understanding of disease process, treatment plan, medications, and discharge instructions  Description: Complete learning assessment and assess knowledge base    Interventions:  - Provide teaching at level of understanding  - Provide teaching via preferred learning methods  Outcome: Progressing

## 2022-12-28 NOTE — PLAN OF CARE
Problem: OCCUPATIONAL THERAPY ADULT  Goal: Performs self-care activities at highest level of function for planned discharge setting  See evaluation for individualized goals  Description: Treatment Interventions: ADL retraining, Functional transfer training, UE strengthening/ROM, Endurance training, Patient/family training, Equipment evaluation/education, Energy conservation, Activityengagement, Compensatory technique education          See flowsheet documentation for full assessment, interventions and recommendations  Outcome: Progressing  Note: Limitation: Decreased ADL status, Decreased UE strength, Decreased Safe judgement during ADL, Decreased endurance, Decreased self-care trans, Decreased high-level ADLs  Prognosis: Good  Assessment: Patient participated in Skilled OT session this date with interventions consisting of functional transfer training, ADL re training with the use of correct body mechnaics, Energy Conservation techniques and therapeutic exercise to: increase functional use of BUEs, increase BUE muscle strength    Patient agreeable to OT treatment session, upon arrival patient was found seated OOB to Recliner, alert, responsive  and in no apparent distress  Re-attempted OT treatment and patient agreeable to same  Patient demonstrates ability to complete self care ADLs as detailed in flow sheet  Patient then transfers sit to stand and ambulates to/from commode with CGA x 1  Patient is able to perform bladder hygiene (I)ly with set up  Once returned to recliner, patient then performs UB TE using 1# weight as detailed in flow sheet  Patient reports abdominal pain 4/10  Session ended with arrival of lunch and patient seated OOB to recliner, all needs met, and call bell within reach  In comparison to previous session, patient with improvements in self care ADL performance, UB strength, functional transfers, and endurance   Patient requiring verbal cues for correct technique, verbal cues for pacing thru activity steps, frequent rest periods and ocassional safety reminders  Patient performance  demonstrated good carryover of learned techniques and strategies to facilitate safety during functional tasks  Patient continues to be functioning below baseline level, occupational performance remains limited secondary to factors listed above and increased risk for falls and injury  From OT standpoint, recommendation at time of d/c would be Short Term Rehab  Patient to benefit from continued Occupational Therapy treatment while in the hospital to address deficits as defined above and maximize level of functional independence with ADLs and functional mobility in order to return to [facility]       OT Discharge Recommendation: Post acute rehabilitation services

## 2022-12-28 NOTE — PROGRESS NOTES
Progress Note - [facility] Infectious Disease   Donna Rowell 58 y o  female MRN: [id number]  Unit/Bed#: -01 Encounter: [id number]      IMPRESSION & RECOMMENDATIONS:   1  Recurrent sepsis  Appears to be 2/2 intra-abdominal source  Negative serial blood cultures  Low clinical suspicion for pneumonia  Improving, with downtrending fever curve and stable WBC  Hemodynamically stable    -antibiotics as below  -monitor temperature and hemodynamics  -serial exam  -recheck CBC     2  Intra-abdominal abscesses  Likely due to #3  S/p IR drain placement x2 12/20 yielding purulent drainage  Cultures positive for E  Coli  Repeat CT shows decreasing size of abscesses  There is still an undrained pelvic collection which remains unchanged and may be a post-op seroma    -continue IV ceftriaxone and PO metronidazole   -anticipate a 10 day course of abx from drain placement   -drain management   -serial wound exams  -close surgery and IR follow up      3  Sigmoid diverticulitis with perforation  S/p sigmoid resection with transverse loop colostomy 12/5  Complicated by intra-abdominal abscesses  -antibiotics as above   -surgery follow up ongoing      4  Bilateral pleural effusions with associated compressive atelectasis  Likely due to intra-abdominal process  Pulmonology consulted  Agree that clinical suspicion for pna is low    -monitor resp sx and O2 requirements   -encouraged incentive spirometry and OOB    5  Antibiotic Allergy  Hx of vomiting with sulfa abx and doxy  Monitor for adverse drug reactions  Antibiotics:  Abx D11  Ceftri/Flagyl  Post drain placement D8    I have discussed the above management plan in detail with patient  I have discussed the above management plan in detail with patient's RN, and the primary service, Surg AP  Subjective:  Patient has no fever, chills, sweats; still feels somewhat nauseated with abdominal pain  Feels weak and is hesitant to ambulate around room   Discussed with surgery not yet ready for discharge as pt needs to ambulate and tolerate regular diet  Objective:  Vitals:  Temp:  [97 9 °F (36 6 °C)-99 4 °F (37 4 °C)] 98 4 °F (36 9 °C)  HR:  [73-79] 73  Resp:  [14-18] 14  BP: (112-123)/(67-74) 112/72  SpO2:  [88 %-94 %] 90 %  Temp (24hrs), Av 8 °F (37 1 °C), Min:97 9 °F (36 6 °C), Max:99 4 °F (37 4 °C)  Current: Temperature: 98 4 °F (36 9 °C)    PHYSICAL EXAM:  General Appearance:  Appearing well, nontoxic, and in no distress, sitting in recliner, generally weak appearing    HEENT: Normocephalic, without obvious abnormality, atraumatic  Conjunctiva pink and sclera anicteric  Oropharynx moist without lesions  Lungs:   Clear to auscultation bilaterally, respirations unlabored   Heart:  RRR; no murmur, rub or gallop   Abdomen:   Soft, non-tender, non-distended, positive bowel sounds; ostomy with liquid brown stool, LLQ wound without surrounding erythema or purulence  RLQ drains x2 with minimal purulent output  Extremities: No cyanosis, clubbing or edema   Musculoskeletal: Back symmetric without curvature, ROM normal     Skin: No rashes or lesions  No draining wounds noted  Peripheral IV intact without evidence of erythema, warmth, or exudate  LABS, IMAGING, & OTHER STUDIES:  Lab Results:  I have personally reviewed pertinent labs    Results from last 7 days   Lab Units 22  0507 22  0521 22  0431   WBC Thousand/uL 13 07* 13 22* 14 44*   HEMOGLOBIN g/dL 8 4* 8 6* 8 6*   PLATELETS Thousands/uL 498* 522* 590*     Results from last 7 days   Lab Units 22  0507 22  0521 22  0501   SODIUM mmol/L 136 136 136   POTASSIUM mmol/L 3 8 3 7 3 9   CHLORIDE mmol/L 103 102 100   CO2 mmol/L 26 25 25   BUN mg/dL 7 6 9   CREATININE mg/dL 0 59* 0 69 0 64   EGFR ml/min/1 73sq m 98 93 95   CALCIUM mg/dL 8 1* 8 2* 8 3*   AST U/L  --  23 22   ALT U/L  --  16 18   ALK PHOS U/L  --  93 98     Results from last 7 days   Lab Units 22  0109 22  1432 12/25/22  1037 12/24/22  1122   BLOOD CULTURE   --   --   --  No Growth at 72 hrs  No Growth at 72 hrs     SPUTUM CULTURE   --  1+ Growth of  --   --    GRAM STAIN RESULT   --  1+ Epithelial cells per low power field  Rare Polys  No organisms seen  --   --    MRSA CULTURE ONLY   --   --  No Methicillin Resistant Staphlyococcus aureus (MRSA) isolated  --    LEGIONELLA URINARY ANTIGEN  Negative  --   --   --      Results from last 7 days   Lab Units 12/26/22  0431 12/25/22  0501 12/24/22  1639   PROCALCITONIN ng/ml 0 32* 0 28* 0 24         Results from last 7 days   Lab Units 12/26/22  0431   FERRITIN ng/mL 1,099*

## 2022-12-28 NOTE — NURSING NOTE
AWAKE AND ALERT AND ORIENTED  PLEASANT  02 MAINTAINED 3 LITERS N/C AND 02 SAT 92%  HR 75/MIN  ABDOMEN IS SOFT AND COLOSTOMY IS MAINTAINED RT ABDOMENFOR BROWN LIQUID RETURN  AGUSTO #1 IS MAINTAINED CANALES LIQUID SMALL AMT AND AGUSTO#2 NO DRAINAGE AT PRESENT  DRESSINGS ARE MAINTAINED LLQ AND AROUND DRAIN SITES, DRY AND INTACT  DENIES PAIN  IVF CONTINUE  NO DISTRESS

## 2022-12-28 NOTE — NURSING NOTE
Pt oob in her chair in no acute distress visiting with , denies any pain at this time, eating small amount of lunch

## 2022-12-28 NOTE — PROGRESS NOTES
Progress Note - General Surgery   Lorri Dugan 58 y o  female MRN: [id number]  Unit/Bed#: -01 Encounter: [id number]      ASSESSMENT:  59 y/o F hx bronchomalacia/bronchiectasis admitted with septic shock 2/2 feculent peritonitis from peforated sigmoid diverticulitis  POD#23 s/p emergent hand-assisted laparoscopic sigmoid colectomy, primary anastomosis, and creation of diverting transverse loop colostomy  LLQ wound infection at hand port site  Intra-abdominal abscesses s/p IR percutaneous drainage (12/20/22)  · AF, VSS, spO2 low 90s on 2L O2 nasal cannula   · Persistent leukocytosis 13 07  · Hgb stable 8 4  · Thrombocytosis improved from last week, 498  · Pulm workup over the weekend: sputum cx/legionella urinary antigen/viral panel negative   · AGUSTO drain x2 R abdomen: 40cc cranial #2 with cloudy serous fluid, caudal #1 with serous fluid in tubing, 8 cc drainage   · Ostomy pink, fx, output: none recorded   · UO: 250cc       PLAN:  · Encourage oral intake as tolerated, continue fulls t/c, consider advancing tomorrow   · Adjust ensure pudding to TID   · Light IVF, D/C once tolerating PO  · Encourage OOB, ambulation, PT/OT, and pulmonary toileting  · PT/OT changed recs to STR, CM arranging   · Monitor O2/pulmonary status, obtain home O2 test prior to D/C  · Continue IV Rocephin/Flagyl until 12/30 per ID recs  ·  Drain/ostomy care      SUBJECTIVE:  Eating small amts of toast and ensure pudding  No nausea  No issues voiding  Having ostomy fx  Using IS  Working with PT       OBJECTIVE:   Vitals:  Blood pressure 115/69, pulse 76, temperature 98 2 °F (36 8 °C), temperature source Oral, resp  rate 16, height 5' 5" (1 651 m), weight 81 4 kg (179 lb 7 3 oz), SpO2 92 %  ,Body mass index is 29 86 kg/m²      I/Os:    Intake/Output Summary (Last 24 hours) at 12/28/2022 1333  Last data filed at 12/28/2022 0514  Gross per 24 hour   Intake 2697 5 ml   Output 8 ml   Net 2689 5 ml       Lines/Drains:  Invasive Devices Peripheral Intravenous Line  Duration           Peripheral IV 12/26/22 Right;Ventral (anterior) Forearm 2 days          Drain  Duration           Colostomy RLQ 23 days    Abscess Drain RUQ 7 days    Abscess Drain RUQ 7 days                Physical Exam  Constitutional:       General: She is not in acute distress  HENT:      Head: Normocephalic and atraumatic  Cardiovascular:      Rate and Rhythm: Normal rate and regular rhythm  Pulmonary:      Effort: Pulmonary effort is normal       Breath sounds: No wheezing, rhonchi or rales  Abdominal:      General: There is no distension  Palpations: Abdomen is soft  Tenderness: There is abdominal tenderness (LLQ)  There is no guarding or rebound  Comments: Hypoactive bowel sounds  Ostomy pink viable fx, stool in bag  LLQ incision with pink granulation tissue and small amount of yellow slough in wound bed, no significant drainage, seropurulent exudate on dressing   Incisions with steris C/D/I   Drains intact, both with serous fluid in tubing, small amt cloudy serous in caudal    Musculoskeletal:         General: No tenderness  Right lower leg: No edema  Left lower leg: No edema  Skin:     General: Skin is warm and dry  Neurological:      Mental Status: She is alert  Diagnostics:  I have personally reviewed pertinent lab results  XR chest portable ICU    Result Date: 12/7/2022  Impression: Possible mild congestive changes and small effusions  Workstation performed: [id number]     XR chest portable ICU    Result Date: 12/7/2022  Impression: ET tube at the annamarie  Small right effusion suggested  Workstation performed: [id number]     XR chest portable ICU    Result Date: 12/6/2022  Impression: 1  Endotracheal tube positioning appropriate, 2 7 cm above the annamarie 2  Shallow depth of inspiration with crowding of bronchovascular markings or atelectasis 3   Nasogastric tube side port at level of left hemidiaphragm, consider advancement to assure the side port is within the stomach  The examination demonstrates a significant  finding and was documented as such in [facility] for liaison and referring practitioner notification  Workstation performed: [id number]     XR chest portable ICU    Result Date: 12/5/2022  Impression: No consolidation or overt pulmonary edema  Workstation performed: [id number]     CT abdomen pelvis with contrast    Result Date: 12/4/2022  Impression: Acute sigmoid diverticulitis involving a large sigmoid diverticulum  There is a small amount of pneumoperitoneum suggesting small perforation  No abscess identified  The study was marked in [facility] for immediate notification   Workstation performed: [id number]     Recent Results (from the past 36 hour(s))   CBC and differential    Collection Time: 12/27/22  5:21 AM   Result Value Ref Range    WBC 13 22 (H) 4 31 - 10 16 Thousand/uL    RBC 2 88 (L) 3 81 - 5 12 Million/uL    Hemoglobin 8 6 (L) 11 5 - 15 4 g/dL    Hematocrit 26 7 (L) 34 8 - 46 1 %    MCV 93 82 - 98 fL    MCH 29 9 26 8 - 34 3 pg    MCHC 32 2 31 4 - 37 4 g/dL    RDW 13 4 11 6 - 15 1 %    MPV 8 7 (L) 8 9 - 12 7 fL    Platelets 807 (H) 641 - 390 Thousands/uL   Comprehensive metabolic panel    Collection Time: 12/27/22  5:21 AM   Result Value Ref Range    Sodium 136 135 - 147 mmol/L    Potassium 3 7 3 5 - 5 3 mmol/L    Chloride 102 96 - 108 mmol/L    CO2 25 21 - 32 mmol/L    ANION GAP 9 4 - 13 mmol/L    BUN 6 5 - 25 mg/dL    Creatinine 0 69 0 60 - 1 30 mg/dL    Glucose 126 65 - 140 mg/dL    Calcium 8 2 (L) 8 4 - 10 2 mg/dL    Corrected Calcium 9 5 8 3 - 10 1 mg/dL    AST 23 13 - 39 U/L    ALT 16 7 - 52 U/L    Alkaline Phosphatase 93 34 - 104 U/L    Total Protein 5 4 (L) 6 4 - 8 4 g/dL    Albumin 2 4 (L) 3 5 - 5 0 g/dL    Total Bilirubin 0 33 0 20 - 1 00 mg/dL    eGFR 93 ml/min/1 73sq m   CBC and differential    Collection Time: 12/28/22  5:07 AM   Result Value Ref Range    WBC 13 07 (H) 4 31 - 10 16 Thousand/uL    RBC 2 83 (L) 3 81 - 5 12 Million/uL    Hemoglobin 8 4 (L) 11 5 - 15 4 g/dL    Hematocrit 26 9 (L) 34 8 - 46 1 %    MCV 95 82 - 98 fL    MCH 29 7 26 8 - 34 3 pg    MCHC 31 2 (L) 31 4 - 37 4 g/dL    RDW 13 5 11 6 - 15 1 %    MPV 9 2 8 9 - 12 7 fL    Platelets 929 (H) 868 - 390 Thousands/uL   Basic metabolic panel    Collection Time: 12/28/22  5:07 AM   Result Value Ref Range    Sodium 136 135 - 147 mmol/L    Potassium 3 8 3 5 - 5 3 mmol/L    Chloride 103 96 - 108 mmol/L    CO2 26 21 - 32 mmol/L    ANION GAP 7 4 - 13 mmol/L    BUN 7 5 - 25 mg/dL    Creatinine 0 59 (L) 0 60 - 1 30 mg/dL    Glucose 114 65 - 140 mg/dL    Calcium 8 1 (L) 8 4 - 10 2 mg/dL    eGFR 98 ml/min/1 73sq m   Manual Differential(PHLEBS Do Not Order)    Collection Time: 12/28/22  5:07 AM   Result Value Ref Range    Segmented % 87 (H) 43 - 75 %    Lymphocytes % 7 (L) 14 - 44 %    Monocytes % 3 (L) 4 - 12 %    Eosinophils, % 2 0 - 6 %    Basophils % 0 0 - 1 %    Myelocytes % 1 0 - 1 %    Absolute Neutrophils 11 37 (H) 1 85 - 7 62 Thousand/uL    Lymphocytes Absolute 0 91 0 60 - 4 47 Thousand/uL    Monocytes Absolute 0 39 0 00 - 1 22 Thousand/uL    Eosinophils Absolute 0 26 0 00 - 0 40 Thousand/uL    Basophils Absolute 0 00 0 00 - 0 10 Thousand/uL    Total Counted      RBC Morphology Normal     Platelet Estimate Increased (A) Adequate       Current Medications:  Scheduled Meds:  Current Facility-Administered Medications   Medication Dose Route Frequency Provider Last Rate   • acetaminophen  975 mg Oral Q6H PRN Pankaj Morales PA-C     • amLODIPine  10 mg Oral Daily Jocelyn Millan PA-C     • aspirin  81 mg Oral Daily Phoebe Dillard PA-C     • budesonide  0 5 mg Nebulization Q12H Pankaj Morales PA-C     • calcium carbonate  500 mg Oral TID PRN Matilda Serrano PA-C     • cefTRIAXone  1,000 mg Intravenous Q24H Ravinder Holliday MD 1,000 mg (12/28/22 1219)   • dextrose 5 % and sodium chloride 0 45 % with KCl 20 mEq/L  75 mL/hr Intravenous Continuous George Jones DO 75 mL/hr (12/28/22 0139)   • enoxaparin  40 mg Subcutaneous Q24H [facility] BARBIE Faust     • levalbuterol  1 25 mg Nebulization BID Michelle Sebastian MD     • lidocaine  2 patch Topical Daily BARBIE Faust     • melatonin  6 mg Per NG Tube HS BARBIE Faust     • metroNIDAZOLE  500 mg Intravenous Q8H Jc Dixon  mg (12/28/22 3110)   • nebivolol  5 mg Oral Daily Jocelyn Millan PA-C     • ondansetron  4 mg Intravenous Q4H PRN Antelmo Christina PA-C     • oxyCODONE-acetaminophen  1 tablet Oral Q6H PRN Pankaj Morales PA-C     • pantoprazole  40 mg Oral BID AC Antelmo Christina PA-C     • phenol  1 spray Mouth/Throat Q2H PRN BARBIE Faust     • promethazine  25 mg Intramuscular Q6H PRN Ella Lara PA-C     • sodium chloride  1 spray Each Nare Q1H PRN Esha Jefferson PA-C     • traMADol  50 mg Oral Q6H PRN BARBIE Faust       Continuous Infusions:dextrose 5 % and sodium chloride 0 45 % with KCl 20 mEq/L, 75 mL/hr, Last Rate: 75 mL/hr (12/28/22 0139)      PRN Meds:  •  acetaminophen  •  calcium carbonate  •  ondansetron  •  oxyCODONE-acetaminophen  •  phenol  •  promethazine  •  sodium chloride  •  traMADol      DAVID Ledezma  12/28/2022

## 2022-12-29 ENCOUNTER — APPOINTMENT (INPATIENT)
Dept: INTERVENTIONAL RADIOLOGY/VASCULAR | Facility: HOSPITAL | Age: 62
End: 2022-12-29

## 2022-12-29 LAB
BACTERIA BLD CULT: NORMAL
BACTERIA BLD CULT: NORMAL

## 2022-12-29 RX ADMIN — AMLODIPINE BESYLATE 10 MG: 10 TABLET ORAL at 08:16

## 2022-12-29 RX ADMIN — TRAMADOL HYDROCHLORIDE 50 MG: 50 TABLET, COATED ORAL at 03:32

## 2022-12-29 RX ADMIN — PANTOPRAZOLE SODIUM 40 MG: 40 TABLET, DELAYED RELEASE ORAL at 06:21

## 2022-12-29 RX ADMIN — DEXTROSE, SODIUM CHLORIDE, AND POTASSIUM CHLORIDE 50 ML/HR: 5; .45; .15 INJECTION INTRAVENOUS at 11:51

## 2022-12-29 RX ADMIN — CEFTRIAXONE 1000 MG: 1 INJECTION, SOLUTION INTRAVENOUS at 12:34

## 2022-12-29 RX ADMIN — METRONIDAZOLE 500 MG: 500 INJECTION, SOLUTION INTRAVENOUS at 14:14

## 2022-12-29 RX ADMIN — METRONIDAZOLE 500 MG: 500 INJECTION, SOLUTION INTRAVENOUS at 22:02

## 2022-12-29 RX ADMIN — METRONIDAZOLE 500 MG: 500 INJECTION, SOLUTION INTRAVENOUS at 06:21

## 2022-12-29 RX ADMIN — NEBIVOLOL 5 MG: 5 TABLET ORAL at 08:16

## 2022-12-29 RX ADMIN — ENOXAPARIN SODIUM 40 MG: 100 INJECTION SUBCUTANEOUS at 08:16

## 2022-12-29 RX ADMIN — ALTEPLASE 6 MG: 2.2 INJECTION, POWDER, LYOPHILIZED, FOR SOLUTION INTRAVENOUS at 16:11

## 2022-12-29 RX ADMIN — ACETAMINOPHEN 975 MG: 325 TABLET ORAL at 22:02

## 2022-12-29 RX ADMIN — ONDANSETRON 4 MG: 2 INJECTION INTRAMUSCULAR; INTRAVENOUS at 08:15

## 2022-12-29 RX ADMIN — PANTOPRAZOLE SODIUM 40 MG: 40 TABLET, DELAYED RELEASE ORAL at 16:14

## 2022-12-29 NOTE — CASE MANAGEMENT
Case Management Progress Note    Patient name Cecily Al  Location [address] [facility] [address] 224/-30 MRN [id number]  : 1960 Date 2022       LOS (days): 25  Geometric Mean LOS (GMLOS) (days): 9 60  Days to GMLOS:-15 7        OBJECTIVE:        Current admission status: Inpatient  Preferred Pharmacy:   [facility]/pharmacy [address] [place] [id number]  Phone: [phone] Fax: [phone]    Primary Care Provider: Chaim Harvey    Primary Insurance: [employer]  Secondary Insurance: MEDICARE    PROGRESS NOTE:    Per medical rounds and surgery PA tentative discharge 24-48 hrs  Call from Nena at [facility] to discuss discharge  Nena will submit for insurance auth  Met with patient and spouse at bedside to discuss discharge planning  Discussed transportation and options [facility] or family transport  Family will transport  Discussed acute rehab and answered questions  Explained that insurance Emmie Lozada has been submitted  CM department following thru discharge

## 2022-12-29 NOTE — PLAN OF CARE
Problem: PHYSICAL THERAPY ADULT  Goal: Performs mobility at highest level of function for planned discharge setting  See evaluation for individualized goals  Description: Treatment/Interventions: Functional transfer training, LE strengthening/ROM, Elevations, Therapeutic exercise, Endurance training, Patient/family training, Equipment eval/education, Bed mobility, Gait training, Compensatory technique education, OT, Spoke to nursing          See flowsheet documentation for full assessment, interventions and recommendations  Outcome: Progressing  Note: Prognosis: Good  Problem List: Decreased strength, Decreased endurance, Impaired balance, Decreased mobility, Decreased safety awareness, Decreased skin integrity, Pain  Assessment: Pt seen for PT treatment session this date, consisting of ther act focused on bed mobility, functional transfer training & unsupported sitting at EOB, ther ex focused on strengthening and gt training on level surfaces to improve pt safety in household environment  Since previous session, pt has made good progress in terms of requiring increased OOB activity  Pertinent barriers during this session include pain  Current goals and POC remain appropriate, pt continues to have rehab potential and is making good progress towards STGs  Pt prognosis for achieving goals is good, pending pt progress with hospitalization/medical status improvements, and indicated by self-expression (thoughts, feelings, needs), supportive family/caregivers and learning potential  Pt limited d/t fear of pain provocation and self limiting  PT recommends post acute rehabilitation services upon discharge  Pt continues to be functioning below baseline level, and remains limited 2* factors listed above  PT will continue to see pt during current hospitalization in order to address the deficits listed above and provide interventions consistent w/ POC in effort to achieve STGs    Barriers to Discharge: Inaccessible home environment, Decreased caregiver support     PT Discharge Recommendation: Post acute rehabilitation services    See flowsheet documentation for full assessment

## 2022-12-29 NOTE — PLAN OF CARE
Problem: Potential for Falls  Goal: Patient will remain free of falls  Description: INTERVENTIONS:  - Educate patient/family on patient safety including physical limitations  - Instruct patient to call for assistance with activity   - Consult OT/PT to assist with strengthening/mobility   - Keep Call bell within reach  - Keep bed low and locked with side rails adjusted as appropriate  - Keep care items and personal belongings within reach  - Initiate and maintain comfort rounds  - Make Fall Risk Sign visible to staff  - Offer Toileting every 2 Hours, in advance of need  - Initiate/Maintain BED alarm  - Obtain necessary fall risk management equipment: yellow socks  - Apply yellow socks and bracelet for high fall risk patients  - Consider moving patient to room near nurses station  Outcome: Progressing     Problem: PAIN - ADULT  Goal: Verbalizes/displays adequate comfort level or baseline comfort level  Description: Interventions:  - Encourage patient to monitor pain and request assistance  - Assess pain using appropriate pain scale  - Administer analgesics based on type and severity of pain and evaluate response  - Implement non-pharmacological measures as appropriate and evaluate response  - Consider cultural and social influences on pain and pain management  - Notify physician/advanced practitioner if interventions unsuccessful or patient reports new pain  Outcome: Progressing     Problem: INFECTION - ADULT  Goal: Absence or prevention of progression during hospitalization  Description: INTERVENTIONS:  - Assess and monitor for signs and symptoms of infection  - Monitor lab/diagnostic results  - Monitor all insertion sites, i e  indwelling lines, tubes, and drains  - Monitor endotracheal if appropriate and nasal secretions for changes in amount and color  - [place] appropriate cooling/warming therapies per order  - Administer medications as ordered  - Instruct and encourage patient and family to use good hand hygiene technique  - Identify and instruct in appropriate isolation precautions for identified infection/condition  Outcome: Progressing     Problem: SAFETY ADULT  Goal: Patient will remain free of falls  Description: INTERVENTIONS:  - Educate patient/family on patient safety including physical limitations  - Instruct patient to call for assistance with activity   - Consult OT/PT to assist with strengthening/mobility   - Keep Call bell within reach  - Keep bed low and locked with side rails adjusted as appropriate  - Keep care items and personal belongings within reach  - Initiate and maintain comfort rounds  - Make Fall Risk Sign visible to staff  - Offer Toileting every 2 Hours, in advance of need  - Initiate/Maintain BED alarm  - Obtain necessary fall risk management equipment: yellow socks  - Apply yellow socks and bracelet for high fall risk patients  - Consider moving patient to room near nurses station  Outcome: Progressing  Goal: Maintain or return to baseline ADL function  Description: INTERVENTIONS:  - Educate patient/family on patient safety including physical limitations  - Instruct patient to call for assistance with activity   - Consult OT/PT to assist with strengthening/mobility   - Keep Call bell within reach  - Keep bed low and locked with side rails adjusted as appropriate  - Keep care items and personal belongings within reach  - Initiate and maintain comfort rounds  - Make Fall Risk Sign visible to staff  - Offer Toileting every 2 Hours, in advance of need  - Initiate/Maintain bed alarm  - Obtain necessary fall risk management equipment: yellow socks  - Apply yellow socks and bracelet for high fall risk patients  - Consider moving patient to room near nurses station  Outcome: Progressing  Goal: Maintains/Returns to pre admission functional level  Description: INTERVENTIONS:  - Perform BMAT or MOVE assessment daily    - Set and communicate daily mobility goal to care team and patient/family/caregiver  - Collaborate with rehabilitation services on mobility goals if consulted  - Perform Range of Motion 3 times a day  - Reposition patient every 2 hours  - Dangle patient 3 times a day  - Stand patient 3 times a day  - Ambulate patient 3 times a day  - Out of bed to chair 3 times a day   - Out of bed for meals 3 times a day  - Out of bed for toileting  - Record patient progress and toleration of activity level   Outcome: Progressing     Problem: DISCHARGE PLANNING  Goal: Discharge to home or other facility with appropriate resources  Description: INTERVENTIONS:  - Identify barriers to discharge w/patient and caregiver  - Arrange for needed discharge resources and transportation as appropriate  - Identify discharge learning needs (meds, wound care, etc )  - Arrange for interpretive services to assist at discharge as needed  - Refer to Case Management Department for coordinating discharge planning if the patient needs post-hospital services based on physician/advanced practitioner order or complex needs related to functional status, cognitive ability, or social support system  Outcome: Progressing     Problem: Knowledge Deficit  Goal: Patient/family/caregiver demonstrates understanding of disease process, treatment plan, medications, and discharge instructions  Description: Complete learning assessment and assess knowledge base    Interventions:  - Provide teaching at level of understanding  - Provide teaching via preferred learning methods  Outcome: Progressing     Problem: MOBILITY - ADULT  Goal: Maintain or return to baseline ADL function  Description: INTERVENTIONS:  - Educate patient/family on patient safety including physical limitations  - Instruct patient to call for assistance with activity   - Consult OT/PT to assist with strengthening/mobility   - Keep Call bell within reach  - Keep bed low and locked with side rails adjusted as appropriate  - Keep care items and personal belongings within reach  - Initiate and maintain comfort rounds  - Make Fall Risk Sign visible to staff  - Offer Toileting every 2 Hours, in advance of need  - Initiate/Maintain bed alarm  - Obtain necessary fall risk management equipment: yellow socks  - Apply yellow socks and bracelet for high fall risk patients  - Consider moving patient to room near nurses station  Outcome: Progressing  Goal: Maintains/Returns to pre admission functional level  Description: INTERVENTIONS:  - Perform BMAT or MOVE assessment daily    - Set and communicate daily mobility goal to care team and patient/family/caregiver  - Collaborate with rehabilitation services on mobility goals if consulted  - Perform Range of Motion 3 times a day  - Reposition patient every 2 hours    - Dangle patient 3 times a day  - Stand patient 3 times a day  - Ambulate patient 3 times a day  - Out of bed to chair 3 times a day   - Out of bed for meals 3 times a day  - Out of bed for toileting  - Record patient progress and toleration of activity level   Outcome: Progressing     Problem: Prexisting or High Potential for Compromised Skin Integrity  Goal: Skin integrity is maintained or improved  Description: INTERVENTIONS:  - Identify patients at risk for skin breakdown  - Assess and monitor skin integrity  - Assess and monitor nutrition and hydration status  - Monitor labs   - Assess for incontinence   - Turn and reposition patient  - Assist with mobility/ambulation  - Relieve pressure over bony prominences  - Avoid friction and shearing  - Provide appropriate hygiene as needed including keeping skin clean and dry  - Evaluate need for skin moisturizer/barrier cream  - Collaborate with interdisciplinary team   - Patient/family teaching  - Consider wound care consult   Outcome: Progressing

## 2022-12-29 NOTE — SEDATION DOCUMENTATION
TPA 3mg instilled into each drain  Drains are capped  To be opened tomorrow morning to bulb suction

## 2022-12-29 NOTE — WOUND OSTOMY CARE
Progress Note- Ostomy  Lemuel Marie 58 y o  female  [id number]  --01    Assessment: Late entry  Patient seen for bi-weekly ostomy follow up  Patient has not been participating in ostomy care up to this point, she has had fevers, bouts of nausea and vomiting this past week  Today patient states she is feeling better  Her family was present today for a visit and she is visibly brighter than the past week  She states that she will be going back home for rehab which will have her closer to home and family  Daughter was present at time of pouch change  She and patient's  asked questions regarding ostomy care, types of pouches, frequency of change, frequency of emptying, care of peristomal skin, showering with pouch in place, and showering without pouch in place, best time to change pouch before eating or drinking in the morning, obtaining supplies once patient is home  Stoma pink, slightly budded, functioning for loose brown stool  Mucocutaneous junction intact  Peristoma skin intact    Two AGUSTO drains to bulb suction to the right abdomen with scant amount of white milky drainage in bulb    Performed ostomy pouch change with patient observing ;  1  Colostomy pouch removed using push-pull method, showed patient washout of barrier and that is an indicator of time to change the pouch  2  Warm water and washcloth to cleanse the peristomal skin  3  Application of barrier film  4  Using moldable barrier, rolling moldable edge out to size of stoma and applying, lifting flange to apply pouch to barrier    Skin and Ostomy Care Plan:  1  Empty colostomy pouch when 1/3-1/2 full of stool or inflated with gas  Cleanse drainage end prior to closing pouch  Change pouch every 3 days and PRN with signs of leakage  Encourage patient to observe emptying pouch  2  Ehob offloading cushion to chair when OOB  3  Elevate heels off of bed with pillow to offload  4   Apply hydraguard to b/l heels, buttocks and sacrum BID and PRN  5  Turn and reposition patient Q2 hours  6  Allevyn life silicone bordered foam to the sacrum-upper buttocks, peel back daily for skin assessment and change every 3 days and PRN     Ostomy:  Wound 12/05/22 Abdomen N/A (Active)   Wound Image   12/28/22 1542       Wound 12/15/22 Buttocks Right (Active)   Wound Image   12/15/22 1237   Wound Description Dry; Intact 12/29/22 0729   Devi-wound Assessment Intact 12/29/22 0729   Wound Length (cm) 12 cm 12/15/22 1237   Wound Width (cm) 8 cm 12/15/22 1237   Wound Surface Area (cm^2) 96 cm^2 12/15/22 1237   Drainage Amount None 12/29/22 0729   Treatments Site care 12/29/22 0729   Dressing Foam;Foam, Silicon (eg  Allevyn, etc) 12/28/22 2000   Dressing Changed Other (Comment) 12/16/22 1844   Patient Tolerance Tolerated well 12/22/22 0905   Dressing Status Clean;Dry; Intact 12/28/22 2000   Number of days: 14     Abscess Drain RUQ (Active)   Site Description Unable to view 12/29/22 0729   Dressing Status Clean;Dry; Intact 12/29/22 0729   Drainage Appearance None 12/29/22 0729   Status Other (Comment) 12/28/22 2000   Output (mL) 0 mL 12/28/22 1637   Number of days: 9       Abscess Drain RUQ (Active)   Site Description Unable to view 12/29/22 0729   Dressing Status Intact; Clean;Dry 12/29/22 0729   Drainage Appearance None 12/29/22 0729   Status Other (Comment) 12/28/22 2000   Output (mL) 10 mL 12/28/22 1637   Number of days: 9       Colostomy RLQ (Active)   Stomal Appliance 2 piece 12/29/22 0729   Stoma Assessment [place] 12/29/22 0729   Stoma size (in) 1 75 in 12/28/22 1542   Stoma Shape Round 12/29/22 0729   Peristomal Assessment Clean; Intact 12/29/22 0729   Treatment Other (Comment) 12/29/22 0729   Output (mL) 200 mL 12/28/22 1723   Number of days: 24         Reviewed plan of care with primary RN Jackie Valdez  Recommendations written as orders  Wound care team to follow weekly while admitted  Questions or concerns [phone] [facility] Nurse    Noreen LINTONN, RN, CWON

## 2022-12-29 NOTE — NURSING NOTE
Surgery did dsg change this am, IR up to see pt and instill tpa , pt tolerated well, pt oob in her chair in no acute distress watching tv, denies any pain at this time, callbell in reach of the pt

## 2022-12-29 NOTE — NURSING NOTE
PLEASANT  02 MAINTAINED 3 LITERS N/C  02 SAT 92%  RESP EASY  NO SOB,HR 70/MIN  AGUSTO DRAINS X2 MAINTAINED TO BULB SUCTION TO RT ABDOMEN  DRESSING LEFT ABDOMEN IS DRY AND INTACT  CALL MENJIVAR IS NEAR AND NO DISTRESS  Pilar Dominguez

## 2022-12-29 NOTE — PLAN OF CARE
Problem: OCCUPATIONAL THERAPY ADULT  Goal: Performs self-care activities at highest level of function for planned discharge setting  See evaluation for individualized goals  Description: Treatment Interventions: ADL retraining, Functional transfer training, UE strengthening/ROM, Endurance training, Patient/family training, Equipment evaluation/education, Energy conservation, Activityengagement, Compensatory technique education          See flowsheet documentation for full assessment, interventions and recommendations  Outcome: Progressing  Note: Limitation: Decreased ADL status, Decreased UE strength, Decreased Safe judgement during ADL, Decreased endurance, Decreased self-care trans, Decreased high-level ADLs  Prognosis: Good  Assessment: Patient participated in Skilled OT session this date with interventions consisting of ADL re training with the use of correct body mechnaics, Energy Conservation techniques, deep breathing technique, safety awareness and fall prevention techniques, therapeutic exercise to: increase functional use of BUEs, increase BUE muscle strength ,  therapeutic activities to: increase activity tolerance, increase dynamic sit/ stand balance during functional activity , increase postural control, increase trunk control and increase OOB/ sitting tolerance   Patient agreeable to OT treatment session, upon arrival patient was found seated in bed (back supported)   In comparison to previous session, patient with improvements in activity tolerance and functional mobility  Patient requiring frequent rest periods and ocassional safety reminders, occasional verbal cues for corrrect technique, and self-care assistance as noted in flow sheet  Patient continues to be functioning below baseline level, occupational performance remains limited secondary to factors listed above and increased risk for falls and injury     From OT standpoint, recommendation at time of d/c would be post-acute rehabilitation services  Patient to benefit from continued Occupational Therapy treatment while in the hospital to address deficits as defined above and maximize level of functional independence with ADLs and functional mobility       OT Discharge Recommendation: Post acute rehabilitation services     [place], [place]/L

## 2022-12-29 NOTE — PROGRESS NOTES
Progress Note - Infectious Disease   Pattie Sahu 58 y o  female MRN: [id number]  Unit/Bed#: -01 Encounter: [id number]      IMPRESSION & RECOMMENDATIONS:   1  Recurrent sepsis  Appears to be 2/2 intra-abdominal source  Negative serial blood cultures  Low clinical suspicion for pneumonia  Improving, with downtrending fever curve and stable WBC  Hemodynamically stable    -antibiotics as below  -monitor temperature and hemodynamics  -serial exam  -recheck CBC     2  Intra-abdominal abscesses  Likely due to #3  S/p IR drain placement x2 12/20 yielding purulent drainage  Cultures positive for E  Coli  Repeat CT shows decreasing size of abscesses  There is still an undrained pelvic collection which remains unchanged and may be a post-op seroma    -continue IV ceftriaxone and PO metronidazole   -anticipate a 10 day course of abx from drain placement, through 12/30   -drain management  Plan for drain check likely tomorrow   -serial wound exams  -close surgery and IR follow up      3  Sigmoid diverticulitis with perforation  S/p sigmoid resection with transverse loop colostomy 12/5  Complicated by intra-abdominal abscesses  -antibiotics as above   -surgery follow up ongoing      4  Bilateral pleural effusions with associated compressive atelectasis  Likely due to intra-abdominal process  Pulmonology consulted  Agree that clinical suspicion for pna is low    -monitor resp sx and O2 requirements   -encouraged incentive spirometry and OOB     5  Antibiotic Allergy  Hx of vomiting with sulfa abx and doxy  Monitor for adverse drug reactions       Antibiotics:  Abx D12  Ceftriaxone/Flagyl  Post drain placement D9          Subjective:  Patient continues to feel better every day  Improving oral intake  No fevers or chills  No new respiratory complaints      Objective:  Vitals:  Temp:  [98 2 °F (36 8 °C)-98 5 °F (36 9 °C)] 98 5 °F (36 9 °C)  HR:  [68-76] 76  Resp:  [17-18] 17  BP: (105-114)/(70-75) 113/75  SpO2:  [91 %-94 %] 91 %  Temp (24hrs), Av 4 °F (36 9 °C), Min:98 2 °F (36 8 °C), Max:98 5 °F (36 9 °C)  Current: Temperature: 98 5 °F (36 9 °C)    Physical Exam:   General:  No acute distress nontoxic  HEENT: Atraumatic normocephalic  Neck: Trachea midline  Psychiatric:  Awake and alert  Pulmonary:  Normal respiratory excursion without accessory muscle use  Abdomen: Nondistended, ostomy with liquid brown stool, left lower quadrant wound without surrounding erythema or purulence  Right lower quadrant drains x2  Extremities:  No edema  Skin:  No rashes  Neuro: Moves all extremities spontaneously    Lab Results:  I have personally reviewed pertinent labs  Results from last 7 days   Lab Units 22  0507 22  0521 22  0501   POTASSIUM mmol/L 3 8 3 7 3 9   CHLORIDE mmol/L 103 102 100   CO2 mmol/L 26 25 25   BUN mg/dL 7 6 9   CREATININE mg/dL 0 59* 0 69 0 64   EGFR ml/min/1 73sq m 98 93 95   CALCIUM mg/dL 8 1* 8 2* 8 3*   AST U/L  --  23 22   ALT U/L  --  16 18   ALK PHOS U/L  --  93 98     Results from last 7 days   Lab Units 22  0507 22  0521 22  0431   WBC Thousand/uL 13 07* 13 22* 14 44*   HEMOGLOBIN g/dL 8 4* 8 6* 8 6*   PLATELETS Thousands/uL 498* 522* 590*     Results from last 7 days   Lab Units 22  0109 22  1433 22  1037 22  1122   BLOOD CULTURE   --   --   --  No Growth After 4 Days  No Growth After 4 Days  SPUTUM CULTURE   --  1+ Growth of  --   --    GRAM STAIN RESULT   --  1+ Epithelial cells per low power field  Rare Polys  No organisms seen  --   --    MRSA CULTURE ONLY   --   --  No Methicillin Resistant Staphlyococcus aureus (MRSA) isolated  --    LEGIONELLA URINARY ANTIGEN  Negative  --   --   --        Imaging Studies:   I have personally reviewed pertinent imaging study reports and images in PACS  EKG, Pathology, and Other Studies:   I have personally reviewed pertinent reports

## 2022-12-29 NOTE — OCCUPATIONAL THERAPY NOTE
12/29/22 0935   OT Last Visit   OT Visit Date 12/29/22   Note Type   Note Type Treatment   Pain Assessment   Pain Assessment Tool   (made no complaints during session)   Restrictions/Precautions   Weight Bearing Precautions Per Order No   Other Precautions Multiple lines;O2;Fall Risk;Pain   Lifestyle   Reciprocal Relationships  present for most of session; speaks of grandchildren   Intrinsic Gratification "my grandchildren";  "I'll have to decide if I'm going to continue working"  ADL   Where Assessed Edge of bed   Equipment Provided   (patient reports having familiarity with Arminda Jonathan from when she had knee surgery)   Grooming Assistance 2  Maximal Assistance   Grooming Deficit Setup;Verbal cueing;Supervision/safety; Increased time to complete;Brushing hair; Other (Comment)  (and washing hair today --- patient prioritized same secondary to recent sweating episodes (and possibly having to defer a shower today))   Grooming Comments Patient able to blow-dry hair to approx  75% completion before becoming too fatigued to continue  UB Dressing Assistance 4  Minimal Assistance   UB Dressing Comments *hospital gown change provided   LB Dressing Comments Dependent for doff/don socks this session   Toileting Assistance    (CGA)   Toileting Deficit Setup;Verbal cueing;Supervison/safety; Increased time to complete; Bedside commode   Bed Mobility   Supine to Sit 5  Supervision   Additional items Assist x 1;HOB elevated; Increased time required; Bedrails   Additional Comments tolerated sitting at edge of bed for approx  25 minutes   Transfers   Sit to Stand   (CGA)   Additional items Assist x 1; Armrests; Increased time required   Stand to Sit   (CGA)   Additional items Assist x 1; Armrests; Increased time required   Stand pivot   (CGA)   Additional items Assist x 1; Increased time required   Toilet transfer   (CGA)   Additional items Assist x 1;Commode;Verbal cues; Increased time required   Toilet Transfers   Toilet Transfer From Bed   Toilet Transfer Type To   Toilet Transfer to Standard bedside commode   Toilet Transfer Technique Stand pivot   Toilet Transfers Contact guard   Toilet Transfers Comments * with RW   Right Upper Extremity- Strength   R Shoulder Other (Comment)  (pro/re-traction)   R Elbow Elbow flexion;Elbow extension  (and supin/pron-ation)   R Weight/Reps/Sets 1 pound weight X 15 reps each   RUE Strength Comment Patient was fatigued post above, so exercise program stopped/shortened   Left Upper Extremity-Strength   L Weights/Reps/Sets all exer  same as RUE above   Coordination   Gross Motor WFL   Dexterity WFL   Subjective   Subjective "thank you so much for helping me do this"  Cognition   Overall Cognitive Status WFL   Arousal/Participation Alert; Cooperative   Attention Attends with cues to redirect   Orientation Level Oriented X4   Memory Within functional limits   Following Commands Follows one step commands without difficulty   Activity Tolerance   Activity Tolerance Patient limited by fatigue   Medical Staff Made Aware nurse Michaela Jo made aware of session's outcomes   Assessment   Assessment Patient participated in Skilled OT session this date with interventions consisting of ADL re training with the use of correct body mechnaics, Energy Conservation techniques, deep breathing technique, safety awareness and fall prevention techniques, therapeutic exercise to: increase functional use of BUEs, increase BUE muscle strength ,  therapeutic activities to: increase activity tolerance, increase dynamic sit/ stand balance during functional activity , increase postural control, increase trunk control and increase OOB/ sitting tolerance   Patient agreeable to OT treatment session, upon arrival patient was found seated in bed (back supported)   In comparison to previous session, patient with improvements in activity tolerance and functional mobility    Patient requiring frequent rest periods and ocassional safety reminders, occasional verbal cues for corrrect technique, and self-care assistance as noted in flow sheet  Patient continues to be functioning below baseline level, occupational performance remains limited secondary to factors listed above and increased risk for falls and injury  From OT standpoint, recommendation at time of d/c would be post-acute rehabilitation services  Patient to benefit from continued Occupational Therapy treatment while in the hospital to address deficits as defined above and maximize level of functional independence with ADLs and functional mobility  Plan   Treatment Interventions ADL retraining;Functional transfer training;UE strengthening/ROM; Patient/family training; Compensatory technique education; Activityengagement; Energy conservation   Goal Expiration Date 01/06/23   OT Treatment Day 5   Recommendation   OT Discharge Recommendation Post acute rehabilitation services   Additional Comments 2 The patient's raw score on the AM-PAC Daily Activity inpatient short form is 19, standardized score is 40 22, greater than 39 4  Patients at this level are likely to benefit from discharge to home  Please refer to the recommendation of the Occupational Therapist for safe discharge planning     AM-PAC Daily Activity Inpatient   Lower Body Dressing 2   Bathing 3   Toileting 3   Upper Body Dressing 3   Grooming 4   Eating 4   Daily Activity Raw Score 19   Daily Activity Standardized Score (Calc for Raw Score >=11) 40 22   AM-PAC Applied Cognition Inpatient   Following a Speech/Presentation 4   Understanding Ordinary Conversation 4   Taking Medications 3   Remembering Where Things Are Placed or Put Away 4   Remembering List of 4-5 Errands 4   Taking Care of Complicated Tasks 4   Applied Cognition Raw Score 23   Applied Cognition Standardized Score 53 08     REJI Bustamante/MARGA

## 2022-12-29 NOTE — PROGRESS NOTES
Progress Note - General Surgery   Candice Gist 58 y o  female MRN: [id number]  Unit/Bed#: [facility] Encounter: [id number]    Assessment:  63yo F POD#24 s/p emergent lap hand assisted sigmoid resection, primary anastomosis, and creation of diverting loop colostomy complicated by intra-abdominal fluid collections/abscess s/p perc drainage by IR   LLQ wound infection   - AVSS on 2 L via nasal cannula  - AGUSTO x2 10 cc  - Stool 200 cc  - No repeat labs this morning however patient with leukocytosis of 13 07 on 12/28    Plan:  Discussed with IR and plans to drain check tomorrow as patient requesting sedation prior to procedure  Patient be made n p o  at midnight in preparation for procedure, diet may be advanced following procedure  IV antibiotics, ID following and appreciate recommendations  Strict I/O  Encourage ambulation and work with PT/OT  Local wound care  CM for dispo planning, authorization sent in preparation of discharge to acute rehab facility    Subjective/Objective   Subjective: No acute overnight events  Patient overall notes improvement in symptoms from this weekend  Some mild nausea with breakfast however noted that this might have been due to quick change in position other than eating  Otherwise with functioning ostomy  Objective:   Blood pressure 113/75, pulse 76, temperature 98 5 °F (36 9 °C), resp  rate 17, height 5' 5" (1 651 m), weight 80 4 kg (177 lb 4 oz), SpO2 91 %  ,Body mass index is 29 5 kg/m²  Intake/Output Summary (Last 24 hours) at 12/29/2022 0959  Last data filed at 12/29/2022 0601  Gross per 24 hour   Intake 1831 25 ml   Output 210 ml   Net 1621 25 ml       Invasive Devices     Peripheral Intravenous Line  Duration           Peripheral IV 12/26/22 Right;Ventral (anterior) Forearm 3 days          Drain  Duration           Colostomy RLQ 24 days    Abscess Drain RUQ 8 days    Abscess Drain RUQ 8 days                Physical Exam  Vitals and nursing note reviewed     Constitutional: Appearance: Normal appearance  She is obese  HENT:      Head: Normocephalic and atraumatic  Cardiovascular:      Rate and Rhythm: Normal rate and regular rhythm  Pulses: Normal pulses  Heart sounds: Normal heart sounds  Pulmonary:      Effort: Pulmonary effort is normal       Breath sounds: Normal breath sounds  Abdominal:      General: Bowel sounds are normal  There is no distension  Palpations: Abdomen is soft  Tenderness: There is abdominal tenderness (periwound)  There is no guarding or rebound  Comments: Left hand port site incision with packing/dressing changed at bedside, pink granulation tissue present in wound; AP x2 sites are clean, dry, intact with dressing change at bedside   Musculoskeletal:         General: Normal range of motion  Right lower leg: No edema  Left lower leg: No edema  Skin:     General: Skin is warm and dry  Neurological:      General: No focal deficit present  Mental Status: She is alert and oriented to person, place, and time  Lab, Imaging and other studies:I have personally reviewed pertinent lab results        VTE Pharmacologic Prophylaxis: Enoxaparin (Lovenox)  VTE Mechanical Prophylaxis: sequential compression device    Vemichaelly DAVID More

## 2022-12-29 NOTE — PROGRESS NOTES
Patient with fluid collection in right paracolic gutter  Drained percutaneously  Persistent signs of infection clinically  CT scan shows small residual fluid collection  Plan was to try to manipulate the drain into the remaining fluid collection  Patient declines without sedation  We will put her on the schedule for tomorrow, with sedation  In the meantime, I injected tPA into the drains  We will allow that to do well overnight  We will open the drains tomorrow at 7 AM   If there is significant output, this may avoid the need for drain manipulation  CT scan is planned for tomorrow  Depending on the output from the tubes, we may not need the CT

## 2022-12-29 NOTE — PHYSICAL THERAPY NOTE
Physical Therapy Treatment Note       12/29/22 0926   PT Last Visit   PT Visit Date 12/29/22   Note Type   Note Type Treatment   Pain Assessment   Pain Assessment Tool 0-10   Pain Score 3   Pain Location/Orientation Location: Abdomen   Pain Onset/Description Onset: Ongoing;Frequency: Constant/Continuous   Patient's Stated Pain Goal No pain   Hospital Pain Intervention(s) Repositioned; Emotional support   Restrictions/Precautions   Weight Bearing Precautions Per Order No   Other Precautions Multiple lines;O2;Fall Risk;Pain  (initially on 2 L O2 -> PT doffed and pt maintained SpO2 > 90%  RN made aware and reports he will monitor)   General   Chart Reviewed Yes   Response to Previous Treatment Patient with no complaints from previous session  Family/Caregiver Present No   Cognition   Overall Cognitive Status WFL   Arousal/Participation Alert; Responsive; Cooperative   Attention Attends with cues to redirect   Orientation Level Oriented X4   Memory Within functional limits   Following Commands Follows all commands and directions without difficulty   Comments pt agreeable to PT session   Subjective   Subjective "I can sit in the recliner"   Bed Mobility   Supine to Sit 5  Supervision   Additional items Assist x 1;HOB elevated; Bedrails; Increased time required   Additional Comments EOB unsupported sitting tolerance x 24 mins s overt LOB   Transfers   Sit to Stand   (CGA for safety)   Additional items Assist x 1; Armrests; Increased time required   Stand to Sit   (CGA)   Additional items Assist x 1; Armrests; Increased time required   Toilet transfer   (CGA for safety)   Additional items Assist x 1;Commode;Verbal cues; Increased time required   Ambulation/Elevation   Gait pattern Improper Weight shift;Decreased foot clearance; Short stride; Excessively slow; Antalgic   Gait Assistance   (CGA)   Additional items Assist x 1   Assistive Device Rolling walker   Distance 5 ft, 16 ft   Stair Management Assistance Not tested   Balance Static Sitting Fair +   Dynamic Sitting Fair   Static Standing Fair -   Dynamic Standing Poor +   Ambulatory Poor +   Endurance Deficit   Endurance Deficit Yes   Activity Tolerance   Activity Tolerance Patient limited by fatigue   Medical Staff Made Aware coordination of care provided with [address] Yes, RN Jorge Crowley made aware of outcomes/recs   Exercises   Hip Abduction Sitting;15 reps;AROM; Bilateral   Hip Adduction Sitting;15 reps;AROM; Bilateral   Knee AROM Long Arc Quad Sitting;15 reps;AROM; Right;Left   Ankle Pumps Sitting;15 reps;AROM; Bilateral   Assessment   Prognosis Good   Problem List Decreased strength;Decreased endurance; Impaired balance;Decreased mobility; Decreased safety awareness;Decreased skin integrity;Pain   Assessment Pt seen for PT treatment session this date, consisting of ther act focused on bed mobility, functional transfer training & unsupported sitting at EOB, ther ex focused on strengthening and gt training on level surfaces to improve pt safety in household environment  Since previous session, pt has made good progress in terms of requiring increased OOB activity  Pertinent barriers during this session include pain  Current goals and POC remain appropriate, pt continues to have rehab potential and is making good progress towards STGs  Pt prognosis for achieving goals is good, pending pt progress with hospitalization/medical status improvements, and indicated by self-expression (thoughts, feelings, needs), supportive family/caregivers and learning potential  Pt limited d/t fear of pain provocation and self limiting  PT recommends post acute rehabilitation services upon discharge  Pt continues to be functioning below baseline level, and remains limited 2* factors listed above  PT will continue to see pt during current hospitalization in order to address the deficits listed above and provide interventions consistent w/ POC in effort to achieve STGs     Barriers to Discharge Inaccessible home environment;Decreased caregiver support   Goals   Patient Goals "to get to rehab"   STG Expiration Date 01/06/23   Short Term Goal #1 goals remain appropriate   PT Treatment Day 3   Plan   Treatment/Interventions Functional transfer training;LE strengthening/ROM; Elevations; Therapeutic exercise; Endurance training;Patient/family training;Equipment eval/education; Bed mobility;Gait training; Compensatory technique education;OT;Spoke to nursing   Progress Slow progress, decreased activity tolerance   PT Frequency 3-5x/wk   Recommendation   PT Discharge Recommendation Post acute rehabilitation services   Equipment Recommended [facility] Recommended Wheeled walker   Additional Comments Pt's raw score on the AM-[facility] Basic Mobility inpatient short form is 17, standardized score is 39 67  Patients at this level are likely to benefit from DC to [facility], however, please refer to therapist recommendation for safe DC planning  AM-[facility] Basic Mobility Inpatient   Turning in Bed Without Bedrails 3   Lying on Back to Sitting on Edge of Flat Bed 3   Moving Bed to Chair 3   Standing Up From Chair 3   Walk in Room 3   Climb 3-5 Stairs 2   Basic Mobility Inpatient Raw Score 17   Basic Mobility Standardized Score 39 67   Highest Level Of Mobility   JH-HLM Goal 5: Stand one or more mins   JH-HLM Achieved 6: Walk 10 steps or more   Education   Education Provided Mobility training;Assistive device;Home exercise program   Patient Demonstrates acceptance/verbal understanding;Reinforcement needed   End of Consult   Patient Position at End of Consult Bedside chair; All needs within reach       Karly Dowling PT, DPT    Time of PT treatment session: 4578-8785 (total treatment time = 40 minutes)

## 2022-12-30 ENCOUNTER — APPOINTMENT (INPATIENT)
Dept: RADIOLOGY | Facility: HOSPITAL | Age: 62
End: 2022-12-30

## 2022-12-30 LAB
2HR DELTA HS TROPONIN: 0 NG/L
4HR DELTA HS TROPONIN: 0 NG/L
ALBUMIN SERPL BCP-MCNC: 2.8 G/DL (ref 3.5–5)
ALP SERPL-CCNC: 86 U/L (ref 34–104)
ALT SERPL W P-5'-P-CCNC: 14 U/L (ref 7–52)
ANION GAP SERPL CALCULATED.3IONS-SCNC: 11 MMOL/L (ref 4–13)
ANION GAP SERPL CALCULATED.3IONS-SCNC: 7 MMOL/L (ref 4–13)
AST SERPL W P-5'-P-CCNC: 15 U/L (ref 13–39)
BASE EXCESS BLDA CALC-SCNC: 1 MMOL/L (ref -2–3)
BILIRUB SERPL-MCNC: 0.25 MG/DL (ref 0.2–1)
BUN SERPL-MCNC: 6 MG/DL (ref 5–25)
BUN SERPL-MCNC: 6 MG/DL (ref 5–25)
CA-I BLD-SCNC: 1.2 MMOL/L (ref 1.12–1.32)
CALCIUM ALBUM COR SERPL-MCNC: 9.7 MG/DL (ref 8.3–10.1)
CALCIUM SERPL-MCNC: 8.3 MG/DL (ref 8.4–10.2)
CALCIUM SERPL-MCNC: 8.7 MG/DL (ref 8.4–10.2)
CARDIAC TROPONIN I PNL SERPL HS: 14 NG/L
CHLORIDE SERPL-SCNC: 100 MMOL/L (ref 96–108)
CHLORIDE SERPL-SCNC: 104 MMOL/L (ref 96–108)
CO2 SERPL-SCNC: 25 MMOL/L (ref 21–32)
CO2 SERPL-SCNC: 27 MMOL/L (ref 21–32)
CREAT SERPL-MCNC: 0.64 MG/DL (ref 0.6–1.3)
CREAT SERPL-MCNC: 0.65 MG/DL (ref 0.6–1.3)
ERYTHROCYTE [DISTWIDTH] IN BLOOD BY AUTOMATED COUNT: 13.8 % (ref 11.6–15.1)
ERYTHROCYTE [DISTWIDTH] IN BLOOD BY AUTOMATED COUNT: 13.8 % (ref 11.6–15.1)
FIO2 GAS DIL.REBREATH: 29 L
GFR SERPL CREATININE-BSD FRML MDRD: 95 ML/MIN/1.73SQ M
GFR SERPL CREATININE-BSD FRML MDRD: 95 ML/MIN/1.73SQ M
GLUCOSE SERPL-MCNC: 105 MG/DL (ref 65–140)
GLUCOSE SERPL-MCNC: 120 MG/DL (ref 65–140)
GLUCOSE SERPL-MCNC: 128 MG/DL (ref 65–140)
GLUCOSE SERPL-MCNC: 136 MG/DL (ref 65–140)
HCO3 BLDA-SCNC: 21.1 MMOL/L (ref 22–28)
HCT VFR BLD AUTO: 26.6 % (ref 34.8–46.1)
HCT VFR BLD AUTO: 27.8 % (ref 34.8–46.1)
HCT VFR BLD CALC: 25 % (ref 34.8–46.1)
HGB BLD-MCNC: 8.5 G/DL (ref 11.5–15.4)
HGB BLD-MCNC: 8.8 G/DL (ref 11.5–15.4)
HGB BLDA-MCNC: 8.5 G/DL (ref 11.5–15.4)
LACTATE SERPL-SCNC: 1.7 MMOL/L (ref 0.5–2)
LACTATE SERPL-SCNC: 2.4 MMOL/L (ref 0.5–2)
MAGNESIUM SERPL-MCNC: 2 MG/DL (ref 1.9–2.7)
MCH RBC QN AUTO: 29.9 PG (ref 26.8–34.3)
MCH RBC QN AUTO: 30 PG (ref 26.8–34.3)
MCHC RBC AUTO-ENTMCNC: 31.7 G/DL (ref 31.4–37.4)
MCHC RBC AUTO-ENTMCNC: 32 G/DL (ref 31.4–37.4)
MCV RBC AUTO: 94 FL (ref 82–98)
MCV RBC AUTO: 95 FL (ref 82–98)
NRBC BLD AUTO-RTO: 0 /100 WBCS
PCO2 BLD: 20 MM HG (ref 36–44)
PCO2 BLD: 22 MMOL/L (ref 21–32)
PH BLD: 7.63 [PH] (ref 7.35–7.45)
PLATELET # BLD AUTO: 518 THOUSANDS/UL (ref 149–390)
PLATELET # BLD AUTO: 583 THOUSANDS/UL (ref 149–390)
PMV BLD AUTO: 9.1 FL (ref 8.9–12.7)
PMV BLD AUTO: 9.1 FL (ref 8.9–12.7)
PO2 BLD: 56 MM HG (ref 75–129)
POTASSIUM BLD-SCNC: 3.8 MMOL/L (ref 3.5–5.3)
POTASSIUM SERPL-SCNC: 3.9 MMOL/L (ref 3.5–5.3)
POTASSIUM SERPL-SCNC: 3.9 MMOL/L (ref 3.5–5.3)
PROCALCITONIN SERPL-MCNC: 0.15 NG/ML
PROT SERPL-MCNC: 6.2 G/DL (ref 6.4–8.4)
RBC # BLD AUTO: 2.83 MILLION/UL (ref 3.81–5.12)
RBC # BLD AUTO: 2.94 MILLION/UL (ref 3.81–5.12)
SAO2 % BLD FROM PO2: 94 % (ref 60–85)
SODIUM BLD-SCNC: 137 MMOL/L (ref 136–145)
SODIUM SERPL-SCNC: 136 MMOL/L (ref 135–147)
SODIUM SERPL-SCNC: 138 MMOL/L (ref 135–147)
SPECIMEN SOURCE: ABNORMAL
WBC # BLD AUTO: 12.36 THOUSAND/UL (ref 4.31–10.16)
WBC # BLD AUTO: 9.13 THOUSAND/UL (ref 4.31–10.16)

## 2022-12-30 RX ADMIN — AMLODIPINE BESYLATE 10 MG: 10 TABLET ORAL at 08:43

## 2022-12-30 RX ADMIN — TRAMADOL HYDROCHLORIDE 50 MG: 50 TABLET, COATED ORAL at 13:09

## 2022-12-30 RX ADMIN — PANTOPRAZOLE SODIUM 40 MG: 40 TABLET, DELAYED RELEASE ORAL at 06:00

## 2022-12-30 RX ADMIN — ENOXAPARIN SODIUM 40 MG: 100 INJECTION SUBCUTANEOUS at 08:42

## 2022-12-30 RX ADMIN — DEXTROSE, SODIUM CHLORIDE, AND POTASSIUM CHLORIDE 50 ML/HR: 5; .45; .15 INJECTION INTRAVENOUS at 10:22

## 2022-12-30 RX ADMIN — TRAMADOL HYDROCHLORIDE 50 MG: 50 TABLET, COATED ORAL at 22:12

## 2022-12-30 RX ADMIN — CEFTRIAXONE 1000 MG: 1 INJECTION, SOLUTION INTRAVENOUS at 13:09

## 2022-12-30 RX ADMIN — METRONIDAZOLE 500 MG: 500 INJECTION, SOLUTION INTRAVENOUS at 14:55

## 2022-12-30 RX ADMIN — METRONIDAZOLE 500 MG: 500 INJECTION, SOLUTION INTRAVENOUS at 05:51

## 2022-12-30 RX ADMIN — NEBIVOLOL 5 MG: 5 TABLET ORAL at 08:43

## 2022-12-30 RX ADMIN — ONDANSETRON 4 MG: 2 INJECTION INTRAMUSCULAR; INTRAVENOUS at 13:09

## 2022-12-30 RX ADMIN — METRONIDAZOLE 500 MG: 500 INJECTION, SOLUTION INTRAVENOUS at 21:25

## 2022-12-30 RX ADMIN — PANTOPRAZOLE SODIUM 40 MG: 40 TABLET, DELAYED RELEASE ORAL at 16:42

## 2022-12-30 NOTE — PLAN OF CARE
Problem: Potential for Falls  Goal: Patient will remain free of falls  Description: INTERVENTIONS:  - Educate patient/family on patient safety including physical limitations  - Instruct patient to call for assistance with activity   - Consult OT/PT to assist with strengthening/mobility   - Keep Call bell within reach  - Keep bed low and locked with side rails adjusted as appropriate  - Keep care items and personal belongings within reach  - Initiate and maintain comfort rounds  - Make Fall Risk Sign visible to staff  - Offer Toileting every 2 Hours, in advance of need  - Initiate/Maintain BED alarm  - Obtain necessary fall risk management equipment: yellow socks  - Apply yellow socks and bracelet for high fall risk patients  - Consider moving patient to room near nurses station  Outcome: Progressing     Problem: PAIN - ADULT  Goal: Verbalizes/displays adequate comfort level or baseline comfort level  Description: Interventions:  - Encourage patient to monitor pain and request assistance  - Assess pain using appropriate pain scale  - Administer analgesics based on type and severity of pain and evaluate response  - Implement non-pharmacological measures as appropriate and evaluate response  - Consider cultural and social influences on pain and pain management  - Notify physician/advanced practitioner if interventions unsuccessful or patient reports new pain  Outcome: Progressing     Problem: INFECTION - ADULT  Goal: Absence or prevention of progression during hospitalization  Description: INTERVENTIONS:  - Assess and monitor for signs and symptoms of infection  - Monitor lab/diagnostic results  - Monitor all insertion sites, i e  indwelling lines, tubes, and drains  - Monitor endotracheal if appropriate and nasal secretions for changes in amount and color  - [place] appropriate cooling/warming therapies per order  - Administer medications as ordered  - Instruct and encourage patient and family to use good hand hygiene technique  - Identify and instruct in appropriate isolation precautions for identified infection/condition  Outcome: Progressing     Problem: SAFETY ADULT  Goal: Patient will remain free of falls  Description: INTERVENTIONS:  - Educate patient/family on patient safety including physical limitations  - Instruct patient to call for assistance with activity   - Consult OT/PT to assist with strengthening/mobility   - Keep Call bell within reach  - Keep bed low and locked with side rails adjusted as appropriate  - Keep care items and personal belongings within reach  - Initiate and maintain comfort rounds  - Make Fall Risk Sign visible to staff  - Offer Toileting every 2 Hours, in advance of need  - Initiate/Maintain BED alarm  - Obtain necessary fall risk management equipment: yellow socks  - Apply yellow socks and bracelet for high fall risk patients  - Consider moving patient to room near nurses station  Outcome: Progressing  Goal: Maintain or return to baseline ADL function  Description: INTERVENTIONS:  - Educate patient/family on patient safety including physical limitations  - Instruct patient to call for assistance with activity   - Consult OT/PT to assist with strengthening/mobility   - Keep Call bell within reach  - Keep bed low and locked with side rails adjusted as appropriate  - Keep care items and personal belongings within reach  - Initiate and maintain comfort rounds  - Make Fall Risk Sign visible to staff  - Offer Toileting every 2 Hours, in advance of need  - Initiate/Maintain bed alarm  - Obtain necessary fall risk management equipment: yellow socks  - Apply yellow socks and bracelet for high fall risk patients  - Consider moving patient to room near nurses station  Outcome: Progressing  Goal: Maintains/Returns to pre admission functional level  Description: INTERVENTIONS:  - Perform BMAT or MOVE assessment daily    - Set and communicate daily mobility goal to care team and patient/family/caregiver  - Collaborate with rehabilitation services on mobility goals if consulted  - Ambulate patient 2-3 times a day  - Out of bed to chair 2-3 times a day   - Out of bed for meals 3 times a day  - Out of bed for toileting  - Record patient progress and toleration of activity level   Outcome: Progressing     Problem: DISCHARGE PLANNING  Goal: Discharge to home or other facility with appropriate resources  Description: INTERVENTIONS:  - Identify barriers to discharge w/patient and caregiver  - Arrange for needed discharge resources and transportation as appropriate  - Identify discharge learning needs (meds, wound care, etc )  - Arrange for interpretive services to assist at discharge as needed  - Refer to Case Management Department for coordinating discharge planning if the patient needs post-hospital services based on physician/advanced practitioner order or complex needs related to functional status, cognitive ability, or social support system  Outcome: Progressing     Problem: Knowledge Deficit  Goal: Patient/family/caregiver demonstrates understanding of disease process, treatment plan, medications, and discharge instructions  Description: Complete learning assessment and assess knowledge base    Interventions:  - Provide teaching at level of understanding  - Provide teaching via preferred learning methods  Outcome: Progressing     Problem: MOBILITY - ADULT  Goal: Maintain or return to baseline ADL function  Description: INTERVENTIONS:  - Educate patient/family on patient safety including physical limitations  - Instruct patient to call for assistance with activity   - Consult OT/PT to assist with strengthening/mobility   - Keep Call bell within reach  - Keep bed low and locked with side rails adjusted as appropriate  - Keep care items and personal belongings within reach  - Initiate and maintain comfort rounds  - Make Fall Risk Sign visible to staff  - Offer Toileting every 2 Hours, in advance of need  - Initiate/Maintain bed alarm  - Obtain necessary fall risk management equipment: yellow socks  - Apply yellow socks and bracelet for high fall risk patients  - Consider moving patient to room near nurses station  Outcome: Progressing  Goal: Maintains/Returns to pre admission functional level  Description: INTERVENTIONS:  - Perform BMAT or MOVE assessment daily    - Set and communicate daily mobility goal to care team and patient/family/caregiver  - Collaborate with rehabilitation services on mobility goals if consulted  - Ambulate patient 2-3 times a day  - Out of bed to chair 3 times a day   - Out of bed for meals 3 times a day  - Out of bed for toileting  - Record patient progress and toleration of activity level   Outcome: Progressing     Problem: Prexisting or High Potential for Compromised Skin Integrity  Goal: Skin integrity is maintained or improved  Description: INTERVENTIONS:  - Identify patients at risk for skin breakdown  - Assess and monitor skin integrity  - Assess and monitor nutrition and hydration status  - Monitor labs   - Assess for incontinence   - Turn and reposition patient  - Assist with mobility/ambulation  - Relieve pressure over bony prominences  - Avoid friction and shearing  - Provide appropriate hygiene as needed including keeping skin clean and dry  - Evaluate need for skin moisturizer/barrier cream  - Collaborate with interdisciplinary team   - Patient/family teaching  - Consider wound care consult   Outcome: Progressing     Problem: Nutrition/Hydration-ADULT  Goal: Nutrient/Hydration intake appropriate for improving, restoring or maintaining nutritional needs  Description: Monitor and assess patient's nutrition/hydration status for malnutrition  Collaborate with interdisciplinary team and initiate plan and interventions as ordered  Monitor patient's weight and dietary intake as ordered or per policy  Utilize nutrition screening tool and intervene as necessary  Determine patient's food preferences and provide high-protein, high-caloric foods as appropriate       INTERVENTIONS:  - Monitor oral intake, urinary output, labs, and treatment plans  - Assess nutrition and hydration status and recommend course of action  - Evaluate amount of meals eaten  - Assist patient with eating if necessary   - Allow adequate time for meals  - Recommend/ encourage appropriate diets, oral nutritional supplements, and vitamin/mineral supplements  - Order, calculate, and assess calorie counts as needed  - Recommend, monitor, and adjust tube feedings and TPN/PPN based on assessed needs  - Assess need for intravenous fluids  - Provide specific nutrition/hydration education as appropriate  - Include patient/family/caregiver in decisions related to nutrition  Outcome: Progressing     Problem: GASTROINTESTINAL - ADULT  Goal: Establish and maintain optimal ostomy function  Description: INTERVENTIONS:  - Assess bowel function  - Encourage oral fluids to ensure adequate hydration  - Administer IV fluids if ordered to ensure adequate hydration   - Administer ordered medications as needed  - Encourage mobilization and activity  - Nutrition services referral to assist patient with appropriate food choices  - Assess stoma site  - Consider wound care consult   Outcome: Progressing

## 2022-12-30 NOTE — PLAN OF CARE
Problem: PAIN - ADULT  Goal: Verbalizes/displays adequate comfort level or baseline comfort level  Description: Interventions:  - Encourage patient to monitor pain and request assistance  - Assess pain using appropriate pain scale  - Administer analgesics based on type and severity of pain and evaluate response  - Implement non-pharmacological measures as appropriate and evaluate response  - Consider cultural and social influences on pain and pain management  - Notify physician/advanced practitioner if interventions unsuccessful or patient reports new pain  Outcome: Progressing     Problem: INFECTION - ADULT  Goal: Absence or prevention of progression during hospitalization  Description: INTERVENTIONS:  - Assess and monitor for signs and symptoms of infection  - Monitor lab/diagnostic results  - Monitor all insertion sites, i e  indwelling lines, tubes, and drains  - Monitor endotracheal if appropriate and nasal secretions for changes in amount and color  - [place] appropriate cooling/warming therapies per order  - Administer medications as ordered  - Instruct and encourage patient and family to use good hand hygiene technique  - Identify and instruct in appropriate isolation precautions for identified infection/condition  Outcome: Progressing     Problem: Knowledge Deficit  Goal: Patient/family/caregiver demonstrates understanding of disease process, treatment plan, medications, and discharge instructions  Description: Complete learning assessment and assess knowledge base  Interventions:  - Provide teaching at level of understanding  - Provide teaching via preferred learning methods  Outcome: Progressing     Problem: Nutrition/Hydration-ADULT  Goal: Nutrient/Hydration intake appropriate for improving, restoring or maintaining nutritional needs  Description: Monitor and assess patient's nutrition/hydration status for malnutrition   Collaborate with interdisciplinary team and initiate plan and interventions as ordered  Monitor patient's weight and dietary intake as ordered or per policy  Utilize nutrition screening tool and intervene as necessary  Determine patient's food preferences and provide high-protein, high-caloric foods as appropriate       INTERVENTIONS:  - Monitor oral intake, urinary output, labs, and treatment plans  - Assess nutrition and hydration status and recommend course of action  - Evaluate amount of meals eaten  - Assist patient with eating if necessary   - Allow adequate time for meals  - Recommend/ encourage appropriate diets, oral nutritional supplements, and vitamin/mineral supplements  - Order, calculate, and assess calorie counts as needed  - Recommend, monitor, and adjust tube feedings and TPN/PPN based on assessed needs  - Assess need for intravenous fluids  - Provide specific nutrition/hydration education as appropriate  - Include patient/family/caregiver in decisions related to nutrition  Outcome: Progressing     Problem: GASTROINTESTINAL - ADULT  Goal: Establish and maintain optimal ostomy function  Description: INTERVENTIONS:  - Assess bowel function  - Encourage oral fluids to ensure adequate hydration  - Administer IV fluids if ordered to ensure adequate hydration   - Administer ordered medications as needed  - Encourage mobilization and activity  - Nutrition services referral to assist patient with appropriate food choices  - Assess stoma site  - Consider wound care consult   Outcome: Progressing

## 2022-12-30 NOTE — NUTRITION
12/30/22 1412   Biochemical Data,Medical Tests, and Procedures   Biochemical Data/Medical Tests/Procedures Lab values reviewed; Meds reviewed   Other Diagnostic/Procedures (Comment) 375 mL ostomy output on 12/29   Nutrition-Focused Physical Exam   Nutrition-Focused Physical Exam Findings RN skin assessment reviewed;Edema; Wound; Ostomy   Nutrition-Focused Physical Exam Findings BL UE non-pitting edema, BL LE trace edema, wound at buttocks, colostomy   Current PO Intake   Current Diet Order Surgical Soft   Nutrition Supplements Ensure Plant-based; Ensure Pudding   Estimated calorie intake compared to estimated need Nutrient needs not met however improving since last assessment  Recommendations/Interventions   Summary Pt ordered NPO for CT scan, prior order for Surgical Soft with PO intakes 0-50% per I/O's  Nutrient needs not met however improving since last assessment  Pt reports her appetite is better  She eats what she can at meal times  She reports taking ordered nutrition supplements as able  PO intakes at meals per I/O's vary 0-50%  Pt does not follow a vegetarian/vegan diet  Recommend adjusting nutrition supplements to chocolate Ensure High Protein BID and continuing Ensure Pudding TID  Recommend transitioning diet to Lo Fiber/Lo residue as clinically indicated  Interventions/Recommendations Continue current diet order;Supplement adjust;Monitor I & O's   Education Assessment   Education Education not indicated at this time   Patient Nutrition Goals   Goal Adequate hydration; Adequate intake;Meet PO needs

## 2022-12-30 NOTE — RAPID RESPONSE
Rapid Response Note  Lemuel Marie 58 y o  female MRN: [id number]  Unit/Bed#: -01 Encounter: [id number]    Rapid Response Notification(s):   Response called date/time:  12/30/2022 12:20 PM  Response team arrival date/time:  12/30/2022 12:22 PM  Response end date/time:  12/30/2022 12:30 PM  [facility]  Rapid response location:  [facility] unit  Primary reason for rapid response call: Other (comment) and acute change in RR (abdominal pain)    Rapid Response Intervention(s):   Airway:  None  Breathing:  None  Circulation:  None  Fluids administered:  None  Medications administered:  None       Assessment:   · Patient is a 65yo F POD#25 from emergent lap sigmoid resection w/ primary anastomosis and creation of diverting loop colostomy 2/2 sigmoid diverticulitis  Hospital course complicated by intra-abdominal abscess formation now s/p percutaneous drain placement by IR    · RRT called for increased RR and abdominal pain while eating  Patient states her lungs and abdomen felt "tight" shortly after coughing and eating  Her blood pressure remained stable w/ SBPs in the 120-130s and she was placed on NC for comfort w/ spO2 >92 throughout  Belly was soft, mildly tender (no more than it had been earlier in the morning according to patient), ostomy w/ brown stool OP, RLQ drain in place    Plan:   · Lab work and CXR to be obtained  Hemodynamics and spO2 remained stable  Patient stated that abdominal and lung tightness was resolving  · Follow up on labs and CXR     Rapid Response Outcome:   Transfer:  Remain on floor    Family notified of transfer: no  Family member contacted: N/A     Background/Situation:   As above    Review of Systems   All other systems reviewed and are negative        Objective:   Vitals:    12/30/22 0600 12/30/22 0736 12/30/22 1222 12/30/22 1230   BP:  112/63 130/80 124/76   Pulse:  70 98 86   Resp:  16     Temp:  97 9 °F (36 6 °C)     TempSrc:       SpO2:  90% 93% 91%   Weight: 81 1 kg (178 lb 12 7 oz)      Height:         Physical Exam  Cardiovascular:      Rate and Rhythm: Normal rate and regular rhythm  Pulmonary:      Comments: Mildly increased effort  Abdominal:      Palpations: Abdomen is soft  Comments: Generalized TTP, ostomy w/ brown stool OP, RLQ drain in place   Skin:     General: Skin is warm and dry  Neurological:      General: No focal deficit present  Mental Status: She is alert and oriented to person, place, and time  Portions of the record may have been created with voice recognition software  Occasional wrong word or "sound a like" substitutions may have occurred due to the inherent limitations of voice recognition software  Read the chart carefully and recognize, using context, where substitutions have occurred      [place], [place]

## 2022-12-30 NOTE — CASE MANAGEMENT
Case Management Discharge Planning Note    Patient name Denise Crews  Location [address] 224/-22 MRN [id number]  : 1960 Date 2022       Current Admission Date: 2022  Current Admission Diagnosis:Sigmoid diverticulitis   Patient Active Problem List    Diagnosis Date Noted   • Fever 2022   • Pleural effusion    • Postoperative intra-abdominal abscess    • Anemia 2022   • Encephalopathy 2022   • Acute respiratory failure with hypoxia ([address] ) 2022   • GERD (gastroesophageal reflux disease) 2022   • Hypertension 2022   • Bronchomalacia 2022   • Asthma 2022   • Septic shock ([address] ) 2022   • Sigmoid diverticulitis 2022      LOS (days): 26  Geometric Mean LOS (GMLOS) (days): 9 60  Days to GMLOS:-16 5     OBJECTIVE:  Risk of Unplanned Readmission Score: 20 64         Current admission status: Inpatient   Preferred Pharmacy:   [facility]pharmacy [address] [place] [address]  [address] [address]  Phone: [phone] Fax: [phone]    Primary Care Provider: Blas Qiu    Primary Insurance: [employer]  Secondary Insurance: MEDICARE    DISCHARGE DETAILS:    Discharge planning discussed with[de-identified] Surgical team and Bonnie Aguilar from [facility]      Additional Comments: Call from Dominguez Walden at [facility] and [employer] obtained  Message from surgery that patient had a rapid response and will not discharge  Update provided to Dominguez Walden at [facility] and they will re-eval after holiday Tuesday 1/3/23    CM department following thru discharge    Accepting Facility Name, [facility]  Receiving Facility/Agency Phone Number: [phone]  Facility/Agency Fax Number: [phone]

## 2022-12-30 NOTE — PROGRESS NOTES
Interventional Radiology:    RLQ abscess secondary to sigmoid diverticulitis, managed by two adjacent drains  Small residual collection identified on recent CT scan, approximately 10-15cc  The residual collection appeared in communication with the drains  TPA was instilled yesterday  15cc pus obtained, this matches the approximate residual fluid collection from recent CT  No need for drain manipulation or repeat CT scan at this time  If the patient becomes symptomatic or there is clinical evidence of worsening infection, a repeat CT can be obtained

## 2022-12-30 NOTE — NURSING NOTE
Patient awake and alert, complaint of some chills earlier this AM, Temp 97 9  No complaints of pain, normoactive bowel sounds  Colostomy bag with soft brown stool, no nausea  AGUSTO drains back to bulb, purulent drainage in tubes  NPO pending CT scan   Call bell in reach

## 2022-12-30 NOTE — NURSING NOTE
Rapid response called on patient at 1220 due to SOB and difficulty breathing, O2 sat at 87, RR24, color pale and severe upper abdominal pain  , EKG NSR, bloodwork and arterial blood gas drawn  O@ sats on 2L up to 94, color improved  Upper abdominal pain continues but improved  Rapid response done at 1240

## 2022-12-30 NOTE — PROGRESS NOTES
Progress Note - Infectious Disease   Dyer Reno 58 y o  female MRN: [id number]  Unit/Bed#: -01 Encounter: [id number]      Impression/Plan:  1  Recurrent sepsis  Appears to be 2/2 intra-abdominal source  Negative serial blood cultures  Low clinical suspicion for pneumonia  Improving, with downtrending fever curve and stable WBC  Hemodynamically stable    -antibiotics as below  -monitor temperature and hemodynamics  -serial exam  -recheck CBC     2  Intra-abdominal abscesses  Likely due to #3  S/p IR drain placement x2 12/20 yielding purulent drainage  Cultures positive for E  Coli  Repeat CT shows decreasing size of abscesses  There is still an undrained pelvic collection which remains unchanged and may be a post-op seroma  S/p tPA instillation into drain with tube check 12/29, per IR residual collection is in communication with the drains and no further drain manipulation needed   -continue IV ceftriaxone and PO metronidazole   -will extend course by 5 days from tPA instillation, which will also be >10 days from initial drain placement, through 1/02/2023  - If ready for DC before completion of antibiotics, can transition Ceftriaxone to PO Cefpodoxime 400 mg BID + PO Metronidazole 500 mg q8h to complete course  -serial wound exams  -close surgery and IR follow up      3  Sigmoid diverticulitis with perforation  S/p sigmoid resection with transverse loop colostomy 12/5  Complicated by intra-abdominal abscesses    -antibiotics as above   -surgery follow up ongoing      4  Bilateral pleural effusions with associated compressive atelectasis  Likely due to intra-abdominal process  Pulmonology consulted  Agree that clinical suspicion for pna is low    -monitor resp sx and O2 requirements   -encouraged incentive spirometry and OOB     5  Antibiotic Allergy  Hx of vomiting with sulfa abx and doxy   Monitor for adverse drug reactions      Plan and recommendations were discussed with primary team   ID will see again 1/2 if remains in house  Call weekend provider with questions  Antibiotics:  Ceftriaxone  Metronidazole    Subjective:  IR injected tPA into drain yesterday in the RLQ abscess  15 cc pus obtained after tPA, no need for drain manipulation or repeat CT per IR  Remains afebrile, WBC now normalized  Patient today denies chills or fever  Noted some abdominal discomfort with the drain tPA  Otherwise no new complaints  Appetite improving  Objective:  Vitals:  Temp:  [97 9 °F (36 6 °C)-99 6 °F (37 6 °C)] 97 9 °F (36 6 °C)  HR:  [70-78] 70  Resp:  [16-20] 16  BP: (108-112)/(63-67) 112/63  SpO2:  [88 %-93 %] 90 %  Temp (24hrs), Av 5 °F (36 9 °C), Min:97 9 °F (36 6 °C), Max:99 6 °F (37 6 °C)  Current: Temperature: 97 9 °F (36 6 °C)    Physical Exam:   General Appearance:  Alert, interactive, nontoxic, no acute distress  Throat: Oropharynx moist without lesions  Lungs:   Clear to auscultation bilaterally; no wheezes, rhonchi or rales; respirations unlabored   Heart:  RRR; no murmur, rub or gallop   Abdomen:   Soft, non-tender, ostomy present, RLQ drains X2  Extremities: No clubbing, cyanosis or edema   Skin: No new rashes or lesions  No draining wounds noted  Labs:    All pertinent labs and imaging studies were personally reviewed  Results from last 7 days   Lab Units 22  0540 22  0507 22  0521   WBC Thousand/uL 9 13 13 07* 13 22*   HEMOGLOBIN g/dL 8 5* 8 4* 8 6*   PLATELETS Thousands/uL 518* 498* 522*     Results from last 7 days   Lab Units 22  0540 22  0507 22  0521 22  0501   SODIUM mmol/L 138 136 136 136   POTASSIUM mmol/L 3 9 3 8 3 7 3 9   CHLORIDE mmol/L 104 103 102 100   CO2 mmol/L 27 26 25 25   BUN mg/dL 6 7 6 9   CREATININE mg/dL 0 65 0 59* 0 69 0 64   EGFR ml/min/1 73sq m 95 98 93 95   CALCIUM mg/dL 8 3* 8 1* 8 2* 8 3*   AST U/L  --   --  23 22   ALT U/L  --   --  16 18   ALK PHOS U/L  --   --  93 98     Results from last 7 days   Lab Units 12/26/22  0431 12/25/22  0501 12/24/22  1639   PROCALCITONIN ng/ml 0 32* 0 28* 0 24         Results from last 7 days   Lab Units 12/26/22  0431   FERRITIN ng/mL 1,099*           Micro:  Results from last 7 days   Lab Units 12/26/22  0109 12/25/22  1433 12/25/22  1037 12/24/22  1122   BLOOD CULTURE   --   --   --  No Growth After 5 Days  No Growth After 5 Days     SPUTUM CULTURE   --  1+ Growth of  --   --    GRAM STAIN RESULT   --  1+ Epithelial cells per low power field  Rare Polys  No organisms seen  --   --    MRSA CULTURE ONLY   --   --  No Methicillin Resistant Staphlyococcus aureus (MRSA) isolated  --    LEGIONELLA URINARY ANTIGEN  Negative  --   --   --        Imaging:          Kristel Shahid MD  [facility]

## 2022-12-30 NOTE — PROGRESS NOTES
Progress Note - General Surgery   Prudencio James 58 y o  female MRN: [id number]  Unit/Bed#: -01 Encounter: [id number]    Assessment:  63yo F POD#25 s/p emergent lap hand assisted sigmoid resection, primary anastomosis, and creation of diverting loop colostomy complicated by intra-abdominal fluid collections/abscess s/p perc drainage by IR   LLQ wound infection   - AVSS on room air  - Stool 375 cc  - WBC 9 13  - Hgb 8 5  - CR 0 65  - electrolytes unremarkable    Addendum: Rapid response called this afternoon for severe onset abdominal pain and increased work of breathing  Nursing reports that patient's O2 sats mid to high 80s and was placed on 2 L supplemental O2 nasal cannula  I waited at bedside by internal medicine and critical care team with repeat labs and chest x-ray obtained  Plan:  Surg soft diet as tolerated with nutritional supplements  Gentle IVF  IV antibiotics per ID, appreciate recommendations  To emetics and analgesics as needed  Local wound care  Ostomy care  Patient underwent IR drain check last evening/this morning with administration of tPA into tubes with return of 15 to 20 cc purulent output likely from collection seen on CT  To be evaluated bedside by attending    Subjective/Objective   Subjective: No acute overnight events  Overall doing well no complaints this morning  Ulcerating diet without increase in abdominal pain, nausea, vomiting  She continues to have function from ostomy  Objective:  Blood pressure 112/63, pulse 70, temperature 97 9 °F (36 6 °C), resp  rate 16, height 5' 5" (1 651 m), weight 81 1 kg (178 lb 12 7 oz), SpO2 90 %  ,Body mass index is 29 75 kg/m²        Intake/Output Summary (Last 24 hours) at 12/30/2022 1034  Last data filed at 12/30/2022 0601  Gross per 24 hour   Intake 1000 ml   Output 1425 ml   Net -425 ml       Invasive Devices     Peripheral Intravenous Line  Duration           Peripheral IV 12/29/22 Left;Proximal;Ventral (anterior) Forearm <1 day Drain  Duration           Colostomy RLQ 25 days    Abscess Drain RUQ 9 days    Abscess Drain RUQ 9 days              Physical Exam  Vitals and nursing note reviewed  Constitutional:       General: She is not in acute distress  Appearance: Normal appearance  She is obese  She is not ill-appearing or toxic-appearing  HENT:      Head: Normocephalic and atraumatic  Cardiovascular:      Rate and Rhythm: Normal rate and regular rhythm  Pulses: Normal pulses  Heart sounds: Normal heart sounds  Pulmonary:      Effort: Pulmonary effort is normal  No respiratory distress  Breath sounds: Normal breath sounds  Abdominal:      General: The ostomy site is clean  Bowel sounds are normal  There is distension  Palpations: Abdomen is soft  Tenderness: There is abdominal tenderness  There is no guarding or rebound  Comments: Left hand port site with packing/dressing taken down, wound picture uploaded to patient's chart; IR drain site x2 with dressing changed at bedside, minimal amount of purulent crusting at skin level, minimal amount of purulent drainage and tube   Musculoskeletal:         General: Normal range of motion  Right lower leg: No edema  Left lower leg: No edema  Skin:     General: Skin is warm  Neurological:      General: No focal deficit present  Mental Status: She is alert and oriented to person, place, and time  Psychiatric:         Mood and Affect: Mood normal          Behavior: Behavior normal        Lab, Imaging and other studies:  I have personally reviewed pertinent lab results      CBC:   Lab Results   Component Value Date    WBC 12 36 (H) 12/30/2022    HGB 8 5 (L) 12/30/2022    HCT 25 (L) 12/30/2022    MCV 95 12/30/2022     (H) 12/30/2022    MCH 29 9 12/30/2022    MCHC 31 7 12/30/2022    RDW 13 8 12/30/2022    MPV 9 1 12/30/2022    NRBC 0 12/30/2022     CMP:   Lab Results   Component Value Date    SODIUM 136 12/30/2022    K 3 9 12/30/2022     12/30/2022    CO2 22 12/30/2022    CO2 25 12/30/2022    BUN 6 12/30/2022    CREATININE 0 64 12/30/2022    GLUCOSE 136 12/30/2022    CALCIUM 8 7 12/30/2022    AST 15 12/30/2022    ALT 14 12/30/2022    ALKPHOS 86 12/30/2022    EGFR 95 12/30/2022     VTE Pharmacologic Prophylaxis: Enoxaparin (Lovenox)  VTE Mechanical Prophylaxis: sequential compression device    Raphael Chavez PA-C

## 2022-12-31 LAB
ANION GAP SERPL CALCULATED.3IONS-SCNC: 7 MMOL/L (ref 4–13)
ATRIAL RATE: 90 BPM
BUN SERPL-MCNC: 5 MG/DL (ref 5–25)
CALCIUM SERPL-MCNC: 8.5 MG/DL (ref 8.4–10.2)
CHLORIDE SERPL-SCNC: 102 MMOL/L (ref 96–108)
CO2 SERPL-SCNC: 28 MMOL/L (ref 21–32)
CREAT SERPL-MCNC: 0.59 MG/DL (ref 0.6–1.3)
ERYTHROCYTE [DISTWIDTH] IN BLOOD BY AUTOMATED COUNT: 13.8 % (ref 11.6–15.1)
GFR SERPL CREATININE-BSD FRML MDRD: 98 ML/MIN/1.73SQ M
GLUCOSE SERPL-MCNC: 101 MG/DL (ref 65–140)
HCT VFR BLD AUTO: 28.2 % (ref 34.8–46.1)
HGB BLD-MCNC: 8.7 G/DL (ref 11.5–15.4)
MCH RBC QN AUTO: 29.6 PG (ref 26.8–34.3)
MCHC RBC AUTO-ENTMCNC: 30.9 G/DL (ref 31.4–37.4)
MCV RBC AUTO: 96 FL (ref 82–98)
P AXIS: 35 DEGREES
PLATELET # BLD AUTO: 614 THOUSANDS/UL (ref 149–390)
PMV BLD AUTO: 9.2 FL (ref 8.9–12.7)
POTASSIUM SERPL-SCNC: 4 MMOL/L (ref 3.5–5.3)
PR INTERVAL: 148 MS
QRS AXIS: 42 DEGREES
QRSD INTERVAL: 80 MS
QT INTERVAL: 368 MS
QTC INTERVAL: 450 MS
RBC # BLD AUTO: 2.94 MILLION/UL (ref 3.81–5.12)
SODIUM SERPL-SCNC: 137 MMOL/L (ref 135–147)
T WAVE AXIS: 60 DEGREES
VENTRICULAR RATE: 90 BPM
WBC # BLD AUTO: 10.39 THOUSAND/UL (ref 4.31–10.16)

## 2022-12-31 RX ADMIN — METRONIDAZOLE 500 MG: 500 INJECTION, SOLUTION INTRAVENOUS at 23:35

## 2022-12-31 RX ADMIN — ENOXAPARIN SODIUM 40 MG: 100 INJECTION SUBCUTANEOUS at 09:45

## 2022-12-31 RX ADMIN — PANTOPRAZOLE SODIUM 40 MG: 40 TABLET, DELAYED RELEASE ORAL at 16:42

## 2022-12-31 RX ADMIN — AMLODIPINE BESYLATE 10 MG: 10 TABLET ORAL at 09:45

## 2022-12-31 RX ADMIN — METRONIDAZOLE 500 MG: 500 INJECTION, SOLUTION INTRAVENOUS at 14:36

## 2022-12-31 RX ADMIN — CEFTRIAXONE 1000 MG: 1 INJECTION, SOLUTION INTRAVENOUS at 13:36

## 2022-12-31 RX ADMIN — PANTOPRAZOLE SODIUM 40 MG: 40 TABLET, DELAYED RELEASE ORAL at 06:18

## 2022-12-31 RX ADMIN — DEXTROSE, SODIUM CHLORIDE, AND POTASSIUM CHLORIDE 50 ML/HR: 5; .45; .15 INJECTION INTRAVENOUS at 13:37

## 2022-12-31 RX ADMIN — ONDANSETRON 4 MG: 2 INJECTION INTRAMUSCULAR; INTRAVENOUS at 20:25

## 2022-12-31 RX ADMIN — METRONIDAZOLE 500 MG: 500 INJECTION, SOLUTION INTRAVENOUS at 06:18

## 2022-12-31 RX ADMIN — OXYCODONE AND ACETAMINOPHEN 1 TABLET: 5; 325 TABLET ORAL at 11:42

## 2022-12-31 RX ADMIN — NEBIVOLOL 5 MG: 5 TABLET ORAL at 09:45

## 2022-12-31 RX ADMIN — TRAMADOL HYDROCHLORIDE 50 MG: 50 TABLET, COATED ORAL at 16:45

## 2022-12-31 NOTE — PROGRESS NOTES
Progress Note - General Surgery   Pattie Sahu 58 y o  female MRN: [id number]  Unit/Bed#: -01 Encounter: [id number]    Assessment:  26-year-old female with past medical history significant for asthma and tracheobronchomalacia presenting with complicated sigmoid diverticulitis  HD#27 septic shock secondary to feculent peritonitis  POD#26 s/p laparoscopic hand-assisted sigmoid resection with transverse loop colostomy  Complicated by intra-abdominal fluid collections/abscess s/p perc drainage by IR   -Afebrile, O2 sats 80s to 90s% on supplemental O2  -UO 2L   -AGUSTO x 2 10 cc, serous  -Stool 125 cc  -WBC 10 39   -Hgb 8 7  -Cr 0 59   -electrolytes unremarkable     Plan:  Srug soft diet with nutritional suppl as tolerated   IVF, may consider decreasing once tolerating more adequate po intake   Serial abdominal exams   IV antibiotics   Antiemetics and analgesics prn   Local wound care   Low threshold to obtain imaging to rule out DVT/PE however no indication at this time   CM for dispo planning, will need to determine bed availability next week     Subjective/Objective   Subjective: No acute overnight events  Discussed events leading up to rapid response yesterday afternoon  Reports overall improvement in symptoms however notes increased fatigue  Tolerating diet without increase in abdominal pain but reports mild nausea without episodes of emesis  Functioning ostomy  Objective:   Blood pressure 125/72, pulse 73, temperature 98 1 °F (36 7 °C), resp  rate 21, height 5' 5" (1 651 m), weight 81 kg (178 lb 9 2 oz), SpO2 92 %  ,Body mass index is 29 72 kg/m²        Intake/Output Summary (Last 24 hours) at 12/31/2022 0902  Last data filed at 12/31/2022 0045  Gross per 24 hour   Intake --   Output 1685 ml   Net -1685 ml       Invasive Devices     Peripheral Intravenous Line  Duration           Peripheral IV 12/29/22 Left;Proximal;Ventral (anterior) Forearm 1 day          Drain  Duration           Colostomy RLQ 26 days Abscess Drain RUQ 10 days    Abscess Drain RUQ 10 days              Physical Exam  Vitals and nursing note reviewed  Constitutional:       General: She is not in acute distress  Appearance: Normal appearance  She is obese  She is not ill-appearing or toxic-appearing  Interventions: Nasal cannula in place  HENT:      Head: Normocephalic and atraumatic  Cardiovascular:      Rate and Rhythm: Normal rate and regular rhythm  Pulses: Normal pulses  Heart sounds: Normal heart sounds  Pulmonary:      Effort: Pulmonary effort is normal       Breath sounds: Normal breath sounds  Abdominal:      General: Bowel sounds are normal  There is distension  Palpations: Abdomen is soft  Tenderness: There is abdominal tenderness  There is no guarding or rebound  Comments: Ostomy with pink/viable stoma, paste brown stool in bag; left hand port site with pink/red granulation tissue, minimal ooze with removal of packing well controlled with pressure; AGUSTO x 2 with serous output   Musculoskeletal:         General: Normal range of motion  Right lower leg: No edema  Left lower leg: No edema  Skin:     General: Skin is warm and dry  Neurological:      General: No focal deficit present  Mental Status: She is alert and oriented to person, place, and time  Psychiatric:         Mood and Affect: Mood normal          Behavior: Behavior normal  Behavior is cooperative  Lab, Imaging and other studies:  I have personally reviewed pertinent lab results      CBC:   Lab Results   Component Value Date    WBC 10 39 (H) 12/31/2022    HGB 8 7 (L) 12/31/2022    HCT 28 2 (L) 12/31/2022    MCV 96 12/31/2022     (H) 12/31/2022    MCH 29 6 12/31/2022    MCHC 30 9 (L) 12/31/2022    RDW 13 8 12/31/2022    MPV 9 2 12/31/2022    NRBC 0 12/30/2022     CMP:   Lab Results   Component Value Date    SODIUM 137 12/31/2022    K 4 0 12/31/2022     12/31/2022    CO2 28 12/31/2022    CO2 22 12/30/2022    BUN 5 12/31/2022    CREATININE 0 59 (L) 12/31/2022    GLUCOSE 136 12/30/2022    CALCIUM 8 5 12/31/2022    AST 15 12/30/2022    ALT 14 12/30/2022    ALKPHOS 86 12/30/2022    EGFR 98 12/31/2022     VTE Pharmacologic Prophylaxis: Enoxaparin (Lovenox)  VTE Mechanical Prophylaxis: sequential compression device    Janey Townsend PA-C

## 2022-12-31 NOTE — PLAN OF CARE
Problem: Potential for Falls  Goal: Patient will remain free of falls  Description: INTERVENTIONS:  - Educate patient/family on patient safety including physical limitations  - Instruct patient to call for assistance with activity   - Consult OT/PT to assist with strengthening/mobility   - Keep Call bell within reach  - Keep bed low and locked with side rails adjusted as appropriate  - Keep care items and personal belongings within reach  - Initiate and maintain comfort rounds  - Make Fall Risk Sign visible to staff  - Offer Toileting every 2 Hours, in advance of need  - Initiate/Maintain BED alarm  - Obtain necessary fall risk management equipment: yellow socks  - Apply yellow socks and bracelet for high fall risk patients  - Consider moving patient to room near nurses station  Outcome: Progressing     Problem: PAIN - ADULT  Goal: Verbalizes/displays adequate comfort level or baseline comfort level  Description: Interventions:  - Encourage patient to monitor pain and request assistance  - Assess pain using appropriate pain scale  - Administer analgesics based on type and severity of pain and evaluate response  - Implement non-pharmacological measures as appropriate and evaluate response  - Consider cultural and social influences on pain and pain management  - Notify physician/advanced practitioner if interventions unsuccessful or patient reports new pain  Outcome: Progressing     Problem: INFECTION - ADULT  Goal: Absence or prevention of progression during hospitalization  Description: INTERVENTIONS:  - Assess and monitor for signs and symptoms of infection  - Monitor lab/diagnostic results  - Monitor all insertion sites, i e  indwelling lines, tubes, and drains  - Monitor endotracheal if appropriate and nasal secretions for changes in amount and color  - [place] appropriate cooling/warming therapies per order  - Administer medications as ordered  - Instruct and encourage patient and family to use good hand hygiene technique  - Identify and instruct in appropriate isolation precautions for identified infection/condition  Outcome: Progressing     Problem: SAFETY ADULT  Goal: Patient will remain free of falls  Description: INTERVENTIONS:  - Educate patient/family on patient safety including physical limitations  - Instruct patient to call for assistance with activity   - Consult OT/PT to assist with strengthening/mobility   - Keep Call bell within reach  - Keep bed low and locked with side rails adjusted as appropriate  - Keep care items and personal belongings within reach  - Initiate and maintain comfort rounds  - Make Fall Risk Sign visible to staff  - Offer Toileting every 2 Hours, in advance of need  - Initiate/Maintain BED alarm  - Obtain necessary fall risk management equipment: yellow socks  - Apply yellow socks and bracelet for high fall risk patients  - Consider moving patient to room near nurses station  Outcome: Progressing  Goal: Maintain or return to baseline ADL function  Description: INTERVENTIONS:  - Educate patient/family on patient safety including physical limitations  - Instruct patient to call for assistance with activity   - Consult OT/PT to assist with strengthening/mobility   - Keep Call bell within reach  - Keep bed low and locked with side rails adjusted as appropriate  - Keep care items and personal belongings within reach  - Initiate and maintain comfort rounds  - Make Fall Risk Sign visible to staff  - Offer Toileting every 2 Hours, in advance of need  - Initiate/Maintain bed alarm  - Obtain necessary fall risk management equipment: yellow socks  - Apply yellow socks and bracelet for high fall risk patients  - Consider moving patient to room near nurses station  Outcome: Progressing       Problem: DISCHARGE PLANNING  Goal: Discharge to home or other facility with appropriate resources  Description: INTERVENTIONS:  - Identify barriers to discharge w/patient and caregiver  - Arrange for needed discharge resources and transportation as appropriate  - Identify discharge learning needs (meds, wound care, etc )  - Arrange for interpretive services to assist at discharge as needed  - Refer to Case Management Department for coordinating discharge planning if the patient needs post-hospital services based on physician/advanced practitioner order or complex needs related to functional status, cognitive ability, or social support system  Outcome: Progressing     Problem: Knowledge Deficit  Goal: Patient/family/caregiver demonstrates understanding of disease process, treatment plan, medications, and discharge instructions  Description: Complete learning assessment and assess knowledge base  Interventions:  - Provide teaching at level of understanding  - Provide teaching via preferred learning methods  Outcome: Progressing     Problem: MOBILITY - ADULT  Goal: Maintain or return to baseline ADL function  Description: INTERVENTIONS:  - Educate patient/family on patient safety including physical limitations  - Instruct patient to call for assistance with activity   - Consult OT/PT to assist with strengthening/mobility   - Keep Call bell within reach  - Keep bed low and locked with side rails adjusted as appropriate  - Keep care items and personal belongings within reach  - Initiate and maintain comfort rounds  - Make Fall Risk Sign visible to staff  - Offer Toileting every 2 Hours, in advance of need  - Initiate/Maintain bed alarm  - Obtain necessary fall risk management equipment: yellow socks  - Apply yellow socks and bracelet for high fall risk patients  - Consider moving patient to room near nurses station  Outcome: Progressing  Goal: Maintains/Returns to pre admission functional level  Description: INTERVENTIONS:  - Perform BMAT or MOVE assessment daily    - Set and communicate daily mobility goal to care team and patient/family/caregiver     - Collaborate with rehabilitation services on mobility goals if consulted  - Perform Range of Motion 3 times a day  - Reposition patient every 2 hours  - Dangle patient 3 times a day  - Stand patient 3 times a day  - Ambulate patient 3 times a day  - Out of bed to chair 3 times a day   - Out of bed for meals 3 times a day  - Out of bed for toileting  - Record patient progress and toleration of activity level   Outcome: Progressing     Problem: Prexisting or High Potential for Compromised Skin Integrity  Goal: Skin integrity is maintained or improved  Description: INTERVENTIONS:  - Identify patients at risk for skin breakdown  - Assess and monitor skin integrity  - Assess and monitor nutrition and hydration status  - Monitor labs   - Assess for incontinence   - Turn and reposition patient  - Assist with mobility/ambulation  - Relieve pressure over bony prominences  - Avoid friction and shearing  - Provide appropriate hygiene as needed including keeping skin clean and dry  - Evaluate need for skin moisturizer/barrier cream  - Collaborate with interdisciplinary team   - Patient/family teaching  - Consider wound care consult   Outcome: Progressing     Problem: Nutrition/Hydration-ADULT  Goal: Nutrient/Hydration intake appropriate for improving, restoring or maintaining nutritional needs  Description: Monitor and assess patient's nutrition/hydration status for malnutrition  Collaborate with interdisciplinary team and initiate plan and interventions as ordered  Monitor patient's weight and dietary intake as ordered or per policy  Utilize nutrition screening tool and intervene as necessary  Determine patient's food preferences and provide high-protein, high-caloric foods as appropriate       INTERVENTIONS:  - Monitor oral intake, urinary output, labs, and treatment plans  - Assess nutrition and hydration status and recommend course of action  - Evaluate amount of meals eaten  - Assist patient with eating if necessary   - Allow adequate time for meals  - Recommend/ encourage appropriate diets, oral nutritional supplements, and vitamin/mineral supplements  - Order, calculate, and assess calorie counts as needed  - Recommend, monitor, and adjust tube feedings and TPN/PPN based on assessed needs  - Assess need for intravenous fluids  - Provide specific nutrition/hydration education as appropriate  - Include patient/family/caregiver in decisions related to nutrition  Outcome: Progressing     Problem: GASTROINTESTINAL - ADULT  Goal: Establish and maintain optimal ostomy function  Description: INTERVENTIONS:  - Assess bowel function  - Encourage oral fluids to ensure adequate hydration  - Administer IV fluids if ordered to ensure adequate hydration   - Administer ordered medications as needed  - Encourage mobilization and activity  - Nutrition services referral to assist patient with appropriate food choices  - Assess stoma site  - Consider wound care consult   Outcome: Progressing

## 2023-01-01 LAB
ANION GAP SERPL CALCULATED.3IONS-SCNC: 9 MMOL/L (ref 4–13)
ANISOCYTOSIS BLD QL SMEAR: PRESENT
BASOPHILS # BLD MANUAL: 0 THOUSAND/UL (ref 0–0.1)
BASOPHILS NFR MAR MANUAL: 0 % (ref 0–1)
BUN SERPL-MCNC: 6 MG/DL (ref 5–25)
CALCIUM SERPL-MCNC: 8.5 MG/DL (ref 8.4–10.2)
CHLORIDE SERPL-SCNC: 102 MMOL/L (ref 96–108)
CO2 SERPL-SCNC: 26 MMOL/L (ref 21–32)
CREAT SERPL-MCNC: 0.63 MG/DL (ref 0.6–1.3)
EOSINOPHIL # BLD MANUAL: 0.35 THOUSAND/UL (ref 0–0.4)
EOSINOPHIL NFR BLD MANUAL: 4 % (ref 0–6)
ERYTHROCYTE [DISTWIDTH] IN BLOOD BY AUTOMATED COUNT: 14.1 % (ref 11.6–15.1)
GFR SERPL CREATININE-BSD FRML MDRD: 96 ML/MIN/1.73SQ M
GLUCOSE SERPL-MCNC: 104 MG/DL (ref 65–140)
HCT VFR BLD AUTO: 27.5 % (ref 34.8–46.1)
HGB BLD-MCNC: 8.6 G/DL (ref 11.5–15.4)
LYMPHOCYTES # BLD AUTO: 0.97 THOUSAND/UL (ref 0.6–4.47)
LYMPHOCYTES # BLD AUTO: 11 % (ref 14–44)
MCH RBC QN AUTO: 29.7 PG (ref 26.8–34.3)
MCHC RBC AUTO-ENTMCNC: 31.3 G/DL (ref 31.4–37.4)
MCV RBC AUTO: 95 FL (ref 82–98)
MONOCYTES # BLD AUTO: 0.53 THOUSAND/UL (ref 0–1.22)
MONOCYTES NFR BLD: 6 % (ref 4–12)
MYELOCYTES NFR BLD MANUAL: 1 % (ref 0–1)
NEUTROPHILS # BLD MANUAL: 6.87 THOUSAND/UL (ref 1.85–7.62)
NEUTS BAND NFR BLD MANUAL: 1 % (ref 0–8)
NEUTS SEG NFR BLD AUTO: 77 % (ref 43–75)
PLATELET # BLD AUTO: 560 THOUSANDS/UL (ref 149–390)
PLATELET BLD QL SMEAR: ABNORMAL
PMV BLD AUTO: 8.9 FL (ref 8.9–12.7)
POLYCHROMASIA BLD QL SMEAR: PRESENT
POTASSIUM SERPL-SCNC: 3.9 MMOL/L (ref 3.5–5.3)
RBC # BLD AUTO: 2.9 MILLION/UL (ref 3.81–5.12)
SODIUM SERPL-SCNC: 137 MMOL/L (ref 135–147)
WBC # BLD AUTO: 8.81 THOUSAND/UL (ref 4.31–10.16)

## 2023-01-01 RX ORDER — HYDROCODONE BITARTRATE AND ACETAMINOPHEN 5; 325 MG/1; MG/1
1 TABLET ORAL EVERY 6 HOURS PRN
Status: DISCONTINUED | OUTPATIENT
Start: 2023-01-01 | End: 2023-01-04 | Stop reason: HOSPADM

## 2023-01-01 RX ORDER — METOCLOPRAMIDE HYDROCHLORIDE 5 MG/ML
10 INJECTION INTRAMUSCULAR; INTRAVENOUS EVERY 6 HOURS PRN
Status: DISCONTINUED | OUTPATIENT
Start: 2023-01-01 | End: 2023-01-02

## 2023-01-01 RX ADMIN — DEXTROSE, SODIUM CHLORIDE, AND POTASSIUM CHLORIDE 50 ML/HR: 5; .45; .15 INJECTION INTRAVENOUS at 10:54

## 2023-01-01 RX ADMIN — ENOXAPARIN SODIUM 40 MG: 100 INJECTION SUBCUTANEOUS at 09:14

## 2023-01-01 RX ADMIN — ACETAMINOPHEN 650 MG: 325 TABLET ORAL at 19:42

## 2023-01-01 RX ADMIN — METRONIDAZOLE 500 MG: 500 INJECTION, SOLUTION INTRAVENOUS at 06:14

## 2023-01-01 RX ADMIN — TRAMADOL HYDROCHLORIDE 50 MG: 50 TABLET, COATED ORAL at 06:21

## 2023-01-01 RX ADMIN — METRONIDAZOLE 500 MG: 500 INJECTION, SOLUTION INTRAVENOUS at 14:47

## 2023-01-01 RX ADMIN — DEXTROSE, SODIUM CHLORIDE, AND POTASSIUM CHLORIDE 50 ML/HR: 5; .45; .15 INJECTION INTRAVENOUS at 13:01

## 2023-01-01 RX ADMIN — PANTOPRAZOLE SODIUM 40 MG: 40 TABLET, DELAYED RELEASE ORAL at 16:32

## 2023-01-01 RX ADMIN — NEBIVOLOL 5 MG: 5 TABLET ORAL at 10:54

## 2023-01-01 RX ADMIN — ONDANSETRON 4 MG: 2 INJECTION INTRAMUSCULAR; INTRAVENOUS at 09:10

## 2023-01-01 RX ADMIN — AMLODIPINE BESYLATE 10 MG: 10 TABLET ORAL at 10:54

## 2023-01-01 RX ADMIN — CEFTRIAXONE 1000 MG: 1 INJECTION, SOLUTION INTRAVENOUS at 13:01

## 2023-01-01 RX ADMIN — METRONIDAZOLE 500 MG: 500 INJECTION, SOLUTION INTRAVENOUS at 21:05

## 2023-01-01 RX ADMIN — PANTOPRAZOLE SODIUM 40 MG: 40 TABLET, DELAYED RELEASE ORAL at 06:14

## 2023-01-01 NOTE — PROGRESS NOTES
Progress Note - General Surgery   Bruna Cottrell 58 y o  female MRN: [id number]  Unit/Bed#: -01 Encounter: [id number]    Assessment:  66-year-old female with past medical history significant for asthma and tracheobronchomalacia presenting with complicated sigmoid diverticulitis  HD#28 septic shock secondary to feculent peritonitis  POD#27 s/p laparoscopic hand-assisted sigmoid resection with transverse loop colostomy  Complicated by intra-abdominal fluid collections/abscess s/p perc drainage by IR   -AVSS on room air   -AGUSTO x 1 30 cc  -stool 100 cc  -WBC 8 81  -hgb 8 6  -Cr 0 63  -electrolytes unremarkable     Plan:  Surg soft diet with nutritional suppl as tolerated, monitor for additional episodes of vomiting to downgrade diet   IVF  Serial abdominal exams   IV antibiotics per ID, appreciate recommendations   Extended course of antibiotics from tpa instillation   Daily local wound care   Ostomy care   CM for dispo planning     Subjective/Objective   Subjective: patient with episode of emesis last evening  Reports that she became nauseous after eating lunch with acute episode of vomiting around 2030  Notes mild nausea this morning  Discussed downgrading diet but she wishes to at least try eating toast with liquids today  Functioning ostomy  Objective:   Blood pressure 133/81, pulse 74, temperature 98 1 °F (36 7 °C), resp  rate 17, height 5' 5" (1 651 m), weight 81 4 kg (179 lb 7 3 oz), SpO2 92 %  ,Body mass index is 29 86 kg/m²        Intake/Output Summary (Last 24 hours) at 1/1/2023 0755  Last data filed at 12/31/2022 1801  Gross per 24 hour   Intake 130 ml   Output 130 ml   Net 0 ml       Invasive Devices     Peripheral Intravenous Line  Duration           Peripheral IV 12/29/22 Left;Proximal;Ventral (anterior) Forearm 2 days    Peripheral IV 12/31/22 Right;Ventral (anterior) Forearm <1 day          Drain  Duration           Colostomy RLQ 27 days    Abscess Drain RUQ 11 days    Abscess Drain RUQ 11 days Physical Exam  Vitals and nursing note reviewed  Constitutional:       General: She is not in acute distress  Appearance: Normal appearance  She is obese  She is not ill-appearing or toxic-appearing  HENT:      Head: Normocephalic and atraumatic  Cardiovascular:      Rate and Rhythm: Normal rate and regular rhythm  Pulses: Normal pulses  Heart sounds: Normal heart sounds  Pulmonary:      Effort: Pulmonary effort is normal       Breath sounds: Normal breath sounds  Abdominal:      General: Bowel sounds are normal  There is distension  Palpations: Abdomen is soft  Tenderness: There is abdominal tenderness  There is no guarding or rebound  Comments: Ostomy if pink/viable with brown paste stool; LLQ incision with pink/red granulating wound bed, no signs of active infection;JPx 2 with minimal serous output   Musculoskeletal:         General: Normal range of motion  Right lower leg: No edema  Left lower leg: No edema  Skin:     General: Skin is warm and dry  Coloration: Skin is pale  Neurological:      General: No focal deficit present  Mental Status: She is alert and oriented to person, place, and time  Lab, Imaging and other studies:  I have personally reviewed pertinent lab results      CBC:   Lab Results   Component Value Date    WBC 8 81 01/01/2023    HGB 8 6 (L) 01/01/2023    HCT 27 5 (L) 01/01/2023    MCV 95 01/01/2023     (H) 01/01/2023    MCH 29 7 01/01/2023    MCHC 31 3 (L) 01/01/2023    RDW 14 1 01/01/2023    MPV 8 9 01/01/2023     CMP:   Lab Results   Component Value Date    SODIUM 137 01/01/2023    K 3 9 01/01/2023     01/01/2023    CO2 26 01/01/2023    BUN 6 01/01/2023    CREATININE 0 63 01/01/2023    CALCIUM 8 5 01/01/2023    EGFR 96 01/01/2023     VTE Pharmacologic Prophylaxis: Enoxaparin (Lovenox)  VTE Mechanical Prophylaxis: sequential compression device    Nimo Powers PA-C

## 2023-01-01 NOTE — PLAN OF CARE
Problem: SAFETY ADULT  Goal: Patient will remain free of falls  Description: INTERVENTIONS:  - Educate patient/family on patient safety including physical limitations  - Instruct patient to call for assistance with activity   - Consult OT/PT to assist with strengthening/mobility   - Keep Call bell within reach  - Keep bed low and locked with side rails adjusted as appropriate  - Keep care items and personal belongings within reach  - Initiate and maintain comfort rounds  - Make Fall Risk Sign visible to staff  - Offer Toileting every 2 Hours, in advance of need  - Initiate/Maintain BED alarm  - Obtain necessary fall risk management equipment: yellow socks  - Apply yellow socks and bracelet for high fall risk patients  - Consider moving patient to room near nurses station  Outcome: Progressing     Problem: SAFETY ADULT  Goal: Maintain or return to baseline ADL function  Description: INTERVENTIONS:  - Educate patient/family on patient safety including physical limitations  - Instruct patient to call for assistance with activity   - Consult OT/PT to assist with strengthening/mobility   - Keep Call bell within reach  - Keep bed low and locked with side rails adjusted as appropriate  - Keep care items and personal belongings within reach  - Initiate and maintain comfort rounds  - Make Fall Risk Sign visible to staff  - Offer Toileting every 2 Hours, in advance of need  - Initiate/Maintain bed alarm  - Obtain necessary fall risk management equipment: yellow socks  - Apply yellow socks and bracelet for high fall risk patients  - Consider moving patient to room near nurses station  Outcome: Progressing

## 2023-01-02 LAB
ERYTHROCYTE [DISTWIDTH] IN BLOOD BY AUTOMATED COUNT: 14.3 % (ref 11.6–15.1)
HCT VFR BLD AUTO: 30.2 % (ref 34.8–46.1)
HGB BLD-MCNC: 9.3 G/DL (ref 11.5–15.4)
MCH RBC QN AUTO: 29.2 PG (ref 26.8–34.3)
MCHC RBC AUTO-ENTMCNC: 30.8 G/DL (ref 31.4–37.4)
MCV RBC AUTO: 95 FL (ref 82–98)
PLATELET # BLD AUTO: 625 THOUSANDS/UL (ref 149–390)
PMV BLD AUTO: 9.2 FL (ref 8.9–12.7)
RBC # BLD AUTO: 3.18 MILLION/UL (ref 3.81–5.12)
WBC # BLD AUTO: 8.09 THOUSAND/UL (ref 4.31–10.16)

## 2023-01-02 RX ORDER — METOCLOPRAMIDE HYDROCHLORIDE 5 MG/ML
10 INJECTION INTRAMUSCULAR; INTRAVENOUS EVERY 6 HOURS PRN
Status: DISCONTINUED | OUTPATIENT
Start: 2023-01-02 | End: 2023-01-04 | Stop reason: HOSPADM

## 2023-01-02 RX ADMIN — CEFTRIAXONE 1000 MG: 1 INJECTION, SOLUTION INTRAVENOUS at 13:27

## 2023-01-02 RX ADMIN — METRONIDAZOLE 500 MG: 500 INJECTION, SOLUTION INTRAVENOUS at 22:30

## 2023-01-02 RX ADMIN — PANTOPRAZOLE SODIUM 40 MG: 40 TABLET, DELAYED RELEASE ORAL at 16:56

## 2023-01-02 RX ADMIN — ACETAMINOPHEN 325 MG: 325 TABLET ORAL at 14:20

## 2023-01-02 RX ADMIN — DEXTROSE, SODIUM CHLORIDE, AND POTASSIUM CHLORIDE 50 ML/HR: 5; .45; .15 INJECTION INTRAVENOUS at 11:44

## 2023-01-02 RX ADMIN — METRONIDAZOLE 500 MG: 500 INJECTION, SOLUTION INTRAVENOUS at 05:55

## 2023-01-02 RX ADMIN — METRONIDAZOLE 500 MG: 500 INJECTION, SOLUTION INTRAVENOUS at 14:11

## 2023-01-02 RX ADMIN — PANTOPRAZOLE SODIUM 40 MG: 40 TABLET, DELAYED RELEASE ORAL at 06:00

## 2023-01-02 RX ADMIN — NEBIVOLOL 5 MG: 5 TABLET ORAL at 09:46

## 2023-01-02 RX ADMIN — ONDANSETRON 4 MG: 2 INJECTION INTRAMUSCULAR; INTRAVENOUS at 09:50

## 2023-01-02 RX ADMIN — AMLODIPINE BESYLATE 10 MG: 10 TABLET ORAL at 09:46

## 2023-01-02 NOTE — PLAN OF CARE
Problem: PAIN - ADULT  Goal: Verbalizes/displays adequate comfort level or baseline comfort level  Description: Interventions:  - Encourage patient to monitor pain and request assistance  - Assess pain using appropriate pain scale  - Administer analgesics based on type and severity of pain and evaluate response  - Implement non-pharmacological measures as appropriate and evaluate response  - Consider cultural and social influences on pain and pain management  - Notify physician/advanced practitioner if interventions unsuccessful or patient reports new pain  Outcome: Progressing     Problem: INFECTION - ADULT  Goal: Absence or prevention of progression during hospitalization  Description: INTERVENTIONS:  - Assess and monitor for signs and symptoms of infection  - Monitor lab/diagnostic results  - Monitor all insertion sites, i e  indwelling lines, tubes, and drains  - Monitor endotracheal if appropriate and nasal secretions for changes in amount and color  - [place] appropriate cooling/warming therapies per order  - Administer medications as ordered  - Instruct and encourage patient and family to use good hand hygiene technique  - Identify and instruct in appropriate isolation precautions for identified infection/condition  Outcome: Progressing     Problem: SAFETY ADULT  Goal: Maintain or return to baseline ADL function  Description: INTERVENTIONS:  - Educate patient/family on patient safety including physical limitations  - Instruct patient to call for assistance with activity   - Consult OT/PT to assist with strengthening/mobility   - Keep Call bell within reach  - Keep bed low and locked with side rails adjusted as appropriate  - Keep care items and personal belongings within reach  - Initiate and maintain comfort rounds  - Make Fall Risk Sign visible to staff  - Offer Toileting every 2 Hours, in advance of need  - Initiate/Maintain bed alarm  - Obtain necessary fall risk management equipment: yellow socks  - Apply yellow socks and bracelet for high fall risk patients  - Consider moving patient to room near nurses station  Outcome: Progressing     Problem: Knowledge Deficit  Goal: Patient/family/caregiver demonstrates understanding of disease process, treatment plan, medications, and discharge instructions  Description: Complete learning assessment and assess knowledge base    Interventions:  - Provide teaching at level of understanding  - Provide teaching via preferred learning methods  Outcome: Progressing     Problem: MOBILITY - ADULT  Goal: Maintain or return to baseline ADL function  Description: INTERVENTIONS:  - Educate patient/family on patient safety including physical limitations  - Instruct patient to call for assistance with activity   - Consult OT/PT to assist with strengthening/mobility   - Keep Call bell within reach  - Keep bed low and locked with side rails adjusted as appropriate  - Keep care items and personal belongings within reach  - Initiate and maintain comfort rounds  - Make Fall Risk Sign visible to staff  - Offer Toileting every 2 Hours, in advance of need  - Initiate/Maintain bed alarm  - Obtain necessary fall risk management equipment: yellow socks  - Apply yellow socks and bracelet for high fall risk patients  - Consider moving patient to room near nurses station  Outcome: Progressing

## 2023-01-02 NOTE — CASE MANAGEMENT
Case Management Progress Note    Patient name Viki Loya  Location [address] 224/-08 MRN [id number]  : 1960 Date 2023       LOS (days): 29  Geometric Mean LOS (GMLOS) (days): 9 60  Days to GMLOS:-19 6        OBJECTIVE:        Current admission status: Inpatient  Preferred Pharmacy:   [facility]pharmacy [address] [place] [address],   [facility] [address] [place] [address] [address]  Phone: [phone] Fax: [phone]    Primary Care Provider: Sarah Hoskins    Primary Insurance: [employer]  Secondary Insurance: MEDICARE    PROGRESS NOTE:    Discharge planning discussed with surgery   Patient to have IR remove drains tomorrow  Plan for discharge Wednesday  Updated therapy notes and discharge sent to [facility] via aidin  Met with patient and spouse at bedside to discuss discharge planning  Discussed transport via BLS due to medical complexity  Agreeable to discharge plan

## 2023-01-02 NOTE — WOUND OSTOMY CARE
Progress Note- Ostomy  Pattie Sahu 58 y o  female  [id number]  --01    Assessment:  Patient called for RN due to pouch leakage  Patient seen for ostomy care and to discuss becoming more hands on with ostomy care  Discussed with patient that pouch should be burped when inflated to prevent pouch from dislodging  She stated she calls for the nurse to burp the pouch  Explained that at this point she should be burping the pouch and attempting to empty and cleanse the pouch opening  Pouch removed using push-pull method  Patient was attentive to watch cleansing of the stoma and peristomal skin  3M No Sting barrier film applied to peristoma skin, 2 piece moldable pouch applied with good seal      Stoma functioning for soft non-formed stool  Stoma is pink and slightly budded  Peristomal skin intact  Mucocutaneous junction intact  2 AGUSTO bulbs to bulb suction present on right abdomen  Ostomy and Skin Care Plan:  1  Empty colostomy pouch when 1/3-1/2 full of stool or inflated with gas  Cleanse drainage end prior to closing pouch  Change pouch every 3 days and PRN with signs of leakage  Encourage patient to observe emptying pouch  2  Ehob offloading cushion to chair when OOB  3  Elevate heels off of bed with pillow to offload  4  Apply hydraguard to b/l heels, buttocks and sacrum BID and PRN  5  Turn and reposition patient Q2 hours  6  Allevyn life silicone bordered foam to the sacrum-upper buttocks, peel back daily for skin assessment and change every 3 days and PRN    Wound:  Wound 12/05/22 Abdomen N/A (Active)   Wound Image   12/28/22 1542   Wound Description ANGIE 01/02/23 0750   Devi-wound Assessment ANGIE 01/02/23 0750   Drainage Amount Small 01/01/23 0915   Drainage Description Serous 01/01/23 0915   Treatments Site care 01/01/23 0915   Dressing ABD;Packings 01/01/23 0915   Wound packed?  Yes 01/01/23 0915   Packing- # removed 1 12/30/22 1000   Packing- # inserted 1 12/30/22 1000   Dressing Changed Changed by provider 12/30/22 1000   Patient Tolerance Tolerated well 12/30/22 2045   Dressing Status Clean;Dry; Intact 01/02/23 0750   Number of days: 28       Wound 12/15/22 Buttocks Right (Active)   Wound Image   12/15/22 1237   Wound Description Intact; Pink 01/02/23 0750   Devi-wound Assessment Intact 12/29/22 0729   Wound Length (cm) 12 cm 12/15/22 1237   Wound Width (cm) 8 cm 12/15/22 1237   Wound Surface Area (cm^2) 96 cm^2 12/15/22 1237   Drainage Amount None 12/29/22 0729   Treatments Site care 12/29/22 0729   Dressing Open to air 12/30/22 0730   Dressing Changed Other (Comment) 12/16/22 1844   Patient Tolerance Tolerated well 12/22/22 0905   Dressing Status Clean;Dry; Intact 01/02/23 0750   Number of days: 18     Abscess Drain RUQ (Active)   Site Description Unable to view 01/02/23 0750   Dressing Status Clean;Dry; Intact 01/02/23 0750   Drainage Appearance Serous 01/02/23 0750   Status Other (Comment) 01/02/23 0750   Output (mL) 10 mL 01/01/23 1601   Number of days: 13       Abscess Drain RUQ (Active)   Site Description Unable to view 01/02/23 0750   Dressing Status Clean;Dry; Intact 01/02/23 0750   Drainage Appearance Serous 01/02/23 0750   Status Other (Comment) 01/02/23 0750   Output (mL) 2 5 mL 01/01/23 1601   Number of days: 13       Colostomy RLQ (Active)   Stomal Appliance 2 piece;Leaking; Changed 01/02/23 1530   Stoma Assessment Pink;Budded 01/02/23 1530   Stoma size (in) 1 75 in 01/02/23 1530   Stoma Shape Oval 01/02/23 1530   Peristomal Assessment Clean; Intact 01/02/23 1530   Treatment Bag change;Site care 01/02/23 1530   Output (mL) 125 mL 01/02/23 0750   Number of days: 28       Peripheral IV 01/02/23 Dorsal (posterior); Left Forearm (Active)   Number of days: 0       Reviewed plan of care with primary RN [facility]  Recommendations written as orders  Wound care team to follow weekly while admitted  Questions or concerns [phone] Nurse    Noreen Cotter BSN, RN, [facility]

## 2023-01-02 NOTE — NURSING NOTE
Patient awake and alert, no complaint of pain or nausea  Colostomy functional for soft mushy brown BM   Call bell in reach

## 2023-01-02 NOTE — PHYSICAL THERAPY NOTE
Physical Therapy Treatment Session Note    Patient's Name: Cecily Al    Admitting Diagnosis  Diverticulitis [K57 92]  Abdominal pain [R10 9]    Problem List  Patient Active Problem List   Diagnosis    Sigmoid diverticulitis    Acute respiratory failure with hypoxia (HCC)    GERD (gastroesophageal reflux disease)    Hypertension    Bronchomalacia    Asthma    Septic shock ([facility] [address] )    Anemia    Encephalopathy    Fever    Pleural effusion    Postoperative intra-abdominal abscess       Past Medical History  Past Medical History:   Diagnosis Date    Asthma     Bronchiectasis ([facility] )     Bronchomalacia     GERD (gastroesophageal reflux disease)     Hiatal hernia        Past Surgical History  Past Surgical History:   Procedure Laterality Date    APPENDECTOMY      CHOLECYSTECTOMY      COLON SIGMOID RESECTION LAPAROSCOPIC N/A 12/5/2022    Procedure: RESECTION COLON SIGMOID LAPAROSCOPIC HAND-ASSISTED, diverting transverse loop colostomy;  Surgeon: Anahy Rodriguez MD;  Location: [facility];  Service: General    HYSTERECTOMY      IR DRAINAGE TUBE PLACEMENT  12/20/2022    KNEE SURGERY Left         01/02/23 0907   PT Last Visit   PT Visit Date 01/02/23   Note Type   Note Type Treatment for insurance authorization   Pain Assessment   Pain Assessment Tool 0-10   Pain Score 5   Pain Location/Orientation Orientation: Upper;Orientation: Mid;Location: Abdomen   Pain Onset/Description Onset: Ongoing;Frequency: Constant/Continuous; Descriptor: Sore   Effect of Pain on Daily Activities yes   Patient's Stated Pain Goal No pain   Hospital Pain Intervention(s) Medication (See MAR); Repositioned; Ambulation/increased activity; Emotional support; Environmental changes   Multiple Pain Sites No   Restrictions/Precautions   Weight Bearing Precautions Per Order No   Other Precautions Fall Risk;Pain;Multiple lines  (colostomy, AGUSTO drains x 2)   General   Chart Reviewed Yes   Response to Previous Treatment Patient with no complaints from previous session  Family/Caregiver Present No   Cognition   Overall Cognitive Status WFL   Arousal/Participation Alert; Cooperative   Attention Within functional limits   Orientation Level Oriented X4   Memory Within functional limits   Following Commands Follows all commands and directions without difficulty   Comments Julissa Arango was agreeable to PT treatment session,pleasant  Subjective   Subjective "I can walk"   Bed Mobility   Supine to Sit 5  Supervision   Additional items Assist x 1;HOB elevated; Bedrails; Increased time required;Verbal cues   Sit to Supine   (DNT pt  was sitting out of bed on recliner upon conclusion )   Additional Comments Verbal cues for bedrail utilization and proper body mechanics  Patient denied lightheadedness/dizziness with positional changes  Transfers   Sit to Stand 4  Minimal assistance   Additional items Assist x 1;Bedrails; Increased time required;Verbal cues  (RW usage)   Stand to Sit 4  Minimal assistance   Additional items Assist x 1;Bedrails; Increased time required;Verbal cues   Stand pivot 4  Minimal assistance   Additional items Assist x 1; Increased time required;Verbal cues   Additional Comments Verbal cues for safety while turning, proper AD usage  Ambulation/Elevation   Gait pattern Improper Weight shift; Forward Flexion;Narrow DEBBIE; Antalgic; Short stride   Gait Assistance 4  Minimal assist   Additional items Assist x 1;Verbal cues; Tactile cues   Assistive Device Rolling walker   Distance 25 feet   Stair Management Assistance Not tested   Ambulation/Elevation Additional Comments Verbal cues for base of support widening for stabilization, safety with directional changes  Balance   Static Sitting Fair +   Dynamic Sitting Fair +   Static Standing Fair   Dynamic Standing Fair -   Ambulatory Fair -   Endurance Deficit   Endurance Deficit Yes   Activity Tolerance   Activity Tolerance Patient limited by pain; Patient limited by fatigue   Nurse Made Aware yes, [address] Андрей Dennis RN   Exercises   Quad Sets Sitting;20 reps;AROM; Bilateral   Heelslides Sitting;20 reps;AROM; Bilateral   Glute Sets Sitting;20 reps;AROM; Bilateral   Hip Flexion Sitting;20 reps;AROM; Bilateral   Hip Abduction Sitting;20 reps;AROM; Bilateral  (w/ green theraband utilization, also completed hip IR/ER AROM B 20 reps)   Hip Adduction Sitting;20 reps;AROM; Bilateral  (w/pillow utilization)   Knee AROM Long Arc Quad Sitting;20 reps;AROM; Bilateral   Ankle Pumps Sitting;20 reps;AROM; Bilateral   Squat 5 reps;AROM; Bilateral  (sit to stand transfers, min A of 1)   Marching Sitting;20 reps;AROM; Bilateral   Assessment   Prognosis Good   Problem List Decreased strength;Decreased endurance; Impaired balance;Decreased mobility; Decreased cognition;Pain   Assessment Pt seen for PT treatment session this date with interventions consisting of gait training to reduce the risk of medical complications and advance toward previous level of function w/ emphasis on improving pt's ability to ambulate level surfaces x 25 feet with min A provided by therapist with RW, Therapeutic exercise to improve endurance consisting of: AROM 20 reps B LE in sitting position and therapeutic activity to reduce fall risk consisting of training: supine<>sit transfers, sit<>stand transfers, static sitting tolerance at EOB for 5 minutes w/ unilateral UE support, static standing tolerance for 2 minutes w/ B UE support, vc and tactile cues for static sitting posture faciliation, vc and tactile cues for static standing posture faciliation and stand pivot transfers towards B direction  Pt agreeable to PT treatment session upon arrival, pt found supine in bed w/ HOB elevated, A&O x 4  In comparison to previous session, pt with improvements in ambulatory distance,balance  Post session: pt returned back to recliner, chair alarm engaged, all needs in reach and RN notified of session findings/recommendations   Continue to recommend post acute rehabilitation services at time of d/c in order to maximize pt's functional independence and safety w/ mobility  Pt continues to be functioning below baseline level, and remains limited 2* factors listed above and including weakness,pain, gait deviations, decreased endurance, activity intolerance  PT will continue to see pt during current hospitalization in order to address the deficits listed above and provide interventions consistent w/ POC in effort to achieve STGs  Goals   Patient Goals to see her  soon   STG Expiration Date 01/06/23   Short Term Goal #1 STGs remain appropriate   PT Treatment Day 4   Plan   Treatment/Interventions Functional transfer training;LE strengthening/ROM; Elevations; Therapeutic exercise; Endurance training;Patient/family training;Equipment eval/education; Bed mobility;Gait training;Spoke to nursing   Progress Progressing toward goals   PT Frequency 3-5x/wk   Recommendation   PT Discharge Recommendation Post acute rehabilitation services  (maintained recommendation)   Equipment Recommended [facility] Recommended Wheeled walker   Change/add to [employer] [place]? No   Additional Comments Upon conclusion, pt  was sitting out of bed on recliner  Chair alarm engaged and all needs within reach     AM-PAC Basic Mobility Inpatient   Turning in Bed Without Bedrails 3   Lying on Back to Sitting on Edge of Flat Bed 3   Moving Bed to Chair 3   Standing Up From Chair 3   Walk in Room 3   Climb 3-5 Stairs 2   Basic Mobility Inpatient Raw Score 17   Basic Mobility Standardized Score 39 67   Highest Level Of Mobility   JH-HLM Goal 5: Stand one or more mins   JH-HLM Achieved 7: Walk 25 feet or more     Treatment Time: [id number]    Cielo Edmondson, PT

## 2023-01-02 NOTE — OCCUPATIONAL THERAPY NOTE
01/02/23 1127   OT Last Visit   OT Visit Date 01/02/23   Note Type   Note Type Treatment   Pain Assessment   Pain Assessment Tool 0-10   Pain Score No Pain   Restrictions/Precautions   Weight Bearing Precautions Per Order No   Other Precautions Fall Risk;Pain;Multiple lines  (colostomy; 2 AGUSTO drains)   Lifestyle   Reciprocal Relationships  present for short periods of session   Intrinsic Gratification rides motorcycle with  > enjoys visiting [place]   ADL   Where Assessed Other (Comment)  (in recliner)   Grooming Assistance 3  Moderate Assistance   Grooming Deficit Setup;Verbal cueing;Supervision/safety; Increased time to complete; Other (Comment)  (washing hair)   Grooming Comments patient demos  'd ability to brush out hair post shampoo completion   UB Bathing Assistance 5  Supervision/Setup   UB Bathing Deficit Setup;Supervision/safety; Increased time to complete   UB Bathing Comments *patient (because she was "not able to see") was dependent for shaving of axilla this session   LB Bathing Assistance 4  Minimal Assistance   LB Bathing Deficit Setup;Verbal cueing;Supervision/safety; Increased time to complete;Right lower leg including foot; Left lower leg including foot   UB Dressing Assistance 4  Minimal Assistance   UB Dressing Comments *for hospital gown change   LB Dressing Comments Dependent for doff/don socks this session   Transfers   Additional Comments Please see PT note for transfer statuses   Coordination   Gross Motor [facility]   Dexterity [facility]   Subjective   Subjective patient voiced appreciation of services;  "I hope to get into the shower again, whenever I'm allowed"  Cognition   Overall Cognitive Status WFL   Arousal/Participation Alert; Cooperative   Attention Within functional limits   Orientation Level Oriented X4   Following Commands Follows all commands and directions without difficulty   Activity Tolerance   Activity Tolerance Patient limited by fatigue   Medical Staff Made Aware nurse Carissa aware of session's content   Assessment   Assessment Patient participated in Skilled OT session this date with interventions consisting of ADL re training with the use of correct body mechnaics, Energy Conservation techniques and increase OOB/ sitting tolerance   Patient agreeable to OT treatment session, upon arrival patient was found seated OOB to Recliner - session completed with patient in recliner  In comparison to previous session, patient with improvements in activity tolerance for self-care tasks And UE functional movement  Patient requiring verbal cues for correct technique and frequent rest periods, and self-care assistance as noted in flow sheet  Patient continues to be functioning below baseline level, occupational performance remains limited secondary to factors listed above and increased risk for falls and injury  From OT standpoint, recommendation at time of d/c would be post-acute rehabilitation services  Patient to benefit from continued Occupational Therapy treatment while in the hospital to address deficits as defined above and maximize level of functional independence with ADLs and functional mobility  Plan   Treatment Interventions ADL retraining; Activityengagement; Energy conservation; Compensatory technique education   Goal Expiration Date 01/06/23   OT Treatment Day 6   Recommendation   OT Discharge Recommendation Post acute rehabilitation services   Additional Comments 2 The patient's raw score on the AM-PAC Daily Activity inpatient short form is 18, standardized score is 38 66, less than 39 4  Patients at this level are likely to benefit from discharge to post-acute rehabilitation services  Please refer to the recommendation of the Occupational Therapist for safe discharge planning     AM-PAC Daily Activity Inpatient   Lower Body Dressing 2   Bathing 3   Toileting 3   Upper Body Dressing 3   Grooming 3  (per Today's hair-washing)   Eating 4   Daily Activity Raw Score 18   Daily Activity Standardized Score (Calc for Raw Score >=11) 38 66   AM-PAC Applied Cognition Inpatient   Following a Speech/Presentation 4   Understanding Ordinary Conversation 4   Taking Medications 3   Remembering Where Things Are Placed or Put Away 4   Remembering List of 4-5 Errands 4   Taking Care of Complicated Tasks 4   Applied Cognition Raw Score 23   Applied Cognition Standardized Score 53 08       REJI Perez/L

## 2023-01-02 NOTE — PLAN OF CARE
Problem: PHYSICAL THERAPY ADULT  Goal: Performs mobility at highest level of function for planned discharge setting  See evaluation for individualized goals  Description: Treatment/Interventions: Functional transfer training, LE strengthening/ROM, Elevations, Therapeutic exercise, Endurance training, Patient/family training, Equipment eval/education, Bed mobility, Gait training, Compensatory technique education, OT, Spoke to nursing          See flowsheet documentation for full assessment, interventions and recommendations  Outcome: Progressing  Note: Prognosis: Good  Problem List: Decreased strength, Decreased endurance, Impaired balance, Decreased mobility, Decreased cognition, Pain  Assessment: Pt seen for PT treatment session this date with interventions consisting of gait training to reduce the risk of medical complications and advance toward previous level of function w/ emphasis on improving pt's ability to ambulate level surfaces x 25 feet with min A provided by therapist with RW, Therapeutic exercise to improve endurance consisting of: AROM 20 reps B LE in sitting position and therapeutic activity to reduce fall risk consisting of training: supine<>sit transfers, sit<>stand transfers, static sitting tolerance at EOB for 5 minutes w/ unilateral UE support, static standing tolerance for 2 minutes w/ B UE support, vc and tactile cues for static sitting posture faciliation, vc and tactile cues for static standing posture faciliation and stand pivot transfers towards B direction  Pt agreeable to PT treatment session upon arrival, pt found supine in bed w/ HOB elevated, A&O x 4  In comparison to previous session, pt with improvements in ambulatory distance,balance  Post session: pt returned back to recliner, chair alarm engaged, all needs in reach and RN notified of session findings/recommendations   Continue to recommend post acute rehabilitation services at time of d/c in order to maximize pt's functional independence and safety w/ mobility  Pt continues to be functioning below baseline level, and remains limited 2* factors listed above and including weakness,pain, gait deviations, decreased endurance, activity intolerance  PT will continue to see pt during current hospitalization in order to address the deficits listed above and provide interventions consistent w/ POC in effort to achieve STGs  Barriers to Discharge: Inaccessible home environment, Decreased caregiver support     PT Discharge Recommendation: Post acute rehabilitation services (maintained recommendation)    See flowsheet documentation for full assessment

## 2023-01-02 NOTE — PLAN OF CARE
Problem: OCCUPATIONAL THERAPY ADULT  Goal: Performs self-care activities at highest level of function for planned discharge setting  See evaluation for individualized goals  Description: Treatment Interventions: ADL retraining, Functional transfer training, UE strengthening/ROM, Endurance training, Patient/family training, Equipment evaluation/education, Energy conservation, Activityengagement, Compensatory technique education          See flowsheet documentation for full assessment, interventions and recommendations  Outcome: Progressing  Note: Limitation: Decreased ADL status, Decreased UE strength, Decreased Safe judgement during ADL, Decreased endurance, Decreased self-care trans, Decreased high-level ADLs  Prognosis: Good  Assessment: Patient participated in Skilled OT session this date with interventions consisting of ADL re training with the use of correct body mechnaics, Energy Conservation techniques and increase OOB/ sitting tolerance   Patient agreeable to OT treatment session, upon arrival patient was found seated OOB to Recliner - session completed with patient in recliner  In comparison to previous session, patient with improvements in activity tolerance for self-care tasks And UE functional movement  Patient requiring verbal cues for correct technique and frequent rest periods, and self-care assistance as noted in flow sheet  Patient continues to be functioning below baseline level, occupational performance remains limited secondary to factors listed above and increased risk for falls and injury  From OT standpoint, recommendation at time of d/c would be post-acute rehabilitation services  Patient to benefit from continued Occupational Therapy treatment while in the hospital to address deficits as defined above and maximize level of functional independence with ADLs and functional mobility       OT Discharge Recommendation: Post acute rehabilitation services     [place], MENDOZA/L

## 2023-01-02 NOTE — PLAN OF CARE
Problem: Potential for Falls  Goal: Patient will remain free of falls  Description: INTERVENTIONS:  - Educate patient/family on patient safety including physical limitations  - Instruct patient to call for assistance with activity   - Consult OT/PT to assist with strengthening/mobility   - Keep Call bell within reach  - Keep bed low and locked with side rails adjusted as appropriate  - Keep care items and personal belongings within reach  - Initiate and maintain comfort rounds  - Make Fall Risk Sign visible to staff  - Offer Toileting every 2 Hours, in advance of need  - Initiate/Maintain BED alarm  - Obtain necessary fall risk management equipment: yellow socks  - Apply yellow socks and bracelet for high fall risk patients  - Consider moving patient to room near nurses station  Outcome: Progressing     Problem: PAIN - ADULT  Goal: Verbalizes/displays adequate comfort level or baseline comfort level  Description: Interventions:  - Encourage patient to monitor pain and request assistance  - Assess pain using appropriate pain scale  - Administer analgesics based on type and severity of pain and evaluate response  - Implement non-pharmacological measures as appropriate and evaluate response  - Consider cultural and social influences on pain and pain management  - Notify physician/advanced practitioner if interventions unsuccessful or patient reports new pain  Outcome: Progressing     Problem: INFECTION - ADULT  Goal: Absence or prevention of progression during hospitalization  Description: INTERVENTIONS:  - Assess and monitor for signs and symptoms of infection  - Monitor lab/diagnostic results  - Monitor all insertion sites, i e  indwelling lines, tubes, and drains  - Monitor endotracheal if appropriate and nasal secretions for changes in amount and color  - [place] appropriate cooling/warming therapies per order  - Administer medications as ordered  - Instruct and encourage patient and family to use good hand hygiene technique  - Identify and instruct in appropriate isolation precautions for identified infection/condition  Outcome: Progressing     Problem: SAFETY ADULT  Goal: Patient will remain free of falls  Description: INTERVENTIONS:  - Educate patient/family on patient safety including physical limitations  - Instruct patient to call for assistance with activity   - Consult OT/PT to assist with strengthening/mobility   - Keep Call bell within reach  - Keep bed low and locked with side rails adjusted as appropriate  - Keep care items and personal belongings within reach  - Initiate and maintain comfort rounds  - Make Fall Risk Sign visible to staff  - Offer Toileting every 2 Hours, in advance of need  - Initiate/Maintain BED alarm  - Obtain necessary fall risk management equipment: yellow socks  - Apply yellow socks and bracelet for high fall risk patients  - Consider moving patient to room near nurses station  Outcome: Progressing  Goal: Maintain or return to baseline ADL function  Description: INTERVENTIONS:  - Educate patient/family on patient safety including physical limitations  - Instruct patient to call for assistance with activity   - Consult OT/PT to assist with strengthening/mobility   - Keep Call bell within reach  - Keep bed low and locked with side rails adjusted as appropriate  - Keep care items and personal belongings within reach  - Initiate and maintain comfort rounds  - Make Fall Risk Sign visible to staff  - Offer Toileting every 2 Hours, in advance of need  - Initiate/Maintain bed alarm  - Obtain necessary fall risk management equipment: yellow socks  - Apply yellow socks and bracelet for high fall risk patients  - Consider moving patient to room near nurses station  Outcome: Progressing  Goal: Maintains/Returns to pre admission functional level  Description: INTERVENTIONS:  - Perform BMAT or MOVE assessment daily    - Set and communicate daily mobility goal to care team and patient/family/caregiver  - Collaborate with rehabilitation services on mobility goals if consulted  - Out of bed for toileting  - Record patient progress and toleration of activity level   Outcome: Progressing     Problem: DISCHARGE PLANNING  Goal: Discharge to home or other facility with appropriate resources  Description: INTERVENTIONS:  - Identify barriers to discharge w/patient and caregiver  - Arrange for needed discharge resources and transportation as appropriate  - Identify discharge learning needs (meds, wound care, etc )  - Arrange for interpretive services to assist at discharge as needed  - Refer to Case Management Department for coordinating discharge planning if the patient needs post-hospital services based on physician/advanced practitioner order or complex needs related to functional status, cognitive ability, or social support system  Outcome: Progressing     Problem: Knowledge Deficit  Goal: Patient/family/caregiver demonstrates understanding of disease process, treatment plan, medications, and discharge instructions  Description: Complete learning assessment and assess knowledge base    Interventions:  - Provide teaching at level of understanding  - Provide teaching via preferred learning methods  Outcome: Progressing     Problem: MOBILITY - ADULT  Goal: Maintain or return to baseline ADL function  Description: INTERVENTIONS:  - Educate patient/family on patient safety including physical limitations  - Instruct patient to call for assistance with activity   - Consult OT/PT to assist with strengthening/mobility   - Keep Call bell within reach  - Keep bed low and locked with side rails adjusted as appropriate  - Keep care items and personal belongings within reach  - Initiate and maintain comfort rounds  - Make Fall Risk Sign visible to staff  - Offer Toileting every 2 Hours, in advance of need  - Initiate/Maintain bed alarm  - Obtain necessary fall risk management equipment: yellow socks  - Apply yellow socks and bracelet for high fall risk patients  - Consider moving patient to room near nurses station  Outcome: Progressing  Goal: Maintains/Returns to pre admission functional level  Description: INTERVENTIONS:  - Perform BMAT or MOVE assessment daily    - Set and communicate daily mobility goal to care team and patient/family/caregiver  - Collaborate with rehabilitation services on mobility goals if consulted  - Perform Range of Motion 3 times a day  - Reposition patient every 2 hours  - Dangle patient 3 times a day  - Stand patient 3 times a day  - Ambulate patient 3 times a day  - Out of bed to chair 3 times a day   - Out of bed for meals 3 times a day  - Out of bed for toileting  - Record patient progress and toleration of activity level   Outcome: Progressing     Problem: Prexisting or High Potential for Compromised Skin Integrity  Goal: Skin integrity is maintained or improved  Description: INTERVENTIONS:  - Identify patients at risk for skin breakdown  - Assess and monitor skin integrity  - Assess and monitor nutrition and hydration status  - Monitor labs   - Assess for incontinence   - Turn and reposition patient  - Assist with mobility/ambulation  - Relieve pressure over bony prominences  - Avoid friction and shearing  - Provide appropriate hygiene as needed including keeping skin clean and dry  - Evaluate need for skin moisturizer/barrier cream  - Collaborate with interdisciplinary team   - Patient/family teaching  - Consider wound care consult   Outcome: Progressing     Problem: Nutrition/Hydration-ADULT  Goal: Nutrient/Hydration intake appropriate for improving, restoring or maintaining nutritional needs  Description: Monitor and assess patient's nutrition/hydration status for malnutrition  Collaborate with interdisciplinary team and initiate plan and interventions as ordered  Monitor patient's weight and dietary intake as ordered or per policy   Utilize nutrition screening tool and intervene as necessary  Determine patient's food preferences and provide high-protein, high-caloric foods as appropriate       INTERVENTIONS:  - Monitor oral intake, urinary output, labs, and treatment plans  - Assess nutrition and hydration status and recommend course of action  - Evaluate amount of meals eaten  - Assist patient with eating if necessary   - Allow adequate time for meals  - Recommend/ encourage appropriate diets, oral nutritional supplements, and vitamin/mineral supplements  - Order, calculate, and assess calorie counts as needed  - Recommend, monitor, and adjust tube feedings and TPN/PPN based on assessed needs  - Assess need for intravenous fluids  - Provide specific nutrition/hydration education as appropriate  - Include patient/family/caregiver in decisions related to nutrition  Outcome: Progressing     Problem: GASTROINTESTINAL - ADULT  Goal: Establish and maintain optimal ostomy function  Description: INTERVENTIONS:  - Assess bowel function  - Encourage oral fluids to ensure adequate hydration  - Administer IV fluids if ordered to ensure adequate hydration   - Administer ordered medications as needed  - Encourage mobilization and activity  - Nutrition services referral to assist patient with appropriate food choices  - Assess stoma site  - Consider wound care consult   Outcome: Progressing

## 2023-01-02 NOTE — PROGRESS NOTES
Progress Note - General Surgery   Levern Plaster 58 y o  female MRN: [id number]  Unit/Bed#: -01 Encounter: [id number]    Assessment:  70-year-old female with past medical history significant for asthma and tracheobronchomalacia presenting with complicated sigmoid diverticulitis  HD#29 septic shock secondary to feculent peritonitis  POD#28 s/p laparoscopic hand-assisted sigmoid resection with transverse loop colostomy  Complicated by intra-abdominal fluid collections/abscess s/p perc drainage by IR   -AVSS on room air, O2 weaned to off  - cc  -AGUSTO x1 12 5 cc  -stool 50 cc  -WBC 8 09   -Hgb 9 3    Plan:  Surg soft diet with nutritional supple as tolerated   IVF, will likely discontinue tomorrow with increased po intake   IR for tube check this week prior to discharge   Serial abdominal exams   IV antibiotics per ID, appreciate recommendations   Extended course of antibiotics from tpa instillation   Daily local wound care   Ostomy care   CM for dispo planning, working on dc to rehab this week     Subjective/Objective   Subjective: No acute overnight events  Patient overall doing well  Mild nausea without recent episode of emesis  Notes increase appetite  Functioning ostomy  Objective:   Blood pressure 124/76, pulse 65, temperature 98 3 °F (36 8 °C), resp  rate 17, height 5' 5" (1 651 m), weight 81 kg (178 lb 9 2 oz), SpO2 93 %  ,Body mass index is 29 72 kg/m²  Intake/Output Summary (Last 24 hours) at 1/2/2023 0747  Last data filed at 1/1/2023 2024  Gross per 24 hour   Intake 10 ml   Output 662 5 ml   Net -652 5 ml       Invasive Devices     Peripheral Intravenous Line  Duration           Peripheral IV 12/31/22 Right;Ventral (anterior) Forearm 1 day          Drain  Duration           Colostomy RLQ 28 days    Abscess Drain RUQ 12 days    Abscess Drain RUQ 12 days              Physical Exam  Vitals and nursing note reviewed  Constitutional:       General: She is not in acute distress       Appearance: Normal appearance  She is obese  She is not ill-appearing or toxic-appearing  HENT:      Head: Normocephalic and atraumatic  Cardiovascular:      Rate and Rhythm: Normal rate and regular rhythm  Pulses: Normal pulses  Heart sounds: Normal heart sounds  Pulmonary:      Effort: Pulmonary effort is normal       Breath sounds: Normal breath sounds  Abdominal:      General: Bowel sounds are normal  There is distension  Palpations: Abdomen is soft  Tenderness: There is no abdominal tenderness  There is no guarding or rebound  Comments: LLQ wound with pink/red granulation tissue; AGUSTO x 2 with minimal serous output; ostomy pink/viable, brown paste stool in bag   Musculoskeletal:         General: Normal range of motion  Right lower leg: No edema  Left lower leg: No edema  Skin:     General: Skin is warm and dry  Neurological:      General: No focal deficit present  Mental Status: She is alert and oriented to person, place, and time  Psychiatric:         Mood and Affect: Mood normal          Behavior: Behavior normal        Lab, Imaging and other studies:  I have personally reviewed pertinent lab results      CBC:   Lab Results   Component Value Date    WBC 8 09 01/02/2023    HGB 9 3 (L) 01/02/2023    HCT 30 2 (L) 01/02/2023    MCV 95 01/02/2023     (H) 01/02/2023    MCH 29 2 01/02/2023    MCHC 30 8 (L) 01/02/2023    RDW 14 3 01/02/2023    MPV 9 2 01/02/2023     VTE Pharmacologic Prophylaxis: Enoxaparin (Lovenox)  VTE Mechanical Prophylaxis: sequential compression device    Calos Hui PA-C

## 2023-01-03 LAB
ANION GAP SERPL CALCULATED.3IONS-SCNC: 8 MMOL/L (ref 4–13)
BASOPHILS # BLD AUTO: 0.04 THOUSANDS/ÂΜL (ref 0–0.1)
BASOPHILS NFR BLD AUTO: 1 % (ref 0–1)
BUN SERPL-MCNC: 8 MG/DL (ref 5–25)
CALCIUM SERPL-MCNC: 8.8 MG/DL (ref 8.4–10.2)
CHLORIDE SERPL-SCNC: 103 MMOL/L (ref 96–108)
CO2 SERPL-SCNC: 25 MMOL/L (ref 21–32)
CREAT SERPL-MCNC: 0.7 MG/DL (ref 0.6–1.3)
EOSINOPHIL # BLD AUTO: 0.2 THOUSAND/ÂΜL (ref 0–0.61)
EOSINOPHIL NFR BLD AUTO: 3 % (ref 0–6)
ERYTHROCYTE [DISTWIDTH] IN BLOOD BY AUTOMATED COUNT: 14.8 % (ref 11.6–15.1)
FLUAV RNA RESP QL NAA+PROBE: NEGATIVE
FLUBV RNA RESP QL NAA+PROBE: NEGATIVE
GFR SERPL CREATININE-BSD FRML MDRD: 93 ML/MIN/1.73SQ M
GLUCOSE SERPL-MCNC: 101 MG/DL (ref 65–140)
HCT VFR BLD AUTO: 27.6 % (ref 34.8–46.1)
HGB BLD-MCNC: 8.6 G/DL (ref 11.5–15.4)
IMM GRANULOCYTES # BLD AUTO: 0.13 THOUSAND/UL (ref 0–0.2)
IMM GRANULOCYTES NFR BLD AUTO: 2 % (ref 0–2)
LYMPHOCYTES # BLD AUTO: 1.35 THOUSANDS/ÂΜL (ref 0.6–4.47)
LYMPHOCYTES NFR BLD AUTO: 17 % (ref 14–44)
MAGNESIUM SERPL-MCNC: 2 MG/DL (ref 1.9–2.7)
MCH RBC QN AUTO: 30.4 PG (ref 26.8–34.3)
MCHC RBC AUTO-ENTMCNC: 31.2 G/DL (ref 31.4–37.4)
MCV RBC AUTO: 98 FL (ref 82–98)
MONOCYTES # BLD AUTO: 0.8 THOUSAND/ÂΜL (ref 0.17–1.22)
MONOCYTES NFR BLD AUTO: 10 % (ref 4–12)
NEUTROPHILS # BLD AUTO: 5.49 THOUSANDS/ÂΜL (ref 1.85–7.62)
NEUTS SEG NFR BLD AUTO: 67 % (ref 43–75)
NRBC BLD AUTO-RTO: 0 /100 WBCS
PLATELET # BLD AUTO: 571 THOUSANDS/UL (ref 149–390)
PMV BLD AUTO: 9.3 FL (ref 8.9–12.7)
POTASSIUM SERPL-SCNC: 3.9 MMOL/L (ref 3.5–5.3)
RBC # BLD AUTO: 2.83 MILLION/UL (ref 3.81–5.12)
RSV RNA RESP QL NAA+PROBE: NEGATIVE
SARS-COV-2 RNA RESP QL NAA+PROBE: NEGATIVE
SODIUM SERPL-SCNC: 136 MMOL/L (ref 135–147)
WBC # BLD AUTO: 8.01 THOUSAND/UL (ref 4.31–10.16)

## 2023-01-03 RX ADMIN — ACETAMINOPHEN 325 MG: 325 TABLET ORAL at 10:16

## 2023-01-03 RX ADMIN — ACETAMINOPHEN 325 MG: 325 TABLET ORAL at 21:37

## 2023-01-03 RX ADMIN — ACETAMINOPHEN 325 MG: 325 TABLET ORAL at 02:31

## 2023-01-03 RX ADMIN — PANTOPRAZOLE SODIUM 40 MG: 40 TABLET, DELAYED RELEASE ORAL at 06:07

## 2023-01-03 RX ADMIN — AMLODIPINE BESYLATE 10 MG: 10 TABLET ORAL at 08:17

## 2023-01-03 RX ADMIN — DEXTROSE, SODIUM CHLORIDE, AND POTASSIUM CHLORIDE 50 ML/HR: 5; .45; .15 INJECTION INTRAVENOUS at 06:07

## 2023-01-03 RX ADMIN — ENOXAPARIN SODIUM 40 MG: 100 INJECTION SUBCUTANEOUS at 08:17

## 2023-01-03 RX ADMIN — PANTOPRAZOLE SODIUM 40 MG: 40 TABLET, DELAYED RELEASE ORAL at 16:04

## 2023-01-03 RX ADMIN — NEBIVOLOL 5 MG: 5 TABLET ORAL at 08:17

## 2023-01-03 RX ADMIN — METRONIDAZOLE 500 MG: 500 INJECTION, SOLUTION INTRAVENOUS at 06:07

## 2023-01-03 NOTE — OCCUPATIONAL THERAPY NOTE
01/03/23 1115   OT Last Visit   OT Visit Date 01/03/23   Note Type   Note Type Cancelled Session   Cancel Reasons Refusal  (pt  reported that her Legs were sore today from theraband exers   yesterday, so she wanted to defer Arms exercises with theraband today (though this was the plan from yesterday) - she stated that she would participate tomorrow)       REJI Bagley/MARGA

## 2023-01-03 NOTE — PLAN OF CARE
Problem: Potential for Falls  Goal: Patient will remain free of falls  Description: INTERVENTIONS:  - Educate patient/family on patient safety including physical limitations  - Instruct patient to call for assistance with activity   - Consult OT/PT to assist with strengthening/mobility   - Keep Call bell within reach  - Keep bed low and locked with side rails adjusted as appropriate  - Keep care items and personal belongings within reach  - Initiate and maintain comfort rounds  - Make Fall Risk Sign visible to staff  - Offer Toileting every 2 Hours, in advance of need  - Initiate/Maintain BED alarm  - Obtain necessary fall risk management equipment: yellow socks  - Apply yellow socks and bracelet for high fall risk patients  - Consider moving patient to room near nurses station  Outcome: Progressing     Problem: PAIN - ADULT  Goal: Verbalizes/displays adequate comfort level or baseline comfort level  Description: Interventions:  - Encourage patient to monitor pain and request assistance  - Assess pain using appropriate pain scale  - Administer analgesics based on type and severity of pain and evaluate response  - Implement non-pharmacological measures as appropriate and evaluate response  - Consider cultural and social influences on pain and pain management  - Notify physician/advanced practitioner if interventions unsuccessful or patient reports new pain  Outcome: Progressing     Problem: INFECTION - ADULT  Goal: Absence or prevention of progression during hospitalization  Description: INTERVENTIONS:  - Assess and monitor for signs and symptoms of infection  - Monitor lab/diagnostic results  - Monitor all insertion sites, i e  indwelling lines, tubes, and drains  - Monitor endotracheal if appropriate and nasal secretions for changes in amount and color  - [place] appropriate cooling/warming therapies per order  - Administer medications as ordered  - Instruct and encourage patient and family to use good hand hygiene technique  - Identify and instruct in appropriate isolation precautions for identified infection/condition  Outcome: Progressing     Problem: SAFETY ADULT  Goal: Patient will remain free of falls  Description: INTERVENTIONS:  - Educate patient/family on patient safety including physical limitations  - Instruct patient to call for assistance with activity   - Consult OT/PT to assist with strengthening/mobility   - Keep Call bell within reach  - Keep bed low and locked with side rails adjusted as appropriate  - Keep care items and personal belongings within reach  - Initiate and maintain comfort rounds  - Make Fall Risk Sign visible to staff  - Offer Toileting every 2 Hours, in advance of need  - Initiate/Maintain BED alarm  - Obtain necessary fall risk management equipment: yellow socks  - Apply yellow socks and bracelet for high fall risk patients  - Consider moving patient to room near nurses station  Outcome: Progressing  Goal: Maintain or return to baseline ADL function  Description: INTERVENTIONS:  - Educate patient/family on patient safety including physical limitations  - Instruct patient to call for assistance with activity   - Consult OT/PT to assist with strengthening/mobility   - Keep Call bell within reach  - Keep bed low and locked with side rails adjusted as appropriate  - Keep care items and personal belongings within reach  - Initiate and maintain comfort rounds  - Make Fall Risk Sign visible to staff  - Offer Toileting every 2 Hours, in advance of need  - Initiate/Maintain bed alarm  - Obtain necessary fall risk management equipment: yellow socks  - Apply yellow socks and bracelet for high fall risk patients  - Consider moving patient to room near nurses station  Outcome: Progressing  Goal: Maintains/Returns to pre admission functional level  Description: INTERVENTIONS:  - Perform BMAT or MOVE assessment daily    - Set and communicate daily mobility goal to care team and patient/family/caregiver  - Collaborate with rehabilitation services on mobility goals if consulted  - Perform Range of Motion 3 times a day  - Reposition patient every 2 hours  - Dangle patient 3 times a day  - Stand patient 3 times a day  - Ambulate patient 3 times a day  - Out of bed to chair 3 times a day   - Out of bed for meals 3 times a day  - Out of bed for toileting  - Record patient progress and toleration of activity level   Outcome: Progressing     Problem: DISCHARGE PLANNING  Goal: Discharge to home or other facility with appropriate resources  Description: INTERVENTIONS:  - Identify barriers to discharge w/patient and caregiver  - Arrange for needed discharge resources and transportation as appropriate  - Identify discharge learning needs (meds, wound care, etc )  - Arrange for interpretive services to assist at discharge as needed  - Refer to Case Management Department for coordinating discharge planning if the patient needs post-hospital services based on physician/advanced practitioner order or complex needs related to functional status, cognitive ability, or social support system  Outcome: Progressing     Problem: Knowledge Deficit  Goal: Patient/family/caregiver demonstrates understanding of disease process, treatment plan, medications, and discharge instructions  Description: Complete learning assessment and assess knowledge base    Interventions:  - Provide teaching at level of understanding  - Provide teaching via preferred learning methods  Outcome: Progressing     Problem: MOBILITY - ADULT  Goal: Maintain or return to baseline ADL function  Description: INTERVENTIONS:  - Educate patient/family on patient safety including physical limitations  - Instruct patient to call for assistance with activity   - Consult OT/PT to assist with strengthening/mobility   - Keep Call bell within reach  - Keep bed low and locked with side rails adjusted as appropriate  - Keep care items and personal belongings within reach  - Initiate and maintain comfort rounds  - Make Fall Risk Sign visible to staff  - Offer Toileting every 2 Hours, in advance of need  - Initiate/Maintain bed alarm  - Obtain necessary fall risk management equipment: yellow socks  - Apply yellow socks and bracelet for high fall risk patients  - Consider moving patient to room near nurses station  Outcome: Progressing  Goal: Maintains/Returns to pre admission functional level  Description: INTERVENTIONS:  - Perform BMAT or MOVE assessment daily    - Set and communicate daily mobility goal to care team and patient/family/caregiver  - Collaborate with rehabilitation services on mobility goals if consulted  - Perform Range of Motion 3 times a day  - Reposition patient every 2 hours    - Dangle patient 3 times a day  - Stand patient 3 times a day  - Ambulate patient 3 times a day  - Out of bed to chair 3 times a day   - Out of bed for meals 3 times a day  - Out of bed for toileting  - Record patient progress and toleration of activity level   Outcome: Progressing     Problem: Prexisting or High Potential for Compromised Skin Integrity  Goal: Skin integrity is maintained or improved  Description: INTERVENTIONS:  - Identify patients at risk for skin breakdown  - Assess and monitor skin integrity  - Assess and monitor nutrition and hydration status  - Monitor labs   - Assess for incontinence   - Turn and reposition patient  - Assist with mobility/ambulation  - Relieve pressure over bony prominences  - Avoid friction and shearing  - Provide appropriate hygiene as needed including keeping skin clean and dry  - Evaluate need for skin moisturizer/barrier cream  - Collaborate with interdisciplinary team   - Patient/family teaching  - Consider wound care consult   Outcome: Progressing     Problem: Nutrition/Hydration-ADULT  Goal: Nutrient/Hydration intake appropriate for improving, restoring or maintaining nutritional needs  Description: Monitor and assess patient's nutrition/hydration status for malnutrition  Collaborate with interdisciplinary team and initiate plan and interventions as ordered  Monitor patient's weight and dietary intake as ordered or per policy  Utilize nutrition screening tool and intervene as necessary  Determine patient's food preferences and provide high-protein, high-caloric foods as appropriate       INTERVENTIONS:  - Monitor oral intake, urinary output, labs, and treatment plans  - Assess nutrition and hydration status and recommend course of action  - Evaluate amount of meals eaten  - Assist patient with eating if necessary   - Allow adequate time for meals  - Recommend/ encourage appropriate diets, oral nutritional supplements, and vitamin/mineral supplements  - Order, calculate, and assess calorie counts as needed  - Recommend, monitor, and adjust tube feedings and TPN/PPN based on assessed needs  - Assess need for intravenous fluids  - Provide specific nutrition/hydration education as appropriate  - Include patient/family/caregiver in decisions related to nutrition  Outcome: Progressing     Problem: GASTROINTESTINAL - ADULT  Goal: Establish and maintain optimal ostomy function  Description: INTERVENTIONS:  - Assess bowel function  - Encourage oral fluids to ensure adequate hydration  - Administer IV fluids if ordered to ensure adequate hydration   - Administer ordered medications as needed  - Encourage mobilization and activity  - Nutrition services referral to assist patient with appropriate food choices  - Assess stoma site  - Consider wound care consult   Outcome: Progressing

## 2023-01-03 NOTE — PROGRESS NOTES
Progress Note - [facility] Infectious Disease   Renee Avery 58 y o  female MRN: [id number]  Unit/Bed#: -01 Encounter: [id number]      IMPRESSION & RECOMMENDATIONS:   1  Recurrent sepsis  Appears to be 2/2 intra-abdominal source  Negative serial blood cultures  Low clinical suspicion for pneumonia  Improving, with downtrending fever curve and WBC resolved  Hemodynamically stable    -antibiotics as below  -monitor temperature and hemodynamics  -serial exam  -recheck CBC     2  Intra-abdominal abscesses  Likely due to #3  S/p IR drain placement x2 12/20 yielding purulent drainage  Cultures positive for E  Coli  Repeat CT shows decreasing size of abscesses  There is still an undrained pelvic collection which remains unchanged and may be a post-op seroma  S/p tPA instillation into drain with tube check 12/29, per IR residual collection is in communication with the drains and no further drain manipulation needed  Patient completed >10d course of IV ceftriaxone and p o  metronidazole (from initial drain placement and 5 days from tPA instillation)  -monitor closely off antibiotics  -serial wound exams  -close surgery and IR follow up      3  Sigmoid diverticulitis with perforation  S/p sigmoid resection with transverse loop colostomy 12/5  Complicated by intra-abdominal abscesses    -antibiotics as above   -surgery follow up ongoing      4  Bilateral pleural effusions with associated compressive atelectasis  Likely due to intra-abdominal process  Pulmonology consulted  Agree that clinical suspicion for pna is low    -monitor resp sx and O2 requirements   -encouraged incentive spirometry and OOB     5  Antibiotic Allergy  Hx of vomiting with sulfa abx and doxy  Monitor for adverse drug reactions      Antibiotics:  D1 off Ceftriaxone/Flagyl   Post drain placement 14  Abx D17    I have discussed the above management plan in detail with patient     I have discussed the above management plan in detail with patient's RN, and the primary service, surgery AP  Subjective:  Patient has no fever, chills, sweats; pain slowly improving  Feels stronger and looking forward to rehab later this week  Objective:  Vitals:  Temp:  [98 5 °F (36 9 °C)-98 6 °F (37 °C)] 98 5 °F (36 9 °C)  HR:  [70-71] 70  Resp:  [16-22] 16  BP: (114-122)/(71-77) 120/77  SpO2:  [93 %-94 %] 93 %  Temp (24hrs), Av 6 °F (37 °C), Min:98 5 °F (36 9 °C), Max:98 6 °F (37 °C)  Current: Temperature: 98 5 °F (36 9 °C)    PHYSICAL EXAM:  General Appearance:  Appearing well, nontoxic, and in no distress   HEENT: Normocephalic, without obvious abnormality, atraumatic  Conjunctiva pink and sclera anicteric  Oropharynx moist without lesions  Lungs:   Clear to auscultation bilaterally, respirations unlabored   Heart:  RRR; no murmur, rub or gallop   Abdomen:   Soft, non-tender, ostomy present, RLQ drains x2 with minimal serous drainage    Extremities: No cyanosis, clubbing or edema   Musculoskeletal: Back symmetric without curvature, ROM normal     Skin: No rashes or lesions  No draining wounds noted  Peripheral IV intact without evidence of erythema, warmth, or exudate  LABS, IMAGING, & OTHER STUDIES:  Lab Results:  I have personally reviewed pertinent labs    Results from last 7 days   Lab Units 23  0529 23  0502 23  0506   WBC Thousand/uL 8 01 8 09 8 81   HEMOGLOBIN g/dL 8 6* 9 3* 8 6*   PLATELETS Thousands/uL 571* 625* 560*     Results from last 7 days   Lab Units 23  0529 23  0506 22  0445 22  1236 22  1228   SODIUM mmol/L 136 137 137  --  136   POTASSIUM mmol/L 3 9 3 9 4 0  --  3 9   CHLORIDE mmol/L 103 102 102  --  100   CO2 mmol/L 25 26 28  --  25   CO2, I-STAT mmol/L  --   --   --  22  --    BUN mg/dL 8 6 5  --  6   CREATININE mg/dL 0 70 0 63 0 59*  --  0 64   EGFR ml/min/1 73sq m 93 96 98  --  95   GLUCOSE, ISTAT mg/dl  --   --   --  136  --    CALCIUM mg/dL 8 8 8 5 8 5  --  8 7   AST U/L  --   -- --   --  15   ALT U/L  --   --   --   --  14   ALK PHOS U/L  --   --   --   --  86         Results from last 7 days   Lab Units 12/30/22  1228   PROCALCITONIN ng/ml 0 15

## 2023-01-03 NOTE — PROGRESS NOTES
Progress Note - General Surgery   Pattie Sahu 58 y o  female MRN: [id number]  Unit/Bed#: -01 Encounter: [id number]      ASSESSMENT:  57 y/o F hx bronchomalacia/bronchiectasis admitted with septic shock 2/2 feculent peritonitis from peforated sigmoid diverticulitis, HD#30  POD#29 s/p emergent hand-assisted laparoscopic sigmoid colectomy, primary anastomosis, and creation of diverting transverse loop colostomy  C/b LLQ wound infection at hand port site and development of intra-abdominal abscesses s/p IR percutaneous drainage (12/20/22)  · AF, VSS, breathing well on RA  · No leukocytosis  · Anemia, Hgb stable 8 6  · Thrombocytosis, improved over the last week or two, 571  · UO: 900cc  · Ostomy, viable, fx, output: 125cc  · Ostomy bag changed yesterday by ostomy RN  · Finished last dose of abx yesterday (per ID recs)  · AGUSTO drains: 32 5 cc combined, both with scant serous drainage  · LLQ wound clean, dry, filling in with pink/red granulation tissue, no signs of infection      PLAN:  · Dispo planning, pending insurance auth, plan for d/c to [facility] tomorrow   · D/w IR, plan for both AGUSTO drains to be removed today  · Daily packing WTD to LLQ  · Tolerating oral diet, saline lock  · Continue soft surg diet and nutrition sup as tolerated  · Encourage OOB, ambulation, IS use      SUBJECTIVE:  No acute complaints  Anxious for drain removal but wants them out prior to d/c to rehab  Feeling better, but not strong yet  No SOB, oxygenating well off O2  OOB to chair  No issues voiding  OBJECTIVE:   Vitals:  Blood pressure 120/77, pulse 78, temperature 98 5 °F (36 9 °C), resp  rate 16, height 5' 5" (1 651 m), weight 81 kg (178 lb 9 2 oz), SpO2 92 %  ,Body mass index is 29 72 kg/m²      I/Os:    Intake/Output Summary (Last 24 hours) at 1/3/2023 0938  Last data filed at 1/3/2023 0624  Gross per 24 hour   Intake 0 ml   Output 932 5 ml   Net -932 5 ml       Lines/Drains:  Invasive Devices     Peripheral Intravenous Line Duration           Peripheral IV 01/02/23 Dorsal (posterior); Left Forearm <1 day          Drain  Duration           Colostomy RLQ 29 days    Abscess Drain RUQ 13 days    Abscess Drain RUQ 13 days                Physical Exam  Vitals reviewed  Constitutional:       General: She is not in acute distress  HENT:      Head: Normocephalic and atraumatic  Cardiovascular:      Rate and Rhythm: Normal rate and regular rhythm  Heart sounds: No murmur heard  Pulmonary:      Effort: Pulmonary effort is normal       Comments: CTA b/l, diminished breath sound b/l  Abdominal:      General: There is no distension  Palpations: Abdomen is soft  Tenderness: There is no abdominal tenderness  There is no guarding or rebound  Comments: R abdomen AGUSTO drains x 2 with scant serous drainage  LLQ wound clean, with granulation tissue, small amt yellow slough but no purulence, no drainage, no periwound cellulitic changes   Musculoskeletal:         General: No tenderness  Right lower leg: No edema  Left lower leg: No edema  Skin:     General: Skin is warm and dry  Neurological:      General: No focal deficit present  Mental Status: She is alert  Diagnostics:  I have personally reviewed pertinent lab results  XR chest portable ICU    Result Date: 12/7/2022  Impression: Possible mild congestive changes and small effusions  Workstation performed: [id number]     XR chest portable ICU    Result Date: 12/7/2022  Impression: ET tube at the annamarie  Small right effusion suggested  Workstation performed: [id number]     XR chest portable ICU    Result Date: 12/6/2022  Impression: 1  Endotracheal tube positioning appropriate, 2 7 cm above the annamarie 2  Shallow depth of inspiration with crowding of bronchovascular markings or atelectasis 3   Nasogastric tube side port at level of left hemidiaphragm, consider advancement to assure the side port is within the stomach  The examination demonstrates a significant  finding and was documented as such in [facility] for liaison and referring practitioner notification  Workstation performed: [id number]     XR chest portable ICU    Result Date: 12/5/2022  Impression: No consolidation or overt pulmonary edema  Workstation performed: [id number]     CT abdomen pelvis with contrast    Result Date: 12/4/2022  Impression: Acute sigmoid diverticulitis involving a large sigmoid diverticulum  There is a small amount of pneumoperitoneum suggesting small perforation  No abscess identified  The study was marked in [facility] for immediate notification   Workstation performed: [id number]     Recent Results (from the past 36 hour(s))   CBC and differential    Collection Time: 01/02/23  5:02 AM   Result Value Ref Range    WBC 8 09 4 31 - 10 16 Thousand/uL    RBC 3 18 (L) 3 81 - 5 12 Million/uL    Hemoglobin 9 3 (L) 11 5 - 15 4 g/dL    Hematocrit 30 2 (L) 34 8 - 46 1 %    MCV 95 82 - 98 fL    MCH 29 2 26 8 - 34 3 pg    MCHC 30 8 (L) 31 4 - 37 4 g/dL    RDW 14 3 11 6 - 15 1 %    MPV 9 2 8 9 - 12 7 fL    Platelets 847 (H) 762 - 390 Thousands/uL   CBC and differential    Collection Time: 01/03/23  5:29 AM   Result Value Ref Range    WBC 8 01 4 31 - 10 16 Thousand/uL    RBC 2 83 (L) 3 81 - 5 12 Million/uL    Hemoglobin 8 6 (L) 11 5 - 15 4 g/dL    Hematocrit 27 6 (L) 34 8 - 46 1 %    MCV 98 82 - 98 fL    MCH 30 4 26 8 - 34 3 pg    MCHC 31 2 (L) 31 4 - 37 4 g/dL    RDW 14 8 11 6 - 15 1 %    MPV 9 3 8 9 - 12 7 fL    Platelets 580 (H) 238 - 390 Thousands/uL    nRBC 0 /100 WBCs    Neutrophils Relative 67 43 - 75 %    Immat GRANS % 2 0 - 2 %    Lymphocytes Relative 17 14 - 44 %    Monocytes Relative 10 4 - 12 %    Eosinophils Relative 3 0 - 6 %    Basophils Relative 1 0 - 1 %    Neutrophils Absolute 5 49 1 85 - 7 62 Thousands/µL    Immature Grans Absolute 0 13 0 00 - 0 20 Thousand/uL    Lymphocytes Absolute 1 35 0 60 - 4 47 Thousands/µL    Monocytes Absolute 0 80 0 17 - 1 22 Thousand/µL    Eosinophils Absolute 0 20 0 00 - 0 61 Thousand/µL    Basophils Absolute 0 04 0 00 - 0 10 Thousands/µL   Basic metabolic panel    Collection Time: 01/03/23  5:29 AM   Result Value Ref Range    Sodium 136 135 - 147 mmol/L    Potassium 3 9 3 5 - 5 3 mmol/L    Chloride 103 96 - 108 mmol/L    CO2 25 21 - 32 mmol/L    ANION GAP 8 4 - 13 mmol/L    BUN 8 5 - 25 mg/dL    Creatinine 0 70 0 60 - 1 30 mg/dL    Glucose 101 65 - 140 mg/dL    Calcium 8 8 8 4 - 10 2 mg/dL    eGFR 93 ml/min/1 73sq m   Magnesium    Collection Time: 01/03/23  5:29 AM   Result Value Ref Range    Magnesium 2 0 1 9 - 2 7 mg/dL       Current Medications:  Scheduled Meds:  Current Facility-Administered Medications   Medication Dose Route Frequency Provider Last Rate   • acetaminophen  975 mg Oral Q6H PRN Pankaj Morales PA-C     • amLODIPine  10 mg Oral Daily Jocelyn Costello PA-C     • calcium carbonate  500 mg Oral TID PRN Anand Hay PA-C     • dextrose 5 % and sodium chloride 0 45 % with KCl 20 mEq/L  50 mL/hr Intravenous Continuous Esha Jefferson PA-C 50 mL/hr (01/03/23 1809)   • enoxaparin  40 mg Subcutaneous Q24H [facility] BARBIE Smalls     • HYDROcodone-acetaminophen  1 tablet Oral Q6H PRN Freeman Gregg MD     • lidocaine  2 patch Topical Daily [place]     • melatonin  6 mg Per NG Tube HS [place]     • metoclopramide  10 mg Intravenous Q6H PRN Freeman Gregg MD     • nebivolol  5 mg Oral Daily Jocelyn Cathleen Millan PA-C     • ondansetron  4 mg Intravenous Q4H PRN Anand Hay PA-C     • pantoprazole  40 mg Oral BID AC Anand Hay PA-C     • phenol  1 spray Mouth/Throat Q2H PRN BARBIE Smalls     • sodium chloride  1 spray Each Nare Q1H PRN Esha Jefferson PA-C     • traMADol  50 mg Oral Q6H PRN BARBIE Smalls       Continuous Infusions:dextrose 5 % and sodium chloride 0 45 % with KCl 20 mEq/L, 50 mL/hr, Last Rate: 50 mL/hr (01/03/23 8917)      PRN Meds:  •  acetaminophen  • calcium carbonate  •  HYDROcodone-acetaminophen  •  metoclopramide  •  ondansetron  •  phenol  •  sodium chloride  •  traMADol      DAVID Ledezma  1/3/2023

## 2023-01-03 NOTE — PLAN OF CARE
Problem: PHYSICAL THERAPY ADULT  Goal: Performs mobility at highest level of function for planned discharge setting  See evaluation for individualized goals  Description: Treatment/Interventions: Functional transfer training, LE strengthening/ROM, Elevations, Therapeutic exercise, Endurance training, Patient/family training, Equipment eval/education, Bed mobility, Gait training, Compensatory technique education, OT, Spoke to nursing          See flowsheet documentation for full assessment, interventions and recommendations  Outcome: Progressing  Note: Prognosis: Good  Problem List: Decreased strength, Decreased endurance, Impaired balance, Decreased mobility  Assessment: Pt seen for PT treatment session this date with interventions consisting of bed mobility tasks, transfer training, gait training, toilet transfer, neuromuscular re-education of movement performed to improve dynamic standing balance, seated TE, and education provided as needed for safety and direction to improve functional mobility, safety awareness, and activity tolerance  Pt agreeable to PT treatment session upon arrival, pt found resting in bed  At end of session, pt left  in bed with bed alarm activated and with all needs in reach  In comparison to previous session, pt with improvements in amount of activity tolerated   Continue to recommend STR at time of d/c in order to maximize pt's functional independence and safety w/ mobility  Pt continues to be functioning below baseline level  PT will continue to see pt while here in order to address the deficits listed above and provide interventions consistent w/ POC in effort to achieve STGs  Barriers to Discharge: Inaccessible home environment, Decreased caregiver support     PT Discharge Recommendation: Post acute rehabilitation services    See flowsheet documentation for full assessment

## 2023-01-03 NOTE — PHYSICAL THERAPY NOTE
PHYSICAL THERAPY TREATMENT NOTE  NAME:  Rohini Locus  DATE: 01/03/23    Length Of Stay: 30  Performed at least 2 patient identifiers during session: Name and ID bracelet    TREATMENT FLOWSHEET:    01/03/23 1345   PT Last Visit   PT Visit Date 01/03/23   Note Type   Note Type Treatment   Pain Assessment   Pain Assessment Tool 0-10   Pain Score 6   Pain Location/Orientation Orientation: Bilateral;Location: Leg   Pain Onset/Description Onset: Ongoing; Descriptor: Sore   Patient's Stated Pain Goal No pain   Hospital Pain Intervention(s) Repositioned; Ambulation/increased activity; Emotional support   Restrictions/Precautions   Weight Bearing Precautions Per Order No   Other Precautions Chair Alarm; Bed Alarm; Fall Risk;Pain;Multiple lines   General   Chart Reviewed Yes   Response to Previous Treatment Patient with no complaints from previous session  Family/Caregiver Present Yes   Cognition   Overall Cognitive Status WFL   Arousal/Participation Alert; Cooperative   Attention Within functional limits   Orientation Level Oriented X4   Memory Within functional limits   Following Commands Follows all commands and directions without difficulty   Subjective   Subjective "I can try, I think they are  coming to remove myu tubes shortly "   Bed Mobility   Rolling R 5  Supervision   Additional items Assist x 1;HOB elevated; Increased time required;Verbal cues   Rolling L 5  Supervision   Additional items Assist x 1;HOB elevated; Increased time required;Verbal cues   Supine to Sit 5  Supervision   Additional items Assist x 1;HOB elevated; Bedrails; Increased time required;Verbal cues   Sit to Supine 5  Supervision   Additional items Assist x 1;HOB elevated; Bedrails; Increased time required;Verbal cues   Transfers   Sit to Stand   (CG)   Additional items Assist x 1;Bedrails; Increased time required;Verbal cues  (RW)   Stand to Sit   (CG)   Additional items Assist x 1;Bedrails; Increased time required;Verbal cues   Stand pivot   (CG) Additional items Assist x 1; Increased time required;Verbal cues  (RW)   Toilet transfer   (CG)   Additional items Assist x 1; Increased time required;Standard toilet  (RW)   Ambulation/Elevation   Gait pattern Improper Weight shift;Decreased foot clearance; Antalgic;Narrow DEBBIE   Gait Assistance   (CG)   Additional items Assist x 1;Verbal cues   Assistive Device Rolling walker   Distance 25 ft x2   Ambulation/Elevation Additional Comments VC's for proper hand placement during transitions   Balance   Static Sitting Fair +   Dynamic Sitting Fair +   Static Standing Fair   Dynamic Standing Fair -   Ambulatory Fair -   Endurance Deficit   Endurance Deficit Yes   Activity Tolerance   Activity Tolerance Patient limited by fatigue;Patient limited by pain   Nurse Made Aware yes   Exercises   Quad Sets Sitting;20 reps;AROM; Bilateral   Heelslides Sitting;20 reps;AROM; Bilateral   Glute Sets Sitting;20 reps;AROM; Bilateral   Hip Flexion Sitting;20 reps;AROM; Bilateral   Hip Abduction Sitting;20 reps;AROM; Bilateral   Hip Adduction Sitting;20 reps;AROM; Bilateral   Knee AROM Long Arc Quad Sitting;20 reps;AROM; Bilateral   Ankle Pumps Sitting;20 reps;AROM; Bilateral   Marching Sitting;20 reps;AROM; Bilateral   Neuro re-ed dynamic standing balance activities performed including multidirectional weight shifting, reaching across midline, and turning 360*B directions supported by RW and CGA   Assessment   Prognosis Good   Problem List Decreased strength;Decreased endurance; Impaired balance;Decreased mobility   Goals   Patient Goals to go home   PT Treatment Day 5   Plan   Treatment/Interventions Functional transfer training;LE strengthening/ROM; Elevations; Therapeutic exercise; Endurance training;Patient/family training;Equipment eval/education; Bed mobility;Gait training;Spoke to nursing; Compensatory technique education   Progress Progressing toward goals   PT Frequency 3-5x/wk   Recommendation   PT Discharge Recommendation Post acute rehabilitation services   AM-PAC Basic Mobility Inpatient   Turning in Bed Without Bedrails 3   Lying on Back to Sitting on Edge of Flat Bed 3   Moving Bed to Chair 3   Standing Up From Chair 3   Walk in Room 3   Climb 3-5 Stairs 2   Basic Mobility Inpatient Raw Score 17   Basic Mobility Standardized Score 39 67   Highest Level Of Mobility   -HL Goal 5: Stand one or more mins   JH-HLM Achieved 7: Walk 25 feet or more       The patient's AM-PAC Basic Mobility Inpatient Short Form Raw Score is 17, Standardized Score is 39 67  A standardized score less than 42 9 suggests the patient may benefit from discharge to post-acute rehabilitation services  Please also refer to the recommendation of the Physical Therapist for safe discharge planning  Pt seen for PT treatment session this date with interventions consisting of bed mobility tasks, transfer training, gait training, toilet transfer, neuromuscular re-education of movement performed to improve dynamic standing balance, seated TE, and education provided as needed for safety and direction to improve functional mobility, safety awareness, and activity tolerance  Pt agreeable to PT treatment session upon arrival, pt found resting in bed  At end of session, pt left  in bed with bed alarm activated and with all needs in reach  In comparison to previous session, pt with improvements in amount of activity tolerated   Continue to recommend STR at time of d/c in order to maximize pt's functional independence and safety w/ mobility  Pt continues to be functioning below baseline level  PT will continue to see pt while here in order to address the deficits listed above and provide interventions consistent w/ POC in effort to achieve STGs      [place], [place]

## 2023-01-03 NOTE — UTILIZATION REVIEW
Continued Stay Review    Date: 1/3                          Current Patient Class: INpatient  Current Level of Care: Med/srug    HPI:62 y o  female initially admitted on 12/4     Assessment/Plan: Tolerating diet  Continue with IV fluids for now  Oxygen weaned to off  Case management working on safe DC plan to rehab  Surgery team has to remove drains  Continues with iv fluids  ID consult:  Intraabdominal abscess likely due to diverticulitis with perforation  Has completed iv abx courses  Closely monitor off abx  Drains to RLQ intact  Ostomy present  Recheck CBC    1/2 Gen/surg:   HD#29 septic shock secondary to feculent peritonitis  POD#28 s/p laparoscopic hand-assisted sigmoid resection with transverse loop colostomy  Complicated by intra-abdominal fluid collections/abscess s/p perc drainage by IR   LLQ wound with pink/red granulation tissue; AGUSTO x 2 with minimal serous output; ostomy pink/viable, brown paste stool in bag     Vital Signs:   /Time Temp Pulse Resp BP MAP (mmHg) SpO2 O2 Device Patient Position - Orthostatic VS   01/03/23 07:28:27 -- 78 16 120/77 91 92 % None (Room air) Lying   01/02/23 2228 98 5 °F (36 9 °C) 70 18 122/71 88 93 % -- --   01/02/23 15:39:08 98 6 °F (37 °C) 71 22 114/74 87 94 % -- --   01/02/23 07:53:39 98 2 °F (36 8 °C) 70 19 122/70 87 93 % -- --   01/02/23 0750 -- -- -- -- -- -- None (Room air) --       Pertinent Labs/Diagnostic Results:   12/30 CXR: Trace effusions  12/27 KUB:  Nonobstructive bowel gas pattern   No radiographic evidence of significant ileus   A few prominent air-filled loops of small bowel are noted within the right hemiabdomen, similar to recent CT   Consider follow-up with abdominal radiography as   clinically warranted  12/24 CT Ca/P;    Status post sigmoid resection, colorectal anastomosis and transverse loop colostomy       2   Decrease in size of right-sided intra-abdominal abscess and resolution of perihepatic collections since 12/18/2022, following percutaneous placement of two drains  3   2 5 x 5 x 4 cm thin-walled pelvic collection, unchanged  4   No new collection in the abdomen or pelvis  5   Bilateral pleural effusions with compressive atelectasis in both lower lobes, developing since 12/18/2022   Coexisting lower lobe pneumonia cannot be excluded  6   Hepatic steatosis  12/21 CXR:   Probable small bibasilar pleural effusions      12/20 IR drainage: Image guided placement of two 10-Stateless all-purpose drains into a long thin fluid collection in the right abdomen yielding pus  The more vertically oriented drain snakes up to the perihepatic space  The more horizontally oriented terminates near the ascending colon  Both drains are in different parts of the same large abscess cavity         Results from last 7 days   Lab Units 01/03/23  0529 01/02/23  0502 01/01/23  0506 12/31/22  0445 12/30/22  1236   WBC Thousand/uL 8 01 8 09 8 81 10 39*  --    HEMOGLOBIN g/dL 8 6* 9 3* 8 6* 8 7*  --    I STAT HEMOGLOBIN g/dl  --   --   --   --  8 5*   HEMATOCRIT % 27 6* 30 2* 27 5* 28 2*  --    HEMATOCRIT, ISTAT %  --   --   --   --  25*   PLATELETS Thousands/uL 571* 625* 560* 614*  --    NEUTROS ABS Thousands/µL 5 49  --   --   --   --    BANDS PCT %  --   --  1  --   --          Results from last 7 days   Lab Units 01/03/23  0529 01/01/23  0506 12/31/22  0445 12/30/22  1236 12/30/22  1228 12/30/22  0540   SODIUM mmol/L 136 137 137  --  136 138   POTASSIUM mmol/L 3 9 3 9 4 0  --  3 9 3 9   CHLORIDE mmol/L 103 102 102  --  100 104   CO2 mmol/L 25 26 28  --  25 27   CO2, I-STAT mmol/L  --   --   --  22  --   --    ANION GAP mmol/L 8 9 7  --  11 7   BUN mg/dL 8 6 5  --  6 6   CREATININE mg/dL 0 70 0 63 0 59*  --  0 64 0 65   EGFR ml/min/1 73sq m 93 96 98  --  95 95   CALCIUM mg/dL 8 8 8 5 8 5  --  8 7 8 3*   CALCIUM, IONIZED, ISTAT mmol/L  --   --   --  1 20  --   --    MAGNESIUM mg/dL 2 0  --   --   --  2 0  --      Results from last 7 days   Lab Units 12/30/22  1228   AST U/L 15   ALT U/L 14   ALK PHOS U/L 86   TOTAL PROTEIN g/dL 6 2*   ALBUMIN g/dL 2 8*   TOTAL BILIRUBIN mg/dL 0 25     Results from last 7 days   Lab Units 12/30/22  1222   POC GLUCOSE mg/dl 128     Results from last 7 days   Lab Units 01/03/23  0529 01/01/23  0506 12/31/22  0445 12/30/22  1228 12/30/22  0540 12/28/22  0507   GLUCOSE RANDOM mg/dL 101 104 101 120 105 114       Results from last 7 days   Lab Units 12/30/22  1236   I STAT BASE EXC mmol/L 1   I STAT O2 SAT % 94*   ISTAT PH ART  7 631*   I STAT ART PCO2 mm HG 20 0*   I STAT ART PO2 mm HG 56 0*   I STAT ART HCO3 mmol/L 21 1*         Results from last 7 days   Lab Units 12/30/22  1650 12/30/22  1438 12/30/22  1228   HS TNI 0HR ng/L  --   --  14   HS TNI 2HR ng/L  --  14  --    HSTNI D2 ng/L  --  0  --    HS TNI 4HR ng/L 14  --   --    HSTNI D4 ng/L 0  --   --        Results from last 7 days   Lab Units 12/30/22  1228   PROCALCITONIN ng/ml 0 15     Results from last 7 days   Lab Units 12/30/22  1438 12/30/22  1228   LACTIC ACID mmol/L 1 7 2 4*         Medications:   Scheduled Medications:  amLODIPine, 10 mg, Oral, Daily  enoxaparin, 40 mg, Subcutaneous, Q24H EAN  lidocaine, 2 patch, Topical, Daily  melatonin, 6 mg, Per NG Tube, HS  nebivolol, 5 mg, Oral, Daily  pantoprazole, 40 mg, Oral, BID AC      Continuous IV Infusions:  dextrose 5 % and sodium chloride 0 45 % with KCl 20 mEq/L, 50 mL/hr, Intravenous, Continuous      PRN Meds:  acetaminophen, 975 mg, Oral, Q6H PRN  calcium carbonate, 500 mg, Oral, TID PRN  HYDROcodone-acetaminophen, 1 tablet, Oral, Q6H PRN  metoclopramide, 10 mg, Intravenous, Q6H PRN  ondansetron, 4 mg, Intravenous, Q4H PRN  phenol, 1 spray, Mouth/Throat, Q2H PRN  sodium chloride, 1 spray, Each Nare, Q1H PRN  traMADol, 50 mg, Oral, Q6H PRN        Discharge Plan: D    Network Utilization Review Department  ATTENTION: Please call with any questions or concerns to [phone] and carefully listen to the prompts so that you are directed to the right person  All voicemails are confidential   [address] Doing all requests for admission clinical reviews, approved or denied determinations and any other requests to dedicated fax number below belonging to the campus where the patient is receiving treatment   List of dedicated fax numbers for the Facilities:  [address] DENIALS (Administrative/Medical Necessity) [phone]   [address] (Maternity/NICU/Pediatrics) [phone]   [address] Ronel Gutiérrez [phone]   [facility]  [phone]   [facility] [address] [address] [address] [place] [address] [phone]   [facility] 134 815 [facility] [phone]

## 2023-01-03 NOTE — CASE MANAGEMENT
Case Management Discharge Planning Note    Patient name Zechariah Mendoza  Location [address] [phone]/-[id number] MRN [id number]  : 1960 Date 1/3/2023       Current Admission Date: 2022  Current Admission Diagnosis:Sigmoid diverticulitis   Patient Active Problem List    Diagnosis Date Noted   • Fever 2022   • Pleural effusion    • Postoperative intra-abdominal abscess    • Anemia 2022   • Encephalopathy 2022   • Acute respiratory failure with hypoxia ([facility] [address] [facility] ) 2022   • GERD (gastroesophageal reflux disease) 2022   • Hypertension 2022   • Bronchomalacia 2022   • Asthma 2022   • Septic shock ([facility] ) 2022   • Sigmoid diverticulitis 2022      LOS (days): 30  Geometric Mean LOS (GMLOS) (days): 9 60  Days to GMLOS:-20 6     OBJECTIVE:  Risk of Unplanned Readmission Score: 18 51         Current admission status: Inpatient   Preferred Pharmacy:   [facility]pharmacy [address] [place] [address] 170 [facility]  [address] [place] [address] [place] [address]  Phone: [phone] Fax: [phone]    Primary Care Provider: Ildefonso Navarro    Primary Insurance: [facility]  Secondary Insurance: MEDICARE    DISCHARGE DETAILS:    Discharge planning discussed with[de-identified] Surgical team and Maura Blakely from [address] [place] [address] Ave of Choice: Yes  Comments - Freedom of Choice: Per [facility] they have received auth for pt and they can accept tomorrow 23  They requested COVID test and transport with  between [id number]  CM requested COVID test from surgical team and transport via S with [email or web address]  Awaiting confirmation fo tranpsort

## 2023-01-04 VITALS
HEIGHT: 65 IN | SYSTOLIC BLOOD PRESSURE: 122 MMHG | TEMPERATURE: 98.6 F | OXYGEN SATURATION: 94 % | HEART RATE: 83 BPM | BODY MASS INDEX: 29.57 KG/M2 | WEIGHT: 177.47 LBS | RESPIRATION RATE: 18 BRPM | DIASTOLIC BLOOD PRESSURE: 82 MMHG

## 2023-01-04 RX ORDER — ACETAMINOPHEN 325 MG/1
975 TABLET ORAL EVERY 6 HOURS PRN
Refills: 0
Start: 2023-01-04

## 2023-01-04 RX ORDER — ONDANSETRON 4 MG/1
4 TABLET, ORALLY DISINTEGRATING ORAL ONCE
Status: COMPLETED | OUTPATIENT
Start: 2023-01-04 | End: 2023-01-04

## 2023-01-04 RX ORDER — CALCIUM CARBONATE 200(500)MG
500 TABLET,CHEWABLE ORAL 3 TIMES DAILY PRN
Refills: 0
Start: 2023-01-04

## 2023-01-04 RX ADMIN — ONDANSETRON 4 MG: 4 TABLET, ORALLY DISINTEGRATING ORAL at 12:51

## 2023-01-04 RX ADMIN — ACETAMINOPHEN 325 MG: 325 TABLET ORAL at 06:00

## 2023-01-04 RX ADMIN — PANTOPRAZOLE SODIUM 40 MG: 40 TABLET, DELAYED RELEASE ORAL at 06:01

## 2023-01-04 RX ADMIN — AMLODIPINE BESYLATE 10 MG: 10 TABLET ORAL at 09:56

## 2023-01-04 RX ADMIN — NEBIVOLOL 5 MG: 5 TABLET ORAL at 09:56

## 2023-01-04 NOTE — PLAN OF CARE
Problem: OCCUPATIONAL THERAPY ADULT  Goal: Performs self-care activities at highest level of function for planned discharge setting  See evaluation for individualized goals  Description: Treatment Interventions: ADL retraining, Functional transfer training, UE strengthening/ROM, Endurance training, Patient/family training, Equipment evaluation/education, Energy conservation, Activityengagement, Compensatory technique education          See flowsheet documentation for full assessment, interventions and recommendations  Outcome: Progressing  Note: Limitation: Decreased ADL status, Decreased UE strength, Decreased Safe judgement during ADL, Decreased endurance, Decreased self-care trans, Decreased high-level ADLs  Prognosis: Good  Assessment: Patient participated in Skilled OT session this date with interventions consisting of safety awareness and fall prevention techniques and  therapeutic activities to: increase activity tolerance, and increase EOB/sitting tolerance (for brushing teeth at end of session)   Patient agreeable to OT treatment session, upon arrival patient was found seated in bed (back supported) > patient had call bell on to ask to use the commode upon my approach  In comparison to previous session, patient with improvements in functional mobility and activity tolerance  Patient requiring occasional rest periods and self-care assistance as noted in flow sheet/AM-PAC  Patient continues to be functioning below baseline level, occupational performance remains limited secondary to factors listed above and increased risk for falls and injury  From OT standpoint, recommendation at time of d/c would be post-acute rehabilitation services concentrating on higher level ADLs for return to baseline home life    Patient to benefit from continued Occupational Therapy treatment while in the hospital to address deficits as defined above and maximize level of functional independence with ADLs and functional mobility       OT Discharge Recommendation: Post acute rehabilitation services        [place], [place]/L

## 2023-01-04 NOTE — NURSING NOTE
Order for discharge to [place] [address] in [place] received  Pt aware of same  IV removed and covered with dsd  Discharge instructions reviewed with pt  Report to be called to facility on discharge  Pt assisted to dress  Belongings packed and to be sent with pt  Awaiting arrival of transport

## 2023-01-04 NOTE — OCCUPATIONAL THERAPY NOTE
01/04/23 0938   OT Last Visit   OT Visit Date 01/04/23   Note Type   Note Type Treatment   Restrictions/Precautions   Weight Bearing Precautions Per Order No   ADL   Toileting Assistance  5  Supervision/Setup   Toileting Deficit Setup; Increased time to complete; Bedside commode   Toileting Comments patient completed slowly, with good results  (Independent hygiene post urination)   Bed Mobility   Supine to Sit 6  Modified independent   Additional items Bedrails; Increased time required   Additional Comments patient remained seated at EOB post transfer from bedside commode, to then engage in brushing her teeth   Therapeutic Excerise-Strength   UE Strength   (*Patient declined offer of UE theraband exercise instruction and participation, stating "I'm going home today - I don't want any therapy"  )   Cognition   Overall Cognitive Status WFL   Activity Tolerance   Medical Staff Made Aware nurse Yair Whitley made aware of session's content/outcome   Assessment   Assessment Patient participated in Skilled OT session this date with interventions consisting of safety awareness and fall prevention techniques and  therapeutic activities to: increase activity tolerance, and increase EOB/sitting tolerance (for brushing teeth at end of session)   Patient agreeable to OT treatment session, upon arrival patient was found seated in bed (back supported) > patient had call bell on to ask to use the commode upon my approach  In comparison to previous session, patient with improvements in functional mobility and activity tolerance  Patient requiring occasional rest periods and self-care assistance as noted in flow sheet/AM-PAC  Patient continues to be functioning below baseline level, occupational performance remains limited secondary to factors listed above and increased risk for falls and injury     From OT standpoint, recommendation at time of d/c would be post-acute rehabilitation services concentrating on higher level ADLs for return to baseline home life  Patient to benefit from continued Occupational Therapy treatment while in the hospital to address deficits as defined above and maximize level of functional independence with ADLs and functional mobility  Plan   Treatment Interventions Functional transfer training; Activityengagement   Goal Expiration Date 01/06/23   OT Treatment Day 7   Recommendation   OT Discharge Recommendation Post acute rehabilitation services   Additional Comments 2 The patient's raw score on the AM-PAC Daily Activity inpatient short form is 19, standardized score is 40 22, greater than 39 4  Patients at this level are likely to benefit from discharge to home  Please refer to the recommendation of the Occupational Therapist for safe discharge planning     AM-PAC Daily Activity Inpatient   Lower Body Dressing 2   Bathing 3   Toileting 4   Upper Body Dressing 3   Grooming 3   Eating 4   Daily Activity Raw Score 19   Daily Activity Standardized Score (Calc for Raw Score >=11) 40 22   AM-PAC Applied Cognition Inpatient   Following a Speech/Presentation 4   Understanding Ordinary Conversation 4   Taking Medications 3   Remembering Where Things Are Placed or Put Away 4   Remembering List of 4-5 Errands 4   Taking Care of Complicated Tasks 4   Applied Cognition Raw Score 23   Applied Cognition Standardized Score 53 08       REJI Meneses/MARGA

## 2023-01-04 NOTE — PROGRESS NOTES
Progress Note - [facility] Infectious Disease   Lorri Dugan 58 y o  female MRN: [id number]  Unit/Bed#: -01 Encounter: [id number]      IMPRESSION & RECOMMENDATIONS:   1  Recurrent sepsis  Appears to be 2/2 intra-abdominal source  Negative serial blood cultures  Low clinical suspicion for pneumonia  Improving, with downtrending fever curve and WBC resolved  Hemodynamically stable    -antibiotics as below  -monitor temperature and hemodynamics  -supportive care      2  Intra-abdominal abscesses  Likely due to #3  S/p IR drain placement x2 12/20 yielding purulent drainage  Cultures positive for E  Coli  Repeat CT shows decreasing size of abscesses  There is still an undrained pelvic collection which remains unchanged and may be a post-op seroma  S/p tPA instillation into drain with tube check 12/29, per IR residual collection is in communication with the drains and no further drain manipulation needed  Patient completed >10d course of IV ceftriaxone and p o  metronidazole (from initial drain placement and 5 days from tPA instillation)  AGUSTO drains removed 1/3/22   -monitor closely off antibiotics  -serial wound exams  -close surgery follow up outpatient      3  Sigmoid diverticulitis with perforation  S/p sigmoid resection with transverse loop colostomy 12/5  Complicated by intra-abdominal abscesses    -antibiotics as above   -surgery follow up ongoing      4  Bilateral pleural effusions with associated compressive atelectasis  Likely due to intra-abdominal process  Pulmonology consulted  Agree that clinical suspicion for pna is low    -monitor resp sx and O2 requirements   -encouraged incentive spirometry and OOB     5  Antibiotic Allergy  Hx of vomiting with sulfa abx and doxy  Monitor for adverse drug reactions      Antibiotics:  D2 off Ceftriaxone/Flagyl   Post drain placement 15  Abx D17    I have discussed the above management plan in detail with patient     I have discussed the above management plan in detail with patient's RN, and the primary service, surgery AP  Subjective:  Patient has no fever, chills, sweats; no nausea, vomiting, minimal abdominal pain; tolerating oral diet; mild cough  No SOB  Objective:  Vitals:  Temp:  [98 4 °F (36 9 °C)-99 1 °F (37 3 °C)] 98 6 °F (37 °C)  HR:  [71-77] 71  Resp:  [18] 18  BP: (113-122)/(75-82) 122/82  SpO2:  [93 %-95 %] 94 %  Temp (24hrs), Av 7 °F (37 1 °C), Min:98 4 °F (36 9 °C), Max:99 1 °F (37 3 °C)  Current: Temperature: 98 6 °F (37 °C)    PHYSICAL EXAM:  General Appearance:  Appearing well, nontoxic, and in no distress   HEENT: Normocephalic, without obvious abnormality, atraumatic  Conjunctiva pink and sclera anicteric  Oropharynx moist without lesions  Lungs:   Clear to auscultation bilaterally, respirations unlabored   Heart:  RRR; no murmur, rub or gallop   Abdomen:   Soft, non-tender, non-distended, positive bowel sounds ostomy present with semisolid brown stool  Drains removed with abdominal dressings in place  Extremities: No cyanosis, clubbing or edema   Musculoskeletal: Back symmetric without curvature, ROM normal     Skin: No rashes or lesions  No draining wounds noted  Peripheral IV intact without evidence of erythema, warmth, or exudate  LABS, IMAGING, & OTHER STUDIES:  Lab Results:  I have personally reviewed pertinent labs    Results from last 7 days   Lab Units 23  0529 23  0502 23  0506   WBC Thousand/uL 8 01 8 09 8 81   HEMOGLOBIN g/dL 8 6* 9 3* 8 6*   PLATELETS Thousands/uL 571* 625* 560*     Results from last 7 days   Lab Units 23  0529 23  0506 22  0445 22  1236 22  1228   SODIUM mmol/L 136 137 137  --  136   POTASSIUM mmol/L 3 9 3 9 4 0  --  3 9   CHLORIDE mmol/L 103 102 102  --  100   CO2 mmol/L 25 26 28  --  25   CO2, I-STAT mmol/L  --   --   --  22  --    BUN mg/dL 8 6 5  --  6   CREATININE mg/dL 0 70 0 63 0 59*  --  0 64   EGFR ml/min/1 73sq m 93 96 98  --  95   GLUCOSE, ISTAT mg/dl  --   --   --  136  --    CALCIUM mg/dL 8 8 8 5 8 5  --  8 7   AST U/L  --   --   --   --  15   ALT U/L  --   --   --   --  14   ALK PHOS U/L  --   --   --   --  86         Results from last 7 days   Lab Units 12/30/22  1228   PROCALCITONIN ng/ml 0 15

## 2023-01-04 NOTE — CASE MANAGEMENT
Case Management Discharge Planning Note    Patient name Viki Loya  Location [address] 224/-[id number] MRN [id number]  : 1960 Date 2023       Current Admission Date: 2022  Current Admission Diagnosis:Sigmoid diverticulitis   Patient Active Problem List    Diagnosis Date Noted   • Fever 2022   • Pleural effusion    • Postoperative intra-abdominal abscess    • Anemia 2022   • Encephalopathy 2022   • Acute respiratory failure with hypoxia ([address] ) 2022   • GERD (gastroesophageal reflux disease) 2022   • Hypertension 2022   • Bronchomalacia 2022   • Asthma 2022   • Septic shock ([address] ) 2022   • Sigmoid diverticulitis 2022      LOS (days): 31  Geometric Mean LOS (GMLOS) (days): 9 60  Days to GMLOS:-21 4     OBJECTIVE:  Risk of Unplanned Readmission Score: 18 32         Current admission status: Inpatient   Preferred Pharmacy:   [facility]/pharmacy [address] [place] [address]  [address] Kike Lee [facility]  Phone: [phone] Fax: [phone]    Primary Care Provider: Sarah Hoskins    Primary Insurance: [place] [employer]  Secondary Insurance: MEDICARE    DISCHARGE DETAILS:    Discharge planning discussed with[de-identified] Pt, spouse, Surgical team and Lydia Pal from [address] [place] [address] Ave of Choice: Yes  Comments - Freedom of Choice: Per surgery pt stablew for d c today  Drains have been removed  [facility] has received auth for pt and they can accept today 23  COVID test negative  CM uploaded test and updated clinical  Pt nurse [facility] aware and will call report and fax AVS  Pt and spouse aware of plan for d/c and in agreement  Contacts  Patient Contacts: Tianna Babin  Relationship to Patient[de-identified] Family  Contact Method:  In Person  Reason/Outcome: Discharge Planning, Emergency Contact                        Treatment Team Recommendation: Acute Rehab  Discharge Destination Plan[de-identified] Acute Rehab  Transport at Discharge : [facility] Ambulance        Transported by [employer] and Unit #):  Michelle S  ETA of Transport (Date): 01/04/23  ETA of Transport (Time): [address] Name, [address] : [facility]  Receiving Facility/Agency Phone Number: [phone]  Facility/Agency Fax Number: [phone]

## 2023-01-04 NOTE — PROGRESS NOTES
Progress Note - General Surgery   Inge Push 58 y o  female MRN: [id number]  Unit/Bed#: -01 Encounter: [id number]    Assessment:  59 y/o F hx bronchomalacia/bronchiectasis admitted with septic shock 2/2 feculent peritonitis from peforated sigmoid diverticulitis, HD#31  POD#30 s/p emergent hand-assisted laparoscopic sigmoid colectomy, primary anastomosis, and creation of diverting transverse loop colostomy  C/b LLQ wound infection at hand port site and development of intra-abdominal abscesses s/p IR percutaneous drainage   -AVSS on room air   - cc  -Stool via transverse loop colostomy 275 cc  -Recent labs stable with no leukocytosis     Plan:  Plan to discharge to acute rehab today with 1200 pickup   All information reviewed with patient for discharge   All questions answered at bedside     Subjective/Objective   Subjective: No acute overnight events  Patient with some difficulty sleeping last evening anticipating discharge today  Addressed all concerns with patient  Provided emotional support in regard to LLQ wound  Objective:   Blood pressure 122/82, pulse 71, temperature 98 6 °F (37 °C), resp  rate 18, height 5' 5" (1 651 m), weight 80 5 kg (177 lb 7 5 oz), SpO2 94 %  ,Body mass index is 29 53 kg/m²  Intake/Output Summary (Last 24 hours) at 1/4/2023 0820  Last data filed at 1/4/2023 0105  Gross per 24 hour   Intake 240 ml   Output 375 ml   Net -135 ml       Invasive Devices     Peripheral Intravenous Line  Duration           Peripheral IV 01/02/23 Dorsal (posterior); Left Forearm 1 day          Drain  Duration           Colostomy RLQ 30 days              Physical Exam  Vitals and nursing note reviewed  Constitutional:       General: She is not in acute distress  Appearance: Normal appearance  She is obese  She is not ill-appearing or toxic-appearing  HENT:      Head: Normocephalic and atraumatic  Cardiovascular:      Rate and Rhythm: Normal rate and regular rhythm        Pulses: Normal pulses  Heart sounds: Normal heart sounds  Pulmonary:      Effort: Pulmonary effort is normal       Breath sounds: Normal breath sounds  Abdominal:      General: The ostomy site is clean  Bowel sounds are normal  There is no distension  Palpations: Abdomen is soft  Tenderness: There is no abdominal tenderness  There is no guarding or rebound  Comments: LLQ wound with pink/red granulation tissue, no surrounding erythema, edema or purulent drainage; previous AGUSTO x 2 sites with clean dressings    Musculoskeletal:         General: Normal range of motion  Right lower leg: No edema  Left lower leg: No edema  Skin:     General: Skin is warm and dry  Neurological:      General: No focal deficit present  Mental Status: She is alert and oriented to person, place, and time  Psychiatric:         Mood and Affect: Mood normal          Behavior: Behavior normal       Comments: Tearful after seeing LLQ wound       Lab, Imaging and other studies:I have personally reviewed pertinent lab results      VTE Pharmacologic Prophylaxis: Enoxaparin (Lovenox)  VTE Mechanical Prophylaxis: sequential compression device    Janey Townsend PA-C

## 2023-01-04 NOTE — DISCHARGE INSTR - AVS FIRST PAGE
WOUND CARE INSTRUCTIONS:  Please remove previous dressing/packing to left lower quadrant wound  Flush wound with normal saline  Pack wound with one moistened 4x4 gauze and dress with dry 4x4 gauze  Please complete daily  Please remove previous dressing to right lower quadrant and replace with clean dressings daily  OSTOMY CARE INSTRUCTIONS:   Empty colostomy pouch when 1/3 - 1/2 full of stool or inflated with gas  Cleanse drainage end prior to closing pouch  Change pouch every 3 days and as needed with signs of leakage  To change ostomy pouch, remove previous pouch carefully with push-pull method  Cleanse surrounding skin with warm water and washcloth  Apply barrier film to surrounding skin  Measure and cut/mold ostomy appliance to size  Apply ostomy appliance       DISCHARGE INSTRUCTIONS:   Light activity   No heavy lifting, limit lifting to 15-20 pounds for 2 weeks   No limitations for diet   Please take medications as prescribed   Please take acetaminophen/ibuprofen scheduled every 4-6 hours for pain  No antibiotics needed for discharge    Please notify general surgery office if you experience:  Fever over 101 5F  Persistent nausea or vomiting  Severe uncontrolled pain  Redness, tenderness, or signs of infection near incisions (pain, swelling, redness, yellow/green drainage)  Active or persistent bleeding from incisions  Chest pain, shortness of breath     Please call to schedule a follow up appointment in general surgery office in 2 weeks   Please call our office with any additional questions or concerns

## 2023-01-05 ENCOUNTER — TELEPHONE (OUTPATIENT)
Dept: SURGERY | Facility: CLINIC | Age: 63
End: 2023-01-05

## 2023-01-05 NOTE — UTILIZATION REVIEW
DISCHARGE INSTRUCTIONS    Name: Adrián Gaviria  YOB: 2021  Primary Diagnosis: Active Problems:    Single liveborn, born in hospital, delivered by vaginal delivery (2021)        General:     Cord Care:   Keep dry. Keep diaper folded below umbilical cord. Circumcision   Care:    Notify MD for redness, drainage or bleeding. Use Vaseline gauze over tip of penis for 1-3 days. Feeding: Breastfeed baby on demand, every 2-3 hours, (at least 8 times in a 24 hour period). Medications:   None    Birthweight: 3.68 kg  % Weight change: -5%  Discharge weight:   Wt Readings from Last 1 Encounters:   21 3.485 kg (55 %, Z= 0.13)*     * Growth percentiles are based on WHO (Boys, 0-2 years) data. Last Bilirubin:   Lab Results   Component Value Date/Time    Bilirubin, total 8.4 (H) 2021 03:34 AM         Physical Activity / Restrictions / Safety:        Positioning: Position baby on his or her back while sleeping. Use a firm mattress. No Co Bedding. Car Seat: Car seat should be reclining, rear facing, and in the back seat of the car. Notify Doctor For:     Call your baby's doctor for the following:   Fever over 100.3 degrees, taken Axillary or Rectally  Yellow Skin color  Increased irritability and / or sleepiness  Wetting less than 5 diapers per day for formula fed babies  Wetting less than 6 diapers per day once your breast milk is in, (at 117 days of age)  Diarrhea or Vomiting    Pain Management:     Pain Management: Bundling, Patting, Dress Appropriately    Follow-Up Care:     Appointment with MD: Rian Chavis MD  Call your baby's doctors office on the next business day to make an appointment for baby's first office visit.    Telephone number: [phone]      Signed By: Pasha Flanagan DO                                                                                                   Date: 2021 Time: 8:23 AM        Patient Education        Your NOTIFICATION OF ADMISSION DISCHARGE   This is a Notification of Discharge from [address]  Please be advised that this patient has been discharge from our facility  Below you will find the admission and discharge date and time including the patient’s disposition  UTILIZATION REVIEW CONTACT:  Nallely Nuñez  Utilization   Network Utilization Review Department  Phone: [phone] carefully listen to the prompts  All voicemails are confidential   Email: [email or web address]     ADMISSION INFORMATION  PRESENTATION DATE: 12/4/2022  7:07 AM  OBERVATION ADMISSION DATE:   INPATIENT ADMISSION DATE: 12/4/22 12:00 PM   DISCHARGE DATE: 1/4/2023  1:39 PM   DISPOSITION:Non [facility] Acute Rehab    IMPORTANT INFORMATION:  Send all requests for admission clinical reviews, approved or denied determinations and any other requests to dedicated fax number below belonging to the campus where the patient is receiving treatment   List of dedicated fax numbers:  [address] DENIALS (Administrative/Medical Necessity) [phone]   [address] (Maternity/NICU/Pediatrics) [phone]   [facility] [phone]   [place] [address] [phone]   [address] [phone]   220 [facility] [phone]   90 [facility] [phone]   [address] [place] [address] 119 [phone]   [facility]  [phone]   4054 [facility] [phone]   412 [facility] [address] [facility] [phone] [facility] 1 Instructions     During your baby's first few weeks, you will spend most of your time feeding, diapering, and comforting your baby. You may feel overwhelmed at times. It is normal to wonder if you know what you are doing, especially if you are first-time parents. [place] care gets easier with every day. Soon you will know what each cry means and be able to figure out what your baby needs and wants. Follow-up care is a key part of your child's treatment and safety. Be sure to make and go to all appointments, and call your doctor if your child is having problems. It's also a good idea to know your child's test results and keep a list of the medicines your child takes. How can you care for your child at home? Feeding  · Feed your baby on demand. This means that you should breastfeed or bottle-feed your baby whenever he or she seems hungry. Do not set a schedule. · During the first 2 weeks, your baby will breastfeed at least 8 times in a 24-hour period. Formula-fed babies may need fewer feedings, at least 6 every 24 hours. · These early feedings often are short. Sometimes, a  nurses or drinks from a bottle only for a few minutes. Feedings gradually will last longer. · You may have to wake your sleepy baby to feed in the first few days after birth. Sleeping  · Always put your baby to sleep on his or her back, not the stomach. This lowers the risk of sudden infant death syndrome (SIDS). · Most babies sleep for a total of 18 hours each day. They wake for a short time at least every 2 to 3 hours. · Newborns have some moments of active sleep. The baby may make sounds or seem restless. This happens about every 50 to 60 minutes and usually lasts a few minutes. · At first, your baby may sleep through loud noises. Later, noises may wake your baby. · When your  wakes up, he or she usually will be hungry and will need to be fed.   Diaper changing and bowel habits  · Try to check your baby's diaper at least every 2 hours. If it needs to be changed, do it as soon as you can. That will help prevent diaper rash. · Your 's wet and soiled diapers can give you clues about your baby's health. Babies can become dehydrated if they're not getting enough breast milk or formula or if they lose fluid because of diarrhea, vomiting, or a fever. · For the first few days, your baby may have about 3 wet diapers a day. After that, expect 6 or more wet diapers a day throughout the first month of life. It can be hard to tell when a diaper is wet if you use disposable diapers. If you cannot tell, put a piece of tissue in the diaper. It will be wet when your baby urinates. · Keep track of what bowel habits are normal or usual for your child. Umbilical cord care  · Keep your baby's diaper folded below the stump. If that doesn't work well, before you put the diaper on your baby, cut out a small area near the top of the diaper to keep the cord open to air. · To keep the cord dry, give your baby a sponge bath instead of bathing your baby in a tub or sink. The stump should fall off within a week or two. When should you call for help? Call your baby's doctor now or seek immediate medical care if:    · Your baby has a rectal temperature that is less than 97.5°F (36.4°C) or is 100.4°F (38°C) or higher. Call if you cannot take your baby's temperature but he or she seems hot.     · Your baby has no wet diapers for 6 hours.     · Your baby's skin or whites of the eyes gets a brighter or deeper yellow.     · You see pus or red skin on or around the umbilical cord stump. These are signs of infection.    Watch closely for changes in your child's health, and be sure to contact your doctor if:    · Your baby is not having regular bowel movements based on his or her age.     · Your baby cries in an unusual way or for an unusual length of time.     · Your baby is rarely awake and does not wake up for feedings, is very fussy, seems too tired to eat, or is not interested in eating. Where can you learn more? Go to http://www.gray.com/  Enter K229 in the search box to learn more about \"Your  at Home: Care Instructions. \"  Current as of: May 27, 2020               Content Version: 12.8  © 7659-8344 [employer]. Care instructions adapted under license by [employer] (which disclaims liability or warranty for this information). If you have questions about a medical condition or this instruction, always ask your healthcare professional. [place] [address] any warranty or liability for your use of this information.

## 2023-01-05 NOTE — TELEPHONE ENCOUNTER
Message sent to Dr Lexx Walsh  Pt is s/p RESECTION COLON SIGMOID LAPAROSCOPIC 12/5/22  She is currently at [facility] and the wound ostomy nurse is asking about wound care  Should packing be continued or possibly changed to a type of wound vac dressing? If Conner Rogers can't be reached @ [phone] then please contact the nurses station @ [phone]

## 2023-01-05 NOTE — TELEPHONE ENCOUNTER
Per Dr Tameka Alston: "They can do either if their wound care nurse evaluates and thinks it’s amenable to vac im ok with that Or moist to dry kerlex packing with overlying dry dressing"    Placed call and spoke with Javier Jaimes, advised her of such  She verbalized understanding and had no more questions at this time

## 2023-01-05 NOTE — TELEPHONE ENCOUNTER
Patient had surgery by Dr Humaira Garcia, currently residing at [facility] as it was a closer location to her  I called them to give all our information if they have any concerns with her wound, stoma, recovery, etc  Also scheduled post op appointment for after her d/c there in 10-14 days per facility was easier to wait until after due to transportation  Spoke with Laura, who verbalized understanding of everything and had no questions at this time  She is going to let patient know appointment day and time also

## 2023-01-05 NOTE — TELEPHONE ENCOUNTER
Spoke with PCP office to relay her clinical course from the last month and the current plan which includes rehabilitation, eventual barium enema, colonoscopy, stoma reversal  We discussed that I will be seeing her for her initial follow ups but that she may want to transition care to a local GI and surgery team in the [facility] area for subsequent care  The PCP office will make sure to set her up with follow up shortly after rehab discharge and will assist with making local referrals as needed

## 2023-01-05 NOTE — DISCHARGE SUMMARY
Discharge Summary   Cecily Al 58 y o  female MRN: [id number]  Unit/Bed#: -01 Encounter: [id number]  1/5/2023    Admission Date: 12/4/2022   Discharge Date: 1/4/2023    Reason for Admission:  Admitting Diagnosis:   Diverticulitis [K57 92]  Abdominal pain [R10 9]    PMH/Secondary Diagnoses:  Past Medical History:   Diagnosis Date   • Asthma    • Bronchiectasis ([address] )    • Bronchomalacia    • GERD (gastroesophageal reflux disease)    • Hiatal hernia      Past Surgical History:   Procedure Laterality Date   • APPENDECTOMY     • CHOLECYSTECTOMY     • COLON SIGMOID RESECTION LAPAROSCOPIC N/A 12/5/2022    Procedure: RESECTION COLON SIGMOID LAPAROSCOPIC HAND-ASSISTED, diverting transverse loop colostomy;  Surgeon: Anahy Rodriguez MD;  Location: [facility];  Service: General   • HYSTERECTOMY     • IR DRAINAGE TUBE PLACEMENT  12/20/2022   • IR OTHER  12/29/2022   • KNEE SURGERY Left      Discharge Diagnoses:   Principal Problem:    Sigmoid diverticulitis  Active Problems:    Acute respiratory failure with hypoxia (HCC)    GERD (gastroesophageal reflux disease)    Hypertension    Asthma    Septic shock ([address] )    Anemia    Encephalopathy    Fever    Pleural effusion    Postoperative intra-abdominal abscess    Consultants:   Critical Care, Internal Medicine, Pulmonology, Interventional Radiology, Infectious Disease, Nutrition     Procedures/Surgeries Performed:  RESECTION COLON SIGMOID LAPAROSCOPIC HAND-ASSISTED, diverting transverse loop colostomy (Abdomen)    Significant Findings:  XR chest portable ICU    Result Date: 12/7/2022  Impression: Possible mild congestive changes and small effusions  Workstation performed: [id number]     XR chest portable ICU    Result Date: 12/7/2022  Impression: ET tube at the annamarie  Small right effusion suggested  Workstation performed: [id number]     XR chest portable ICU    Result Date: 12/6/2022  Impression: 1  Endotracheal tube positioning appropriate, 2 7 cm above the annamarie 2   Shallow depth of inspiration with crowding of bronchovascular markings or atelectasis 3  Nasogastric tube side port at level of left hemidiaphragm, consider advancement to assure the side port is within the stomach  The examination demonstrates a significant  finding and was documented as such in [facility] for liaison and referring practitioner notification  Workstation performed: [id number]     XR chest portable ICU    Result Date: 12/5/2022  Impression: No consolidation or overt pulmonary edema  Workstation performed: [id number]     CT abdomen pelvis with contrast    Result Date: 12/4/2022  Impression: Acute sigmoid diverticulitis involving a large sigmoid diverticulum  There is a small amount of pneumoperitoneum suggesting small perforation  No abscess identified  The study was marked in [facility] for immediate notification  Workstation performed: [id number]     History of Present Illness: As per Curt Mdarigal : " Rohini Caruso is a 58 y o  female who presents with first episode sigmoid diverticulitis  Symptoms started at 3 am with sharp abdominal pain on the left side radiating throughout  Had associated nausea, started to have episodes of emesis in the ED  Pain worse with movement  Labs and vitals normal  CT scan shows a large diverticulum with inflammatory changes in the sigmoid colon with some pericolonic air and a few specks of free air on the left side of the abdomen, no abscess  Patient has allergy to dilaudid, gets very nauseated with any narcotics  Fentanyl was tolerated in the past but still with some nausea  Hesitant to take pain medications but is very uncomfortable  Tylenol and toradol given and ordered scheduled  Zosyn given  No prior episodes of diverticulitis  Prior colonoscopy over 10 years ago  Maternal uncle had colon cancer at a young age, no other cancer in the family   Patient's  with a lot of experience with recurrent diverticulitis       Patient has bronchiectasis and bronchomalacia diagnosed in 2016, follows with pulmonary specialist at [facility]  Has had 2 laser treatments on her airways  Has not been down to see them since 2018/2019  Says things have been under control from that perspective "    Summary of Hospital Course: Patient initially presented to ED on 12/04/2022 with complaints of lower abdominal pain  CT performed in ED with evidence of acute sigmoid diverticulitis involving large sigmoid diverticulum with small amount of pneumoperitoneum suggesting small perforation however no abscess identified  Patient was evaluated in ED by general surgery team recommendations for admission to general surgery service for close monitoring as a level to stepdown  Plan was for conservative management with bowel rest, IV fluid hydration, IV antibiotics, scheduled anti-inflammatories and antiemetics  On hospital day 2, patient met SIRS/sepsis criteria as evidenced by fever, tachypnea, hypotension, leukopenia suspected intra-abdominal source  Patient was urgently taken to the OR for laparoscopic hand-assisted sigmoid colon resection with diverting transverse loop colostomy  Patient remained intubated postoperatively requiring care from critical care team   Arterial line, CVL, NG tube and Avery placed intraoperatively which remained in place postoperatively with subsequent removal when appropriate  Intermittently required pressor support post operatively  Patient was extubated on hospital day 3/postop day 2 to high flow supplemental O2   TF initiated via NG tube on postop day 4 with quick return of bowel function eventual transition to oral diet  Ostomy bar removed on postop day 7  On postop day 10 patient's left lower quadrant HandPort site incision opened secondary to increased/persistent drainage suggesting possible wound infection  Staples removed at bedside, cultures obtained and patient tolerated well  Patient was transitioned to wet-to-dry dressings to wound      Postop day 13 patient with acute, severe abdominal pain prompting evaluation with repeat CTA chest, CT abdomen pelvis rule out underlying abscess/PE  CT with evidence of intra-abdominal abscesses requiring combination of reinitiation of intravenous antibiotic therapy and percutaneous drainage by IR  Patient underwent IR drainage on 12/20/2022  Two IR drains were placed at that time under anesthesia  Following procedure patient was closely monitored with vitals and laboratory studies  Postop day 19 patient with persistent leukocytosis and fever prompting repeat CT chest abdomen pelvis  CT at that time showed decreased size of intra-abdominal abscesses with drains in appropriate position and bilateral pleural effusions with possible underlying pneumonia  Infectious disease and pulmonology consulted at that time  Appeared to be a smoldering inflammatory/sepsis picture  Postop day 24/25 patient IR drains reevaluated IR with instillation of tPA  After administration return of approximately 15 cc of pus from drain appeared to be approximate residual fluid collection from recent CT  Postop day 25 patient with rapid response called secondary to severe abdominal pain and hypoxia  Patient with resolution of symptoms  Tinges to closely monitor patient for low threshold to obtain DVT/PE imaging  Postop day 29 with IR drain removal   Following IR drain removal patient was stable for discharge from surgical standpoint  Prior to discharge on postop day 30 patient was tolerating surgical soft diet without increase in abdominal pain, nausea, vomiting  Patient continued to have functioning ostomy with pasty brown stool  Patient and family underwent ostomy teaching with wound care nurse  Pain was well controlled with oral medications  Patient completed 2 full antibiotic course as per infectious disease recommendations  No need for continuation of oral antibiotics upon discharge    Case management arranged for acute rehab at [facility] transport  Care instructions provided for nurses at acute rehab center to continue  Patient is to follow-up in the outpatient setting with Dr Rick Barger  Complications:   Intra abdominal abscesses     Plan Upon Discharge:  -Discharged to acute rehab facility with plans for outpatient follow up with Dr Rick Barger in general surgery office   -Discharged on home medications and over the counter pain medications as needed   -Continued local wound care and ostomy care   -Discharge instructions discussed with the patient and a print out AVS of these instructions were given to the patient by the nurse upon discharge  Discharge Diagnosis:   Principal Problem:    Sigmoid diverticulitis  Active Problems:    Acute respiratory failure with hypoxia (HCC)    GERD (gastroesophageal reflux disease)    Hypertension    Asthma    Septic shock (HCC)    Anemia    Encephalopathy    Fever    Pleural effusion    Postoperative intra-abdominal abscess    Condition at Discharge: stable     Discharge instructions/Information to patient and family:   See after visit summary for information provided to patient and family  Provisions for Follow-Up Care:  See after visit summary for information related to follow-up care and any pertinent home health orders  PCP: Ildefonso Navarro    Disposition: See After Visit Summary for discharge disposition information  Planned Readmission: No    Discharge Statement   I spent 15 minutes discharging the patient  This time was spent on the day of discharge  I had direct contact with the patient on the day of discharge  Additional documentation is required if more than 30 minutes were spent on discharge  Discharge Medications:  See after visit summary for reconciled discharge medications provided to patient and family        Michelle Montilla PA-C

## 2023-01-12 ENCOUNTER — TELEPHONE (OUTPATIENT)
Dept: SURGERY | Facility: CLINIC | Age: 63
End: 2023-01-12

## 2023-01-12 NOTE — TELEPHONE ENCOUNTER
Office visit switched to virtual, facility made aware, we can call them at [phone] the day of the appointment and they will give us a number to send the link to  Also called medical records at the facility as we have not received records, left a message to call us back

## 2023-01-12 NOTE — TELEPHONE ENCOUNTER
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 This patient’s chart needs to be merged with another chart under the name of Donavonl Chris  I believe Ginny Wilson is her  name  That name is what her appointment is made with for next week  But this telephone note has been filed under her maiden name  I have asked the patient to ask the facilit y to fax us records regarding the blood clot on the new medication  Let’s be on the lookout for [facility]  Thank you so much  Note          Emily Goldman LPN 2 days ago     MW  Patient called and given the fax number to the office  She will have them fax over her records  Note           MD Emily Lovell LPN 2 days ago     AB  Please let the patient know I will be in touch this evening  Please request records be faxed regarding new findings including testing for blood clots and treatment plan and any other update  Emily Goldman LPN routed conversation to Arturo Rachel MD 2 days ago     Emily Goldman LPN 2 days ago     MW  Patient calling and wants to know if you can give her a call back in reference to her health  She has devloped blood clots in both legs  She is at a rehab center   Please call her back at [phone]         Note

## 2023-01-16 NOTE — TELEPHONE ENCOUNTER
Placed call to medical records and requested records again as we have not received them  She stated she would print them and fax them straight to our office this morning

## 2025-04-03 NOTE — SEDATION DOCUMENTATION
2 drains placed by Dr Heaven Cartwright into abscesses in abd  200 ml thick purulent drainage  Removed  Specimens sent to lab  Drains to bulb suction  Lab results reviewed and abnormals discussed with physician.

## (undated) DEVICE — TROCARS: Brand: KII® BALLOON BLUNT TIP SYSTEM

## (undated) DEVICE — 3M™ TEGADERM™ TRANSPARENT FILM DRESSING FRAME STYLE, 1626W, 4 IN X 4-3/4 IN (10 CM X 12 CM), 50/CT 4CT/CASE: Brand: 3M™ TEGADERM™

## (undated) DEVICE — 4-PORT MANIFOLD: Brand: NEPTUNE 2

## (undated) DEVICE — NEEDLE 25G X 1 1/2

## (undated) DEVICE — 3M™ IOBAN™ 2 ANTIMICROBIAL INCISE DRAPE 6650EZ: Brand: IOBAN™ 2

## (undated) DEVICE — ENDOPATH ECHELON ENDOSCOPIC LINEAR CUTTER RELOADS, BLUE, 60MM: Brand: ECHELON ENDOPATH

## (undated) DEVICE — HARMONIC 1100 SHEARS, 36CM SHAFT LENGTH: Brand: HARMONIC

## (undated) DEVICE — SUT VICRYL 0 REEL 54 IN J287G

## (undated) DEVICE — SUT MONOCRYL 4-0 PS-2 27 IN Y426H

## (undated) DEVICE — SIGMOIDOSCOPE DISPOSABLE PIECE

## (undated) DEVICE — GAUZE SPONGES,16 PLY: Brand: CURITY

## (undated) DEVICE — 5 MM CURVED DISSECTORS WITH MONOPOLAR CAUTERY: Brand: ENDOPATH

## (undated) DEVICE — TROCAR 11MM KIT OPTICAL SEPARATOR SYSTEM

## (undated) DEVICE — STAPLER SKIN 35 WIDE ULC APPOSE

## (undated) DEVICE — SUT PDS II 1 CTX-B 36 IN ZB371

## (undated) DEVICE — ASTOUND IMPERVIOUS SURGICAL GOWN: Brand: CONVERTORS

## (undated) DEVICE — TROCAR: Brand: KII FIOS FIRST ENTRY

## (undated) DEVICE — ECHELON CIRCULAR POWERED STAPLER: Brand: ECHELON CIRCULAR

## (undated) DEVICE — MAYO STAND COVER: Brand: CONVERTORS

## (undated) DEVICE — INTENDED FOR TISSUE SEPARATION, AND OTHER PROCEDURES THAT REQUIRE A SHARP SURGICAL BLADE TO PUNCTURE OR CUT.: Brand: BARD-PARKER ® CARBON RIB-BACK BLADES

## (undated) DEVICE — HEAVY DUTY TABLE COVER: Brand: CONVERTORS

## (undated) DEVICE — ACCESS PLATFORM FOR MINIMALLY INVASIVE SURGERY.: Brand: GELPORT® LAPAROSCOPIC  SYSTEM

## (undated) DEVICE — TUBING SMOKE EVAC W/FILTRATION DEVICE PLUMEPORT ACTIV

## (undated) DEVICE — SUT SILK 3-0 SH CR/8 18 IN C013D

## (undated) DEVICE — TRAY FOLEY 16FR URIMETER SURESTEP

## (undated) DEVICE — PLUMEPEN PRO 10FT

## (undated) DEVICE — DRAPE,UNDERBUTTOCKS,PCH,STERILE: Brand: MEDLINE

## (undated) DEVICE — ENDOPATH ECHELON ENDOSCOPIC LINEAR CUTTER RELOADS, WHITE, 60MM: Brand: ECHELON ENDOPATH

## (undated) DEVICE — PROXIMATE LINEAR CUTTER RELOAD (STNADARD) , BLUE, 55MM: Brand: PROXIMATE

## (undated) DEVICE — SPONGE STICK WITH PVP-I: Brand: KENDALL

## (undated) DEVICE — 3M™ TEGADERM™ TRANSPARENT FILM DRESSING FRAME STYLE, 1624W, 2-3/8 IN X 2-3/4 IN (6 CM X 7 CM), 100/CT 4CT/CASE: Brand: 3M™ TEGADERM™

## (undated) DEVICE — GLOVE INDICATOR UNDERGLOVE SZ 7.5 GREEN

## (undated) DEVICE — PACK PBDS LAP CHOLE RF

## (undated) DEVICE — POOLE SUCTION HANDLE W/TUBING: Brand: CARDINAL HEALTH

## (undated) DEVICE — SPONGE LAP 18 X 18 IN

## (undated) DEVICE — CHLORAPREP HI-LITE 26ML ORANGE

## (undated) DEVICE — ECHELON FLEX POWERED PLUS ARTICULATING ENDOSCOPIC LINEAR CUTTER , 60MM: Brand: ECHELON FLEX

## (undated) DEVICE — GLOVE SRG BIOGEL 7

## (undated) DEVICE — LUBRICANT SURGILUBE TUBE 4 OZ  FLIP TOP

## (undated) DEVICE — ENDOPATH 5 MM GRASPERS WITH RATCHET HANDLES: Brand: ENDOPATH

## (undated) DEVICE — LEGGINGS: Brand: CONVERTORS